# Patient Record
Sex: FEMALE | Race: WHITE | Employment: OTHER | URBAN - METROPOLITAN AREA
[De-identification: names, ages, dates, MRNs, and addresses within clinical notes are randomized per-mention and may not be internally consistent; named-entity substitution may affect disease eponyms.]

---

## 2017-01-20 ENCOUNTER — HOSPITAL ENCOUNTER (OUTPATIENT)
Dept: LAB | Age: 77
Discharge: HOME | End: 2017-01-20
Payer: MEDICARE

## 2017-01-20 PROCEDURE — 83550 IRON BINDING TEST: CPT

## 2017-01-20 PROCEDURE — 82728 ASSAY OF FERRITIN: CPT

## 2017-01-21 LAB
ALBUMIN SERPL-MCNC: 4.2 G/DL (ref 3.5–4.8)
ALBUMIN/GLOB SERPL: 1.4 {RATIO} (ref 1.1–2.5)
ALP SERPL-CCNC: 61 IU/L (ref 39–117)
ALT SERPL-CCNC: 23 IU/L (ref 0–32)
AST SERPL-CCNC: 17 IU/L (ref 0–40)
BILIRUB SERPL-MCNC: 0.2 MG/DL (ref 0–1.2)
BUN SERPL-MCNC: 21 MG/DL (ref 8–27)
BUN/CREAT SERPL: 24 (ref 11–26)
CALCIUM SERPL-MCNC: 9.6 MG/DL (ref 8.7–10.3)
CHLORIDE SERPL-SCNC: 96 MMOL/L (ref 96–106)
CHOLEST SERPL-MCNC: 142 MG/DL (ref 100–199)
CO2 SERPL-SCNC: 24 MMOL/L (ref 18–29)
CREAT SERPL-MCNC: 0.86 MG/DL (ref 0.57–1)
ERYTHROCYTE [DISTWIDTH] IN BLOOD BY AUTOMATED COUNT: 14.7 % (ref 12.3–15.4)
EST. AVERAGE GLUCOSE BLD GHB EST-MCNC: 143 MG/DL
GLOBULIN SER CALC-MCNC: 2.9 G/DL (ref 1.5–4.5)
GLUCOSE SERPL-MCNC: 138 MG/DL (ref 65–99)
HBA1C MFR BLD: 6.6 % (ref 4.8–5.6)
HCT VFR BLD AUTO: 33.8 % (ref 34–46.6)
HDLC SERPL-MCNC: 45 MG/DL
HGB BLD-MCNC: 10.9 G/DL (ref 11.1–15.9)
LDLC SERPL CALC-MCNC: 73 MG/DL (ref 0–99)
MCH RBC QN AUTO: 28 PG (ref 26.6–33)
MCHC RBC AUTO-ENTMCNC: 32.2 G/DL (ref 31.5–35.7)
MCV RBC AUTO: 87 FL (ref 79–97)
PLATELET # BLD AUTO: 340 X10E3/UL (ref 150–379)
POTASSIUM SERPL-SCNC: 5 MMOL/L (ref 3.5–5.2)
PROT SERPL-MCNC: 7.1 G/DL (ref 6–8.5)
RBC # BLD AUTO: 3.89 X10E6/UL (ref 3.77–5.28)
SODIUM SERPL-SCNC: 137 MMOL/L (ref 134–144)
TRIGL SERPL-MCNC: 121 MG/DL (ref 0–149)
VLDLC SERPL CALC-MCNC: 24 MG/DL (ref 5–40)
WBC # BLD AUTO: 7.1 X10E3/UL (ref 3.4–10.8)

## 2017-01-21 NOTE — PROGRESS NOTES
Tell pt bs avg is 135-140 good control of diabetes  Cholesterol levels wnl and at goal  Lytes kidney and liver wnl  Tell pt she is mildly anemic--she is on aspirin and plavix at risk for gi bleeding--I recommend that she sees GI Dr. Renea Paige again for the anemia.  Might need upper endoscopy--also please add on iron profile and ferritin

## 2017-01-23 LAB
FERRITIN SERPL-MCNC: 22 NG/ML (ref 15–150)
IRON SATN MFR SERPL: 11 % (ref 15–55)
IRON SERPL-MCNC: 50 UG/DL (ref 27–139)
SPECIMEN STATUS REPORT, ROLRST: NORMAL
TIBC SERPL-MCNC: 439 UG/DL (ref 250–450)
UIBC SERPL-MCNC: 389 UG/DL (ref 118–369)

## 2017-01-23 NOTE — PROGRESS NOTES
Pt called -bp 180/100 at [facility] this evening. Mild headache. Has had 3 bp readings elevated at gyn office recently  Advised tylenol for HA. Go to  if continues to have HA and get bp repeated. Try to get in to see PCP this week for BP check. Will forward to PCP.

## 2017-01-23 NOTE — PROGRESS NOTES
Discussed with pt--take iron bid otc and see Dr Yari Martinez for EGD-d/t anemia. pt verbalized understanding

## 2017-01-25 ENCOUNTER — TELEPHONE (OUTPATIENT)
Dept: INTERNAL MEDICINE CLINIC | Age: 77
End: 2017-01-25

## 2017-01-25 NOTE — TELEPHONE ENCOUNTER
Titi with Dr. Annmarie Freitas office called and states that they are needing the pt's recent office notes to be faxed to #[phone]. Please call Titi if needed.

## 2017-02-01 ENCOUNTER — TELEPHONE (OUTPATIENT)
Dept: INTERNAL MEDICINE CLINIC | Age: 77
End: 2017-02-01

## 2017-02-01 NOTE — TELEPHONE ENCOUNTER
Pt states the iron is not agreeing with her. She has diarrhea and not feeling well. Please call today she ask.

## 2017-02-01 NOTE — TELEPHONE ENCOUNTER
I called and spoke with patient. Pt was advised to try imodium AD and to hold the Iron. She was also advised to ask the pharmacist where to find Iron Gluconate as this is easier on the stomach. She verbalized understanding.

## 2017-02-22 RX ORDER — CELECOXIB 200 MG/1
CAPSULE ORAL
Qty: 30 CAP | Refills: 6 | Status: SHIPPED | OUTPATIENT
Start: 2017-02-22 | End: 2017-09-29 | Stop reason: SDUPTHER

## 2017-03-07 ENCOUNTER — OFFICE VISIT (OUTPATIENT)
Dept: INTERNAL MEDICINE CLINIC | Age: 77
End: 2017-03-07

## 2017-03-07 VITALS
HEIGHT: 63 IN | BODY MASS INDEX: 31.01 KG/M2 | WEIGHT: 175 LBS | TEMPERATURE: 98 F | HEART RATE: 95 BPM | OXYGEN SATURATION: 96 % | DIASTOLIC BLOOD PRESSURE: 78 MMHG | SYSTOLIC BLOOD PRESSURE: 149 MMHG

## 2017-03-07 DIAGNOSIS — Z13.39 SCREENING FOR ALCOHOLISM: ICD-10-CM

## 2017-03-07 DIAGNOSIS — Z00.00 ROUTINE GENERAL MEDICAL EXAMINATION AT A HEALTH CARE FACILITY: ICD-10-CM

## 2017-03-07 DIAGNOSIS — H26.9 CATARACT: Primary | ICD-10-CM

## 2017-03-07 DIAGNOSIS — Z78.0 ASYMPTOMATIC MENOPAUSAL STATE: ICD-10-CM

## 2017-03-07 NOTE — MR AVS SNAPSHOT
Visit Information Date & Time Provider Department Dept. Phone Encounter #  
 3/7/2017  3:15 PM Jean Marie Segundo, [address] [phone] [id number] Your Appointments 5/30/2017 11:30 AM  
ROUTINE CARE with Jean Marie Segundo, [address] [facility]) Appt Note: . [phone] [id number]  
[address] [facility] [address] Upcoming Health Maintenance Date Due DTaP/Tdap/Td series (1 - Tdap) 12/23/1962 OSTEOPOROSIS SCREENING (DEXA) 12/23/2006 EYE EXAM RETINAL OR DILATED Q1 10/29/2016 MEDICARE YEARLY EXAM 12/17/2016 MICROALBUMIN Q1 4/11/2017 HEMOGLOBIN A1C Q6M 7/20/2017 FOOT EXAM Q1 10/14/2017 GLAUCOMA SCREENING Q2Y 10/29/2017 LIPID PANEL Q1 1/20/2018 COLONOSCOPY 8/29/2019 Allergies as of 3/7/2017  Review Complete On: 3/7/2017 By: Jean Marie Segundo MD  
  
 Severity Noted Reaction Type Reactions Codeine  11/16/2009    Other (comments) Jerking sensation Livalo [Pitavastatin]  10/03/2013    Diarrhea Niaspan [Niacin]  10/08/2012    Myalgia Statins-hmg-coa Reductase Inhibitors  12/07/2010    Myalgia Zocor, pravachol, Lipitor, crestor Welchol [Colesevelam]  06/03/2013    Other (comments) Nausea, bloating and malaise Zetia [Ezetimibe]  04/01/2014    Myalgia Current Immunizations  Reviewed on 2/13/2016 Name Date Influenza Vaccine 11/30/2015, 10/18/2013 Influenza Vaccine Whole 9/29/2012 PPD  Incomplete, 5/19/2011 Pneumococcal Vaccine (Unspecified Type) 1/1/2008 Zoster Vaccine, Live 2/15/2017 Not reviewed this visit You Were Diagnosed With   
  
 Codes Comments Cataract    -  Primary ICD-10-CM: H26.9 ICD-9-CM: 366.9 Asymptomatic menopausal state     ICD-10-CM: Z78.0 ICD-9-CM: V49.81 Routine general medical examination at a health care facility     ICD-10-CM: Z00.00 ICD-9-CM: V70.0 Screening for alcoholism     ICD-10-CM: Z13.89 ICD-9-CM: V79.1 Vitals BP Pulse Temp Height(growth percentile) Weight(growth percentile) SpO2  
 149/78 (BP 1 Location: Left arm, BP Patient Position: Sitting) 95 98 °F (36.7 °C) (Oral) 5' 3\" (1.6 m) 175 lb (79.4 kg) 96% BMI OB Status Smoking Status 31 kg/m2 Postmenopausal Never Smoker BMI and BSA Data Body Mass Index Body Surface Area  
 31 kg/m 2 1.88 m 2 Preferred Pharmacy Pharmacy Name Phone [facility] [phone] [phone] Your Updated Medication List  
  
   
This list is accurate as of: 3/7/17  3:26 PM.  Always use your most recent med list.  
  
  
  
  
 aspirin 81 mg tablet Take 81 mg by mouth daily. atorvastatin 40 mg tablet Commonly known as:  LIPITOR Take  by mouth daily. celecoxib 200 mg capsule Commonly known as:  CELEBREX  
take 1 capsule by mouth once daily CENTRUM SILVER PO Take 1 Tab by mouth daily. clopidogrel 75 mg Tab Commonly known as:  PLAVIX Take 1 Tab by mouth daily. COREG 6.25 mg tablet Generic drug:  carvedilol Take  by mouth two (2) times daily (with meals). cyanocobalamin 500 mcg tablet Commonly known as:  VITAMIN B12 Take 500 mcg by mouth daily. fenofibrate 54 mg tablet Commonly known as:  LOFIBRA Take  by mouth daily. fluticasone 50 mcg/actuation nasal spray Commonly known as:  Alric Eaves INSTILL 2 SPRAYS IN EACH NOSTRIL ONCE DAILY  
  
 gabapentin 300 mg capsule Commonly known as:  NEURONTIN Take 2 Caps by mouth three (3) times daily. insulin glargine 100 unit/mL (3 mL) pen Commonly known as:  LANTUS SOLOSTAR Inject 16 units at bedtime--Dose change 10/14/16--updated med list--did not send prescription to the pharmacy Insulin Needles (Disposable) 31 gauge x 3/16\" Ndle Commonly known as:  PEN NEEDLE Use as directed once daily losartan 50 mg tablet Commonly known as:  COZAAR Take  by mouth daily. montelukast 10 mg tablet Commonly known as:  SINGULAIR  
take 1 tablet by mouth once daily NORVASC 2.5 mg tablet Generic drug:  amLODIPine Take  by mouth daily. omeprazole 20 mg capsule Commonly known as:  PRILOSEC Take 1 Cap by mouth two (2) times a day. PEPCID 20 mg tablet Generic drug:  famotidine Take 20 mg by mouth as needed. ranolazine  mg SR tablet Commonly known as:  RANEXA Take 1 Tab by mouth two (2) times a day. SITagliptin-metFORMIN 50-1,000 mg per tablet Commonly known as:  JANUMET  
take 1 tablet by mouth twice a day with food To-Do List   
 03/20/2017 Imaging:  DEXA BONE DENSITY STUDY AXIAL Patient Instructions Schedule of Personalized Health Plan - female (Provide Copy to Patient) The best way to stay healthy is to live a healthy lifestyle. A healthy lifestyle includes regular exercise, eating a well-balanced diet, keeping a healthy weight and not smoking. Regular physical exams and screening tests are another important way to take care of yourself. Preventive exams provided by health care providers can find health problems early when treatment works best and can keep you from getting certain diseases or illnesses. Preventive services include exams, lab tests, screenings, shots, monitoring and information to help you take care of your own health. All people over 65 should have a pneumonia shot. Pneumonia shots are usually only needed once in a lifetime unless your doctor decides differently. All people over 65 should have a yearly flu shot. People over 65 are at medium to high risk for Hepatitis B. Three shots are needed for complete protection. In addition to your physical exam, some screening tests are recommended: 
 
Bone mass measurement (dexa scan) is recommended every two years Diabetes Mellitus screening is recommended every year. Glaucoma is an eye disease caused by high pressure in the eye. An eye exam is recommended every year. Cardiovascular screening tests that check your cholesterol and other blood fat (lipid) levels are recommended every five years. Colorectal Cancer screening tests help to find pre-cancerous polyps (growths in the colon) so they can be removed before they turn into cancer. Tests ordered for screening depend on your personal and family history risk factors. Screening for Breast Cancer is recommended yearly with a mammogram. 
 
Screening for Cervical Cancer is recommended every two years (annually for certain risk factors, such as previous history of STD or abnormal PAP in past 7 years), with a Pelvic Exam with PAP Here is a list of your current Health Maintenance items with a due date: 
Health Maintenance Due Topic Date Due  
 DTaP/Tdap/Td series (1 - Tdap) 12/23/1962  
 OSTEOPOROSIS SCREENING (DEXA)  12/23/2006  
 EYE EXAM RETINAL OR DILATED Q1  10/29/2016  MEDICARE YEARLY EXAM  12/17/2016 Introducing [facility] SERVICES! Byron Torre introduces [employer] patient portal. Now you can access parts of your medical record, email your doctor's office, and request medication refills online. 1. In your internet browser, go to [email or web address] 2. Click on the First Time User? Click Here link in the Sign In box. You will see the New Member Sign Up page. 3. Enter your [employer] Access Code exactly as it appears below. You will not need to use this code after youve completed the sign-up process. If you do not sign up before the expiration date, you must request a new code. · [employer] Access Code: [email or web address] Expires: 6/5/2017  3:26 PM 
 
4. Enter the last four digits of your Social Security Number (xxxx) and Date of Birth (mm/dd/yyyy) as indicated and click Submit. You will be taken to the next sign-up page. 5. Create a [employer] ID. This will be your [employer] login ID and cannot be changed, so think of one that is secure and easy to remember. 6. Create a [employer] password. You can change your password at any time. 7. Enter your Password Reset Question and Answer. This can be used at a later time if you forget your password. 8. Enter your e-mail address. You will receive e-mail notification when new information is available in [address] 9. Click Sign Up. You can now view and download portions of your medical record. 10. Click the Download Summary menu link to download a portable copy of your medical information. If you have questions, please visit the Frequently Asked Questions section of the [employer] website. Remember, [employer] is NOT to be used for urgent needs. For medical emergencies, dial 911. Now available from your iPhone and Android! Please provide this summary of care documentation to your next provider. Your primary care clinician is listed as Nick OLIVEROS. If you have any questions after today's visit, please call [phone].

## 2017-03-07 NOTE — PROGRESS NOTES
HISTORY OF PRESENT ILLNESS  Emma Gramajo is a 77 y.o. female. HPI   preop cataract sx-Dr Tabares  Has not had any CP or angina sxs  Going to gym in [place] 3 days per week and feels ok  Had egd Dr Salvador for anemia--negative per pt  On oral iron but causes diarrhea    Patient Active Problem List    Diagnosis Date Noted    Unstable angina ([facility] 02/13/2016    NSTEMI (non-ST elevated myocardial infarction) ([facility] 01/19/2016    Advanced care planning/counseling discussion 12/17/2015    Migraine 02/19/2014    Hiatal hernia 05/15/2012    CAD (coronary artery disease) 05/15/2012    Type 2 diabetes, controlled, with neuropathy ([facility] 11/16/2009    Essential hypertension, benign 11/16/2009    Pure hypercholesterolemia 11/16/2009    Neuropathy in diabetes ([facility] 11/16/2009    DJD (degenerative joint disease) 11/16/2009    DOMINGO (obstructive sleep apnea) 11/16/2009    Breast cancer ([facility] 11/16/2009     Current Outpatient Prescriptions   Medication Sig Dispense Refill    celecoxib (CELEBREX) 200 mg capsule take 1 capsule by mouth once daily 30 Cap 6    gabapentin (NEURONTIN) 300 mg capsule Take 2 Caps by mouth three (3) times daily. 540 Cap 3    carvedilol (COREG) 6.25 mg tablet Take  by mouth two (2) times daily (with meals).  amLODIPine (NORVASC) 2.5 mg tablet Take  by mouth daily.  losartan (COZAAR) 50 mg tablet Take  by mouth daily.  insulin glargine (LANTUS SOLOSTAR) 100 unit/mL (3 mL) pen Inject 16 units at bedtime--Dose change 10/14/16--updated med list--did not send prescription to the pharmacy 15 mL 3    fluticasone (FLONASE) 50 mcg/actuation nasal spray INSTILL 2 SPRAYS IN EACH NOSTRIL ONCE DAILY 16 g 8    sitaGLIPtin-metFORMIN (JANUMET) 50-1,000 mg per tablet take 1 tablet by mouth twice a day with food 180 Tab 3    atorvastatin (LIPITOR) 40 mg tablet Take  by mouth daily.  fenofibrate (LOFIBRA) 54 mg tablet Take  by mouth daily.       omeprazole (PRILOSEC) 20 mg capsule Take 1 Cap by mouth two (2) times a day. 60 Cap 11    montelukast (SINGULAIR) 10 mg tablet take 1 tablet by mouth once daily 30 Tab 6    cyanocobalamin (VITAMIN B12) 500 mcg tablet Take 500 mcg by mouth daily.  ranolazine ER (RANEXA) 500 mg SR tablet Take 1 Tab by mouth two (2) times a day. 60 Tab 12    clopidogrel (PLAVIX) 75 mg tablet Take 1 Tab by mouth daily. 30 Tab 11    Insulin Needles, Disposable, (PEN NEEDLE) 31 x 3/16 \" Ndle Use as directed once daily 100 Each 11    aspirin 81 mg Tab Take 81 mg by mouth daily.  MULTIVITAMINS W-MINERALS/LUT (CENTRUM SILVER PO) Take 1 Tab by mouth daily.  famotidine (PEPCID) 20 mg tablet Take 20 mg by mouth as needed.        Allergies   Allergen Reactions    Codeine Other (comments)     Jerking sensation    Livalo [Pitavastatin] Diarrhea    Niaspan [Niacin] Myalgia    Statins-Hmg-Coa Reductase Inhibitors Myalgia     Zocor, pravachol, Lipitor, crestor    Welchol [Colesevelam] Other (comments)     Nausea, bloating and malaise    Zetia [Ezetimibe] Myalgia     Past Medical History:   Diagnosis Date    Arthritis     [facility] 2002    s/p lumpectomy and XRT    Diabetes ([facility]     GERD (gastroesophageal reflux disease)     with hiatal hernia    Hypercholesterolemia     Hypertension     Neuropathy in diabetes ([facility]     DOMINGO (obstructive sleep apnea)     on CPAP     Past Surgical History:   Procedure Laterality Date    BREAST SURGERY PROCEDURE UNLISTED  2001    right lumpectomy    CARDIAC SURG PROCEDURE UNLIST      cardiac cath; 3 stents placed    HX CARPAL TUNNEL RELEASE      b/l    HX HERNIA REPAIR      HX LUMBAR FUSION  July 2015    L4-L5    HX ORTHOPAEDIC      spinal fusion    VASCULAR SURGERY PROCEDURE UNLIST      right leg vein stripping     Family History   Problem Relation Age of Onset    Diabetes Mother     Heart Disease Mother     Stroke Mother     Breast Cancer Mother 79    Diabetes Brother     Heart Disease Brother     Emphysema Father     Diabetes Daughter      Social History   Substance Use Topics    Smoking status: Never Smoker    Smokeless tobacco: Never Used    Alcohol use No      Lab Results  Component Value Date/Time   WBC 7.1 01/20/2017 09:54 AM   WBC 8.0 05/15/2012 12:00 AM   HGB 10.9 01/20/2017 09:54 AM   HCT 33.8 01/20/2017 09:54 AM   PLATELET 404 37/02/7795 09:54 AM   MCV 87 01/20/2017 09:54 AM       Lab Results  Component Value Date/Time   GFR est AA 76 01/20/2017 09:54 AM   GFR est non-AA 66 01/20/2017 09:54 AM   Creatinine 0.86 01/20/2017 09:54 AM   BUN 21 01/20/2017 09:54 AM   Sodium 137 01/20/2017 09:54 AM   Potassium 5.0 01/20/2017 09:54 AM   Chloride 96 01/20/2017 09:54 AM   CO2 24 01/20/2017 09:54 AM         Review of Systems   Constitutional: Negative for chills, fever, malaise/fatigue and weight loss. Eyes: Negative for blurred vision and double vision. Respiratory: Negative for cough and shortness of breath. Cardiovascular: Negative for chest pain and palpitations. Gastrointestinal: Negative for abdominal pain, blood in stool, constipation, diarrhea, melena, nausea and vomiting. Genitourinary: Negative for dysuria, frequency, hematuria and urgency. Musculoskeletal: Negative for back pain, falls, joint pain and myalgias. Neurological: Negative for dizziness, tremors and headaches. Physical Exam   Constitutional: She appears well-developed and well-nourished. Appears stated age   Cardiovascular: Normal rate, regular rhythm and normal heart sounds. Exam reveals no gallop and no friction rub. No murmur heard. Pulmonary/Chest: Effort normal and breath sounds normal. No respiratory distress. She has no wheezes. Abdominal: Soft. Bowel sounds are normal.   Musculoskeletal: She exhibits no edema. Neurological: She is alert. Psychiatric: She has a normal mood and affect. Nursing note and vitals reviewed.       ASSESSMENT and PLAN  [address] [place] [address] was seen today for annual wellness visit and pre-op exam.    Diagnoses and all orders for this visit:    Cataract   Acceptable candidate and risk for sx-complete preop form  Asymptomatic menopausal state  -     DEXA BONE DENSITY STUDY AXIAL;  Future    Routine general medical examination at a health care facility    Screening for alcoholism    Rtc 3 months  Follow-up Disposition: Not on File

## 2017-03-07 NOTE — PROGRESS NOTES
This is a Subsequent Medicare Annual Wellness Visit providing Personalized Prevention Plan Services (PPPS) (Performed 12 months after initial AWV and PPPS )    I have reviewed the patient's medical history in detail and updated the computerized patient record. History     Past Medical History:   Diagnosis Date    Arthritis     Breast cancer ([facility] 2002    s/p lumpectomy and XRT    Diabetes ([facility]     GERD (gastroesophageal reflux disease)     with hiatal hernia    Hypercholesterolemia     Hypertension     Neuropathy in diabetes ([facility]     DOMINGO (obstructive sleep apnea)     on CPAP      Past Surgical History:   Procedure Laterality Date    BREAST SURGERY PROCEDURE UNLISTED  2001    right lumpectomy    CARDIAC SURG PROCEDURE UNLIST      cardiac cath; 3 stents placed    HX CARPAL TUNNEL RELEASE      b/l    HX HERNIA REPAIR      HX LUMBAR FUSION  July 2015    L4-L5    HX ORTHOPAEDIC      spinal fusion    VASCULAR SURGERY PROCEDURE UNLIST      right leg vein stripping     Current Outpatient Prescriptions   Medication Sig Dispense Refill    celecoxib (CELEBREX) 200 mg capsule take 1 capsule by mouth once daily 30 Cap 6    gabapentin (NEURONTIN) 300 mg capsule Take 2 Caps by mouth three (3) times daily. 540 Cap 3    carvedilol (COREG) 6.25 mg tablet Take  by mouth two (2) times daily (with meals).  amLODIPine (NORVASC) 2.5 mg tablet Take  by mouth daily.  losartan (COZAAR) 50 mg tablet Take  by mouth daily.  insulin glargine (LANTUS SOLOSTAR) 100 unit/mL (3 mL) pen Inject 16 units at bedtime--Dose change 10/14/16--updated med list--did not send prescription to the pharmacy 15 mL 3    fluticasone (FLONASE) 50 mcg/actuation nasal spray INSTILL 2 SPRAYS IN EACH NOSTRIL ONCE DAILY 16 g 8    sitaGLIPtin-metFORMIN (JANUMET) 50-1,000 mg per tablet take 1 tablet by mouth twice a day with food 180 Tab 3    atorvastatin (LIPITOR) 40 mg tablet Take  by mouth daily.       fenofibrate (LOFIBRA) 54 mg tablet Take  by mouth daily.  omeprazole (PRILOSEC) 20 mg capsule Take 1 Cap by mouth two (2) times a day. 60 Cap 11    montelukast (SINGULAIR) 10 mg tablet take 1 tablet by mouth once daily 30 Tab 6    cyanocobalamin (VITAMIN B12) 500 mcg tablet Take 500 mcg by mouth daily.  ranolazine ER (RANEXA) 500 mg SR tablet Take 1 Tab by mouth two (2) times a day. 60 Tab 12    clopidogrel (PLAVIX) 75 mg tablet Take 1 Tab by mouth daily. 30 Tab 11    Insulin Needles, Disposable, (PEN NEEDLE) 31 x 3/16 \" Ndle Use as directed once daily 100 Each 11    aspirin 81 mg Tab Take 81 mg by mouth daily.  MULTIVITAMINS W-MINERALS/LUT (CENTRUM SILVER PO) Take 1 Tab by mouth daily.  famotidine (PEPCID) 20 mg tablet Take 20 mg by mouth as needed.        Allergies   Allergen Reactions    Codeine Other (comments)     Jerking sensation    Livalo [Pitavastatin] Diarrhea    Niaspan [Niacin] Myalgia    Statins-Hmg-Coa Reductase Inhibitors Myalgia     Zocor, pravachol, Lipitor, crestor    Welchol [Colesevelam] Other (comments)     Nausea, bloating and malaise    Zetia [Ezetimibe] Myalgia     Family History   Problem Relation Age of Onset    Diabetes Mother     Heart Disease Mother    [facility] Stroke Mother     Breast Cancer Mother 79    Diabetes Brother     Heart Disease Brother     Emphysema Father     Diabetes Daughter      Social History   Substance Use Topics    Smoking status: Never Smoker    Smokeless tobacco: Never Used    Alcohol use No     Patient Active Problem List   Diagnosis Code    Type 2 diabetes, controlled, with neuropathy ([facility] E11.40    Essential hypertension, benign I10    Pure hypercholesterolemia E78.00    Neuropathy in diabetes ([facility] E11.40    DJD (degenerative joint disease) M19.90    DOMINGO (obstructive sleep apnea) G47.33    Breast cancer ([facility] C50.919    Hiatal hernia K44.9    CAD (coronary artery disease) I25.10    Migraine G43.909    Advanced care planning/counseling discussion Z70.80    NSTEMI (non-ST elevated myocardial infarction) ([facility] I21.4    Unstable angina (HCC) I20.0       Depression Risk Factor Screening:     PHQ 2 / 9, over the last two weeks 3/7/2017   Little interest or pleasure in doing things Not at all   Feeling down, depressed or hopeless Not at all   Total Score PHQ 2 0     Alcohol Risk Factor Screening: On any occasion during the past 3 months, have you had more than 3 drinks containing alcohol? No    Do you average more than 7 drinks per week? No      Functional Ability and Level of Safety:     Hearing Loss   normal-to-mild    Activities of Daily Living   Self-care. Requires assistance with: no ADLs    Fall Risk     Fall Risk Assessment, last 12 mths 3/7/2017   Able to walk? Yes   Fall in past 12 months? No     Abuse Screen   None  Patient is not abused    Review of Systems   A comprehensive review of systems was negative except for that written in the HPI. Physical Examination     Evaluation of Cognitive Function:  Mood/affect:  happy  Appearance: age appropriate, casually dressed and within normal Limits  Family member/caregiver input: none    Visit Vitals    /78 (BP 1 Location: Left arm, BP Patient Position: Sitting)    Pulse 95    Temp 98 °F (36.7 °C) (Oral)    Ht 5' 3\" (1.6 m)    Wt 175 lb (79.4 kg)    SpO2 96%    BMI 31 kg/m2     General:  Alert, cooperative, no distress, appears stated age. Head:  Normocephalic, without obvious abnormality, atraumatic. Eyes:  Conjunctivae/corneas clear. PERRL, EOMs intact. Fundi benign. Ears:  Normal TMs and external ear canals both ears. Nose: Nares normal. Septum midline. Mucosa normal. No drainage or sinus tenderness. Throat: Lips, mucosa, and tongue normal. Teeth and gums normal.   Neck: Supple, symmetrical, trachea midline, no adenopathy, thyroid: no enlargement/tenderness/nodules, no carotid bruit and no JVD. Back:   Symmetric, no curvature. ROM normal. No CVA tenderness. Lungs:   Clear to auscultation bilaterally. Chest wall:  No tenderness or deformity. Heart:  Regular rate and rhythm, S1, S2 normal, no murmur, click, rub or gallop. Breast Exam:  No tenderness, masses, or nipple abnormality. Abdomen:   Soft, non-tender. Bowel sounds normal. No masses,  No organomegaly. Genitalia:  Normal female without lesion, discharge or tenderness. Rectal:  Normal tone,  no masses or tenderness  Guaiac negative stool. Extremities: Extremities normal, atraumatic, no cyanosis or edema. Pulses: 2+ and symmetric all extremities. Skin: Skin color, texture, turgor normal. No rashes or lesions. Lymph nodes: Cervical, supraclavicular, and axillary nodes normal.   Neurologic: CNII-XII intact. Normal strength, sensation and reflexes throughout. Patient Care Team:  Lesia Collins MD as PCP - Lizy Garcia RN as Ambulatory Care Navigator  Frances Mars MD as Consulting Provider (Endocrinology)  Meg Batres MD (Neurology)  Jefferson Lesches., MD (Gastroenterology)  Jose E Chacko MD (Ophthalmology)  Edilberto Potter MD (Neurosurgery)  Donavon Reddy RN as One Braulio Richard MD as Physician (Sleep Medicine)  Frances Mars MD as Consulting Provider (Endocrinology)    Advice/Referrals/Counseling   Education and counseling provided:  End-of-Life planning (with patient's consent)-see ACP note  Bone mass measurement (DEXA)-ordered      Assessment/Plan   Viji Bonilla was seen today for annual wellness visit and pre-op exam.    Diagnoses and all orders for this visit:    Cataract   Cleared for sx    Asymptomatic menopausal state  -     DEXA BONE DENSITY STUDY AXIAL; Future    rtc 3 months  Follow-up Disposition: Not on File.

## 2017-03-07 NOTE — PATIENT INSTRUCTIONS
Schedule of Personalized Health Plan - female  (Provide Copy to Patient)  The best way to stay healthy is to live a healthy lifestyle. A healthy lifestyle includes regular exercise, eating a well-balanced diet, keeping a healthy weight and not smoking. Regular physical exams and screening tests are another important way to take care of yourself. Preventive exams provided by health care providers can find health problems early when treatment works best and can keep you from getting certain diseases or illnesses. Preventive services include exams, lab tests, screenings, shots, monitoring and information to help you take care of your own health. All people over 65 should have a pneumonia shot. Pneumonia shots are usually only needed once in a lifetime unless your doctor decides differently. All people over 65 should have a yearly flu shot. People over 65 are at medium to high risk for Hepatitis B. Three shots are needed for complete protection. In addition to your physical exam, some screening tests are recommended:    Bone mass measurement (dexa scan) is recommended every two years  Diabetes Mellitus screening is recommended every year. Glaucoma is an eye disease caused by high pressure in the eye. An eye exam is recommended every year. Cardiovascular screening tests that check your cholesterol and other blood fat (lipid) levels are recommended every five years. Colorectal Cancer screening tests help to find pre-cancerous polyps (growths in the colon) so they can be removed before they turn into cancer. Tests ordered for screening depend on your personal and family history risk factors.     Screening for Breast Cancer is recommended yearly with a mammogram.    Screening for Cervical Cancer is recommended every two years (annually for certain risk factors, such as previous history of STD or abnormal PAP in past 7 years), with a Pelvic Exam with PAP    Here is a list of your current Health Maintenance items with a due date:  Health Maintenance Due   Topic Date Due    DTaP/Tdap/Td series (1 - Tdap) 12/23/1962    OSTEOPOROSIS SCREENING (DEXA)  12/23/2006    EYE EXAM RETINAL OR DILATED Q1  10/29/2016    MEDICARE YEARLY EXAM  12/17/2016

## 2017-03-07 NOTE — ACP (ADVANCE CARE PLANNING)
Pt's daughter Regla Barrientos is SDM per pt. Pt has AMD at home but may want to update.  I gave pt AMD forms for completion

## 2017-04-02 ENCOUNTER — DOCUMENTATION ONLY (OUTPATIENT)
Dept: INTERNAL MEDICINE CLINIC | Age: 77
End: 2017-04-02

## 2017-04-02 PROBLEM — M85.852 OSTEOPENIA OF LEFT THIGH: Status: ACTIVE | Noted: 2017-04-02

## 2017-04-02 NOTE — PROGRESS NOTES
Telephone call. Discussed dexa. Normal spine. Normal total hip . Mild osteopenia of fem neck. Advised calicum plus D bid, walking and repeat scan 2-3 years.

## 2017-04-18 ENCOUNTER — OFFICE VISIT (OUTPATIENT)
Dept: INTERNAL MEDICINE CLINIC | Age: 77
End: 2017-04-18

## 2017-04-18 VITALS
HEIGHT: 63 IN | DIASTOLIC BLOOD PRESSURE: 76 MMHG | WEIGHT: 174 LBS | TEMPERATURE: 97.9 F | HEART RATE: 89 BPM | OXYGEN SATURATION: 97 % | BODY MASS INDEX: 30.83 KG/M2 | SYSTOLIC BLOOD PRESSURE: 128 MMHG

## 2017-04-18 DIAGNOSIS — H26.8 OTHER CATARACT: Primary | ICD-10-CM

## 2017-04-18 RX ORDER — NITROGLYCERIN 0.4 MG/1
0.4 TABLET SUBLINGUAL
Qty: 25 TAB | Refills: 3 | Status: SHIPPED | OUTPATIENT
Start: 2017-04-18

## 2017-04-18 RX ORDER — FUROSEMIDE 20 MG/1
20 TABLET ORAL
Qty: 30 TAB | Refills: 0 | Status: SHIPPED | OUTPATIENT
Start: 2017-04-18 | End: 2018-08-10

## 2017-04-18 NOTE — PROGRESS NOTES
HISTORY OF PRESENT ILLNESS  Angela Marr is a 77 y.o. female. HPI     Here for preop cataracts--sx scheduled left eye surgery 5-8-17  Had prior right eye cataract sx lst month which went well  Had egd and colonoscopy this year for mild anemia--Dr Rider  No cp or angina sxs    Patient Active Problem List    Diagnosis Date Noted    Osteopenia of left thigh 04/02/2017    Unstable angina ([facility] 02/13/2016    NSTEMI (non-ST elevated myocardial infarction) ([facility] 01/19/2016    Advanced care planning/counseling discussion 12/17/2015    Migraine 02/19/2014    Hiatal hernia 05/15/2012    CAD (coronary artery disease) 05/15/2012    Type 2 diabetes, controlled, with neuropathy ([facility] 11/16/2009    Essential hypertension, benign 11/16/2009    Pure hypercholesterolemia 11/16/2009    Neuropathy in diabetes ([facility] 11/16/2009    DJD (degenerative joint disease) 11/16/2009    DOMINGO (obstructive sleep apnea) 11/16/2009    Breast cancer ([facility] 11/16/2009     Current Outpatient Prescriptions   Medication Sig Dispense Refill    montelukast (SINGULAIR) 10 mg tablet take 1 tablet by mouth once daily 30 Tab 11    furosemide (LASIX) 20 mg tablet Take 1 Tab by mouth daily as needed. 30 Tab 0    nitroglycerin (NITROSTAT) 0.4 mg SL tablet 1 Tab by SubLINGual route every five (5) minutes as needed for Chest Pain. 25 Tab 3    celecoxib (CELEBREX) 200 mg capsule take 1 capsule by mouth once daily 30 Cap 6    gabapentin (NEURONTIN) 300 mg capsule Take 2 Caps by mouth three (3) times daily. 540 Cap 3    famotidine (PEPCID) 20 mg tablet Take 20 mg by mouth as needed.  carvedilol (COREG) 6.25 mg tablet Take  by mouth two (2) times daily (with meals).  amLODIPine (NORVASC) 2.5 mg tablet Take  by mouth daily.  losartan (COZAAR) 50 mg tablet Take  by mouth daily.       insulin glargine (LANTUS SOLOSTAR) 100 unit/mL (3 mL) pen Inject 16 units at bedtime--Dose change 10/14/16--updated med list--did not send prescription to the pharmacy (Patient taking differently: Inject 18 units at bedtime--Dose change 10/14/16--updated med list--did not send prescription to the pharmacy) 15 mL 3    fluticasone (FLONASE) 50 mcg/actuation nasal spray INSTILL 2 SPRAYS IN EACH NOSTRIL ONCE DAILY 16 g 8    sitaGLIPtin-metFORMIN (JANUMET) 50-1,000 mg per tablet take 1 tablet by mouth twice a day with food 180 Tab 3    atorvastatin (LIPITOR) 40 mg tablet Take  by mouth daily.  fenofibrate (LOFIBRA) 54 mg tablet Take  by mouth daily.  omeprazole (PRILOSEC) 20 mg capsule Take 1 Cap by mouth two (2) times a day. 60 Cap 11    cyanocobalamin (VITAMIN B12) 500 mcg tablet Take 500 mcg by mouth daily.  ranolazine ER (RANEXA) 500 mg SR tablet Take 1 Tab by mouth two (2) times a day. 60 Tab 12    clopidogrel (PLAVIX) 75 mg tablet Take 1 Tab by mouth daily. 30 Tab 11    Insulin Needles, Disposable, (PEN NEEDLE) 31 x 3/16 \" Ndle Use as directed once daily 100 Each 11    aspirin 81 mg Tab Take 81 mg by mouth daily.  MULTIVITAMINS W-MINERALS/LUT (CENTRUM SILVER PO) Take 1 Tab by mouth daily.        Allergies   Allergen Reactions    Codeine Other (comments)     Jerking sensation    Livalo [Pitavastatin] Diarrhea    Niaspan [Niacin] Myalgia    Statins-Hmg-Coa Reductase Inhibitors Myalgia     Zocor, pravachol, Lipitor, crestor    Welchol [Colesevelam] Other (comments)     Nausea, bloating and malaise    Zetia [Ezetimibe] Myalgia     Past Medical History:   Diagnosis Date    Arthritis     Breast cancer ([facility] 2002    s/p lumpectomy and XRT    Diabetes ([facility]     GERD (gastroesophageal reflux disease)     with hiatal hernia    Hypercholesterolemia     Hypertension     Neuropathy in diabetes ([facility]     DOMINGO (obstructive sleep apnea)     on CPAP     Past Surgical History:   Procedure Laterality Date    BREAST SURGERY PROCEDURE UNLISTED  2001    right lumpectomy    CARDIAC SURG PROCEDURE UNLIST      cardiac cath; 3 stents placed    HX CARPAL TUNNEL RELEASE      b/l    HX HERNIA REPAIR      HX LUMBAR FUSION  July 2015    L4-L5    HX ORTHOPAEDIC      spinal fusion    VASCULAR SURGERY PROCEDURE UNLIST      right leg vein stripping     Family History   Problem Relation Age of Onset    Diabetes Mother     Heart Disease Mother     Stroke Mother     Breast Cancer Mother 79    Diabetes Brother     Heart Disease Brother     Emphysema Father     Diabetes Daughter      Social History   Substance Use Topics    Smoking status: Never Smoker    Smokeless tobacco: Never Used    Alcohol use No      Lab Results  Component Value Date/Time   WBC 7.1 01/20/2017 09:54 AM   WBC 8.0 05/15/2012 12:00 AM   HGB 10.9 01/20/2017 09:54 AM   HCT 33.8 01/20/2017 09:54 AM   PLATELET 171 10/63/9967 09:54 AM   MCV 87 01/20/2017 09:54 AM       Lab Results  Component Value Date/Time   Hemoglobin A1c 6.6 01/20/2017 09:54 AM   Hemoglobin A1c 6.3 10/14/2016 12:41 PM   Hemoglobin A1c 6.3 04/11/2016 09:14 AM   Hemoglobin A1c, External 6.3 04/30/2016   Glucose 138 01/20/2017 09:54 AM   Glucose (POC) 85 02/15/2016 08:03 AM   Microalb/Creat ratio (ug/mg creat.) 84.1 04/11/2016 09:14 AM   LDL, calculated 73 01/20/2017 09:54 AM   Creatinine 0.86 01/20/2017 09:54 AM      Lab Results  Component Value Date/Time   GFR est AA 76 01/20/2017 09:54 AM   GFR est non-AA 66 01/20/2017 09:54 AM   Creatinine 0.86 01/20/2017 09:54 AM   BUN 21 01/20/2017 09:54 AM   Sodium 137 01/20/2017 09:54 AM   Potassium 5.0 01/20/2017 09:54 AM   Chloride 96 01/20/2017 09:54 AM   CO2 24 01/20/2017 09:54 AM         ROS    Physical Exam   Constitutional: She appears well-developed and well-nourished. Appears stated age   Cardiovascular: Normal rate, regular rhythm and normal heart sounds. Exam reveals no gallop and no friction rub. No murmur heard. Pulmonary/Chest: Effort normal and breath sounds normal. No respiratory distress. She has no wheezes. Abdominal: Soft.  Bowel sounds are normal. Musculoskeletal: She exhibits no edema. Neurological: She is alert. Psychiatric: She has a normal mood and affect. Nursing note and vitals reviewed. ASSESSMENT and PLAN  Topher Dick was seen today for pre-op exam.    Diagnoses and all orders for this visit:    Other cataract   Acceptable candidate and risk for surgery, surgery is low risk   Will complete preop form and fax    Edema   Pt request rx for minor leg edema and weight gain, no sob sxs   Lasix 20 mg every day prn  Other orders  -     furosemide (LASIX) 20 mg tablet; Take 1 Tab by mouth daily as needed. -     nitroglycerin (NITROSTAT) 0.4 mg SL tablet; 1 Tab by SubLINGual route every five (5) minutes as needed for Chest Pain. Follow-up Disposition:  Return in about 3 months (around 7/18/2017) for f/u dm-2 htn cad hld.

## 2017-04-18 NOTE — MR AVS SNAPSHOT
Visit Information Date & Time Provider Department Dept. Phone Encounter #  
 4/18/2017  2:45 PM Kaci Alberts, [facility] [phone] [id number] Follow-up Instructions Return in about 3 months (around 7/18/2017) for f/u dm-2 htn cad hld. Your Appointments 5/30/2017 11:30 AM  
ROUTINE CARE with Kaci Alberts, [address] [facility] Appt Note: . [facility] [phone] [id number]  
[address] [facility] [address] [facility] Upcoming Health Maintenance Date Due DTaP/Tdap/Td series (1 - Tdap) 12/23/1962 MICROALBUMIN Q1 4/11/2017 HEMOGLOBIN A1C Q6M 7/20/2017 FOOT EXAM Q1 10/14/2017 LIPID PANEL Q1 1/20/2018 EYE EXAM RETINAL OR DILATED Q1 2/28/2018 MEDICARE YEARLY EXAM 3/8/2018 GLAUCOMA SCREENING Q2Y 2/28/2019 COLONOSCOPY 3/1/2022 Allergies as of 4/18/2017  Review Complete On: 3/7/2017 By: Kaci Alberts MD  
  
 Severity Noted Reaction Type Reactions Codeine  11/16/2009    Other (comments) Jerking sensation Livalo [Pitavastatin]  10/03/2013    Diarrhea Niaspan [Niacin]  10/08/2012    Myalgia Statins-hmg-coa Reductase Inhibitors  12/07/2010    Myalgia Zocor, pravachol, Lipitor, crestor Welchol [Colesevelam]  06/03/2013    Other (comments) Nausea, bloating and malaise Zetia [Ezetimibe]  04/01/2014    Myalgia Current Immunizations  Reviewed on 2/13/2016 Name Date Influenza Vaccine 11/30/2015, 10/18/2013 Influenza Vaccine Whole 9/29/2012 PPD  Incomplete, 5/19/2011 Pneumococcal Vaccine (Unspecified Type) 1/1/2008 Zoster Vaccine, Live 2/15/2017 Not reviewed this visit You Were Diagnosed With   
  
 Codes Comments Other cataract    -  Primary ICD-10-CM: H26.8 ICD-9-CM: 366.8 Vitals BP Pulse Temp Height(growth percentile) Weight(growth percentile) SpO2 128/76 (BP 1 Location: Left arm, BP Patient Position: Sitting) 89 97.9 °F (36.6 °C) (Oral) 5' 3\" (1.6 m) 174 lb (78.9 kg) 97% BMI OB Status Smoking Status 30.82 kg/m2 Postmenopausal Never Smoker BMI and BSA Data Body Mass Index Body Surface Area  
 30.82 kg/m 2 1.87 m 2 Preferred Pharmacy Pharmacy Name Phone [facility] [phone] [phone] Your Updated Medication List  
  
   
This list is accurate as of: 4/18/17  3:33 PM.  Always use your most recent med list.  
  
  
  
  
 aspirin 81 mg tablet Take 81 mg by mouth daily. atorvastatin 40 mg tablet Commonly known as:  LIPITOR Take  by mouth daily. celecoxib 200 mg capsule Commonly known as:  CELEBREX  
take 1 capsule by mouth once daily CENTRUM SILVER PO Take 1 Tab by mouth daily. clopidogrel 75 mg Tab Commonly known as:  PLAVIX Take 1 Tab by mouth daily. COREG 6.25 mg tablet Generic drug:  carvedilol Take  by mouth two (2) times daily (with meals). cyanocobalamin 500 mcg tablet Commonly known as:  VITAMIN B12 Take 500 mcg by mouth daily. fenofibrate 54 mg tablet Commonly known as:  LOFIBRA Take  by mouth daily. fluticasone 50 mcg/actuation nasal spray Commonly known as:  Trinidad Morris INSTILL 2 SPRAYS IN EACH NOSTRIL ONCE DAILY  
  
 furosemide 20 mg tablet Commonly known as:  LASIX Take 1 Tab by mouth daily as needed. gabapentin 300 mg capsule Commonly known as:  NEURONTIN Take 2 Caps by mouth three (3) times daily. insulin glargine 100 unit/mL (3 mL) pen Commonly known as:  LANTUS SOLOSTAR Inject 16 units at bedtime--Dose change 10/14/16--updated med list--did not send prescription to the pharmacy Insulin Needles (Disposable) 31 gauge x 3/16\" Ndle Commonly known as:  PEN NEEDLE Use as directed once daily  
  
 losartan 50 mg tablet Commonly known as:  COZAAR  
 Take  by mouth daily. montelukast 10 mg tablet Commonly known as:  SINGULAIR  
take 1 tablet by mouth once daily  
  
 nitroglycerin 0.4 mg SL tablet Commonly known as:  NITROSTAT  
1 Tab by SubLINGual route every five (5) minutes as needed for Chest Pain. NORVASC 2.5 mg tablet Generic drug:  amLODIPine Take  by mouth daily. omeprazole 20 mg capsule Commonly known as:  PRILOSEC Take 1 Cap by mouth two (2) times a day. PEPCID 20 mg tablet Generic drug:  famotidine Take 20 mg by mouth as needed. ranolazine  mg SR tablet Commonly known as:  RANEXA Take 1 Tab by mouth two (2) times a day. SITagliptin-metFORMIN 50-1,000 mg per tablet Commonly known as:  JANUMET  
take 1 tablet by mouth twice a day with food Prescriptions Sent to Pharmacy Refills  
 furosemide (LASIX) 20 mg tablet 0 Sig: Take 1 Tab by mouth daily as needed. Class: Normal  
 Pharmacy: [facility] [id number] #: [phone] Route: Oral  
 nitroglycerin (NITROSTAT) 0.4 mg SL tablet 3 Si Tab by SubLINGual route every five (5) minutes as needed for Chest Pain. Class: Normal  
 Pharmacy: [facility] [id number] [place] [address] Ph #: [phone] Route: SubLINGual  
  
Follow-up Instructions Return in about 3 months (around 2017) for f/u dm-2 htn cad hld. Introducing [facility]! Kandis Dow introduces [employer] patient portal. Now you can access parts of your medical record, email your doctor's office, and request medication refills online. 1. In your internet browser, go to [email or web address] 2. Click on the First Time User? Click Here link in the Sign In box. You will see the New Member Sign Up page. 3. Enter your [employer] Access Code exactly as it appears below. You will not need to use this code after youve completed the sign-up process.  If you do not sign up before the expiration date, you must request a new code. · [employer] Access Code: [email or web address] Expires: 6/5/2017  4:26 PM 
 
4. Enter the last four digits of your Social Security Number (xxxx) and Date of Birth (mm/dd/yyyy) as indicated and click Submit. You will be taken to the next sign-up page. 5. Create a [employer] ID. This will be your [employer] login ID and cannot be changed, so think of one that is secure and easy to remember. 6. Create a [employer] password. You can change your password at any time. 7. Enter your Password Reset Question and Answer. This can be used at a later time if you forget your password. 8. Enter your e-mail address. You will receive e-mail notification when new information is available in [address] 9. Click Sign Up. You can now view and download portions of your medical record. 10. Click the Download Summary menu link to download a portable copy of your medical information. If you have questions, please visit the Frequently Asked Questions section of the [employer] website. Remember, [employer] is NOT to be used for urgent needs. For medical emergencies, dial 911. Now available from your iPhone and Android! Please provide this summary of care documentation to your next provider. Your primary care clinician is listed as Luis OLIVEROS. If you have any questions after today's visit, please call [phone].

## 2017-05-27 RX ORDER — SITAGLIPTIN AND METFORMIN HYDROCHLORIDE 50; 1000 MG/1; MG/1
TABLET, FILM COATED ORAL
Qty: 180 TAB | Refills: 3 | Status: SHIPPED | OUTPATIENT
Start: 2017-05-27 | End: 2018-05-29 | Stop reason: SDUPTHER

## 2017-06-05 RX ORDER — OMEPRAZOLE 20 MG/1
CAPSULE, DELAYED RELEASE ORAL
Qty: 60 CAP | Refills: 11 | Status: SHIPPED | OUTPATIENT
Start: 2017-06-05 | End: 2018-05-29 | Stop reason: SDUPTHER

## 2017-07-16 RX ORDER — FLUTICASONE PROPIONATE 50 MCG
SPRAY, SUSPENSION (ML) NASAL
Qty: 1 BOTTLE | Refills: 8 | Status: SHIPPED | OUTPATIENT
Start: 2017-07-16 | End: 2019-01-21 | Stop reason: SDUPTHER

## 2017-08-01 ENCOUNTER — OFFICE VISIT (OUTPATIENT)
Dept: INTERNAL MEDICINE CLINIC | Age: 77
End: 2017-08-01

## 2017-08-01 ENCOUNTER — HOSPITAL ENCOUNTER (OUTPATIENT)
Dept: LAB | Age: 77
Discharge: HOME | End: 2017-08-01
Payer: MEDICARE

## 2017-08-01 VITALS
HEART RATE: 87 BPM | HEIGHT: 63 IN | OXYGEN SATURATION: 96 % | DIASTOLIC BLOOD PRESSURE: 70 MMHG | SYSTOLIC BLOOD PRESSURE: 121 MMHG | TEMPERATURE: 98.3 F | BODY MASS INDEX: 30.79 KG/M2 | WEIGHT: 173.8 LBS

## 2017-08-01 DIAGNOSIS — I25.10 CORONARY ARTERY DISEASE INVOLVING NATIVE CORONARY ARTERY OF NATIVE HEART WITHOUT ANGINA PECTORIS: ICD-10-CM

## 2017-08-01 DIAGNOSIS — I10 ESSENTIAL HYPERTENSION, BENIGN: ICD-10-CM

## 2017-08-01 DIAGNOSIS — E78.00 PURE HYPERCHOLESTEROLEMIA: ICD-10-CM

## 2017-08-01 DIAGNOSIS — D64.9 ANEMIA, UNSPECIFIED TYPE: ICD-10-CM

## 2017-08-01 DIAGNOSIS — E11.40 TYPE 2 DIABETES, CONTROLLED, WITH NEUROPATHY (HCC): Primary | ICD-10-CM

## 2017-08-01 DIAGNOSIS — I25.5 ISCHEMIC CARDIOMYOPATHY: ICD-10-CM

## 2017-08-01 PROCEDURE — 36415 COLL VENOUS BLD VENIPUNCTURE: CPT

## 2017-08-01 PROCEDURE — 80053 COMPREHEN METABOLIC PANEL: CPT

## 2017-08-01 PROCEDURE — 85027 COMPLETE CBC AUTOMATED: CPT

## 2017-08-01 PROCEDURE — 82043 UR ALBUMIN QUANTITATIVE: CPT

## 2017-08-01 PROCEDURE — 80061 LIPID PANEL: CPT

## 2017-08-01 PROCEDURE — 83036 HEMOGLOBIN GLYCOSYLATED A1C: CPT

## 2017-08-01 NOTE — MR AVS SNAPSHOT
Visit Information Date & Time Provider Department Dept. Phone Encounter #  
 8/1/2017  1:30 PM Ilana Cali, [address] [phone] [id number] Follow-up Instructions Return in about 4 months (around 12/1/2017) for dm-2 htn hld. Upcoming Health Maintenance Date Due DTaP/Tdap/Td series (1 - Tdap) 12/23/1962 MICROALBUMIN Q1 4/11/2017 HEMOGLOBIN A1C Q6M 7/20/2017 INFLUENZA AGE 9 TO ADULT 8/1/2017 FOOT EXAM Q1 10/14/2017 LIPID PANEL Q1 1/20/2018 EYE EXAM RETINAL OR DILATED Q1 2/28/2018 MEDICARE YEARLY EXAM 3/8/2018 GLAUCOMA SCREENING Q2Y 2/28/2019 COLONOSCOPY 3/1/2022 Allergies as of 8/1/2017  Review Complete On: 3/7/2017 By: Ilana Cali MD  
  
 Severity Noted Reaction Type Reactions Codeine  11/16/2009    Other (comments) Jerking sensation Livalo [Pitavastatin]  10/03/2013    Diarrhea Niaspan [Niacin]  10/08/2012    Myalgia Statins-hmg-coa Reductase Inhibitors  12/07/2010    Myalgia Zocor, pravachol, Lipitor, crestor Welchol [Colesevelam]  06/03/2013    Other (comments) Nausea, bloating and malaise Zetia [Ezetimibe]  04/01/2014    Myalgia Current Immunizations  Reviewed on 2/13/2016 Name Date Influenza Vaccine 11/30/2015, 10/18/2013 Influenza Vaccine Whole 9/29/2012 PPD  Incomplete, 5/19/2011 ZZZ-RETIRED (DO NOT USE) Pneumococcal Vaccine (Unspecified Type) 1/1/2008 Zoster Vaccine, Live 2/15/2017 Not reviewed this visit You Were Diagnosed With   
  
 Codes Comments Type 2 diabetes, controlled, with neuropathy ([facility] [address]    -  Primary ICD-10-CM: E11.40 ICD-9-CM: 250.60, 357.2 Essential hypertension, benign     ICD-10-CM: I10 
ICD-9-CM: 401.1 Pure hypercholesterolemia     ICD-10-CM: E78.00 ICD-9-CM: 272.0 Coronary artery disease involving native coronary artery of native heart without angina pectoris     ICD-10-CM: I25.10 ICD-9-CM: 414.01   
 Anemia, unspecified type     ICD-10-CM: D64.9 ICD-9-CM: 285.9 Ischemic cardiomyopathy     ICD-10-CM: I25.5 ICD-9-CM: 414.8 Vitals BP Pulse Temp Height(growth percentile) Weight(growth percentile) SpO2  
 121/70 (BP 1 Location: Left arm, BP Patient Position: Sitting) 87 98.3 °F (36.8 °C) (Oral) 5' 3\" (1.6 m) 173 lb 12.8 oz (78.8 kg) 96% BMI OB Status Smoking Status 30.79 kg/m2 Postmenopausal Never Smoker BMI and BSA Data Body Mass Index Body Surface Area 30.79 kg/m 2 1.87 m 2 Preferred Pharmacy Pharmacy Name Phone [facility] [phone] [place] [address] [phone] Your Updated Medication List  
  
   
This list is accurate as of: 8/1/17  1:55 PM.  Always use your most recent med list.  
  
  
  
  
 aspirin 81 mg tablet Take 81 mg by mouth daily. atorvastatin 40 mg tablet Commonly known as:  LIPITOR Take  by mouth daily. celecoxib 200 mg capsule Commonly known as:  CELEBREX  
take 1 capsule by mouth once daily CENTRUM SILVER PO Take 1 Tab by mouth daily. clopidogrel 75 mg Tab Commonly known as:  PLAVIX Take 1 Tab by mouth daily. COREG 6.25 mg tablet Generic drug:  carvedilol Take  by mouth two (2) times daily (with meals). cyanocobalamin 500 mcg tablet Commonly known as:  VITAMIN B12 Take 500 mcg by mouth daily. fenofibrate 54 mg tablet Commonly known as:  LOFIBRA Take  by mouth daily. fluticasone 50 mcg/actuation nasal spray Commonly known as:  FLONASE  
instill 2 sprays into each nostril daily  
  
 furosemide 20 mg tablet Commonly known as:  LASIX Take 1 Tab by mouth daily as needed. gabapentin 300 mg capsule Commonly known as:  NEURONTIN Take 2 Caps by mouth three (3) times daily. insulin glargine 100 unit/mL (3 mL) Inpn Commonly known as:  LANTUS SOLOSTAR  
 Inject 16 units at bedtime--Dose change 10/14/16--updated med list--did not send prescription to the pharmacy Insulin Needles (Disposable) 31 gauge x 3/16\" Ndle Commonly known as:  PEN NEEDLE Use as directed once daily JANUMET 50-1,000 mg per tablet Generic drug:  SITagliptin-metFORMIN  
take 1 tablet by mouth twice a day with food  
  
 losartan 50 mg tablet Commonly known as:  COZAAR Take  by mouth daily. montelukast 10 mg tablet Commonly known as:  SINGULAIR  
take 1 tablet by mouth once daily  
  
 nitroglycerin 0.4 mg SL tablet Commonly known as:  NITROSTAT  
1 Tab by SubLINGual route every five (5) minutes as needed for Chest Pain. NORVASC 2.5 mg tablet Generic drug:  amLODIPine Take  by mouth daily. omeprazole 20 mg capsule Commonly known as:  PRILOSEC  
take 1 capsule by mouth twice a day PEPCID 20 mg tablet Generic drug:  famotidine Take 20 mg by mouth as needed. ranolazine  mg SR tablet Commonly known as:  RANEXA Take 1 Tab by mouth two (2) times a day. We Performed the Following CBC W/O DIFF [21163 CPT(R)] HEMOGLOBIN A1C WITH EAG [77732 CPT(R)] LIPID PANEL [24882 CPT(R)] METABOLIC PANEL, COMPREHENSIVE [91068 CPT(R)] MICROALBUMIN, UR, RAND W/ MICROALBUMIN/CREA RATIO [id number] CPT(R)] Follow-up Instructions Return in about 4 months (around 12/1/2017) for dm-2 htn hld. Introducing [facility] & HEALTH SERVICES! [employer] introduces ubigrate patient portal. Now you can access parts of your medical record, email your doctor's office, and request medication refills online. 1. In your internet browser, go to [email or web address] 2. Click on the First Time User? Click Here link in the Sign In box. You will see the New Member Sign Up page. 3. Enter your ubigrate Access Code exactly as it appears below.  You will not need to use this code after youve completed the sign-up process. If you do not sign up before the expiration date, you must request a new code. · GetSocial Access Code: [email or web address] Expires: 10/30/2017  1:55 PM 
 
4. Enter the last four digits of your Social Security Number (xxxx) and Date of Birth (mm/dd/yyyy) as indicated and click Submit. You will be taken to the next sign-up page. 5. Create a GetSocial ID. This will be your GetSocial login ID and cannot be changed, so think of one that is secure and easy to remember. 6. Create a GetSocial password. You can change your password at any time. 7. Enter your Password Reset Question and Answer. This can be used at a later time if you forget your password. 8. Enter your e-mail address. You will receive e-mail notification when new information is available in [address] 9. Click Sign Up. You can now view and download portions of your medical record. 10. Click the Download Summary menu link to download a portable copy of your medical information. If you have questions, please visit the Frequently Asked Questions section of the GetSocial website. Remember, GetSocial is NOT to be used for urgent needs. For medical emergencies, dial 911. Now available from your iPhone and Android! Please provide this summary of care documentation to your next provider. Your primary care clinician is listed as Sharon OLIVEROS. If you have any questions after today's visit, please call [phone].

## 2017-08-01 NOTE — PROGRESS NOTES
HISTORY OF PRESENT ILLNESS  Lilo Vasquez is a 77 y.o. female. HPI   F/u DM-2 htn hld cad  In ER in July for presycnope in [place]--ER evaluation negative  Saw cardiologist Dr Cole Bustos recently- no med changes. Ef 45%  Exercise at the local gym 2 days per week , walks 6- 8 minutes on treadmill  No PAD by testing in the past at vascular MD office  No cp or angina  No back pain since sx 2 yrs ago  Last a1c was 6.6    6 months ago  fsbs fluctuates, last 2 months now occasiionaly >200. Usual range 120-150 in am, up to 200 in PM, higher now  Has pain in legs walking and has constant burning in feet -takes neurontin 1 pm, 2 hs       Patient Active Problem List    Diagnosis Date Noted    Osteopenia of left thigh 04/02/2017    Unstable angina ([facility] [address] 02/13/2016    NSTEMI (non-ST elevated myocardial infarction) ([facility] [address] 01/19/2016    Advanced care planning/counseling discussion 12/17/2015    Migraine 02/19/2014    Hiatal hernia 05/15/2012    CAD (coronary artery disease) 05/15/2012    Type 2 diabetes, controlled, with neuropathy ([facility] [address] 11/16/2009    Essential hypertension, benign 11/16/2009    Pure hypercholesterolemia 11/16/2009    Neuropathy in diabetes ([facility] [address] 11/16/2009    DJD (degenerative joint disease) 11/16/2009    DOMINGO (obstructive sleep apnea) 11/16/2009    Breast cancer ([facility] [address] 11/16/2009     Current Outpatient Prescriptions   Medication Sig Dispense Refill    fluticasone (FLONASE) 50 mcg/actuation nasal spray instill 2 sprays into each nostril daily 1 Bottle 8    omeprazole (PRILOSEC) 20 mg capsule take 1 capsule by mouth twice a day 60 Cap 11    JANUMET 50-1,000 mg per tablet take 1 tablet by mouth twice a day with food 180 Tab 3    montelukast (SINGULAIR) 10 mg tablet take 1 tablet by mouth once daily 30 Tab 11    furosemide (LASIX) 20 mg tablet Take 1 Tab by mouth daily as needed.  30 Tab 0    nitroglycerin (NITROSTAT) 0.4 mg SL tablet 1 Tab by SubLINGual route every five (5) minutes as needed for Chest Pain. 25 Tab 3    celecoxib (CELEBREX) 200 mg capsule take 1 capsule by mouth once daily 30 Cap 6    gabapentin (NEURONTIN) 300 mg capsule Take 2 Caps by mouth three (3) times daily. 540 Cap 3    famotidine (PEPCID) 20 mg tablet Take 20 mg by mouth as needed.  carvedilol (COREG) 6.25 mg tablet Take  by mouth two (2) times daily (with meals).  amLODIPine (NORVASC) 2.5 mg tablet Take  by mouth daily.  losartan (COZAAR) 50 mg tablet Take  by mouth daily.  insulin glargine (LANTUS SOLOSTAR) 100 unit/mL (3 mL) pen Inject 16 units at bedtime--Dose change 10/14/16--updated med list--did not send prescription to the pharmacy (Patient taking differently: Inject 18 units at bedtime--Dose change 10/14/16--updated med list--did not send prescription to the pharmacy) 15 mL 3    atorvastatin (LIPITOR) 40 mg tablet Take  by mouth daily.  fenofibrate (LOFIBRA) 54 mg tablet Take  by mouth daily.  cyanocobalamin (VITAMIN B12) 500 mcg tablet Take 500 mcg by mouth daily.  ranolazine ER (RANEXA) 500 mg SR tablet Take 1 Tab by mouth two (2) times a day. 60 Tab 12    clopidogrel (PLAVIX) 75 mg tablet Take 1 Tab by mouth daily. 30 Tab 11    Insulin Needles, Disposable, (PEN NEEDLE) 31 x 3/16 \" Ndle Use as directed once daily 100 Each 11    aspirin 81 mg Tab Take 81 mg by mouth daily.  MULTIVITAMINS W-MINERALS/LUT (CENTRUM SILVER PO) Take 1 Tab by mouth daily.        Allergies   Allergen Reactions    Codeine Other (comments)     Jerking sensation    Livalo [Pitavastatin] Diarrhea    Niaspan [Niacin] Myalgia    Statins-Hmg-Coa Reductase Inhibitors Myalgia     Zocor, pravachol, Lipitor, crestor    Welchol [Colesevelam] Other (comments)     Nausea, bloating and malaise    Zetia [Ezetimibe] Myalgia      Lab Results  Component Value Date/Time   Hemoglobin A1c 6.6 01/20/2017 09:54 AM   Hemoglobin A1c 6.3 10/14/2016 12:41 PM   Hemoglobin A1c 6.3 04/11/2016 09:14 AM   Hemoglobin A1c, External 6.3 04/30/2016   Glucose 138 01/20/2017 09:54 AM   Glucose (POC) 85 02/15/2016 08:03 AM   Microalb/Creat ratio (ug/mg creat.) 84.1 04/11/2016 09:14 AM   LDL, calculated 73 01/20/2017 09:54 AM   Creatinine 0.86 01/20/2017 09:54 AM      Lab Results  Component Value Date/Time   Cholesterol, total 142 01/20/2017 09:54 AM   Cholesterol (POC) 195 08/11/2011 10:08 AM   HDL Cholesterol 45 01/20/2017 09:54 AM   LDL, calculated 73 01/20/2017 09:54 AM   LDL Cholesterol (POC) 113 08/11/2011 10:08 AM   LDL-C, External 104 05/02/2016   Triglyceride 121 01/20/2017 09:54 AM   Triglycerides (POC) 256 08/11/2011 10:08 AM   CHOL/HDL Ratio 5.5 01/19/2016 12:42 PM     Lab Results  Component Value Date/Time   GFR est non-AA 66 01/20/2017 09:54 AM   GFR est AA 76 01/20/2017 09:54 AM   Creatinine 0.86 01/20/2017 09:54 AM   BUN 21 01/20/2017 09:54 AM   Sodium 137 01/20/2017 09:54 AM   Potassium 5.0 01/20/2017 09:54 AM   Chloride 96 01/20/2017 09:54 AM   CO2 24 01/20/2017 09:54 AM   Magnesium 1.5 01/19/2016 12:42 PM          ROS    Physical Exam   Constitutional: She appears well-developed and well-nourished. Appears stated age   Cardiovascular: Normal rate, regular rhythm and normal heart sounds. Exam reveals no gallop and no friction rub. No murmur heard. Pulmonary/Chest: Effort normal and breath sounds normal. No respiratory distress. She has no wheezes. Abdominal: Soft. Bowel sounds are normal.   Musculoskeletal: She exhibits no edema. Neurological: She is alert. Psychiatric: She has a normal mood and affect. Nursing note and vitals reviewed. ASSESSMENT and PLAN  Diagnoses and all orders for this visit:    1. Type 2 diabetes, controlled, with neuropathy ([address]  -     HEMOGLOBIN A1C WITH EAG  -     MICROALBUMIN, UR, RAND W/ MICROALBUMIN/CREA RATIO  -     METABOLIC PANEL, COMPREHENSIVE   Borderline control by fsbs, may need to increase lantus   Work on diet   Check feet daily-discussed    2.  Essential hypertension, benign  -     METABOLIC PANEL, COMPREHENSIVE   controlled    3. Pure hypercholesterolemia  -     METABOLIC PANEL, COMPREHENSIVE  -     LIPID PANEL   On statin tx    4. Coronary artery disease involving native coronary artery of native heart without angina pectoris   EF 45% recnet echo was done   No angina     5.  Anemia   On iron 1 tab every day-slow fe   Colonoscopy this year 1 polyp and EGD and CT was done    rtc 4 months  Follow-up Disposition: Not on File

## 2017-08-02 LAB
ALBUMIN SERPL-MCNC: 4 G/DL (ref 3.5–4.8)
ALBUMIN/CREAT UR: 126.1 MG/G CREAT (ref 0–30)
ALBUMIN/GLOB SERPL: 1.4 {RATIO} (ref 1.2–2.2)
ALP SERPL-CCNC: 50 IU/L (ref 39–117)
ALT SERPL-CCNC: 21 IU/L (ref 0–32)
AST SERPL-CCNC: 15 IU/L (ref 0–40)
BILIRUB SERPL-MCNC: <0.2 MG/DL (ref 0–1.2)
BUN SERPL-MCNC: 21 MG/DL (ref 8–27)
BUN/CREAT SERPL: 20 (ref 12–28)
CALCIUM SERPL-MCNC: 9.5 MG/DL (ref 8.7–10.3)
CHLORIDE SERPL-SCNC: 99 MMOL/L (ref 96–106)
CHOLEST SERPL-MCNC: 145 MG/DL (ref 100–199)
CO2 SERPL-SCNC: 23 MMOL/L (ref 18–29)
CREAT SERPL-MCNC: 1.07 MG/DL (ref 0.57–1)
CREAT UR-MCNC: 130.8 MG/DL
ERYTHROCYTE [DISTWIDTH] IN BLOOD BY AUTOMATED COUNT: 14.2 % (ref 12.3–15.4)
EST. AVERAGE GLUCOSE BLD GHB EST-MCNC: 131 MG/DL
GLOBULIN SER CALC-MCNC: 2.9 G/DL (ref 1.5–4.5)
GLUCOSE SERPL-MCNC: 112 MG/DL (ref 65–99)
HBA1C MFR BLD: 6.2 % (ref 4.8–5.6)
HCT VFR BLD AUTO: 32 % (ref 34–46.6)
HDLC SERPL-MCNC: 40 MG/DL
HGB BLD-MCNC: 10.4 G/DL (ref 11.1–15.9)
LDLC SERPL CALC-MCNC: 65 MG/DL (ref 0–99)
MCH RBC QN AUTO: 28.3 PG (ref 26.6–33)
MCHC RBC AUTO-ENTMCNC: 32.5 G/DL (ref 31.5–35.7)
MCV RBC AUTO: 87 FL (ref 79–97)
MICROALBUMIN UR-MCNC: 164.9 UG/ML
PLATELET # BLD AUTO: 297 X10E3/UL (ref 150–379)
POTASSIUM SERPL-SCNC: 4.8 MMOL/L (ref 3.5–5.2)
PROT SERPL-MCNC: 6.9 G/DL (ref 6–8.5)
RBC # BLD AUTO: 3.68 X10E6/UL (ref 3.77–5.28)
SODIUM SERPL-SCNC: 139 MMOL/L (ref 134–144)
TRIGL SERPL-MCNC: 198 MG/DL (ref 0–149)
VLDLC SERPL CALC-MCNC: 40 MG/DL (ref 5–40)
WBC # BLD AUTO: 8 X10E3/UL (ref 3.4–10.8)

## 2017-08-02 RX ORDER — LOSARTAN POTASSIUM 50 MG/1
100 TABLET ORAL DAILY
Qty: 90 TAB | Refills: 3 | Status: SHIPPED | OUTPATIENT
Start: 2017-08-02 | End: 2017-11-03 | Stop reason: SDUPTHER

## 2017-09-29 RX ORDER — CELECOXIB 200 MG/1
CAPSULE ORAL
Qty: 30 CAP | Refills: 6 | Status: SHIPPED | OUTPATIENT
Start: 2017-09-29 | End: 2019-08-08 | Stop reason: SDUPTHER

## 2017-10-04 RX ORDER — CELECOXIB 200 MG/1
CAPSULE ORAL
Qty: 30 CAP | Refills: 6 | Status: SHIPPED | OUTPATIENT
Start: 2017-10-04 | End: 2017-12-01 | Stop reason: SDUPTHER

## 2017-11-03 ENCOUNTER — DOCUMENTATION ONLY (OUTPATIENT)
Dept: INTERNAL MEDICINE CLINIC | Age: 77
End: 2017-11-03

## 2017-11-03 RX ORDER — LOSARTAN POTASSIUM 100 MG/1
100 TABLET ORAL DAILY
Qty: 90 TAB | Refills: 3 | Status: SHIPPED | OUTPATIENT
Start: 2017-11-03 | End: 2018-08-10

## 2017-12-01 ENCOUNTER — OFFICE VISIT (OUTPATIENT)
Dept: INTERNAL MEDICINE CLINIC | Age: 77
End: 2017-12-01

## 2017-12-01 ENCOUNTER — HOSPITAL ENCOUNTER (OUTPATIENT)
Dept: MAMMOGRAPHY | Age: 77
Discharge: HOME | End: 2017-12-01
Payer: MEDICARE

## 2017-12-01 ENCOUNTER — HOSPITAL ENCOUNTER (OUTPATIENT)
Dept: LAB | Age: 77
Discharge: HOME | End: 2017-12-01
Payer: MEDICARE

## 2017-12-01 VITALS
HEART RATE: 90 BPM | OXYGEN SATURATION: 96 % | TEMPERATURE: 97.2 F | WEIGHT: 176 LBS | HEIGHT: 63 IN | BODY MASS INDEX: 31.18 KG/M2 | SYSTOLIC BLOOD PRESSURE: 125 MMHG | DIASTOLIC BLOOD PRESSURE: 71 MMHG

## 2017-12-01 DIAGNOSIS — Z12.31 VISIT FOR SCREENING MAMMOGRAM: ICD-10-CM

## 2017-12-01 DIAGNOSIS — R11.0 NAUSEA: ICD-10-CM

## 2017-12-01 DIAGNOSIS — E53.8 B12 DEFICIENCY: ICD-10-CM

## 2017-12-01 DIAGNOSIS — E11.40 TYPE 2 DIABETES, CONTROLLED, WITH NEUROPATHY (HCC): Primary | ICD-10-CM

## 2017-12-01 DIAGNOSIS — D50.9 IRON DEFICIENCY ANEMIA, UNSPECIFIED IRON DEFICIENCY ANEMIA TYPE: ICD-10-CM

## 2017-12-01 DIAGNOSIS — E78.00 PURE HYPERCHOLESTEROLEMIA: ICD-10-CM

## 2017-12-01 DIAGNOSIS — R80.9 MICROALBUMINURIA: ICD-10-CM

## 2017-12-01 DIAGNOSIS — I10 ESSENTIAL HYPERTENSION, BENIGN: ICD-10-CM

## 2017-12-01 PROCEDURE — 83550 IRON BINDING TEST: CPT

## 2017-12-01 PROCEDURE — 77063 BREAST TOMOSYNTHESIS BI: CPT

## 2017-12-01 PROCEDURE — 83036 HEMOGLOBIN GLYCOSYLATED A1C: CPT

## 2017-12-01 PROCEDURE — 85027 COMPLETE CBC AUTOMATED: CPT

## 2017-12-01 PROCEDURE — 82728 ASSAY OF FERRITIN: CPT

## 2017-12-01 PROCEDURE — 80053 COMPREHEN METABOLIC PANEL: CPT

## 2017-12-01 PROCEDURE — 36415 COLL VENOUS BLD VENIPUNCTURE: CPT

## 2017-12-01 PROCEDURE — 82607 VITAMIN B-12: CPT

## 2017-12-01 RX ORDER — INSULIN PUMP SYRINGE, 3 ML
EACH MISCELLANEOUS
Qty: 1 KIT | Refills: 0 | Status: SHIPPED | OUTPATIENT
Start: 2017-12-01

## 2017-12-01 RX ORDER — LANCETS
EACH MISCELLANEOUS
Qty: 200 EACH | Refills: 3 | Status: SHIPPED | OUTPATIENT
Start: 2017-12-01

## 2017-12-01 RX ORDER — PEN NEEDLE, DIABETIC 31 GX3/16"
NEEDLE, DISPOSABLE MISCELLANEOUS
Qty: 100 PEN NEEDLE | Refills: 3 | Status: SHIPPED | OUTPATIENT
Start: 2017-12-01

## 2017-12-01 RX ORDER — PREGABALIN 50 MG/1
50 CAPSULE ORAL 3 TIMES DAILY
Qty: 90 CAP | Refills: 6 | Status: SHIPPED | OUTPATIENT
Start: 2017-12-01 | End: 2018-07-04 | Stop reason: SDUPTHER

## 2017-12-01 NOTE — MR AVS SNAPSHOT
Visit Information Date & Time Provider Department Dept. Phone Encounter #  
 12/1/2017  1:30 PM Marti Lama, [address] [phone] [id number] Follow-up Instructions Return in about 4 months (around 4/1/2018) for dm-2 htn hld anemia. Upcoming Health Maintenance Date Due DTaP/Tdap/Td series (1 - Tdap) 12/23/1962 HEMOGLOBIN A1C Q6M 2/1/2018 EYE EXAM RETINAL OR DILATED Q1 2/28/2018 MEDICARE YEARLY EXAM 3/8/2018 MICROALBUMIN Q1 8/1/2018 LIPID PANEL Q1 8/1/2018 FOOT EXAM Q1 10/16/2018 GLAUCOMA SCREENING Q2Y 2/28/2019 COLONOSCOPY 3/1/2022 Allergies as of 12/1/2017  Review Complete On: 12/1/2017 By: Krish Ramos LPN Severity Noted Reaction Type Reactions Codeine  11/16/2009    Other (comments) Jerking sensation Livalo [Pitavastatin]  10/03/2013    Diarrhea Niaspan [Niacin]  10/08/2012    Myalgia Statins-hmg-coa Reductase Inhibitors  12/07/2010    Myalgia Zocor, pravachol, Lipitor, crestor Welchol [Colesevelam]  06/03/2013    Other (comments) Nausea, bloating and malaise Zetia [Ezetimibe]  04/01/2014    Myalgia Current Immunizations  Reviewed on 2/13/2016 Name Date Influenza Vaccine 9/15/2017, 11/30/2015, 10/18/2013 Influenza Vaccine Whole 9/29/2012 PPD  Incomplete, 5/19/2011 ZZZ-RETIRED (DO NOT USE) Pneumococcal Vaccine (Unspecified Type) 1/1/2008 Zoster Vaccine, Live 2/15/2017 Not reviewed this visit You Were Diagnosed With   
  
 Codes Comments Type 2 diabetes, controlled, with neuropathy ([facility] [address]    -  Primary ICD-10-CM: E11.40 ICD-9-CM: 250.60, 357.2 Iron deficiency anemia, unspecified iron deficiency anemia type     ICD-10-CM: D50.9 ICD-9-CM: 280.9 Essential hypertension, benign     ICD-10-CM: I10 
ICD-9-CM: 401.1 Pure hypercholesterolemia     ICD-10-CM: E78.00 ICD-9-CM: 272.0 Microalbuminuria     ICD-10-CM: R80.9 ICD-9-CM: 791.0 B12 deficiency     ICD-10-CM: E53.8 ICD-9-CM: 266.2 Vitals BP Pulse Temp Height(growth percentile) Weight(growth percentile) SpO2  
 125/71 (BP 1 Location: Left arm, BP Patient Position: Sitting) 90 97.2 °F (36.2 °C) (Oral) 5' 3\" (1.6 m) 176 lb (79.8 kg) 96% BMI OB Status Smoking Status 31.18 kg/m2 Postmenopausal Never Smoker BMI and BSA Data Body Mass Index Body Surface Area  
 31.18 kg/m 2 1.88 m 2 Preferred Pharmacy [phone] [facility] [facility] [phone] Your Updated Medication List  
  
   
This list is accurate as of: 12/1/17  2:18 PM.  Always use your most recent med list.  
  
  
  
  
 aspirin 81 mg tablet Take 81 mg by mouth daily. atorvastatin 40 mg tablet Commonly known as:  LIPITOR Take  by mouth daily. Blood-Glucose Meter monitoring kit One touch ultra mini glucometer--dx 250.00  
  
 celecoxib 200 mg capsule Commonly known as:  CELEBREX  
take 1 capsule by mouth once daily CENTRUM SILVER PO Take 1 Tab by mouth daily. clopidogrel 75 mg Tab Commonly known as:  PLAVIX Take 1 Tab by mouth daily. COREG 6.25 mg tablet Generic drug:  carvedilol Take  by mouth two (2) times daily (with meals). cyanocobalamin 500 mcg tablet Commonly known as:  VITAMIN B12 Take 500 mcg by mouth daily. fenofibrate 54 mg tablet Commonly known as:  LOFIBRA Take  by mouth daily. fluticasone 50 mcg/actuation nasal spray Commonly known as:  FLONASE  
instill 2 sprays into each nostril daily  
  
 furosemide 20 mg tablet Commonly known as:  LASIX Take 1 Tab by mouth daily as needed. gabapentin 300 mg capsule Commonly known as:  NEURONTIN Take 2 Caps by mouth three (3) times daily. glucose blood VI test strips strip Commonly known as:  ASCENSIA AUTODISC VI, ONE TOUCH ULTRA TEST VI  
 One touch ultra test strips min---check fsbs bid  
  
 insulin glargine 100 unit/mL (3 mL) Inpn Commonly known as:  LANTUS SOLOSTAR Inject 16 units at bedtime--Dose change 10/14/16--updated med list--did not send prescription to the pharmacy Insulin Needles (Disposable) 31 gauge x 3/16\" Ndle Commonly known as:  PEN NEEDLE Use as directed once daily JANUMET 50-1,000 mg per tablet Generic drug:  SITagliptin-metFORMIN  
take 1 tablet by mouth twice a day with food Lancets Misc Check fsbs bid -250.02  
  
 losartan 100 mg tablet Commonly known as:  COZAAR Take 1 Tab by mouth daily. montelukast 10 mg tablet Commonly known as:  SINGULAIR  
take 1 tablet by mouth once daily  
  
 nitroglycerin 0.4 mg SL tablet Commonly known as:  NITROSTAT  
1 Tab by SubLINGual route every five (5) minutes as needed for Chest Pain. NORVASC 2.5 mg tablet Generic drug:  amLODIPine Take  by mouth daily. omeprazole 20 mg capsule Commonly known as:  PRILOSEC  
take 1 capsule by mouth twice a day PEPCID 20 mg tablet Generic drug:  famotidine Take 20 mg by mouth as needed. ranolazine  mg SR tablet Commonly known as:  RANEXA Take 1 Tab by mouth two (2) times a day. Prescriptions Printed Refills Insulin Needles, Disposable, (PEN NEEDLE) 31 gauge x 3/16\" ndle 3 Sig: Use as directed once daily Class: Print  
 glucose blood VI test strips (ASCENSIA AUTODISC VI, ONE TOUCH ULTRA TEST VI) strip 3 Sig: One touch ultra test strips min---check fsbs bid Class: Print Blood-Glucose Meter monitoring kit 0 Sig: One touch ultra mini glucometer--dx 250.00 Class: Print Lancets misc 3 Sig: Check fsbs bid -250.02 Class: Print We Performed the Following CBC W/O DIFF [14108 CPT(R)] FERRITIN [72987 CPT(R)] HEMOGLOBIN A1C WITH EAG [06433 CPT(R)] IRON PROFILE [id number] CPT(R)] METABOLIC PANEL, COMPREHENSIVE [81914 CPT(R)] VITAMIN B12 [id number] CPT(R)] Follow-up Instructions Return in about 4 months (around 4/1/2018) for dm-2 htn hld anemia. Introducing [facility] & HEALTH SERVICES! [facility] introduces [employer] patient portal. Now you can access parts of your medical record, email your doctor's office, and request medication refills online. 1. In your internet browser, go to [email or web address] 2. Click on the First Time User? Click Here link in the Sign In box. You will see the New Member Sign Up page. 3. Enter your [employer] Mother List Access Code exactly as it appears below. You will not need to use this code after youve completed the sign-up process. If you do not sign up before the expiration date, you must request a new code. · The [employer] List Access Code: [email or web address] Expires: 3/1/2018  2:18 PM 
 
4. Enter the last four digits of your Social Security Number (xxxx) and Date of Birth (mm/dd/yyyy) as indicated and click Submit. You will be taken to the next sign-up page. 5. Create a The Mother List ID. This will be your The Mother List login ID and cannot be changed, so think of one that is secure and easy to remember. 6. Create a The [employer] List password. You can change your password at any time. 7. Enter your Password Reset Question and Answer. This can be used at a later time if you forget your password. 8. Enter your e-mail address. You will receive e-mail notification when new information is available in [address] 9. Click Sign Up. You can now view and download portions of your medical record. 10. Click the Download Summary menu link to download a portable copy of your medical information. If you have questions, please visit the Frequently Asked Questions section of the The Mother List website. Remember, The Mother List is NOT to be used for urgent needs. For medical emergencies, dial 911. Now available from your iPhone and Android! Please provide this summary of care documentation to your next provider. Your primary care clinician is listed as Shannon OLIVEROS. If you have any questions after today's visit, please call [phone].

## 2017-12-01 NOTE — PROGRESS NOTES
HISTORY OF PRESENT ILLNESS  Meaghan Bell is a 77 y.o. female. HPI   F/u DM-2 htn hld cad  Last visit hb was lower 10.4 from 10.9--oral iron inceased to 2 every day. Also elevated microlbumin--losartan dose increased to 100 mgqd  In ER in July for presycnope in [place]--ER evaluation negative  Saw cardiologist Dr Stan Coles recently- no med changes. Ef 45%  Exercise at the local gym 2 days per week , walks 6- 8 minutes on treadmill  No PAD by testing in the past at vascular MD office  No cp or angina  No back pain since sx 2 yrs ago  Last a1c was 6.6    6 months ago  fsbs fluctuates, last 2 months now occasiionaly >200.  Usual range 120-150 in am, up to 200 in PM, higher now  Has pain in legs walking and has constant burning in feet -takes neurontin 1 pm, 2 hs      {Choose one or more HPI Chronic Disease Notes, press DELETE if none desired:66136}  {Choose one or more SmartLinks; press DELETE if none desired:[id number]}   {Choose one or more Last Lab values; press DELETE if none desired:3333874}     ROS    Physical Exam    ASSESSMENT and PLAN  {ASSESSMENT/PLAN:46719}

## 2017-12-01 NOTE — PROGRESS NOTES
HISTORY OF PRESENT ILLNESS  Miriam Gooden is a 77 y.o. female. HPI   HPI   F/u DM-2 htn hld cad  Had recent UTI--treated at [facility] with bactrim x 7d  Last Hb was done to 10.4 --oral increased to 2 every day but still only taking one every day  Dr Yari Martinez-- EGD, M2a, colonoscopy--tics, colon polyps and hemorrhoids    In ER in July for presycnope in [place]--ER evaluation negative  Saw cardiologist Dr John Peña recently- no med changes.  Ef 45%  Exercise at the local gym 2 days per week , walks 6- 8 minutes on treadmill  No PAD by testing in the past at vascular MD office  No cp or angina  No back pain since sx 2 yrs ago  Last a1c was 6.6    6 months ago  fsbs fluctuates, but better after UTI treatments--avg am fsbs 110-160 but night time over 200 sometimes    Pt increased insulin to 20 units qd  Has pain in legs walking and has constant burning in feet and sharp stabbing pain in feet-takes neurontin 1 pm, 2 hs        Patient Active Problem List    Diagnosis Date Noted    Iron deficiency anemia 12/01/2017    Osteopenia of left thigh 04/02/2017    Unstable angina ([address] 02/13/2016    NSTEMI (non-ST elevated myocardial infarction) ([address] 01/19/2016    Advanced care planning/counseling discussion 12/17/2015    Migraine 02/19/2014    Hiatal hernia 05/15/2012    CAD (coronary artery disease) 05/15/2012    Type 2 diabetes, controlled, with neuropathy ([address] 11/16/2009    Essential hypertension, benign 11/16/2009    Pure hypercholesterolemia 11/16/2009    Neuropathy in diabetes ([address] 11/16/2009    DJD (degenerative joint disease) 11/16/2009    DOMINGO (obstructive sleep apnea) 11/16/2009    Breast cancer ([address] 11/16/2009     Current Outpatient Prescriptions   Medication Sig Dispense Refill    Insulin Needles, Disposable, (PEN NEEDLE) 31 gauge x 3/16\" ndle Use as directed once daily 100 Pen Needle 3    glucose blood VI test strips (ASCENSIA AUTODISC VI, ONE TOUCH ULTRA TEST VI) strip One touch ultra test strips min---check fsbs bid 200 Strip 3    Blood-Glucose Meter monitoring kit One touch ultra mini glucometer--dx 250.00 1 Kit 0    Lancets misc Check fsbs bid -250.02 200 Each 3    pregabalin (LYRICA) 50 mg capsule Take 1 Cap by mouth three (3) times daily. Max Daily Amount: 150 mg. 90 Cap 6    losartan (COZAAR) 100 mg tablet Take 1 Tab by mouth daily. 90 Tab 3    celecoxib (CELEBREX) 200 mg capsule take 1 capsule by mouth once daily 30 Cap 6    fluticasone (FLONASE) 50 mcg/actuation nasal spray instill 2 sprays into each nostril daily 1 Bottle 8    omeprazole (PRILOSEC) 20 mg capsule take 1 capsule by mouth twice a day 60 Cap 11    JANUMET 50-1,000 mg per tablet take 1 tablet by mouth twice a day with food 180 Tab 3    montelukast (SINGULAIR) 10 mg tablet take 1 tablet by mouth once daily 30 Tab 11    famotidine (PEPCID) 20 mg tablet Take 20 mg by mouth as needed.  carvedilol (COREG) 6.25 mg tablet Take  by mouth two (2) times daily (with meals).  amLODIPine (NORVASC) 2.5 mg tablet Take  by mouth daily.  insulin glargine (LANTUS SOLOSTAR) 100 unit/mL (3 mL) pen Inject 16 units at bedtime--Dose change 10/14/16--updated med list--did not send prescription to the pharmacy (Patient taking differently: Inject 20 units at bedtime--Dose change 10/14/16--updated med list--did not send prescription to the pharmacy) 15 mL 3    atorvastatin (LIPITOR) 40 mg tablet Take  by mouth daily.  fenofibrate (LOFIBRA) 54 mg tablet Take  by mouth daily.  cyanocobalamin (VITAMIN B12) 500 mcg tablet Take 500 mcg by mouth daily.  ranolazine ER (RANEXA) 500 mg SR tablet Take 1 Tab by mouth two (2) times a day. 60 Tab 12    clopidogrel (PLAVIX) 75 mg tablet Take 1 Tab by mouth daily. 30 Tab 11    aspirin 81 mg Tab Take 81 mg by mouth daily.  MULTIVITAMINS W-MINERALS/LUT (CENTRUM SILVER PO) Take 1 Tab by mouth daily.  furosemide (LASIX) 20 mg tablet Take 1 Tab by mouth daily as needed.  30 Tab 0  nitroglycerin (NITROSTAT) 0.4 mg SL tablet 1 Tab by SubLINGual route every five (5) minutes as needed for Chest Pain. 25 Tab 3     Allergies   Allergen Reactions    Codeine Other (comments)     Jerking sensation    Livalo [Pitavastatin] Diarrhea    Niaspan [Niacin] Myalgia    Statins-Hmg-Coa Reductase Inhibitors Myalgia     Zocor, pravachol, Lipitor, crestor    Welchol [Colesevelam] Other (comments)     Nausea, bloating and malaise    Zetia [Ezetimibe] Myalgia      Lab Results  Component Value Date/Time   Hemoglobin A1c 6.2 08/01/2017 02:01 PM   Hemoglobin A1c 6.6 01/20/2017 09:54 AM   Hemoglobin A1c 6.3 10/14/2016 12:41 PM   Hemoglobin A1c, External 6.3 04/30/2016   Glucose 112 08/01/2017 02:01 PM   Glucose (POC) 85 02/15/2016 08:03 AM   Microalb/Creat ratio (ug/mg creat.) 126.1 08/01/2017 02:01 PM   LDL, calculated 65 08/01/2017 02:01 PM   Creatinine 1.07 08/01/2017 02:01 PM      Lab Results  Component Value Date/Time   Cholesterol, total 145 08/01/2017 02:01 PM   Cholesterol (POC) 195 08/11/2011 10:08 AM   HDL Cholesterol 40 08/01/2017 02:01 PM   LDL, calculated 65 08/01/2017 02:01 PM   LDL Cholesterol (POC) 113 08/11/2011 10:08 AM   LDL-C, External 104 05/02/2016   Triglyceride 198 08/01/2017 02:01 PM   Triglycerides (POC) 256 08/11/2011 10:08 AM   CHOL/HDL Ratio 5.5 01/19/2016 12:42 PM     Lab Results  Component Value Date/Time   GFR est non-AA 51 08/01/2017 02:01 PM   GFR est AA 59 08/01/2017 02:01 PM   Creatinine 1.07 08/01/2017 02:01 PM   BUN 21 08/01/2017 02:01 PM   Sodium 139 08/01/2017 02:01 PM   Potassium 4.8 08/01/2017 02:01 PM   Chloride 99 08/01/2017 02:01 PM   CO2 23 08/01/2017 02:01 PM   Magnesium 1.5 01/19/2016 12:42 PM          ROS    Physical Exam   Constitutional: She appears well-developed and well-nourished. Appears stated age   Cardiovascular: Normal rate, regular rhythm and normal heart sounds. Exam reveals no gallop and no friction rub. No murmur heard.   Pulmonary/Chest: Effort normal and breath sounds normal. No respiratory distress. She has no wheezes. Abdominal: Soft. Bowel sounds are normal.   Musculoskeletal: She exhibits no edema. Neurological: She is alert. Psychiatric: She has a normal mood and affect. Nursing note and vitals reviewed. ASSESSMENT and PLAN  Diagnoses and all orders for this visit:    1. Type 2 diabetes, controlled, with neuropathy ([facility]  -     HEMOGLOBIN A1C WITH EAG  -     METABOLIC PANEL, COMPREHENSIVE   Fait control per home readings but up slightly   Can titfate up lantus if a1c is up    2. Iron deficiency anemia, unspecified iron deficiency anemia type  -     IRON PROFILE  -     CBC W/O DIFF  -     FERRITIN   May need to increase oral iron from 1 every day to one bid or tid    3. Essential hypertension, benign  -     METABOLIC PANEL, COMPREHENSIVE   Good control    4. Pure hypercholesterolemia  -     METABOLIC PANEL, COMPREHENSIVE    5. Microalbuminuria  -     METABOLIC PANEL, COMPREHENSIVE   Continue losartan 100mg qd  6. B12 deficiency  -     VITAMIN B12    7. Nausea  -     US ABD COMP; Future    8. Diabetic neuropathy   Start lyrica 50 mg tid, stop neurontin  Other orders  -     Insulin Needles, Disposable, (PEN NEEDLE) 31 gauge x 3/16\" ndle; Use as directed once daily  -     glucose blood VI test strips (ASCENSIA AUTODISC VI, ONE TOUCH ULTRA TEST VI) strip; One touch ultra test strips min---check fsbs bid  -     Blood-Glucose Meter monitoring kit; One touch ultra mini glucometer--dx 250.00  -     Lancets misc; Check fsbs bid -250.02  -     pregabalin (LYRICA) 50 mg capsule; Take 1 Cap by mouth three (3) times daily. Max Daily Amount: 150 mg. Follow-up Disposition:  Return in about 4 months (around 4/1/2018) for dm-2 htn hld anemia.

## 2017-12-02 LAB
ALBUMIN SERPL-MCNC: 4.1 G/DL (ref 3.5–4.8)
ALBUMIN/GLOB SERPL: 1.5 {RATIO} (ref 1.2–2.2)
ALP SERPL-CCNC: 47 IU/L (ref 39–117)
ALT SERPL-CCNC: 22 IU/L (ref 0–32)
AST SERPL-CCNC: 14 IU/L (ref 0–40)
BILIRUB SERPL-MCNC: 0.2 MG/DL (ref 0–1.2)
BUN SERPL-MCNC: 28 MG/DL (ref 8–27)
BUN/CREAT SERPL: 24 (ref 12–28)
CALCIUM SERPL-MCNC: 9.7 MG/DL (ref 8.7–10.3)
CHLORIDE SERPL-SCNC: 93 MMOL/L (ref 96–106)
CO2 SERPL-SCNC: 20 MMOL/L (ref 18–29)
CREAT SERPL-MCNC: 1.15 MG/DL (ref 0.57–1)
ERYTHROCYTE [DISTWIDTH] IN BLOOD BY AUTOMATED COUNT: 14.4 % (ref 12.3–15.4)
EST. AVERAGE GLUCOSE BLD GHB EST-MCNC: 137 MG/DL
FERRITIN SERPL-MCNC: 41 NG/ML (ref 15–150)
GFR SERPLBLD CREATININE-BSD FMLA CKD-EPI: 47 ML/MIN/1.73
GFR SERPLBLD CREATININE-BSD FMLA CKD-EPI: 54 ML/MIN/1.73
GLOBULIN SER CALC-MCNC: 2.7 G/DL (ref 1.5–4.5)
GLUCOSE SERPL-MCNC: 120 MG/DL (ref 65–99)
HBA1C MFR BLD: 6.4 % (ref 4.8–5.6)
HCT VFR BLD AUTO: 31.2 % (ref 34–46.6)
HGB BLD-MCNC: 10.4 G/DL (ref 11.1–15.9)
IRON SATN MFR SERPL: 15 % (ref 15–55)
IRON SERPL-MCNC: 60 UG/DL (ref 27–139)
MCH RBC QN AUTO: 29.1 PG (ref 26.6–33)
MCHC RBC AUTO-ENTMCNC: 33.3 G/DL (ref 31.5–35.7)
MCV RBC AUTO: 87 FL (ref 79–97)
PLATELET # BLD AUTO: 290 X10E3/UL (ref 150–379)
POTASSIUM SERPL-SCNC: 4.9 MMOL/L (ref 3.5–5.2)
PROT SERPL-MCNC: 6.8 G/DL (ref 6–8.5)
RBC # BLD AUTO: 3.58 X10E6/UL (ref 3.77–5.28)
SODIUM SERPL-SCNC: 131 MMOL/L (ref 134–144)
TIBC SERPL-MCNC: 411 UG/DL (ref 250–450)
UIBC SERPL-MCNC: 351 UG/DL (ref 118–369)
VIT B12 SERPL-MCNC: 786 PG/ML (ref 211–946)
WBC # BLD AUTO: 6.7 X10E3/UL (ref 3.4–10.8)

## 2017-12-06 ENCOUNTER — TELEPHONE (OUTPATIENT)
Dept: INTERNAL MEDICINE CLINIC | Age: 77
End: 2017-12-06

## 2017-12-06 NOTE — TELEPHONE ENCOUNTER
Patient needs needs a call back in reference to Urologist she was referred to can't see her until February 2018 & they have not received documentation from Dr. Doron Malloy. Please call to advise if waiting is ok or if Dr. Doron Malloy can get her in sooner. Please call to discuss.  Thank you

## 2017-12-07 NOTE — TELEPHONE ENCOUNTER
Spoke with patient after 2 patient identifiers being note and advised per Dr. Joselyn Renteria that it is ok to wait to February to be seen. Patient expressed understanding and has no further questions at this time.

## 2017-12-08 ENCOUNTER — HOSPITAL ENCOUNTER (OUTPATIENT)
Dept: ULTRASOUND IMAGING | Age: 77
Discharge: HOME | End: 2017-12-08
Attending: INTERNAL MEDICINE
Payer: MEDICARE

## 2017-12-08 DIAGNOSIS — R11.0 NAUSEA: ICD-10-CM

## 2017-12-08 PROCEDURE — 76700 US EXAM ABDOM COMPLETE: CPT

## 2017-12-08 NOTE — PROGRESS NOTES
US was done for CKD--just has small cysts in both kidneys of no significance--she will see renal MD in 1-2 mos  She dose have moderate to severe fatty liver and has DM-2---she should see a hepatologist to monitor this--refer to Dr Marielos Ibrahim please

## 2017-12-11 ENCOUNTER — DOCUMENTATION ONLY (OUTPATIENT)
Dept: INTERNAL MEDICINE CLINIC | Age: 77
End: 2017-12-11

## 2017-12-11 NOTE — PROGRESS NOTES
Discussed US results--has fatty liver and DM-2--refer to [facility].  Refer to GI MD for Dr Fabio Elias for ongoing abdominal pain and nausea.

## 2017-12-14 RX ORDER — GABAPENTIN 300 MG/1
CAPSULE ORAL
Qty: 540 CAP | Refills: 3 | Status: SHIPPED | OUTPATIENT
Start: 2017-12-14 | End: 2018-08-10

## 2018-02-23 ENCOUNTER — OFFICE VISIT (OUTPATIENT)
Dept: HEMATOLOGY | Age: 78
End: 2018-02-23

## 2018-02-23 VITALS
HEART RATE: 61 BPM | WEIGHT: 169.4 LBS | TEMPERATURE: 97.8 F | SYSTOLIC BLOOD PRESSURE: 134 MMHG | DIASTOLIC BLOOD PRESSURE: 74 MMHG | OXYGEN SATURATION: 96 % | HEIGHT: 63 IN | BODY MASS INDEX: 30.02 KG/M2

## 2018-02-23 DIAGNOSIS — K76.0 FATTY LIVER: Primary | ICD-10-CM

## 2018-02-23 NOTE — PROGRESS NOTES
Chief Complaint   Patient presents with   [facility] Patient     Hepatic Steatosis     Visit Vitals    /74 (BP 1 Location: Left arm, BP Patient Position: Sitting)    Pulse 61    Temp 97.8 °F (36.6 °C) (Tympanic)    Ht 5' 3\" (1.6 m)    Wt 169 lb 6.4 oz (76.8 kg)    SpO2 96%    BMI 30.01 kg/m2     PHQ over the last two weeks 2/23/2018   Little interest or pleasure in doing things Not at all   Feeling down, depressed or hopeless Not at all   Total Score PHQ 2 0

## 2018-02-23 NOTE — MR AVS SNAPSHOT
[address] [facility] [id number] Miah Chan 13 
[phone] Patient: Linda Montana MRN: [id number] :1940 Visit Information Date & Time Provider Department Dept. Phone Encounter #  
 2018  1:30 PM Robert Bazan 47 of [facility] [place] 2] [id number] Follow-up Instructions Return in about 4 weeks (around 3/23/2018) for NP with FS. Your Appointments 2018 10:30 AM  
ROUTINE CARE with Marie Boo, [address] [place] [address],[address] [facility] Appt Note: 4 month for dm-2 htn hld anemia [address] [id number]  
[address] [place] [address] [address] [place] [address] Upcoming Health Maintenance Date Due DTaP/Tdap/Td series (1 - Tdap) 1961 EYE EXAM RETINAL OR DILATED Q1 2018 MEDICARE YEARLY EXAM 3/8/2018 HEMOGLOBIN A1C Q6M 2018 MICROALBUMIN Q1 2018 LIPID PANEL Q1 2018 FOOT EXAM Q1 10/16/2018 GLAUCOMA SCREENING Q2Y 2019 COLONOSCOPY 3/1/2022 Allergies as of 2018  Review Complete On: 2018 By: Juliocesar Morris LPN Severity Noted Reaction Type Reactions Codeine  2009    Other (comments) Jerking sensation Livalo [Pitavastatin]  10/03/2013    Diarrhea Niaspan [Niacin]  10/08/2012    Myalgia Statins-hmg-coa Reductase Inhibitors  2010    Myalgia Zocor, pravachol, Lipitor, crestor Welchol [Colesevelam]  2013    Other (comments) Nausea, bloating and malaise Zetia [Ezetimibe]  2014    Myalgia Current Immunizations  Reviewed on 2016 Name Date Influenza Vaccine 9/15/2017, 2015, 10/18/2013 Influenza Vaccine Whole 2012 PPD  Incomplete, 2011 ZZZ-RETIRED (DO NOT USE) Pneumococcal Vaccine (Unspecified Type) 2008 Zoster Vaccine, Live 2/15/2017 Not reviewed this visit You Were Diagnosed With   
  
 Codes Comments Fatty liver    -  Primary ICD-10-CM: K76.0 ICD-9-CM: 571.8 Vitals BP Pulse Temp Height(growth percentile) Weight(growth percentile) 134/74 (BP 1 Location: Left arm, BP Patient Position: Sitting) 61 97.8 °F (36.6 °C) (Tympanic) 5' 3\" (1.6 m) 169 lb 6.4 oz (76.8 kg) SpO2 BMI OB Status Smoking Status 96% 30.01 kg/m2 Postmenopausal Never Smoker BMI and BSA Data Body Mass Index Body Surface Area 30.01 kg/m 2 1.85 m 2 Preferred Pharmacy Pharm[facility] Name Phone [facility] [phone] [facility] [address] [phone] Your Updated Medication List  
  
   
This list is accurate as of 2/23/18  3:13 PM.  Always use your most recent med list.  
  
  
  
  
 aspirin 81 mg tablet Take 81 mg by mouth daily. atorvastatin 40 mg tablet Commonly known as:  LIPITOR Take  by mouth daily. Blood-Glucose Meter monitoring kit One touch ultra mini glucometer--dx 250.00  
  
 celecoxib 200 mg capsule Commonly known as:  CELEBREX  
take 1 capsule by mouth once daily CENTRUM SILVER PO Take 1 Tab by mouth daily. clopidogrel 75 mg Tab Commonly known as:  PLAVIX Take 1 Tab by mouth daily. COREG 6.25 mg tablet Generic drug:  carvedilol Take  by mouth two (2) times daily (with meals). cyanocobalamin 500 mcg tablet Commonly known as:  VITAMIN B12 Take 500 mcg by mouth daily. fenofibrate 54 mg tablet Commonly known as:  LOFIBRA Take  by mouth daily. fluticasone 50 mcg/actuation nasal spray Commonly known as:  FLONASE  
instill 2 sprays into each nostril daily  
  
 furosemide 20 mg tablet Commonly known as:  LASIX Take 1 Tab by mouth daily as needed. gabapentin 300 mg capsule Commonly known as:  NEURONTIN  
take 2 capsules by mouth three times a day  
  
 glucose blood VI test strips strip Commonly known as:  ASCENSIA AUTODISC VI, ONE TOUCH ULTRA TEST VI One touch ultra test strips min---check fsbs bid  
  
 insulin glargine 100 unit/mL (3 mL) Inpn Commonly known as:  LANTUS SOLOSTAR U-100 INSULIN Inject 16 units at bedtime--Dose change 10/14/16--updated med list--did not send prescription to the pharmacy Insulin Needles (Disposable) 31 gauge x 3/16\" Ndle Commonly known as:  PEN NEEDLE Use as directed once daily JANUMET 50-1,000 mg per tablet Generic drug:  SITagliptin-metFORMIN  
take 1 tablet by mouth twice a day with food Lancets Misc Check fsbs bid -250.02  
  
 losartan 100 mg tablet Commonly known as:  COZAAR Take 1 Tab by mouth daily. montelukast 10 mg tablet Commonly known as:  SINGULAIR  
take 1 tablet by mouth once daily  
  
 nitroglycerin 0.4 mg SL tablet Commonly known as:  NITROSTAT  
1 Tab by SubLINGual route every five (5) minutes as needed for Chest Pain. NORVASC 2.5 mg tablet Generic drug:  amLODIPine Take  by mouth daily. omeprazole 20 mg capsule Commonly known as:  PRILOSEC  
take 1 capsule by mouth twice a day PEPCID 20 mg tablet Generic drug:  famotidine Take 20 mg by mouth as needed. pregabalin 50 mg capsule Commonly known as:  Murriel Aurora Take 1 Cap by mouth three (3) times daily. Max Daily Amount: 150 mg.  
  
 ranolazine  mg SR tablet Commonly known as:  RANEXA Take 1 Tab by mouth two (2) times a day. We Performed the Following ALPHA-1-ANTITRYPSIN, TOTAL [81259 CPT(R)] CBC WITH AUTOMATED DIFF [66572 CPT(R)] FERRITIN [07263 CPT(R)] HEMOGLOBIN A1C WITH EAG [24715 CPT(R)] HEP A AB, TOTAL [id number] CPT(R)] HEP B SURFACE AB T571444 CPT(R)] HEP B SURFACE AG [id number] CPT(R)] HEPATIC FUNCTION PANEL [51348 CPT(R)] HEPATITIS B CORE AB, TOTAL [id number] CPT(R)] IRON PROFILE [id number] CPT(R)] METABOLIC PANEL, BASIC [51402 CPT(R)] PROTHROMBIN TIME + INR [17267 CPT(R)] Follow-up Instructions Return in about 4 weeks (around 3/23/2018) for NP with FS. To-Do List   
 03/27/2018 10:30 AM  
  Appointment with Saleem Church at [facility] ([phone]) Introducing [facility]! [facility] introduces [employer] patient portal. Now you can access parts of your medical record, email your doctor's office, and request medication refills online. 1. In your internet browser, go to [email or web address] 2. Click on the First Time User? Click Here link in the Sign In box. You will see the New Member Sign Up page. 3. Enter your [employer] Access Code exactly as it appears below. You will not need to use this code after youve completed the sign-up process. If you do not sign up before the expiration date, you must request a new code. · [employer] Access Code: [email or web address] Expires: 3/1/2018  2:18 PM 
 
4. Enter the last four digits of your Social Security Number (xxxx) and Date of Birth (mm/dd/yyyy) as indicated and click Submit. You will be taken to the next sign-up page. 5. Create a [employer] ID. This will be your [employer] login ID and cannot be changed, so think of one that is secure and easy to remember. 6. Create a [employer] password. You can change your password at any time. 7. Enter your Password Reset Question and Answer. This can be used at a later time if you forget your password. 8. Enter your e-mail address. You will receive e-mail notification when new information is available in [address] 9. Click Sign Up. You can now view and download portions of your medical record. 10. Click the Download Summary menu link to download a portable copy of your medical information. If you have questions, please visit the Frequently Asked Questions section of the [employer] website. Remember, [employer] is NOT to be used for urgent needs. For medical emergencies, dial 911. Now available from your iPhone and Android! Please provide this summary of care documentation to your next provider. Your primary care clinician is listed as Margarito OLIVEROS. If you have any questions after today's visit, please call [phone].

## 2018-02-23 NOTE — PROGRESS NOTES
[address] Tsering Conde MD, [place], [place], [place]       Edward Valladares, SIMBA Larios, ADDISON Diaz, ROBERTO CARLOS-BC   Vega Jama, SIMBA [facility]eda [facility]    at [address], [address] [place]  22.    [phone]    FAX: [address] [place] [address]    at [facility], [address], [address]    [phone]    FAX: [phone]         Patient Care Team:  Rafat Herrera MD as PCP - Shelby Souza RN as Ambulatory Care Navigator  Jamzin Doll MD as Consulting Provider (Endocrinology)  Shivam Park MD (Neurology)  Vipul Graf MD (Gastroenterology)  Eduar Trent MD (Ophthalmology)  Cornel Bailey MD (Neurosurgery)  Darrian Lund RN as [facility] MD Jose Manuel as Physician (Sleep Medicine)  Jazmin Doll MD as Consulting Provider (Endocrinology)      Problem List  Date Reviewed: 12/1/2017          Codes Class Noted    Fatty liver ICD-10-CM: K76.0  ICD-9-CM: 571.8  2/23/2018        Iron deficiency anemia ICD-10-CM: D50.9  ICD-9-CM: 280.9  12/1/2017    Overview Signed 12/1/2017  1:46 PM by Rafat Herrera MD     colonoscopy 3-1-17--tics,polyp, int hemorrhoids--Dr Manley Smoker  EGD             Osteopenia of left thigh ICD-10-CM: M85.852  ICD-9-CM: 733.90  4/2/2017    Overview Signed 4/2/2017  3:02 PM by Rafat Herrera MD     T -1.1 at fem neck, normal total hip and spine 2017.              NSTEMI (non-ST elevated myocardial infarction) [facility]) ICD-10-CM: I21.4  ICD-9-CM: 410.70  1/19/2016        Advanced care planning/counseling discussion ICD-10-CM: Z71.89  ICD-9-CM: V65.49  12/17/2015    Overview Signed 12/17/2015  5:54 PM by Rafat Herrera MD     Pt has a living will and was advised to bring in a copy              Hiatal hernia ICD-10-CM: K44.9  ICD-9-CM: 553.3  5/15/2012        CAD (coronary artery disease) ICD-10-CM: I25.10  ICD-9-CM: 414.00  5/15/2012    Overview Addendum 3/7/2017  7:19 PM by Aranza Sierra MD     Diffuse nonobstructive dz-Dr. Julisa Bailey  2016--s/p non st elevation IWMI---3 stents to RCA-Dr Noble             Type 2 diabetes, controlled, with neuropathy ([address] ICD-10-CM: E11.40  ICD-9-CM: 250.60, 357.2  11/16/2009        Essential hypertension, benign ICD-10-CM: I10  ICD-9-CM: 401.1  11/16/2009        Pure hypercholesterolemia ICD-10-CM: E78.00  ICD-9-CM: 272.0  11/16/2009        Neuropathy in diabetes [facility]) ICD-10-CM: E11.40  ICD-9-CM: 250.60, 357.2  11/16/2009        DJD (degenerative joint disease) ICD-10-CM: M19.90  ICD-9-CM: 715.90  11/16/2009    Overview Signed 11/16/2009  5:14 PM by Aranza Sierra MD     Hand arthritis             DOMINGO (obstructive sleep apnea) ICD-10-CM: Z37.98  ICD-9-CM: 327.23  11/16/2009    Overview Signed 11/16/2009  5:14 PM by Aranza Sierra MD     Cpap. Pulmonary associated             Breast cancer [facility]) ICD-10-CM: C50.919  ICD-9-CM: 174.9  11/16/2009    Overview Signed 11/16/2009  5:16 PM by Aranza Sierra MD     S/p right lumpectomy and XRT  2001. Dr Liya Benitez and Dr. Gabriela Begum                     The physicians listed above have asked me to see Tayla Murdock in consultation regarding suspected fatty liver disease and its management. All medical records sent by the referring physicians were reviewed including imaging studies     The patient is a 69 y.o.  female who is suspected to have fatty liver disease based upon ultrasound. Serologic evaluation was either not perfromed or available to me. Ultrasound of the liver was performed in 12/2017. The results of the imaging suggested fatty liver disease. An assessment of liver fibrosis with biopsy or elastography has not been performed.       The most recent laboratory studies indicate that the liver transaminases are normal, ALP is normal, tests of hepatic synthetic and metabolic function are normal, and the platelet count is normal.    The patient notes fatigue,     The patient completes all daily activities without any functional limitations. The patient has not experienced pain in the right side over the liver,       ALLERGIES  Allergies   Allergen Reactions    Codeine Other (comments)     Jerking sensation    Livalo [Pitavastatin] Diarrhea    Niaspan [Niacin] Myalgia    Statins-Hmg-Coa Reductase Inhibitors Myalgia     Zocor, pravachol, Lipitor, crestor    Welchol [Colesevelam] Other (comments)     Nausea, bloating and malaise    Zetia [Ezetimibe] Myalgia       MEDICATIONS  Current Outpatient Prescriptions   Medication Sig    Insulin Needles, Disposable, (PEN NEEDLE) 31 gauge x 3/16\" ndle Use as directed once daily    glucose blood VI test strips (ASCENSIA AUTODISC VI, ONE TOUCH ULTRA TEST VI) strip One touch ultra test strips min---check fsbs bid    Lancets misc Check fsbs bid -250.02    pregabalin (LYRICA) 50 mg capsule Take 1 Cap by mouth three (3) times daily. Max Daily Amount: 150 mg.    losartan (COZAAR) 100 mg tablet Take 1 Tab by mouth daily.  fluticasone (FLONASE) 50 mcg/actuation nasal spray instill 2 sprays into each nostril daily    omeprazole (PRILOSEC) 20 mg capsule take 1 capsule by mouth twice a day    JANUMET 50-1,000 mg per tablet take 1 tablet by mouth twice a day with food    montelukast (SINGULAIR) 10 mg tablet take 1 tablet by mouth once daily    furosemide (LASIX) 20 mg tablet Take 1 Tab by mouth daily as needed.  nitroglycerin (NITROSTAT) 0.4 mg SL tablet 1 Tab by SubLINGual route every five (5) minutes as needed for Chest Pain.  famotidine (PEPCID) 20 mg tablet Take 20 mg by mouth as needed.  carvedilol (COREG) 6.25 mg tablet Take  by mouth two (2) times daily (with meals).     amLODIPine (NORVASC) 2.5 mg tablet Take  by mouth daily.    insulin glargine (LANTUS SOLOSTAR) 100 unit/mL (3 mL) pen Inject 16 units at bedtime--Dose change 10/14/16--updated med list--did not send prescription to the pharmacy (Patient taking differently: Inject 20 units at bedtime--Dose change 10/14/16--updated med list--did not send prescription to the pharmacy)    atorvastatin (LIPITOR) 40 mg tablet Take  by mouth daily.  fenofibrate (LOFIBRA) 54 mg tablet Take  by mouth daily.  cyanocobalamin (VITAMIN B12) 500 mcg tablet Take 500 mcg by mouth daily.  ranolazine ER (RANEXA) 500 mg SR tablet Take 1 Tab by mouth two (2) times a day.  clopidogrel (PLAVIX) 75 mg tablet Take 1 Tab by mouth daily.  aspirin 81 mg Tab Take 81 mg by mouth daily.  MULTIVITAMINS W-MINERALS/LUT (CENTRUM SILVER PO) Take 1 Tab by mouth daily.  gabapentin (NEURONTIN) 300 mg capsule take 2 capsules by mouth three times a day    Blood-Glucose Meter monitoring kit One touch ultra mini glucometer--dx 250.00    celecoxib (CELEBREX) 200 mg capsule take 1 capsule by mouth once daily     No current facility-administered medications for this visit. SYSTEM REVIEW NOT RELATED TO LIVER DISEASE OR REVIEWED ABOVE:  Constitution systems: Negative for fever, chills, weight gain, weight loss. Eyes: Negative for visual changes. ENT: Negative for sore throat, painful swallowing. Respiratory: Negative for cough, hemoptysis, SOB. Cardiology: Negative for chest pain, palpitations. GI:  Negative for constipation or diarrhea. : Negative for urinary frequency, dysuria, hematuria, nocturia. Skin: Negative for rash. Hematology: Negative for easy bruising, blood clots. Musculo-skelatal: Negative for back pain, muscle pain, weakness. Neurologic: Negative for headaches, dizziness, vertigo, memory problems not related to HE. Psychology: Negative for anxiety, depression. FAMILY HISTORY:  The father  of COPD. The mother  of MI.     There is no family history of liver disease. SOCIAL HISTORY:  The patient is . The patient has 3 children, and 4 grandchildren. The patient has never used tobacco products. The patient has never consumed significant amounts of alcohol. The patient used to work in accounting. PHYSICAL EXAMINATION:  Visit Vitals    /74 (BP 1 Location: Left arm, BP Patient Position: Sitting)    Pulse 61    Temp 97.8 °F (36.6 °C) (Tympanic)    Ht 5' 3\" (1.6 m)    Wt 169 lb 6.4 oz (76.8 kg)    SpO2 96%    BMI 30.01 kg/m2     General: No acute distress. Eyes: Sclera anicteric. ENT: No oral lesions. Thyroid normal.  Nodes: No adenopathy. Skin: No spider angiomata. No jaundice. No palmar erythema. Respiratory: Lungs clear to auscultation. Cardiovascular: Regular heart rate. No murmurs. No JVD. Abdomen: Soft non-tender. Liver size normal to percussion/palpation. Spleen not palpable. No obvious ascites. Extremities: No edema. No muscle wasting. No gross arthritic changes. Neurologic: Alert and oriented. Cranial nerves grossly intact. No asterixis. LABORATORY STUDIES:  [facility] [place] of [address] Units 2/23/2018 12/1/2017   WBC 3.4 - 10.8 x10E3/uL 6.6 6.7   ANC 1.4 - 7.0 x10E3/uL 4.7    HGB 11.1 - 15.9 g/dL 11.5 10.4 (L)    - 379 x10E3/uL 280 290   INR 0.8 - 1.2 0.9    AST 0 - 40 IU/L 15 14   ALT 0 - 32 IU/L 21 22   Alk Phos 39 - 117 IU/L 47 47   Bili, Total 0.0 - 1.2 mg/dL 0.2 0.2   Bili, Direct 0.00 - 0.40 mg/dL 0.12    Albumin 3.5 - 4.8 g/dL 4.3 4.1   BUN 8 - 27 mg/dL 22 28 (H)   Creat 0.57 - 1.00 mg/dL 0.95 1.15 (H)   Na 134 - 144 mmol/L 139 131 (L)   K 3.5 - 5.2 mmol/L 4.6 4.9   Cl 96 - 106 mmol/L 97 93 (L)   CO2 18 - 29 mmol/L 22 20   Glucose 65 - 99 mg/dL 94 120 (H)   Magnesium 1.6 - 2.4 mg/dL       SEROLOGIES:  Not available or performed. Testing was performed today.     LIVER HISTOLOGY:  Not available or performed    ENDOSCOPIC PROCEDURES:  Not available or performed    RADIOLOGY:  12/2017. Ultrasound of liver. Echogenic consistent with fatty liver. No liver mass lesions. No dilated bile ducts. No ascites. OTHER TESTING:  Not available or performed    ASSESSMENT AND PLAN:  Suspected fatty liver disease of unclear histologic severity. Liver transaminases are normal.  Alkaline phosphate is normal.  Liver function is normal.  The platelet count is normal.      Based upon laboratory studies and imaging the patient does not appear to have significant liver injury. Will perform laboratory testing to monitor liver function and degree of liver injury. This included BMP, hepatic panel, CBC with platelet count,     Will perform serologic and virologic studies to assess for other causes of chronic liver disease. There is no reason to perform liver biopsy at this time. Liver chemistries will be monitored. The need to perform an assessment of liver fibrosis was discussed with the patient. The Fibroscan can assess liver fibrosis and determine if a patient has advanced fibrosis or cirrhosis without the need for liver biopsy. The Fibroscan is currently available at liver [place]. This will be performed at the next office visit. The patient was counseled regarding diet and exercise to achieve weight loss. The best diet for patients with fatty liver is one very low in carbohydrates and enriched with protein such as an Atkins program.      We will continue to monitor the patient at periodic intervals. If weight loss is successful the fat will resolve from the liver and liver enzymes should return to the normal range. We would then repeat an ultrasound to see if this also returns to normal.  If liver enzymes do not return to normal with weight reduction additional evaluation may be necessary over time. The patient was directed to continue all current medications at the current dosages.   There are no contraindications for the patient to take any medications that are necessary for treatment of other medical issues including medications for diabetes mellitus and hypercholesterolemia. The patient was counseled regarding alcohol consumption and that this could contribute to fatty liver disease. The need for vaccination against viral hepatitis A and B will be assessed with serologic and instituted as appropriate. All of the above issues were discussed with the patient. All questions were answered. The patient expressed a clear understanding of the above. [address] [place] [address] in 4 weeks for Fibroscan, to review all data and determine the treatment plan.     Anaya Collins MD  [facility] [place] [address] [place] [address] 22.  [phone]

## 2018-02-24 LAB
A1AT SERPL-MCNC: 127 MG/DL (ref 90–200)
ALBUMIN SERPL-MCNC: 4.3 G/DL (ref 3.5–4.8)
ALP SERPL-CCNC: 47 IU/L (ref 39–117)
ALT SERPL-CCNC: 21 IU/L (ref 0–32)
AST SERPL-CCNC: 15 IU/L (ref 0–40)
BASOPHILS # BLD AUTO: 0 X10E3/UL (ref 0–0.2)
BASOPHILS NFR BLD AUTO: 1 %
BILIRUB DIRECT SERPL-MCNC: 0.12 MG/DL (ref 0–0.4)
BILIRUB SERPL-MCNC: 0.2 MG/DL (ref 0–1.2)
BUN SERPL-MCNC: 22 MG/DL (ref 8–27)
BUN/CREAT SERPL: 23 (ref 12–28)
CALCIUM SERPL-MCNC: 9.7 MG/DL (ref 8.7–10.3)
CHLORIDE SERPL-SCNC: 97 MMOL/L (ref 96–106)
CO2 SERPL-SCNC: 22 MMOL/L (ref 18–29)
CREAT SERPL-MCNC: 0.95 MG/DL (ref 0.57–1)
EOSINOPHIL # BLD AUTO: 0.1 X10E3/UL (ref 0–0.4)
EOSINOPHIL NFR BLD AUTO: 2 %
ERYTHROCYTE [DISTWIDTH] IN BLOOD BY AUTOMATED COUNT: 14.1 % (ref 12.3–15.4)
EST. AVERAGE GLUCOSE BLD GHB EST-MCNC: 123 MG/DL
FERRITIN SERPL-MCNC: 27 NG/ML (ref 15–150)
GFR SERPLBLD CREATININE-BSD FMLA CKD-EPI: 58 ML/MIN/1.73
GFR SERPLBLD CREATININE-BSD FMLA CKD-EPI: 67 ML/MIN/1.73
GLUCOSE SERPL-MCNC: 94 MG/DL (ref 65–99)
HAV AB SER QL IA: NEGATIVE
HBA1C MFR BLD: 5.9 % (ref 4.8–5.6)
HBV CORE AB SERPL QL IA: NEGATIVE
HBV SURFACE AB SER QL: NON REACTIVE
HBV SURFACE AG SERPL QL IA: NEGATIVE
HCT VFR BLD AUTO: 36.4 % (ref 34–46.6)
HGB BLD-MCNC: 11.5 G/DL (ref 11.1–15.9)
IMM GRANULOCYTES # BLD: 0 X10E3/UL (ref 0–0.1)
IMM GRANULOCYTES NFR BLD: 0 %
INR PPP: 0.9 (ref 0.8–1.2)
IRON SATN MFR SERPL: 15 % (ref 15–55)
IRON SERPL-MCNC: 62 UG/DL (ref 27–139)
LYMPHOCYTES # BLD AUTO: 1.2 X10E3/UL (ref 0.7–3.1)
LYMPHOCYTES NFR BLD AUTO: 18 %
MCH RBC QN AUTO: 28 PG (ref 26.6–33)
MCHC RBC AUTO-ENTMCNC: 31.6 G/DL (ref 31.5–35.7)
MCV RBC AUTO: 89 FL (ref 79–97)
MONOCYTES # BLD AUTO: 0.6 X10E3/UL (ref 0.1–0.9)
MONOCYTES NFR BLD AUTO: 9 %
NEUTROPHILS # BLD AUTO: 4.7 X10E3/UL (ref 1.4–7)
NEUTROPHILS NFR BLD AUTO: 70 %
PLATELET # BLD AUTO: 280 X10E3/UL (ref 150–379)
POTASSIUM SERPL-SCNC: 4.6 MMOL/L (ref 3.5–5.2)
PROT SERPL-MCNC: 7.1 G/DL (ref 6–8.5)
PROTHROMBIN TIME: 10.1 SEC (ref 9.1–12)
RBC # BLD AUTO: 4.1 X10E6/UL (ref 3.77–5.28)
SODIUM SERPL-SCNC: 139 MMOL/L (ref 134–144)
TIBC SERPL-MCNC: 425 UG/DL (ref 250–450)
UIBC SERPL-MCNC: 363 UG/DL (ref 118–369)
WBC # BLD AUTO: 6.6 X10E3/UL (ref 3.4–10.8)

## 2018-03-27 ENCOUNTER — OFFICE VISIT (OUTPATIENT)
Dept: HEMATOLOGY | Age: 78
End: 2018-03-27

## 2018-03-27 VITALS
DIASTOLIC BLOOD PRESSURE: 69 MMHG | BODY MASS INDEX: 29.73 KG/M2 | HEART RATE: 83 BPM | TEMPERATURE: 97.3 F | WEIGHT: 167.8 LBS | OXYGEN SATURATION: 99 % | HEIGHT: 63 IN | RESPIRATION RATE: 16 BRPM | SYSTOLIC BLOOD PRESSURE: 124 MMHG

## 2018-03-27 DIAGNOSIS — K76.0 FATTY LIVER: Primary | ICD-10-CM

## 2018-03-27 NOTE — MR AVS SNAPSHOT
[address] [facility] [phone] Miah Chan 13 
[phone] Patient: Jaz Calderon MRN: [id number] :1940 Visit Information Date & Time Provider Department Dept. Phone Encounter #  
 3/27/2018 10:30 AM Viral Hickey, [address] of [place] [address] [id number] Follow-up Instructions Return in about 1 year (around 3/27/2019) for Александр Craig and [address] . Your Appointments 2018 10:30 AM  
ROUTINE CARE with Kaushik Fabian, [address]) Appt Note: 4 month for dm-2 htn hld anemia [address] [address]  
[address] 235 [facility] [address] [place] [address] Upcoming Health Maintenance Date Due DTaP/Tdap/Td series (1 - Tdap) 1961 EYE EXAM RETINAL OR DILATED Q1 2018 MEDICARE YEARLY EXAM 3/14/2018 MICROALBUMIN Q1 2018 LIPID PANEL Q1 2018 HEMOGLOBIN A1C Q6M 2018 FOOT EXAM Q1 10/16/2018 GLAUCOMA SCREENING Q2Y 2019 COLONOSCOPY 3/1/2022 Allergies as of 3/27/2018  Review Complete On: 3/27/2018 By: Rose Love, Certified Medical Assistant Severity Noted Reaction Type Reactions Codeine  2009    Other (comments) Jerking sensation Livalo [Pitavastatin]  10/03/2013    Diarrhea Niaspan [Niacin]  10/08/2012    Myalgia Statins-hmg-coa Reductase Inhibitors  2010    Myalgia Zocor, pravachol, Lipitor, crestor Welchol [Colesevelam]  2013    Other (comments) Nausea, bloating and malaise Zetia [Ezetimibe]  2014    Myalgia Current Immunizations  Reviewed on 2016 Name Date Influenza Vaccine 9/15/2017, 2015, 10/18/2013 Influenza Vaccine Whole 2012 PPD  Incomplete, 2011 ZZZ-RETIRED (DO NOT USE) Pneumococcal Vaccine (Unspecified Type) 2008 Zoster Vaccine, Live 2/15/2017 Not reviewed this visit Vitals BP Pulse Temp Resp Height(growth percentile) Weight(growth percentile) 124/69 (BP 1 Location: Left arm, BP Patient Position: Sitting) 83 97.3 °F (36.3 °C) 16 5' 3\" (1.6 m) 167 lb 12.8 oz (76.1 kg) SpO2 BMI OB Status Smoking Status 99% 29.72 kg/m2 Postmenopausal Never Smoker BMI and BSA Data Body Mass Index Body Surface Area  
 29.72 kg/m 2 1.84 m 2 Preferred Pharmacy Pharmacy Name Phone [facility] [phone] [phone] Your Updated Medication List  
  
   
This list is accurate as of 3/27/18 11:07 AM.  Always use your most recent med list.  
  
  
  
  
 aspirin 81 mg tablet Take 81 mg by mouth daily. atorvastatin 40 mg tablet Commonly known as:  LIPITOR Take  by mouth daily. Blood-Glucose Meter monitoring kit One touch ultra mini glucometer--dx 250.00  
  
 celecoxib 200 mg capsule Commonly known as:  CELEBREX  
take 1 capsule by mouth once daily CENTRUM SILVER PO Take 1 Tab by mouth daily. clopidogrel 75 mg Tab Commonly known as:  PLAVIX Take 1 Tab by mouth daily. COREG 6.25 mg tablet Generic drug:  carvedilol Take  by mouth two (2) times daily (with meals). cyanocobalamin 500 mcg tablet Commonly known as:  VITAMIN B12 Take 500 mcg by mouth daily. fenofibrate 54 mg tablet Commonly known as:  LOFIBRA Take  by mouth daily. fluticasone 50 mcg/actuation nasal spray Commonly known as:  FLONASE  
instill 2 sprays into each nostril daily  
  
 furosemide 20 mg tablet Commonly known as:  LASIX Take 1 Tab by mouth daily as needed. gabapentin 300 mg capsule Commonly known as:  NEURONTIN  
take 2 capsules by mouth three times a day  
  
 glucose blood VI test strips strip Commonly known as:  ASCENSIA AUTODISC VI, ONE TOUCH ULTRA TEST VI One touch ultra test strips min---check fsbs bid insulin glargine 100 unit/mL (3 mL) Inpn Commonly known as:  LANTUS SOLOSTAR U-100 INSULIN Inject 16 units at bedtime--Dose change 10/14/16--updated med list--did not send prescription to the pharmacy Insulin Needles (Disposable) 31 gauge x 3/16\" Ndle Commonly known as:  PEN NEEDLE Use as directed once daily JANUMET 50-1,000 mg per tablet Generic drug:  SITagliptin-metFORMIN  
take 1 tablet by mouth twice a day with food Lancets Misc Check fsbs bid -250.02  
  
 losartan 100 mg tablet Commonly known as:  COZAAR Take 1 Tab by mouth daily. montelukast 10 mg tablet Commonly known as:  SINGULAIR  
take 1 tablet by mouth once daily  
  
 nitroglycerin 0.4 mg SL tablet Commonly known as:  NITROSTAT  
1 Tab by SubLINGual route every five (5) minutes as needed for Chest Pain. NORVASC 2.5 mg tablet Generic drug:  amLODIPine Take  by mouth daily. omeprazole 20 mg capsule Commonly known as:  PRILOSEC  
take 1 capsule by mouth twice a day PEPCID 20 mg tablet Generic drug:  famotidine Take 20 mg by mouth as needed. pregabalin 50 mg capsule Commonly known as:  Gloriann Bacca Take 1 Cap by mouth three (3) times daily. Max Daily Amount: 150 mg.  
  
 ranolazine  mg SR tablet Commonly known as:  RANEXA Take 1 Tab by mouth two (2) times a day. Follow-up Instructions Return in about 1 year (around 3/27/2019) for [facility] and [address] . Introducing [facility]! Nemesio Mejia introduces [employer] patient portal. Now you can access parts of your medical record, email your doctor's office, and request medication refills online. 1. In your internet browser, go to [email or web address] 2. Click on the First Time User? Click Here link in the Sign In box. You will see the New Member Sign Up page. 3. Enter your [employer] Access Code exactly as it appears below.  You will not need to use this code after youve completed the sign-up process. If you do not sign up before the expiration date, you must request a new code. · [employer] [facility] [employer] Access Code: [email or web address] Expires: 6/25/2018 11:07 AM 
 
4. Enter the last four digits of your Social Security Number (xxxx) and Date of Birth (mm/dd/yyyy) as indicated and click Submit. You will be taken to the next sign-up page. 5. Create a [employer] [facility] [employer] ID. This will be your [employer] [facility] [employer] login ID and cannot be changed, so think of one that is secure and easy to remember. 6. Create a [employer] [facility] [employer] password. You can change your password at any time. 7. Enter your Password Reset Question and Answer. This can be used at a later time if you forget your password. 8. Enter your e-mail address. You will receive e-mail notification when new information is available in [address]. 9. Click Sign Up. You can now view and download portions of your medical record. 10. Click the Download Summary menu link to download a portable copy of your medical information. If you have questions, please visit the Frequently Asked Questions section of the [employer] [facility] [employer] website. Remember, [employer] [facility] [employer] is NOT to be used for urgent needs. For medical emergencies, dial 911. Now available from your iPhone and Android! Please provide this summary of care documentation to your next provider. Your primary care clinician is listed as Erika OLIVEROS. If you have any questions after today's visit, please call [phone].

## 2018-03-27 NOTE — PROGRESS NOTES
70 Tsering Conde MD, [address], [facility], [place]       Jaison Abraham, NP    Yaneth Evans, ADDISON Chaves, ACNP-BC   Nicolás Moscoso, SIMBA Tyler Count includes the [facility] 136    at [address]    [address] [place] [address] Ave, [address] [facility], [address].    [phone]    FAX: [address] [place] [address] Avenue    [place] [address] [address], [address], [address]    [phone]    FAX: [phone]         Patient Care Team:  Shira Alcala MD as PCP - Smita Amaya RN as Ambulatory Care Navigator  Marlon Wallis MD as Consulting Provider (Endocrinology)  Bird Crooks MD (Neurology)  Kristin Carter MD (Gastroenterology)  Amparo Pelletier MD (Ophthalmology)  Laquita Iyer MD (Neurosurgery)  Yue Berrios RN as One Braulio Richard MD as Physician (Sleep Medicine)  Marlon Wallis MD as Consulting Provider (Endocrinology)      Problem List  Date Reviewed: 2/25/2018          Codes Class Noted    Fatty liver ICD-10-CM: K76.0  ICD-9-CM: 571.8  2/23/2018        Iron deficiency anemia ICD-10-CM: D50.9  ICD-9-CM: 280.9  12/1/2017    Overview Signed 12/1/2017  1:46 PM by Shira Alcala MD     colonoscopy 3-1-17--tics,polyp, int hemorrhoids--Dr Apoorva Londono  EGD             Osteopenia of left thigh ICD-10-CM: M85.852  ICD-9-CM: 733.90  4/2/2017    Overview Signed 4/2/2017  3:02 PM by Shira Alcala MD     T -1.1 at fem neck, normal total hip and spine 2017.              NSTEMI (non-ST elevated myocardial infarction) [facility]) ICD-10-CM: I21.4  ICD-9-CM: 410.70  1/19/2016        Advanced care planning/counseling discussion ICD-10-CM: Z71.89  ICD-9-CM: V65.49  12/17/2015    Overview Signed 12/17/2015  5:54 PM by Shira Alcala MD     Pt has a living will and was advised to bring in a copy              Hiatal hernia ICD-10-CM: K44.9  ICD-9-CM: 553.3  5/15/2012        CAD (coronary artery disease) ICD-10-CM: I25.10  ICD-9-CM: 414.00  5/15/2012    Overview Addendum 3/7/2017  7:19 PM by Anaya Hoffmann MD     Diffuse nonobstructive dz-Dr. Yosi Amaral  2016--s/p non st elevation IWMI---3 stents to RCA-Dr Noble             Type 2 diabetes, controlled, with neuropathy ([facility] [address] ICD-10-CM: E11.40  ICD-9-CM: 250.60, 357.2  11/16/2009        Essential hypertension, benign ICD-10-CM: I10  ICD-9-CM: 401.1  11/16/2009        Pure hypercholesterolemia ICD-10-CM: E78.00  ICD-9-CM: 272.0  11/16/2009        Neuropathy in diabetes [facility]) ICD-10-CM: E11.40  ICD-9-CM: 250.60, 357.2  11/16/2009        DJD (degenerative joint disease) ICD-10-CM: M19.90  ICD-9-CM: 715.90  11/16/2009    Overview Signed 11/16/2009  5:14 PM by Anaya Hoffmann MD     Hand arthritis             DOMINGO (obstructive sleep apnea) ICD-10-CM: B34.22  ICD-9-CM: 327.23  11/16/2009    Overview Signed 11/16/2009  5:14 PM by Anaya Hoffmann MD     Cpap. Pulmonary associated             Breast cancer [facility]) ICD-10-CM: C50.919  ICD-9-CM: 174.9  11/16/2009    Overview Signed 11/16/2009  5:16 PM by Anaya Hoffmann MD     S/p right lumpectomy and XRT  2001. Dr Tiffany Engel and Dr. Dario Montana returns to the [place] [address] for management of non-alcoholic fatty liver (NAFL) and to perform in-office fibroscan evaluation. The active problem list, all pertinent past medical history, medications, radiologic findings and laboratory findings related to the liver disorder were reviewed with the patient. The patient is a 69 y.o.  female who is suspected to have fatty liver disease based upon ultrasound. She has always had normal liver enzymes and function. Serologic evaluation was performed at her initial office visit and is negative. Ultrasound of the liver was performed in 12/2017.   The results of the imaging suggested fatty liver disease. An assessment of liver fibrosis with biopsy or elastography has not been performed. This is planned for today's office visit. The most recent laboratory studies indicate that the liver transaminases are normal, ALP is normal, tests of hepatic synthetic and metabolic function are normal, and the platelet count is normal.    The patient notes fatigue. The patient completes all daily activities without any functional limitations. The patient has not experienced pain in the right side over the liver. Of note, patient was treated with tamoxifen therapy for several years following breast cancer treatment. She has had no exposure to this medication for the past 3-4 years. ALLERGIES  Allergies   Allergen Reactions    Codeine Other (comments)     Jerking sensation    Livalo [Pitavastatin] Diarrhea    Niaspan [Niacin] Myalgia    Statins-Hmg-Coa Reductase Inhibitors Myalgia     Zocor, pravachol, Lipitor, crestor    Welchol [Colesevelam] Other (comments)     Nausea, bloating and malaise    Zetia [Ezetimibe] Myalgia       MEDICATIONS  Current Outpatient Prescriptions   Medication Sig    gabapentin (NEURONTIN) 300 mg capsule take 2 capsules by mouth three times a day    Insulin Needles, Disposable, (PEN NEEDLE) 31 gauge x 3/16\" ndle Use as directed once daily    glucose blood VI test strips (ASCENSIA AUTODISC VI, ONE TOUCH ULTRA TEST VI) strip One touch ultra test strips min---check fsbs bid    Blood-Glucose Meter monitoring kit One touch ultra mini glucometer--dx 250.00    Lancets misc Check fsbs bid -250.02    pregabalin (LYRICA) 50 mg capsule Take 1 Cap by mouth three (3) times daily. Max Daily Amount: 150 mg.    losartan (COZAAR) 100 mg tablet Take 1 Tab by mouth daily.     celecoxib (CELEBREX) 200 mg capsule take 1 capsule by mouth once daily    fluticasone (FLONASE) 50 mcg/actuation nasal spray instill 2 sprays into each nostril daily    omeprazole (PRILOSEC) 20 mg capsule take 1 capsule by mouth twice a day    JANUMET 50-1,000 mg per tablet take 1 tablet by mouth twice a day with food    montelukast (SINGULAIR) 10 mg tablet take 1 tablet by mouth once daily    nitroglycerin (NITROSTAT) 0.4 mg SL tablet 1 Tab by SubLINGual route every five (5) minutes as needed for Chest Pain.  famotidine (PEPCID) 20 mg tablet Take 20 mg by mouth as needed.  carvedilol (COREG) 6.25 mg tablet Take  by mouth two (2) times daily (with meals).  amLODIPine (NORVASC) 2.5 mg tablet Take  by mouth daily.  insulin glargine (LANTUS SOLOSTAR) 100 unit/mL (3 mL) pen Inject 16 units at bedtime--Dose change 10/14/16--updated med list--did not send prescription to the pharmacy (Patient taking differently: Inject 20 units at bedtime--Dose change 10/14/16--updated med list--did not send prescription to the pharmacy)    atorvastatin (LIPITOR) 40 mg tablet Take  by mouth daily.  fenofibrate (LOFIBRA) 54 mg tablet Take  by mouth daily.  cyanocobalamin (VITAMIN B12) 500 mcg tablet Take 500 mcg by mouth daily.  ranolazine ER (RANEXA) 500 mg SR tablet Take 1 Tab by mouth two (2) times a day.  clopidogrel (PLAVIX) 75 mg tablet Take 1 Tab by mouth daily.  aspirin 81 mg Tab Take 81 mg by mouth daily.  MULTIVITAMINS W-MINERALS/LUT (CENTRUM SILVER PO) Take 1 Tab by mouth daily.  furosemide (LASIX) 20 mg tablet Take 1 Tab by mouth daily as needed. No current facility-administered medications for this visit. SYSTEM REVIEW NOT RELATED TO LIVER DISEASE OR REVIEWED ABOVE:  Constitution systems: Negative for fever, chills, weight gain, weight loss. Eyes: Negative for visual changes. ENT: Negative for sore throat, painful swallowing. Respiratory: Negative for cough, hemoptysis, SOB. Cardiology: Negative for chest pain, palpitations. GI:  Negative for constipation or diarrhea. : Negative for urinary frequency, dysuria, hematuria, nocturia.    Skin: Negative for rash. Hematology: Negative for easy bruising, blood clots. Musculo-skeletal: Negative for back pain, muscle pain, weakness. Neurologic: Negative for headaches, dizziness, vertigo, memory problems not related to HE. Psychology: Negative for anxiety, depression. FAMILY HISTORY:  The father  of COPD. The mother  of MI. There is no family history of liver disease. SOCIAL HISTORY:  The patient is . The patient has 3 children, and 4 grandchildren. The patient has never used tobacco products. The patient has never consumed significant amounts of alcohol. The patient used to work in [employer]. PHYSICAL EXAMINATION:  Visit Vitals    /69 (BP 1 Location: Left arm, BP Patient Position: Sitting)    Pulse 83    Temp 97.3 °F (36.3 °C)    Resp 16    Ht 5' 3\" (1.6 m)    Wt 167 lb 12.8 oz (76.1 kg)    SpO2 99%    BMI 29.72 kg/m2     General: No acute distress. Eyes: Sclera anicteric. ENT: No oral lesions. Thyroid normal.  Nodes: No adenopathy. Skin: No spider angiomata. No jaundice. No palmar erythema. Respiratory: Lungs clear to auscultation. Cardiovascular: Regular heart rate. No murmurs. No JVD. Abdomen: Soft non-tender. Liver size normal to percussion/palpation. Spleen not palpable. No obvious ascites. Extremities: No edema. No muscle wasting. No gross arthritic changes. Neurologic: Alert and oriented. Cranial nerves grossly intact. No asterixis.     LABORATORY STUDIES:  Liver [place] of [address] Units 2018   WBC 3.4 - 10.8 x10E3/uL 6.6 6.7   ANC 1.4 - 7.0 x10E3/uL 4.7    HGB 11.1 - 15.9 g/dL 11.5 10.4 (L)    - 379 x10E3/uL 280 290   INR 0.8 - 1.2 0.9    AST 0 - 40 IU/L 15 14   ALT 0 - 32 IU/L 21 22   Alk Phos 39 - 117 IU/L 47 47   Bili, Total 0.0 - 1.2 mg/dL 0.2 0.2   Bili, Direct 0.00 - 0.40 mg/dL 0.12    Albumin 3.5 - 4.8 g/dL 4.3 4.1   BUN 8 - 27 mg/dL 22 28 (H)   Creat 0.57 - 1.00 mg/dL 0.95 1.15 (H)   Na 134 - 144 mmol/L 139 131 (L)   K 3.5 - 5.2 mmol/L 4.6 4.9   Cl 96 - 106 mmol/L 97 93 (L)   CO2 18 - 29 mmol/L 22 20   Glucose 65 - 99 mg/dL 94 120 (H)   Magnesium 1.6 - 2.4 mg/dL       SEROLOGIES:  Serologies Latest Ref Rng & Units 2/23/2018   Hep A Ab, Total Negative Negative   Hep B Surface Ag Negative Negative   Hep B Core Ab, Total Negative Negative   Hep B Surface AB QL  Non Reactive   Ferritin 15 - 150 ng/mL 27   Iron % Saturation 15 - 55 % 15   Alpha-1 antitrypsin level 90 - 200 mg/dL 127     LIVER HISTOLOGY:  3/2018. FibroScan performed at [address]. EkPa was 3.6. Suggested fibrosis level is F0-1. CAP score is 348. ENDOSCOPIC PROCEDURES:  Not available or performed    RADIOLOGY:  12/2017. Ultrasound of liver. Echogenic consistent with fatty liver. No liver mass lesions. No dilated bile ducts. No ascites. OTHER TESTING:  Not available or performed    ASSESSMENT AND PLAN:  Suspected fatty liver disease in the setting of no significant fibrosis. Liver transaminases are normal.  Alkaline phosphate is normal.  Liver function is normal.  The platelet count is normal.      Based upon laboratory studies and imaging the patient does not appear to have significant liver injury. Serologic and virologic studies to assess for other causes of chronic liver disease were negative. I have reviewed with the patient and her daughter the results of the fibroscan which support significant steatosis of the liver. There does not appear to be associated inflammation as enzymes are normal and no fibrosis as fibroscan score was low. She has multiple features of metabolic syndrome that could contribute to the development of steatosis. Additionally, she has a history of tamoxifen use which is associated with steatosis. I have counseled her on the importance of tight control of these contributors like DM, cholesterol, hypertension, and weight.     There is no reason to perform liver biopsy at this time. Liver chemistries will be monitored. We have recommended monitoring with enzymes and repeat fibroscan in one year. The need to perform an assessment of liver fibrosis was discussed with the patient. The Fibroscan can assess liver fibrosis and determine if a patient has advanced fibrosis or cirrhosis without the need for liver biopsy. The Fibroscan is currently available at liver [place]. This will be performed at the next office visit. The patient was directed to continue all current medications at the current dosages. There are no contraindications for the patient to take any medications that are necessary for treatment of other medical issues including medications for diabetes mellitus and hypercholesterolemia. The patient was counseled regarding alcohol consumption and that this could contribute to fatty liver disease. Vaccination for viral hepatitis A and B is recommended since the patient has no serologic evidence of previous exposure or vaccination with immunity. All of the above issues were discussed with the patient. All questions were answered. The patient expressed a clear understanding of the above. [address] [place] [address] in 12 months for Fibroscan and repeat labs.      Khushi Peacock PA-C  Liver [address], [address] [place]  22.  [phone]\

## 2018-03-27 NOTE — PROGRESS NOTES
Tayla Murdock is a 69 y.o. female    Chief Complaint   Patient presents with    Follow-up       1. Have you been to the ER, urgent care clinic since your last visit? Hospitalized since your last visit? No     2. Have you seen or consulted any other health care providers outside of the [address] [place] [address] since your last visit? Include any pap smears or colon screening.   No     Visit Vitals    /69 (BP 1 Location: Left arm, BP Patient Position: Sitting)    Pulse 83    Temp 97.3 °F (36.3 °C)    Resp 16    Ht 5' 3\" (1.6 m)    Wt 167 lb 12.8 oz (76.1 kg)    SpO2 99%    BMI 29.72 kg/m2

## 2018-04-04 RX ORDER — MONTELUKAST SODIUM 10 MG/1
TABLET ORAL
Qty: 30 TAB | Refills: 11 | Status: SHIPPED | OUTPATIENT
Start: 2018-04-04 | End: 2019-04-04 | Stop reason: SDUPTHER

## 2018-04-06 ENCOUNTER — TELEPHONE (OUTPATIENT)
Dept: INTERNAL MEDICINE CLINIC | Age: 78
End: 2018-04-06

## 2018-04-06 ENCOUNTER — HOSPITAL ENCOUNTER (OUTPATIENT)
Dept: LAB | Age: 78
Discharge: HOME | End: 2018-04-06
Payer: MEDICARE

## 2018-04-06 ENCOUNTER — OFFICE VISIT (OUTPATIENT)
Dept: INTERNAL MEDICINE CLINIC | Age: 78
End: 2018-04-06

## 2018-04-06 VITALS
WEIGHT: 168 LBS | SYSTOLIC BLOOD PRESSURE: 96 MMHG | BODY MASS INDEX: 29.77 KG/M2 | HEART RATE: 82 BPM | HEIGHT: 63 IN | RESPIRATION RATE: 16 BRPM | OXYGEN SATURATION: 98 % | DIASTOLIC BLOOD PRESSURE: 63 MMHG | TEMPERATURE: 97.6 F

## 2018-04-06 DIAGNOSIS — E11.40 TYPE 2 DIABETES, CONTROLLED, WITH NEUROPATHY (HCC): Primary | ICD-10-CM

## 2018-04-06 DIAGNOSIS — E78.00 PURE HYPERCHOLESTEROLEMIA: ICD-10-CM

## 2018-04-06 DIAGNOSIS — G44.229 CHRONIC TENSION-TYPE HEADACHE, NOT INTRACTABLE: ICD-10-CM

## 2018-04-06 DIAGNOSIS — G89.29 CHRONIC NONINTRACTABLE HEADACHE, UNSPECIFIED HEADACHE TYPE: ICD-10-CM

## 2018-04-06 DIAGNOSIS — Z00.00 MEDICARE ANNUAL WELLNESS VISIT, SUBSEQUENT: ICD-10-CM

## 2018-04-06 DIAGNOSIS — R51.9 CHRONIC NONINTRACTABLE HEADACHE, UNSPECIFIED HEADACHE TYPE: ICD-10-CM

## 2018-04-06 DIAGNOSIS — K76.0 FATTY LIVER: ICD-10-CM

## 2018-04-06 DIAGNOSIS — I10 ESSENTIAL HYPERTENSION, BENIGN: ICD-10-CM

## 2018-04-06 DIAGNOSIS — I25.10 CORONARY ARTERY DISEASE INVOLVING NATIVE CORONARY ARTERY OF NATIVE HEART WITHOUT ANGINA PECTORIS: ICD-10-CM

## 2018-04-06 LAB — ERYTHROCYTE [SEDIMENTATION RATE] IN BLOOD BY WESTERGREN METHOD: 3 MM/HR (ref 0–40)

## 2018-04-06 PROCEDURE — 85651 RBC SED RATE NONAUTOMATED: CPT

## 2018-04-06 PROCEDURE — 36415 COLL VENOUS BLD VENIPUNCTURE: CPT

## 2018-04-06 RX ORDER — AMOXICILLIN 500 MG/1
500 CAPSULE ORAL 3 TIMES DAILY
Qty: 21 CAP | Refills: 0 | Status: SHIPPED | OUTPATIENT
Start: 2018-04-06 | End: 2018-08-10

## 2018-04-06 NOTE — MR AVS SNAPSHOT
[facility] [address] [place] [phone] Patient: Rachael De La Paz MRN: [id number] :1940 Visit Information Date & Time Provider Department Dept. Phone Encounter #  
 2018 10:30 AM Rigo Cancino, [address] [place] [address],[address] [phone] [id number] Follow-up Instructions Return in about 6 months (around 10/6/2018) for dm-2 htn chol cad. Your Appointments 3/29/2019 11:30 AM  
Follow Up with [address] ([facility]) Appt Note: Follow up [address] [facility] [phone] [facility] [address]  
59 [facility] [address] Upcoming Health Maintenance Date Due DTaP/Tdap/Td series (1 - Tdap) 1961 EYE EXAM RETINAL OR DILATED Q1 2018 MEDICARE YEARLY EXAM 3/14/2018 MICROALBUMIN Q1 2018 LIPID PANEL Q1 2018 HEMOGLOBIN A1C Q6M 2018 FOOT EXAM Q1 10/16/2018 GLAUCOMA SCREENING Q2Y 2019 COLONOSCOPY 3/1/2022 Allergies as of 2018  Review Complete On: 3/27/2018 By: BRIANA Henderson Severity Noted Reaction Type Reactions Codeine  2009    Other (comments) Jerking sensation Livalo [Pitavastatin]  10/03/2013    Diarrhea Niaspan [Niacin]  10/08/2012    Myalgia Statins-hmg-coa Reductase Inhibitors  2010    Myalgia Zocor, pravachol, Lipitor, crestor Welchol [Colesevelam]  2013    Other (comments) Nausea, bloating and malaise Zetia [Ezetimibe]  2014    Myalgia Current Immunizations  Reviewed on 2016 Name Date Influenza Vaccine 9/15/2017, 2015, 10/18/2013 Influenza Vaccine Whole 2012 PPD  Incomplete, 2011 ZZZ-RETIRED (DO NOT USE) Pneumococcal Vaccine (Unspecified Type) 2008 Zoster Vaccine, Live 2/15/2017 Not reviewed this visit You Were Diagnosed With   
  
 Codes Comments Type 2 diabetes, controlled, with neuropathy ([facility] [address]    -  Primary ICD-10-CM: E11.40 ICD-9-CM: 250.60, 357.2 Coronary artery disease involving native coronary artery of native heart without angina pectoris     ICD-10-CM: I25.10 ICD-9-CM: 414.01 Essential hypertension, benign     ICD-10-CM: I10 
ICD-9-CM: 401.1 Pure hypercholesterolemia     ICD-10-CM: E78.00 ICD-9-CM: 272.0 Fatty liver     ICD-10-CM: K76.0 ICD-9-CM: 571.8 Chronic nonintractable headache, unspecified headache type     ICD-10-CM: R51 ICD-9-CM: 234. 0 Chronic tension-type headache, not intractable     ICD-10-CM: V88.003 ICD-9-CM: 339.12 Vitals BP Pulse Temp Resp Height(growth percentile) Weight(growth percentile) 96/63 (BP 1 Location: Left arm, BP Patient Position: Sitting) 82 97.6 °F (36.4 °C) (Oral) 16 5' 3\" (1.6 m) 168 lb (76.2 kg) SpO2 BMI OB Status Smoking Status 98% 29.76 kg/m2 Postmenopausal Never Smoker BMI and BSA Data Body Mass Index Body Surface Area  
 29.76 kg/m 2 1.84 m 2 [facility] [address] [place] [address] [phone] Your Updated Medication List  
  
   
This list is accurate as of 4/6/18 11:16 AM.  Always use your most recent med list.  
  
  
  
  
 amoxicillin 500 mg capsule Commonly known as:  AMOXIL Take 1 Cap by mouth three (3) times daily. aspirin 81 mg tablet Take 81 mg by mouth daily. atorvastatin 40 mg tablet Commonly known as:  LIPITOR Take  by mouth daily. Blood-Glucose Meter monitoring kit One touch ultra mini glucometer--dx 250.00  
  
 celecoxib 200 mg capsule Commonly known as:  CELEBREX  
take 1 capsule by mouth once daily CENTRUM SILVER PO Take 1 Tab by mouth daily. clopidogrel 75 mg Tab Commonly known as:  PLAVIX Take 1 Tab by mouth daily. COREG 6.25 mg tablet Generic drug:  carvedilol Take  by mouth two (2) times daily (with meals). cyanocobalamin 500 mcg tablet Commonly known as:  VITAMIN B12 Take 500 mcg by mouth daily. fenofibrate 54 mg tablet Commonly known as:  LOFIBRA Take  by mouth daily. fluticasone 50 mcg/actuation nasal spray Commonly known as:  FLONASE  
instill 2 sprays into each nostril daily  
  
 furosemide 20 mg tablet Commonly known as:  LASIX Take 1 Tab by mouth daily as needed. gabapentin 300 mg capsule Commonly known as:  NEURONTIN  
take 2 capsules by mouth three times a day  
  
 glucose blood VI test strips strip Commonly known as:  ASCENSIA AUTODISC VI, ONE TOUCH ULTRA TEST VI One touch ultra test strips min---check fsbs bid  
  
 insulin glargine 100 unit/mL (3 mL) Inpn Commonly known as:  LANTUS SOLOSTAR U-100 INSULIN Inject 16 units at bedtime--Dose change 10/14/16--updated med list--did not send prescription to the pharmacy Insulin Needles (Disposable) 31 gauge x 3/16\" Ndle Commonly known as:  PEN NEEDLE Use as directed once daily JANUMET 50-1,000 mg per tablet Generic drug:  SITagliptin-metFORMIN  
take 1 tablet by mouth twice a day with food Lancets Misc Check fsbs bid -250.02  
  
 losartan 100 mg tablet Commonly known as:  COZAAR Take 1 Tab by mouth daily. montelukast 10 mg tablet Commonly known as:  SINGULAIR  
take 1 tablet by mouth once daily  
  
 nitroglycerin 0.4 mg SL tablet Commonly known as:  NITROSTAT  
1 Tab by SubLINGual route every five (5) minutes as needed for Chest Pain. NORVASC 2.5 mg tablet Generic drug:  amLODIPine Take  by mouth daily. omeprazole 20 mg capsule Commonly known as:  PRILOSEC  
take 1 capsule by mouth twice a day PEPCID 20 mg tablet Generic drug:  famotidine Take 20 mg by mouth as needed. pregabalin 50 mg capsule Commonly known as:  [facility] Take 1 Cap by mouth three (3) times daily. Max Daily Amount: 150 mg. ranolazine  mg SR tablet Commonly known as:  RANEXA Take 1 Tab by mouth two (2) times a day. Prescriptions Sent to Pharmacy Refills  
 amoxicillin (AMOXIL) 500 mg capsule 0 Sig: Take 1 Cap by mouth three (3) times daily. Class: Normal  
 Pharmacy: [facility] [address] #: [phone] Route: Oral  
  
We Performed the Following SED RATE (ESR) [id number] CPT(R)] Follow-up Instructions Return in about 6 months (around 10/6/2018) for dm-2 htn chol cad. Patient Instructions Medicare Wellness Visit, Female The best way to live healthy is to have a healthy lifestyle by eating a well-balanced diet, exercising regularly, limiting alcohol and stopping smoking. Regular physical exams and screening tests are another way to keep healthy. Preventive exams provided by your health care provider can find health problems before they become diseases or illnesses. Preventive services including immunizations, screening tests, monitoring and exams can help you take care of your own health. All people over age 72 should have a pneumovax  and and a prevnar shot to prevent pneumonia. These are once in a lifetime unless you and your provider decide differently. All people over 65 should have a yearly flu shot and a tetanus vaccine every 10 years. A bone mass density to screen for osteoporosis or thinning of the bones should be done every 2 years after 65. Screening for diabetes mellitus with a blood sugar test should be done every year. Glaucoma is a disease of the eye due to increased ocular pressure that can lead to blindness and it should be done every year by an eye professional. 
 
Cardiovascular screening tests that check for elevated lipids (fatty part of blood) which can lead to heart disease and strokes should be done every 5 years. Colorectal screening that evaluates for blood or polyps in your colon should be done yearly as a stool test or every five years as a flexible sigmoidoscope or every 10 years as a colonoscopy up to age 76. Breast cancer screening with a mammogram is recommended biennially  for women age 54-69. Screening for cervical cancer with a pap smear and pelvic exam is recommended for women after age 72 years every 2 years up to age 79 or when the provider and patient decide to stop. If there is a history of cervical abnormalities or other increased risk for cancer then the test is recommended yearly. Hepatitis C screening is also recommended for anyone born between [birth year] through [address]. A shingles vaccine is also recommended once in a lifetime after age 61. Your Medicare Wellness Exam is recommended annually. Here is a list of your current Health Maintenance items with a due date: 
Health Maintenance Due Topic Date Due  
 DTaP/Tdap/Td  (1 - Tdap) 12/23/1961 [facility] Eye Exam  02/28/2018 [facility] Annual Well Visit  03/14/2018 Introducing [facility]! Atiya Ramsay introduces [employer] patient portal. Now you can access parts of your medical record, email your doctor's office, and request medication refills online. 1. In your internet browser, go to [email or web address] 2. Click on the First Time User? Click Here link in the Sign In box. You will see the New Member Sign Up page. 3. Enter your [employer] Access Code exactly as it appears below. You will not need to use this code after youve completed the sign-up process. If you do not sign up before the expiration date, you must request a new code. · [employer] Access Code: [email or web address] Expires: 6/25/2018 11:07 AM 
 
4. Enter the last four digits of your Social Security Number (xxxx) and Date of Birth (mm/dd/yyyy) as indicated and click Submit. You will be taken to the next sign-up page. 5. Create a [employer] ID. This will be your [employer] login ID and cannot be changed, so think of one that is secure and easy to remember. 6. Create a [employer] password. You can change your password at any time. 7. Enter your Password Reset Question and Answer. This can be used at a later time if you forget your password. 8. Enter your e-mail address. You will receive e-mail notification when new information is available in [address] 9. Click Sign Up. You can now view and download portions of your medical record. 10. Click the Download Summary menu link to download a portable copy of your medical information. If you have questions, please visit the Frequently Asked Questions section of the [employer] website. Remember, [employer] is NOT to be used for urgent needs. For medical emergencies, dial 911. Now available from your iPhone and Android! Please provide this summary of care documentation to your next provider. Your primary care clinician is listed as Pat OLIVEROS. If you have any questions after today's visit, please call [phone].

## 2018-04-06 NOTE — PROGRESS NOTES
HISTORY OF PRESENT ILLNESS  Srinivasa Dowell is a 69 y.o. female. HPI     F/u DM-2 htn hld cad  Last a1c 5.9 ldl 65  Sees renal MD and will get labs next week-Dr Keenan  fsbs bid < 200 at home    C/o sneezes and sinus congestion and bitemporal headaches for 4 months intermittently  Headache pain is soreness, dull pain occurs in the evenings, worse after cough or when bending over    Saw liver MD for fatty liver, had fibroscan--repeat in 1 year    Last visit:  Had recent UTI--treated at [facility] with bactrim x 7d  Last Hb was done to 10.4 --oral increased to 2 every day but still only taking one every day  Dr Cheng Schmitz-- EGD, M2a, colonoscopy--tics, colon polyps and hemorrhoids     In ER in July for presycnope in [place]--ER evaluation negative  Saw cardiologist Dr Esmer Leija recently- no med changes.  Ef 45%  Exercise at the local gym 2 days per week , walks 6- 8 minutes on treadmill  No PAD by testing in the past at vascular MD office  No cp or angina  No back pain since sx 2 yrs ago  Last a1c was 6.6    6 months ago  fsbs fluctuates, but better after UTI treatments--avg am fsbs 110-160 but night time over 200 sometimes     Pt increased insulin to 20 units qd  Has pain in legs walking and has constant burning in feet and sharp stabbing pain in feet-takes neurontin 1 pm, 2 hs         Patient Active Problem List    Diagnosis Date Noted    Fatty liver 02/23/2018    Iron deficiency anemia 12/01/2017    Osteopenia of left thigh 04/02/2017    NSTEMI (non-ST elevated myocardial infarction) ([address] 01/19/2016    Advanced care planning/counseling discussion 12/17/2015    Hiatal hernia 05/15/2012    CAD (coronary artery disease) 05/15/2012    Type 2 diabetes, controlled, with neuropathy ([address] 11/16/2009    Essential hypertension, benign 11/16/2009    Pure hypercholesterolemia 11/16/2009    Neuropathy in diabetes ([address] 11/16/2009    DJD (degenerative joint disease) 11/16/2009    DOMINGO (obstructive sleep apnea) 11/16/2009  Breast cancer ([facility] 11/16/2009     Current Outpatient Prescriptions   Medication Sig Dispense Refill    montelukast (SINGULAIR) 10 mg tablet take 1 tablet by mouth once daily 30 Tab 11    gabapentin (NEURONTIN) 300 mg capsule take 2 capsules by mouth three times a day 540 Cap 3    Insulin Needles, Disposable, (PEN NEEDLE) 31 gauge x 3/16\" ndle Use as directed once daily 100 Pen Needle 3    glucose blood VI test strips (ASCENSIA AUTODISC VI, ONE TOUCH ULTRA TEST VI) strip One touch ultra test strips min---check fsbs bid 200 Strip 3    Blood-Glucose Meter monitoring kit One touch ultra mini glucometer--dx 250.00 1 Kit 0    Lancets misc Check fsbs bid -250.02 200 Each 3    pregabalin (LYRICA) 50 mg capsule Take 1 Cap by mouth three (3) times daily. Max Daily Amount: 150 mg. 90 Cap 6    losartan (COZAAR) 100 mg tablet Take 1 Tab by mouth daily. 90 Tab 3    celecoxib (CELEBREX) 200 mg capsule take 1 capsule by mouth once daily 30 Cap 6    fluticasone (FLONASE) 50 mcg/actuation nasal spray instill 2 sprays into each nostril daily 1 Bottle 8    omeprazole (PRILOSEC) 20 mg capsule take 1 capsule by mouth twice a day 60 Cap 11    JANUMET 50-1,000 mg per tablet take 1 tablet by mouth twice a day with food 180 Tab 3    furosemide (LASIX) 20 mg tablet Take 1 Tab by mouth daily as needed. 30 Tab 0    nitroglycerin (NITROSTAT) 0.4 mg SL tablet 1 Tab by SubLINGual route every five (5) minutes as needed for Chest Pain. 25 Tab 3    famotidine (PEPCID) 20 mg tablet Take 20 mg by mouth as needed.  carvedilol (COREG) 6.25 mg tablet Take  by mouth two (2) times daily (with meals).  amLODIPine (NORVASC) 2.5 mg tablet Take  by mouth daily.       insulin glargine (LANTUS SOLOSTAR) 100 unit/mL (3 mL) pen Inject 16 units at bedtime--Dose change 10/14/16--updated med list--did not send prescription to the pharmacy (Patient taking differently: Inject 20 units at bedtime--Dose change 10/14/16--updated med list--did not send prescription to the pharmacy) 15 mL 3    atorvastatin (LIPITOR) 40 mg tablet Take  by mouth daily.  fenofibrate (LOFIBRA) 54 mg tablet Take  by mouth daily.  cyanocobalamin (VITAMIN B12) 500 mcg tablet Take 500 mcg by mouth daily.  ranolazine ER (RANEXA) 500 mg SR tablet Take 1 Tab by mouth two (2) times a day. 60 Tab 12    clopidogrel (PLAVIX) 75 mg tablet Take 1 Tab by mouth daily. 30 Tab 11    aspirin 81 mg Tab Take 81 mg by mouth daily.  MULTIVITAMINS W-MINERALS/LUT (CENTRUM SILVER PO) Take 1 Tab by mouth daily.        Allergies   Allergen Reactions    Codeine Other (comments)     Jerking sensation    Livalo [Pitavastatin] Diarrhea    Niaspan [Niacin] Myalgia    Statins-Hmg-Coa Reductase Inhibitors Myalgia     Zocor, pravachol, Lipitor, crestor    Welchol [Colesevelam] Other (comments)     Nausea, bloating and malaise    Zetia [Ezetimibe] Myalgia     Social History   Substance Use Topics    Smoking status: Never Smoker    Smokeless tobacco: Never Used    Alcohol use No      Lab Results  Component Value Date/Time   WBC 6.6 02/23/2018 03:15 PM   HGB 11.5 02/23/2018 03:15 PM   HCT 36.4 02/23/2018 03:15 PM   PLATELET 704 06/39/8881 03:15 PM   MCV 89 02/23/2018 03:15 PM     Lab Results  Component Value Date/Time   Hemoglobin A1c 5.9 (H) 02/23/2018 03:15 PM   Hemoglobin A1c 6.4 (H) 12/01/2017 02:40 PM   Hemoglobin A1c 6.2 (H) 08/01/2017 02:01 PM   Hemoglobin A1c, External 6.3 04/30/2016   Glucose 94 02/23/2018 03:15 PM   Glucose (POC) 85 02/15/2016 08:03 AM   Microalb/Creat ratio (ug/mg creat.) 126.1 (H) 08/01/2017 02:01 PM   LDL, calculated 65 08/01/2017 02:01 PM   Creatinine 0.95 02/23/2018 03:15 PM      Lab Results  Component Value Date/Time   Cholesterol, total 145 08/01/2017 02:01 PM   Cholesterol (POC) 195 08/11/2011 10:08 AM   HDL Cholesterol 40 08/01/2017 02:01 PM   LDL, calculated 65 08/01/2017 02:01 PM   LDL Cholesterol (POC) 113 08/11/2011 10:08 AM   LDL-C, External 104 05/02/2016   Triglyceride 198 (H) 08/01/2017 02:01 PM   Triglycerides (POC) 256 08/11/2011 10:08 AM   CHOL/HDL Ratio 5.5 (H) 01/19/2016 12:42 PM     Lab Results  Component Value Date/Time   GFR est non-AA 58 (L) 02/23/2018 03:15 PM   GFR est AA 67 02/23/2018 03:15 PM   Creatinine 0.95 02/23/2018 03:15 PM   BUN 22 02/23/2018 03:15 PM   Sodium 139 02/23/2018 03:15 PM   Potassium 4.6 02/23/2018 03:15 PM   Chloride 97 02/23/2018 03:15 PM   CO2 22 02/23/2018 03:15 PM   Magnesium 1.5 (L) 01/19/2016 12:42 PM        ROS    Physical Exam   Constitutional: She appears well-developed and well-nourished. Appears stated age   Cardiovascular: Normal rate, regular rhythm and normal heart sounds. Exam reveals no gallop and no friction rub. No murmur heard. Pulmonary/Chest: Effort normal and breath sounds normal. No respiratory distress. She has no wheezes. Abdominal: Soft. Bowel sounds are normal.   Musculoskeletal: She exhibits no edema. Neurological: She is alert. Psychiatric: She has a normal mood and affect. Nursing note and vitals reviewed. ASSESSMENT and PLAN  Diagnoses and all orders for this visit:    1. Type 2 diabetes, controlled, with neuropathy ([address]    2. Coronary artery disease involving native coronary artery of native heart without angina pectoris    3. Essential hypertension, benign    4. Pure hypercholesterolemia    5. Fatty liver    6. Chronic nonintractable headache, unspecified headache type    7. Chronic tension-type headache, not intractable  -     SED RATE (ESR)    Other orders  -     amoxicillin (AMOXIL) 500 mg capsule; Take 1 Cap by mouth three (3) times daily. Follow-up Disposition:  Return in about 6 months (around 10/6/2018) for dm-2 htn chol cad.     TAre appropriate based on today's review and evaluation  End-of-Life planning (with patient's consent)  This is the Subsequent Medicare Annual Wellness Exam, performed 12 months or more after the Initial AWV or the last Subsequent AWV    I have reviewed the patient's medical history in detail and updated the computerized patient record. History     Past Medical History:   Diagnosis Date    Arthritis     [facility] 2002    s/p lumpectomy and XRT    Diabetes [facility]     GERD (gastroesophageal reflux disease)     with hiatal hernia    Hypercholesterolemia     Hypertension     Neuropathy in diabetes ([facility]     DOMINGO (obstructive sleep apnea)     on CPAP      Past Surgical History:   Procedure Laterality Date    BREAST SURGERY PROCEDURE UNLISTED  2001    right lumpectomy    CARDIAC SURG PROCEDURE UNLIST      cardiac cath; 3 stents placed    HX BREAST BIOPSY Left     years ago no scar seen    HX CARPAL TUNNEL RELEASE      b/l    HX HERNIA REPAIR      HX LUMBAR FUSION  July 2015    L4-L5    HX ORTHOPAEDIC      spinal fusion    VASCULAR SURGERY PROCEDURE UNLIST      right leg vein stripping     Current Outpatient Prescriptions   Medication Sig Dispense Refill    montelukast (SINGULAIR) 10 mg tablet take 1 tablet by mouth once daily 30 Tab 11    gabapentin (NEURONTIN) 300 mg capsule take 2 capsules by mouth three times a day 540 Cap 3    Insulin Needles, Disposable, (PEN NEEDLE) 31 gauge x 3/16\" ndle Use as directed once daily 100 Pen Needle 3    glucose blood VI test strips (ASCENSIA AUTODISC VI, ONE TOUCH ULTRA TEST VI) strip One touch ultra test strips min---check fsbs bid 200 Strip 3    Blood-Glucose Meter monitoring kit One touch ultra mini glucometer--dx 250.00 1 Kit 0    Lancets misc Check fsbs bid -250.02 200 Each 3    pregabalin (LYRICA) 50 mg capsule Take 1 Cap by mouth three (3) times daily. Max Daily Amount: 150 mg. 90 Cap 6    losartan (COZAAR) 100 mg tablet Take 1 Tab by mouth daily.  90 Tab 3    celecoxib (CELEBREX) 200 mg capsule take 1 capsule by mouth once daily 30 Cap 6    fluticasone (FLONASE) 50 mcg/actuation nasal spray instill 2 sprays into each nostril daily 1 Bottle 8    omeprazole (PRILOSEC) 20 mg capsule take 1 capsule by mouth twice a day 60 Cap 11    JANUMET 50-1,000 mg per tablet take 1 tablet by mouth twice a day with food 180 Tab 3    furosemide (LASIX) 20 mg tablet Take 1 Tab by mouth daily as needed. 30 Tab 0    nitroglycerin (NITROSTAT) 0.4 mg SL tablet 1 Tab by SubLINGual route every five (5) minutes as needed for Chest Pain. 25 Tab 3    famotidine (PEPCID) 20 mg tablet Take 20 mg by mouth as needed.  carvedilol (COREG) 6.25 mg tablet Take  by mouth two (2) times daily (with meals).  amLODIPine (NORVASC) 2.5 mg tablet Take  by mouth daily.  insulin glargine (LANTUS SOLOSTAR) 100 unit/mL (3 mL) pen Inject 16 units at bedtime--Dose change 10/14/16--updated med list--did not send prescription to the pharmacy (Patient taking differently: Inject 20 units at bedtime--Dose change 10/14/16--updated med list--did not send prescription to the pharmacy) 15 mL 3    atorvastatin (LIPITOR) 40 mg tablet Take  by mouth daily.  fenofibrate (LOFIBRA) 54 mg tablet Take  by mouth daily.  cyanocobalamin (VITAMIN B12) 500 mcg tablet Take 500 mcg by mouth daily.  ranolazine ER (RANEXA) 500 mg SR tablet Take 1 Tab by mouth two (2) times a day. 60 Tab 12    clopidogrel (PLAVIX) 75 mg tablet Take 1 Tab by mouth daily. 30 Tab 11    aspirin 81 mg Tab Take 81 mg by mouth daily.  MULTIVITAMINS W-MINERALS/LUT (CENTRUM SILVER PO) Take 1 Tab by mouth daily.        Allergies   Allergen Reactions    Codeine Other (comments)     Jerking sensation    Livalo [Pitavastatin] Diarrhea    Niaspan [Niacin] Myalgia    Statins-Hmg-Coa Reductase Inhibitors Myalgia     Zocor, pravachol, Lipitor, crestor    Welchol [Colesevelam] Other (comments)     Nausea, bloating and malaise    Zetia [Ezetimibe] Myalgia     Family History   Problem Relation Age of Onset    Diabetes Mother     Heart Disease Mother     Stroke Mother     Breast Cancer Mother 79    Diabetes Brother     Heart Disease Brother     Emphysema Father     Diabetes Daughter      Social History   Substance Use Topics    Smoking status: Never Smoker    Smokeless tobacco: Never Used    Alcohol use No     Patient Active Problem List   Diagnosis Code    Type 2 diabetes, controlled, with neuropathy ([facility] E11.40    Essential hypertension, benign I10    Pure hypercholesterolemia E78.00    Neuropathy in diabetes ([facility] E11.40    DJD (degenerative joint disease) M19.90    DOMINGO (obstructive sleep apnea) G47.33    Breast cancer ([facility] C50.919    Hiatal hernia K44.9    CAD (coronary artery disease) I25.10    Advanced care planning/counseling discussion Z71.89    NSTEMI (non-ST elevated myocardial infarction) ([facility] I21.4    Osteopenia of left thigh M85.852    Iron deficiency anemia D50.9    Fatty liver K76.0       Depression Risk Factor Screening:     PHQ over the last two weeks 4/6/2018   Little interest or pleasure in doing things Not at all   Feeling down, depressed or hopeless Not at all   Total Score PHQ 2 0     Alcohol Risk Factor Screening: You do not drink alcohol or very rarely. Functional Ability and Level of Safety:   Hearing Loss  Hearing is good. Activities of Daily Living  The home contains: no safety equipment. Patient needs help with:  transportation    Fall Risk  Fall Risk Assessment, last 12 mths 4/6/2018   Able to walk? Yes   Fall in past 12 months? No   Fall with injury?  -       Abuse Screen  Patient is not abused    Cognitive Screening   Evaluation of Cognitive Function:  Has your family/caregiver stated any concerns about your memory: no  Normal    Patient Care Team   Patient Care Team:  Eduin Romeo MD as PCP - Norah Browne RN as Ambulatory Care Navigator  Garry Chahal MD as Consulting Provider (Endocrinology)  Tr Best MD (Neurology)  Abel Judd MD (Gastroenterology)  Eddie Tidwell MD (Ophthalmology)  Casimiro Looney MD (Neurosurgery)  iLsa Mckeon RN as One Braulio Richard MD as Physician (Sleep Medicine)  Eber Motley MD as Consulting Provider (Endocrinology)    Assessment/Plan   Education and counseling providetdap recommendedAre appropriate based on today's review and evaluation  End-of-Life planning (with patient's consent)-see acp note  tdap recommended    Diagnoses and all orders for this visit:    1. Type 2 diabetes, controlled, with neuropathy ([address]   Controlled by recent a1c  2. Coronary artery disease involving native coronary artery of native heart without angina pectoris   Stable, no cp /angina sxs  3. Essential hypertension, benign   controlled  4. Pure hypercholesterolemia   LDl at goal  5. Fatty liver   Continue diet and exercise   F/u hepatologist  6.  Headache    C/w tension  Or sinusitis   Sed rate   Amoxil   otc nsaids or tylenol prn    Health Maintenance Due   Topic Date Due    DTaP/Tdap/Td series (1 - Tdap) 12/[birth year]/1962    EYE EXAM RETINAL OR DILATED Q1  02/28/2018    MEDICARE YEARLY EXAM  03/14/2018

## 2018-04-06 NOTE — PATIENT INSTRUCTIONS

## 2018-04-17 ENCOUNTER — HOSPITAL ENCOUNTER (OUTPATIENT)
Dept: LAB | Age: 78
Discharge: HOME | End: 2018-04-17
Payer: MEDICARE

## 2018-04-17 LAB — ERYTHROCYTE [SEDIMENTATION RATE] IN BLOOD BY WESTERGREN METHOD: 5 MM/HR (ref 0–40)

## 2018-04-17 PROCEDURE — 36415 COLL VENOUS BLD VENIPUNCTURE: CPT

## 2018-04-17 PROCEDURE — 85651 RBC SED RATE NONAUTOMATED: CPT

## 2018-05-29 RX ORDER — CELECOXIB 200 MG/1
CAPSULE ORAL
Qty: 30 CAP | Refills: 6 | Status: SHIPPED | OUTPATIENT
Start: 2018-05-29 | End: 2018-08-10

## 2018-05-29 RX ORDER — SITAGLIPTIN AND METFORMIN HYDROCHLORIDE 50; 1000 MG/1; MG/1
TABLET, FILM COATED ORAL
Qty: 180 TAB | Refills: 3 | Status: SHIPPED | OUTPATIENT
Start: 2018-05-29 | End: 2019-06-09 | Stop reason: SDUPTHER

## 2018-05-29 RX ORDER — OMEPRAZOLE 20 MG/1
CAPSULE, DELAYED RELEASE ORAL
Qty: 60 CAP | Refills: 11 | Status: SHIPPED | OUTPATIENT
Start: 2018-05-29 | End: 2019-08-26 | Stop reason: SDUPTHER

## 2018-07-05 ENCOUNTER — TELEPHONE (OUTPATIENT)
Dept: INTERNAL MEDICINE CLINIC | Age: 78
End: 2018-07-05

## 2018-08-10 ENCOUNTER — OFFICE VISIT (OUTPATIENT)
Dept: INTERNAL MEDICINE CLINIC | Age: 78
End: 2018-08-10

## 2018-08-10 VITALS
DIASTOLIC BLOOD PRESSURE: 74 MMHG | SYSTOLIC BLOOD PRESSURE: 123 MMHG | RESPIRATION RATE: 18 BRPM | HEART RATE: 86 BPM | TEMPERATURE: 98.5 F | OXYGEN SATURATION: 98 % | WEIGHT: 166 LBS | BODY MASS INDEX: 29.41 KG/M2 | HEIGHT: 63 IN

## 2018-08-10 DIAGNOSIS — J01.10 ACUTE FRONTAL SINUSITIS, RECURRENCE NOT SPECIFIED: ICD-10-CM

## 2018-08-10 DIAGNOSIS — R53.81 MALAISE: ICD-10-CM

## 2018-08-10 DIAGNOSIS — G89.29 CHRONIC INTRACTABLE HEADACHE, UNSPECIFIED HEADACHE TYPE: Primary | ICD-10-CM

## 2018-08-10 DIAGNOSIS — R73.9 HYPERGLYCEMIA: ICD-10-CM

## 2018-08-10 DIAGNOSIS — R51.9 CHRONIC INTRACTABLE HEADACHE, UNSPECIFIED HEADACHE TYPE: Primary | ICD-10-CM

## 2018-08-10 PROBLEM — E11.21 TYPE 2 DIABETES WITH NEPHROPATHY (HCC): Status: ACTIVE | Noted: 2018-08-10

## 2018-08-10 RX ORDER — AZITHROMYCIN 250 MG/1
TABLET, FILM COATED ORAL
Qty: 6 TAB | Refills: 0 | Status: SHIPPED | OUTPATIENT
Start: 2018-08-10 | End: 2018-10-09 | Stop reason: ALTCHOICE

## 2018-08-10 RX ORDER — AMOXICILLIN AND CLAVULANATE POTASSIUM 875; 125 MG/1; MG/1
1 TABLET, FILM COATED ORAL EVERY 12 HOURS
Qty: 20 TAB | Refills: 0 | Status: SHIPPED | OUTPATIENT
Start: 2018-08-10 | End: 2018-08-10

## 2018-08-10 NOTE — PROGRESS NOTES
1. Have you been to the ER,No urgent care clinic since your last visit? No Hospitalized since your last visit? NO    2. Have you seen or consulted any other health care providers outside of the [address] [place] [address] since your last visit? Include any pap smears or colon screening. Patient complaining of head pain rating 8 most of the time. Pain radiates dowm the neck and behind ears, especially around temples. Taking Tylenol Strength. B/S 160 this morning fasting. Seen by Podiatry Thursday.

## 2018-08-10 NOTE — PROGRESS NOTES
SUBJECTIVE  Ms. Emma Gramajo is a patient of Ellis Franklin MD and presents today acutely for     Chief Complaint   Patient presents with    Head Pain     with blurred vision       She is worried that her sugar is running high. 166 this morning. For several weeks she has been having headaches. She was volunteering for the county fair, and \"it was so bad I felt swimmy headed\", and it was affecting her vision--\"blurry\". Bitemporal, pounding, L side of face.      In Jan, similar pain, and thought it was a tooth problem; has had root canal.    Past Medical History:   Diagnosis Date    Arthritis     [facility] ([facility] 2002    s/p lumpectomy and XRT    Diabetes ([facility]     GERD (gastroesophageal reflux disease)     with hiatal hernia    Hypercholesterolemia     Hypertension     Neuropathy in diabetes ([facility]     DOMINGO (obstructive sleep apnea)     on CPAP       Current Outpatient Prescriptions on File Prior to Visit   Medication Sig Dispense Refill    LYRICA 50 mg capsule take 1 capsule by mouth three times a day 90 Cap 5    JANUMET 50-1,000 mg per tablet take 1 tablet by mouth twice a day with food 180 Tab 3    omeprazole (PRILOSEC) 20 mg capsule take 1 capsule by mouth twice a day 60 Cap 11    montelukast (SINGULAIR) 10 mg tablet take 1 tablet by mouth once daily 30 Tab 11    Insulin Needles, Disposable, (PEN NEEDLE) 31 gauge x 3/16\" ndle Use as directed once daily 100 Pen Needle 3    glucose blood VI test strips (ASCENSIA AUTODISC VI, ONE TOUCH ULTRA TEST VI) strip One touch ultra test strips min---check fsbs bid 200 Strip 3    Blood-Glucose Meter monitoring kit One touch ultra mini glucometer--dx 250.00 1 Kit 0    Lancets misc Check fsbs bid -250.02 200 Each 3    celecoxib (CELEBREX) 200 mg capsule take 1 capsule by mouth once daily 30 Cap 6    fluticasone (FLONASE) 50 mcg/actuation nasal spray instill 2 sprays into each nostril daily 1 Bottle 8    nitroglycerin (NITROSTAT) 0.4 mg SL tablet 1 Tab by SubLINGual route every five (5) minutes as needed for Chest Pain. 25 Tab 3    carvedilol (COREG) 6.25 mg tablet Take  by mouth two (2) times daily (with meals).  amLODIPine (NORVASC) 2.5 mg tablet Take  by mouth daily.  insulin glargine (LANTUS SOLOSTAR) 100 unit/mL (3 mL) pen Inject 16 units at bedtime--Dose change 10/14/16--updated med list--did not send prescription to the pharmacy (Patient taking differently: Inject 20 units at bedtime--Dose change 10/14/16--updated med list--did not send prescription to the pharmacy) 15 mL 3    atorvastatin (LIPITOR) 40 mg tablet Take  by mouth daily.  fenofibrate (LOFIBRA) 54 mg tablet Take  by mouth daily.  cyanocobalamin (VITAMIN B12) 500 mcg tablet Take 500 mcg by mouth daily.  ranolazine ER (RANEXA) 500 mg SR tablet Take 1 Tab by mouth two (2) times a day. 60 Tab 12    clopidogrel (PLAVIX) 75 mg tablet Take 1 Tab by mouth daily. 30 Tab 11    aspirin 81 mg Tab Take 81 mg by mouth daily.  MULTIVITAMINS W-MINERALS/LUT (CENTRUM SILVER PO) Take 1 Tab by mouth daily.  celecoxib (CELEBREX) 200 mg capsule take 1 capsule by mouth once daily 30 Cap 6    amoxicillin (AMOXIL) 500 mg capsule Take 1 Cap by mouth three (3) times daily. 21 Cap 0    gabapentin (NEURONTIN) 300 mg capsule take 2 capsules by mouth three times a day 540 Cap 3    losartan (COZAAR) 100 mg tablet Take 1 Tab by mouth daily. 90 Tab 3    furosemide (LASIX) 20 mg tablet Take 1 Tab by mouth daily as needed. 30 Tab 0    famotidine (PEPCID) 20 mg tablet Take 20 mg by mouth as needed. No current facility-administered medications on file prior to visit. Complete review of systems was performed and is otherwise unremarkable except as noted elsewhere.       OBJECTIVE  Visit Vitals    /74 (BP 1 Location: Left arm, BP Patient Position: Sitting)    Pulse 86    Temp 98.5 °F (36.9 °C)    Resp 18    Ht 5' 3\" (1.6 m)    Wt 166 lb (75.3 kg)    SpO2 98%    BMI 29.41 kg/m2     Gen: Pleasant, anxious 69 y.o.  female in NAD.   HEENT: +Tender over R frontal area and R maxillary. PERRLA. EOMI. OP moist and pink.  Neck: Supple.  No LAD.  HEART: RRR, No M/G/R.   LUNGS: CTAB No W/R.   ABDOMEN: S, NT, ND, BS+.   EXTREMITIES: Warm. No C/C/E.  NEURO: Alert and oriented x 3.  Cranial nerves grossly intact.  No focal sensory or motor deficits noted. SKIN: Warm. Dry. No rashes or other lesions noted. UA: Normal.     ASSESSMENT / PLAN    ICD-10-CM ICD-9-CM    1. Chronic intractable headache, unspecified headache type--?sinusitis, tooth abscess/nerve injury, atypical migraine. R51 784.0 REFERRAL TO NEUROLOGY--Dr. Sam Ceron; if doesn't improve with antibiotics. Might also run it by her dentist.    2. Malaise R53.81 780.79 AMB POC URINALYSIS DIP STICK AUTO W/O MICRO--negative   3. Hyperglycemia R73.9 790.29 AMB POC URINALYSIS DIP STICK AUTO W/O MICRO   4. Acute frontal sinusitis, recurrence not specified J01.10 461.1 Z-Pack       I have reviewed with the patient details of the assessment and plan and all questions were answered. Relevant patient education was performed. Follow-up Disposition:  Return if symptoms worsen or fail to improve.

## 2018-08-10 NOTE — MR AVS SNAPSHOT
[address] 
[phone] Patient: Meaghan Bell MRN: [id number] :1940 Visit Information Date & Time Provider Department Dept. Phone Encounter #  
 8/10/2018  1:45 PM Reynaldo Tracy, [address] [phone] [id number] Follow-up Instructions Return if symptoms worsen or fail to improve. Follow-up and Disposition History Your Appointments 10/9/2018 11:30 AM  
ROUTINE CARE with Alfonso Varner, [address] [facility] Appt Note: 6 MONTH F/U FOR DM-2 HTN CHOL CAD  
 [phone] [id number]  
[address] 235 [facility] [address]  
  
    
 3/29/2019 11:30 AM  
Follow Up with BRIANA Flores 75 ([facility]) Appt Note: Follow up 200 [facility] [place] [phone] [facility] [address]  
59 [facility] [place] [address] Upcoming Health Maintenance Date Due DTaP/Tdap/Td series (1 - Tdap) 1961 EYE EXAM RETINAL OR DILATED Q1 2018 MICROALBUMIN Q1 2018 LIPID PANEL Q1 2018 Influenza Age 5 to Adult 2018 HEMOGLOBIN A1C Q6M 2018 FOOT EXAM Q1 10/16/2018 GLAUCOMA SCREENING Q2Y 2019 MEDICARE YEARLY EXAM 2019 COLONOSCOPY 3/1/2022 Allergies as of 8/10/2018  Review Complete On: 8/10/2018 By: Reynaldo Tracy MD  
  
 Severity Noted Reaction Type Reactions Codeine  2009    Other (comments) Jerking sensation Livalo [Pitavastatin]  10/03/2013    Diarrhea Niaspan [Niacin]  10/08/2012    Myalgia Statins-hmg-coa Reductase Inhibitors  2010    Myalgia Zocor, pravachol, Lipitor, crestor Welchol [Colesevelam]  2013    Other (comments) Nausea, bloating and malaise Zetia [Ezetimibe]  2014    Myalgia Current Immunizations  Reviewed on 2/13/2016 Name Date Influenza Vaccine 9/15/2017, 11/30/2015, 10/18/2013 Influenza Vaccine Whole 9/29/2012 PPD  Incomplete, 5/19/2011 ZZZ-RETIRED (DO NOT USE) Pneumococcal Vaccine (Unspecified Type) 1/1/2008 Zoster Vaccine, Live 2/15/2017 Not reviewed this visit You Were Diagnosed With   
  
 Codes Comments Chronic intractable headache, unspecified headache type    -  Primary ICD-10-CM: R51 ICD-9-CM: 784.0 Malaise     ICD-10-CM: R53.81 ICD-9-CM: 780.79 Hyperglycemia     ICD-10-CM: R73.9 ICD-9-CM: 790.29 Acute frontal sinusitis, recurrence not specified     ICD-10-CM: J01.10 ICD-9-CM: 062.5 Vitals BP Pulse Temp Resp Height(growth percentile) Weight(growth percentile) 123/74 (BP 1 Location: Left arm, BP Patient Position: Sitting) 86 98.5 °F (36.9 °C) 18 5' 3\" (1.6 m) 166 lb (75.3 kg) SpO2 BMI OB Status Smoking Status 98% 29.41 kg/m2 Postmenopausal Never Smoker BMI and BSA Data Body Mass Index Body Surface Area  
 29.41 kg/m 2 1.83 m 2 Preferred Pharmacy Pharmacy Name Phone [facility] [phone] [address] [phone] Your Updated Medication List  
  
   
This list is accurate as of 8/10/18  2:57 PM.  Always use your most recent med list.  
  
  
  
  
 aspirin 81 mg tablet Take 81 mg by mouth daily. atorvastatin 40 mg tablet Commonly known as:  LIPITOR Take  by mouth daily. azithromycin 250 mg tablet Commonly known as:  Rudolph Montes Use as directed Blood-Glucose Meter monitoring kit One touch ultra mini glucometer--dx 250.00  
  
 celecoxib 200 mg capsule Commonly known as:  CELEBREX  
take 1 capsule by mouth once daily CENTRUM SILVER PO Take 1 Tab by mouth daily. clopidogrel 75 mg Tab Commonly known as:  PLAVIX Take 1 Tab by mouth daily. COREG 6.25 mg tablet Generic drug:  carvedilol Take  by mouth two (2) times daily (with meals). cyanocobalamin 500 mcg tablet Commonly known as:  VITAMIN B12 Take 500 mcg by mouth daily. fenofibrate 54 mg tablet Commonly known as:  LOFIBRA Take  by mouth daily. fluticasone 50 mcg/actuation nasal spray Commonly known as:  FLONASE  
instill 2 sprays into each nostril daily  
  
 glucose blood VI test strips strip Commonly known as:  ASCENSIA AUTODISC VI, ONE TOUCH ULTRA TEST VI One touch ultra test strips min---check fsbs bid  
  
 insulin glargine 100 unit/mL (3 mL) Inpn Commonly known as:  LANTUS SOLOSTAR U-100 INSULIN Inject 16 units at bedtime--Dose change 10/14/16--updated med list--did not send prescription to the pharmacy Insulin Needles (Disposable) 31 gauge x 3/16\" Ndle Commonly known as:  PEN NEEDLE Use as directed once daily JANUMET 50-1,000 mg per tablet Generic drug:  SITagliptin-metFORMIN  
take 1 tablet by mouth twice a day with food Lancets Misc Check fsbs bid -250.02  
  
 LYRICA 50 mg capsule Generic drug:  pregabalin  
take 1 capsule by mouth three times a day  
  
 montelukast 10 mg tablet Commonly known as:  SINGULAIR  
take 1 tablet by mouth once daily  
  
 nitroglycerin 0.4 mg SL tablet Commonly known as:  NITROSTAT  
1 Tab by SubLINGual route every five (5) minutes as needed for Chest Pain. NORVASC 2.5 mg tablet Generic drug:  amLODIPine Take  by mouth daily. omeprazole 20 mg capsule Commonly known as:  PRILOSEC  
take 1 capsule by mouth twice a day  
  
 ranolazine  mg SR tablet Commonly known as:  RANEXA Take 1 Tab by mouth two (2) times a day. Prescriptions Sent to Pharmacy Refills  
 azithromycin (ZITHROMAX) 250 mg tablet 0 Sig: Use as directed Class: Normal  
 Pharmacy: [facility] [address] #: [phone] We Performed the Following AMB POC URINALYSIS DIP STICK AUTO W/O MICRO [99761 CPT(R)] REFERRAL TO NEUROLOGY [DXH23 Custom] Follow-up Instructions Return if symptoms worsen or fail to improve. Referral Information Referral ID Referred By Referred To  
  
 [id number] Bess Alcaraz MD   
   [address] Phone: [phone] Fax: [phone] Visits Status Start Date End Date 1 New Request 8/10/18 8/10/19 If your referral has a status of pending review or denied, additional information will be sent to support the outcome of this decision. Introducing [facility]! [place] introduces [employer] patient portal. Now you can access parts of your medical record, email your doctor's office, and request medication refills online. 1. In your internet browser, go to [email or web address] 2. Click on the First Time User? Click Here link in the Sign In box. You will see the New Member Sign Up page. 3. Enter your [employer] Access Code exactly as it appears below. You will not need to use this code after youve completed the sign-up process. If you do not sign up before the expiration date, you must request a new code. · [employer] Access Code: [email or web address] Expires: 11/8/2018  2:05 PM 
 
4. Enter the last four digits of your Social Security Number (xxxx) and Date of Birth (mm/dd/yyyy) as indicated and click Submit. You will be taken to the next sign-up page. 5. Create a idea[employer] ID. This will be your [employer] login ID and cannot be changed, so think of one that is secure and easy to remember. 6. Create a [employer] password. You can change your password at any time. 7. Enter your Password Reset Question and Answer. This can be used at a later time if you forget your password. 8. Enter your e-mail address. You will receive e-mail notification when new information is available in [address] 9. Click Sign Up. You can now view and download portions of your medical record. 10. Click the Download Summary menu link to download a portable copy of your medical information. If you have questions, please visit the Frequently Asked Questions section of the [employer] website. Remember, [employer] is NOT to be used for urgent needs. For medical emergencies, dial 911. Now available from your iPhone and Android! Please provide this summary of care documentation to your next provider. Your primary care clinician is listed as Zenia OLIVEROS. If you have any questions after today's visit, please call [phone].

## 2018-08-14 ENCOUNTER — OFFICE VISIT (OUTPATIENT)
Dept: NEUROLOGY | Age: 78
End: 2018-08-14

## 2018-08-14 VITALS
HEART RATE: 84 BPM | OXYGEN SATURATION: 98 % | RESPIRATION RATE: 18 BRPM | BODY MASS INDEX: 29.95 KG/M2 | SYSTOLIC BLOOD PRESSURE: 126 MMHG | DIASTOLIC BLOOD PRESSURE: 68 MMHG | HEIGHT: 63 IN | WEIGHT: 169 LBS

## 2018-08-14 DIAGNOSIS — R51.9 NEW ONSET OF HEADACHES AFTER AGE 50: Primary | ICD-10-CM

## 2018-08-14 RX ORDER — LORAZEPAM 1 MG/1
1 TABLET ORAL ONCE
Qty: 2 TAB | Refills: 0 | Status: SHIPPED | OUTPATIENT
Start: 2018-08-14 | End: 2018-08-14

## 2018-08-14 RX ORDER — AMITRIPTYLINE HYDROCHLORIDE 10 MG/1
10 TABLET, FILM COATED ORAL
Qty: 30 TAB | Refills: 5 | Status: SHIPPED | OUTPATIENT
Start: 2018-08-14 | End: 2019-11-22 | Stop reason: SDUPTHER

## 2018-08-14 NOTE — PATIENT INSTRUCTIONS
10 [facility]   Statement to Patients  April 1, 2014      In an effort to ensure the large volume of patient prescription refills is processed in the most efficient and expeditious manner, we are asking our patients to assist us by calling your Pharmacy for all prescription refills, this will include also your  Mail Order Pharmacy. The pharmacy will contact our office electronically to continue the refill process. Please do not wait until the last minute to call your pharmacy. We need at least 48 hours (2days) to fill prescriptions. We also encourage you to call your pharmacy before going to  your prescription to make sure it is ready. With regard to controlled substance prescription refill requests (narcotic refills) that need to be picked up at our office, we ask your cooperation by providing us with at least 72 hours (3days) notice that you will need a refill. We will not refill narcotic prescription refill requests after 4:00pm on any weekday, Monday through Thursday, or after 2:00pm on Fridays, or on the weekends. We encourage everyone to explore another way of getting your prescription refill request processed using [employer], our patient web portal through our electronic medical record system. [employer] is an efficient and effective way to communicate your medication request directly to the office and  downloadable as an miles on your smart phone . [employer] also features a review functionality that allows you to view your medication list as well as leave messages for your physician. Are you ready to get connected? If so please review the attatched instructions or speak to any of our staff to get you set up right away! Thank you so much for your cooperation. Should you have any questions please contact our Practice Administrator.     The Physicians and Staff,  [facility]   Patient Instruction Plan/ Result Policy    If we have ordered testing for you, know that; \"NO NEWS IS GOOD NEWS! \" It is our policy that we know longer call patients with results, nor do we  give test results over the phone. We schedule follow up appointments so that your results can be discussed in person. This allows you to address any questions you have regarding the results. If something of concern is revealed on your test, we will contact you to discuss the matter and if needed schedule a sooner follow up appointment. Additionally, results may be found by using the My Chart feature and one of our patient service representatives at the  can give you instructions on how to access this feature to utilize our electronic medical record system. Thank you for your understanding. Amitriptyline (By mouth)   Amitriptyline (am-i-TRIP-ti-bucky)  Treats depression. This medicine is a TCA. Brand Name(s): Elavil   There may be other brand names for this medicine. When This Medicine Should Not Be Used: This medicine is not right for everyone. Do not use if you had an allergic reaction to amitriptyline. How to Use This Medicine:   Tablet  · Take your medicine as directed. Your dose may need to be changed several times to find what works best for you. · This medicine should come with a Medication Guide. Ask your pharmacist for a copy if you do not have one. · Store the medicine in a closed container at room temperature, away from heat, moisture, and direct light. · Missed dose: Take a dose as soon as you remember. If it is almost time for your next dose, wait until then and take a regular dose. Do not take extra medicine to make up for a missed dose. Drugs and Foods to Avoid:   Ask your doctor or pharmacist before using any other medicine, including over-the-counter medicines, vitamins, and herbal products. · Do not use this medicine with cisapride. Do not use this medicine and an MAO inhibitor (MAOI) within 14 days of each other.   · Some medicines and foods can affect how amitriptyline works. Tell your doctor if you are using cimetidine, disulfiram, or guanethidine. Tell your doctor if you are using other medicine for depression (such as fluoxetine, paroxetine, sertraline), a phenothiazine medicine (such as promethazine, chlorpromazine), medicine for heart rhythm problems (such as flecainide, propafenone, quinidine), or thyroid medicine. · Tell your doctor if you use anything else that makes you sleepy. Some examples are allergy medicine, narcotic pain medicine, and alcohol. Warnings While Using This Medicine:   · Tell your doctor if you are pregnant or breastfeeding, or if you have liver disease, heart disease, diabetes, narrow-angle glaucoma, a seizure disorder, a thyroid problem, or trouble urinating. · For some children, teenagers, and young adults, this medicine may increase mental or emotional problems. This may lead to thoughts of suicide and violence. Talk with your doctor right away if you have any thoughts or behavior changes that concern you. Tell your doctor if you or anyone in your family has a history of bipolar disorder or suicide attempts. · This medicine may cause the following problems:   ¨ Heart rhythm problems  ¨ High or low blood sugar levels  · This medicine may make you dizzy or drowsy. Do not drive or do anything else that could be dangerous until you know how this medicine affects you. · Do not stop using this medicine suddenly. Your doctor will need to slowly decrease your dose before you stop it completely. · Keep all medicine out of the reach of children. Never share your medicine with anyone.   Possible Side Effects While Using This Medicine:   Call your doctor right away if you notice any of these side effects:  · Allergic reaction: Itching or hives, swelling in your face or hands, swelling or tingling in your mouth or throat, chest tightness, trouble breathing  · Anxiety, restlessness, seeing or hearing things that are not there  · Chest pain, trouble breathing  · Fast, pounding, or uneven heartbeat  · Feeling more excited or energetic than usual, racing thoughts, trouble sleeping  · Lightheadedness, dizziness, or fainting  · Seizures  · Thoughts of hurting yourself or others, unusual behavior  · Unusual bleeding or bruising  If you notice these less serious side effects, talk with your doctor:   · Blurred vision, dry mouth, fever  · Change in how much or how often you urinate  · Constipation, diarrhea, nausea, vomiting  · Drowsiness, sleepiness  · Sexual problems  If you notice other side effects that you think are caused by this medicine, tell your doctor. Call your doctor for medical advice about side effects. You may report side effects to FDA at 8-457-FDA-5836  © 2017 [address] [employer] Information is for End User's use only and may not be sold, redistributed or otherwise used for commercial purposes. The above information is an  only. It is not intended as medical advice for individual conditions or treatments. Talk to your doctor, nurse or pharmacist before following any medical regimen to see if it is safe and effective for you.

## 2018-08-14 NOTE — MR AVS SNAPSHOT
[address] [place] [address] [address] Ashlie Dominique [address] [phone] Patient: Soledad Tafoya MRN: [id number] :1940 Visit Information Date & Time Provider Department Dept. Phone Encounter #  
 2018  3:00 PM MD Manjeet Chirinos Neurology [facility] [phone] [id number] Follow-up Instructions Return in about 3 months (around 2018). Your Appointments 10/9/2018 11:30 AM  
ROUTINE CARE with Katherin Mac, [address] [place] [address] [facility] Appt [facility]: 6 MONTH F/U FOR DM-2 HTN CHOL CAD  
 [address] [facility] [address] [id number]  
[address] 235 [facility] [address] [facility]  
  
    
 3/29/2019 11:30 AM  
Follow Up with BRIANA Waltersteliazar [address] ([facility]) Appt Note: Follow up 200 [facility] D[place] [phone] [facility] [id number]  
59 [facility] [place] [address] Upcoming Health Maintenance Date Due DTaP/Tdap/Td series (1 - Tdap) 1961 EYE EXAM RETINAL OR DILATED Q1 2018 MICROALBUMIN Q1 2018 LIPID PANEL Q1 2018 Influenza Age 5 to Adult 2018 HEMOGLOBIN A1C Q6M 2018 FOOT EXAM Q1 10/16/2018 GLAUCOMA SCREENING Q2Y 2019 MEDICARE YEARLY EXAM 2019 COLONOSCOPY 3/1/2022 Allergies as of 2018  Review Complete On: 8/10/2018 By: Nic Tian MD  
  
 Severity Noted Reaction Type Reactions Codeine  2009    Other (comments) Jerking sensation Livalo [Pitavastatin]  10/03/2013    Diarrhea Niaspan [Niacin]  10/08/2012    Myalgia Statins-hmg-coa Reductase Inhibitors  2010    Myalgia Zocor, pravachol, Lipitor, crestor Welchol [Colesevelam]  2013    Other (comments) Nausea, bloating and malaise Zetia [Ezetimibe]  2014    Myalgia Current Immunizations  Reviewed on 2/13/2016 Name Date Influenza Vaccine 9/15/2017, 11/30/2015, 10/18/2013 Influenza Vaccine Whole 9/29/2012 PPD  Incomplete, 5/19/2011 ZZZ-RETIRED (DO NOT USE) Pneumococcal Vaccine (Unspecified Type) 1/1/2008 Zoster Vaccine, Live 2/15/2017 Not reviewed this visit You Were Diagnosed With   
  
 Codes Comments New onset of headaches after age 48    -  Primary ICD-10-CM: R46 ICD-9-CM: 802. 0 Vitals BP Pulse Resp Height(growth percentile) Weight(growth percentile) SpO2  
 126/68 84 18 5' 3\" (1.6 m) 169 lb (76.7 kg) 98% BMI OB Status Smoking Status 29.94 kg/m2 Postmenopausal Never Smoker BMI and BSA Data Body Mass Index Body Surface Area  
 29.94 kg/m 2 1.85 m 2 Preferred Pharmacy Pharmacy Name Phone [facility] [phone] [place] [address] [phone] Your Updated Medication List  
  
   
This list is accurate as of 8/14/18  3:51 PM.  Always use your most recent med list.  
  
  
  
  
 amitriptyline 10 mg tablet Commonly known as:  ELAVIL Take 1 Tab by mouth nightly. aspirin 81 mg tablet Take 81 mg by mouth daily. atorvastatin 40 mg tablet Commonly known as:  LIPITOR Take  by mouth daily. azithromycin 250 mg tablet Commonly known as:  Sherren Mohair Use as directed Blood-Glucose Meter monitoring kit One touch ultra mini glucometer--dx 250.00  
  
 celecoxib 200 mg capsule Commonly known as:  CELEBREX  
take 1 capsule by mouth once daily CENTRUM SILVER PO Take 1 Tab by mouth daily. clopidogrel 75 mg Tab Commonly known as:  PLAVIX Take 1 Tab by mouth daily. COREG 6.25 mg tablet Generic drug:  carvedilol Take  by mouth two (2) times daily (with meals). cyanocobalamin 500 mcg tablet Commonly known as:  VITAMIN B12 Take 500 mcg by mouth daily. fenofibrate 54 mg tablet Commonly known as:  LOFIBRA Take  by mouth daily. fluticasone 50 mcg/actuation nasal spray Commonly known as:  FLONASE  
instill 2 sprays into each nostril daily  
  
 glucose blood VI test strips strip Commonly known as:  ASCENSIA AUTODISC VI, ONE TOUCH ULTRA TEST VI One touch ultra test strips min---check fsbs bid  
  
 insulin glargine 100 unit/mL (3 mL) Inpn Commonly known as:  LANTUS SOLOSTAR U-100 INSULIN Inject 16 units at bedtime--Dose change 10/14/16--updated med list--did not send prescription to the pharmacy Insulin Needles (Disposable) 31 gauge x 3/16\" Ndle Commonly known as:  PEN NEEDLE Use as directed once daily JANUMET 50-1,000 mg per tablet Generic drug:  SITagliptin-metFORMIN  
take 1 tablet by mouth twice a day with food Lancets Misc Check fsbs bid -250.02 LORazepam 1 mg tablet Commonly known as:  ATIVAN Take 1 Tab by mouth once for 1 dose. Max Daily Amount: 2 mg. LYRICA 50 mg capsule Generic drug:  pregabalin  
take 1 capsule by mouth three times a day  
  
 montelukast 10 mg tablet Commonly known as:  SINGULAIR  
take 1 tablet by mouth once daily  
  
 nitroglycerin 0.4 mg SL tablet Commonly known as:  NITROSTAT  
1 Tab by SubLINGual route every five (5) minutes as needed for Chest Pain. NORVASC 2.5 mg tablet Generic drug:  amLODIPine Take  by mouth daily. omeprazole 20 mg capsule Commonly known as:  PRILOSEC  
take 1 capsule by mouth twice a day  
  
 ranolazine  mg SR tablet Commonly known as:  RANEXA Take 1 Tab by mouth two (2) times a day. Prescriptions Printed Refills LORazepam (ATIVAN) 1 mg tablet 0 Sig: Take 1 Tab by mouth once for 1 dose. Max Daily Amount: 2 mg. Class: Print Route: Oral  
  
Prescriptions Sent to Pharmacy Refills  
 amitriptyline (ELAVIL) 10 mg tablet 5 Sig: Take 1 Tab by mouth nightly.   
 Class: Normal  
 Pharmacy: [facility] [id number]  #: [phone] Route: Oral  
  
Follow-up Instructions Return in about 3 months (around 11/14/2018). To-Do List   
 08/15/2018 Imaging:  MRI BRAIN WO CONT Patient Instructions PRESCRIPTION REFILL POLICY [facility] Statement to Patients April 1, 2014 In an effort to ensure the large volume of patient prescription refills is processed in the most efficient and expeditious manner, we are asking our patients to assist us by calling your Pharmacy for all prescription refills, this will include also your  Mail Order Pharmacy. The pharmacy will contact our office electronically to continue the refill process. Please do not wait until the last minute to call your pharmacy. We need at least 48 hours (2days) to fill prescriptions. We also encourage you to call your pharmacy before going to  your prescription to make sure it is ready. With regard to controlled substance prescription refill requests (narcotic refills) that need to be picked up at our office, we ask your cooperation by providing us with at least 72 hours (3days) notice that you will need a refill. We will not refill narcotic prescription refill requests after 4:00pm on any weekday, Monday through Thursday, or after 2:00pm on Fridays, or on the weekends. We encourage everyone to explore another way of getting your prescription refill request processed using [employer], our patient web portal through our electronic medical record system. [employer]i is an efficient and effective way to communicate your medication request directly to the office and  downloadable as an miles on your smart phone . [employer]i also features a review functionality that allows you to view your medication list as well as leave messages for your physician. Are you ready to get connected?  If so please review the attatched instructions or speak to any of our staff to get you set up right away! Thank you so much for your cooperation. Should you have any questions please contact our Practice Administrator. The Physicians and Staff,  [facility] Patient Instruction Plan/ Result Policy If we have ordered testing for you, know that; \"NO NEWS IS GOOD NEWS! \" It is our policy that we know longer call patients with results, nor do we  give test results over the phone. We schedule follow up appointments so that your results can be discussed in person. This allows you to address any questions you have regarding the results. If something of concern is revealed on your test, we will contact you to discuss the matter and if needed schedule a sooner follow up appointment. Additionally, results may be found by using the My Chart feature and one of our patient service representatives at the  can give you instructions on how to access this feature to utilize our electronic medical record system. Thank you for your understanding. Amitriptyline (By mouth) Amitriptyline (am-i-TRIP-ti-bucky) Treats depression. This medicine is a TCA. Brand Name(s): Elavil There may be other brand names for this medicine. When This Medicine Should Not Be Used: This medicine is not right for everyone. Do not use if you had an allergic reaction to amitriptyline. How to Use This Medicine:  
Tablet · Take your medicine as directed. Your dose may need to be changed several times to find what works best for you. · This medicine should come with a Medication Guide. Ask your pharmacist for a copy if you do not have one. · Store the medicine in a closed container at room temperature, away from heat, moisture, and direct light. · Missed dose: Take a dose as soon as you remember. If it is almost time for your next dose, wait until then and take a regular dose.  Do not take extra medicine to make up for a missed dose. Drugs and Foods to Avoid: Ask your doctor or pharmacist before using any other medicine, including over-the-counter medicines, vitamins, and herbal products. · Do not use this medicine with cisapride. Do not use this medicine and an MAO inhibitor (MAOI) within 14 days of each other. · Some medicines and foods can affect how amitriptyline works. Tell your doctor if you are using cimetidine, disulfiram, or guanethidine. Tell your doctor if you are using other medicine for depression (such as fluoxetine, paroxetine, sertraline), a phenothiazine medicine (such as promethazine, chlorpromazine), medicine for heart rhythm problems (such as flecainide, propafenone, quinidine), or thyroid medicine. · Tell your doctor if you use anything else that makes you sleepy. Some examples are allergy medicine, narcotic pain medicine, and alcohol. Warnings While Using This Medicine: · Tell your doctor if you are pregnant or breastfeeding, or if you have liver disease, heart disease, diabetes, narrow-angle glaucoma, a seizure disorder, a thyroid problem, or trouble urinating. · For some children, teenagers, and young adults, this medicine may increase mental or emotional problems. This may lead to thoughts of suicide and violence. Talk with your doctor right away if you have any thoughts or behavior changes that concern you. Tell your doctor if you or anyone in your family has a history of bipolar disorder or suicide attempts. · This medicine may cause the following problems:  
¨ Heart rhythm problems ¨ High or low blood sugar levels · This medicine may make you dizzy or drowsy. Do not drive or do anything else that could be dangerous until you know how this medicine affects you. · Do not stop using this medicine suddenly. Your doctor will need to slowly decrease your dose before you stop it completely. · Keep all medicine out of the reach of children.  Never share your medicine with anyone. Possible Side Effects While Using This Medicine:  
Call your doctor right away if you notice any of these side effects: · Allergic reaction: Itching or hives, swelling in your face or hands, swelling or tingling in your mouth or throat, chest tightness, trouble breathing · Anxiety, restlessness, seeing or hearing things that are not there · Chest pain, trouble breathing · Fast, pounding, or uneven heartbeat · Feeling more excited or energetic than usual, racing thoughts, trouble sleeping · Lightheadedness, dizziness, or fainting · Seizures · Thoughts of hurting yourself or others, unusual behavior · Unusual bleeding or bruising If you notice these less serious side effects, talk with your doctor: · Blurred vision, dry mouth, fever · Change in how much or how often you urinate · Constipation, diarrhea, nausea, vomiting · Drowsiness, sleepiness · Sexual problems If you notice other side effects that you think are caused by this medicine, tell your doctor. Call your doctor for medical advice about side effects. You may report side effects to FDA at 5-701-FDA-7949 © 2017 [facility] Information is for End User's use only and may not be sold, redistributed or otherwise used for commercial purposes. The above information is an  only. It is not intended as medical advice for individual conditions or treatments. Talk to your doctor, nurse or pharmacist before following any medical regimen to see if it is safe and effective for you. Introducing [facility]! Merna Liu introduces [employer] patient portal. Now you can access parts of your medical record, email your doctor's office, and request medication refills online. 1. In your internet browser, go to [email or web address] 2. Click on the First Time User? Click Here link in the Sign In box. You will see the New Member Sign Up page. 3. Enter your [employer] Access Code exactly as it appears below. You will not need to use this code after youve completed the sign-up process. If you do not sign up before the expiration date, you must request a new code. · [employer] Access Code: [email or web address] Expires: 11/8/2018  2:05 PM 
 
4. Enter the last four digits of your Social Security Number (xxxx) and Date of Birth (mm/dd/yyyy) as indicated and click Submit. You will be taken to the next sign-up page. 5. Create a [employer] ID. This will be your [employer] login ID and cannot be changed, so think of one that is secure and easy to remember. 6. Create a [employer] password. You can change your password at any time. 7. Enter your Password Reset Question and Answer. This can be used at a later time if you forget your password. 8. Enter your e-mail address. You will receive e-mail notification when new information is available in [address] 9. Click Sign Up. You can now view and download portions of your medical record. 10. Click the Download Summary menu link to download a portable copy of your medical information. If you have questions, please visit the Frequently Asked Questions section of the [employer] website. Remember, [employer] is NOT to be used for urgent needs. For medical emergencies, dial 911. Now available from your iPhone and Android! Please provide this summary of care documentation to your next provider. Your primary care clinician is listed as Juan A OLIVEROS. If you have any questions after today's visit, please call [phone].

## 2018-08-14 NOTE — PROGRESS NOTES
Neurology Progress Note    Patient ID:  Oly Wilkerson  [id number]  69 y.o.  1940    HISTORY PROVIDED BY:  Patient    Chief Complaint: dizziness/stroke eval  Subjective:    Ms. Linda Aj is here for follow up today of headaches. She has not been seen in almost 3 years. Previously she was followed for dizziness and stroke. Today she has a new complaint of headache. She as had pain in her head and around her eyes and down her neck x 6 months. She had it really severe in December. She had an abscess tooth and had a root canal but still has had pain. She has continued with issues. She will have throbbing and it feels like it is going to burst open. This is on her left side of her face. She also feels both temples hurt. Coughing makes it worse. She denies soreness when eating. Last Thursday she had some eye pain associated and she had some blurred vision. She wakes up and feels fine and then it begins to ache as the day goes on. She feels like it is worse when leaning over at night too. The pain is throbbing. She tried a claritin once thinking it was sinus but this made it worse. She takes tylenol to help but she was told to limit this if possible. She has not had any recent imaging. Recap:  dizziness and stroke eval. MRA of the neck was negative. Since I saw her last she did have had several carotid stents placed. She is now on Plavix and aspirin 81 mg daily. Today, she came home after her stents, she did have a severe episode of vertigo followed by headache. Patient reports her memory is stable. She does have some episodes of transient aphasia. Nothing consistent. Her daughter is with her today and does not feel like her memory is a big issue. Labs from last visit showed elevated B6. Patient has stopped her B6 supplements. Patient reports that her dizziness otherwise, better.    Patient has had no further syncope      Objective:   ROS:  Per HPI-  Otherwise 12 point ROS was negative    Meds:  Current Outpatient Prescriptions on File Prior to Visit   Medication Sig Dispense Refill    azithromycin (ZITHROMAX) 250 mg tablet Use as directed 6 Tab 0    LYRICA 50 mg capsule take 1 capsule by mouth three times a day 90 Cap 5    JANUMET 50-1,000 mg per tablet take 1 tablet by mouth twice a day with food 180 Tab 3    omeprazole (PRILOSEC) 20 mg capsule take 1 capsule by mouth twice a day 60 Cap 11    montelukast (SINGULAIR) 10 mg tablet take 1 tablet by mouth once daily 30 Tab 11    Insulin Needles, Disposable, (PEN NEEDLE) 31 gauge x 3/16\" ndle Use as directed once daily 100 Pen Needle 3    glucose blood VI test strips (ASCENSIA AUTODISC VI, ONE TOUCH ULTRA TEST VI) strip One touch ultra test strips min---check fsbs bid 200 Strip 3    Blood-Glucose Meter monitoring kit One touch ultra mini glucometer--dx 250.00 1 Kit 0    Lancets misc Check fsbs bid -250.02 200 Each 3    celecoxib (CELEBREX) 200 mg capsule take 1 capsule by mouth once daily 30 Cap 6    fluticasone (FLONASE) 50 mcg/actuation nasal spray instill 2 sprays into each nostril daily 1 Bottle 8    nitroglycerin (NITROSTAT) 0.4 mg SL tablet 1 Tab by SubLINGual route every five (5) minutes as needed for Chest Pain. 25 Tab 3    carvedilol (COREG) 6.25 mg tablet Take  by mouth two (2) times daily (with meals).  amLODIPine (NORVASC) 2.5 mg tablet Take  by mouth daily.  insulin glargine (LANTUS SOLOSTAR) 100 unit/mL (3 mL) pen Inject 16 units at bedtime--Dose change 10/14/16--updated med list--did not send prescription to the pharmacy (Patient taking differently: Inject 20 units at bedtime--Dose change 10/14/16--updated med list--did not send prescription to the pharmacy) 15 mL 3    atorvastatin (LIPITOR) 40 mg tablet Take  by mouth daily.  fenofibrate (LOFIBRA) 54 mg tablet Take  by mouth daily.  cyanocobalamin (VITAMIN B12) 500 mcg tablet Take 500 mcg by mouth daily.       ranolazine ER (RANEXA) 500 mg SR tablet Take 1 Tab by mouth two (2) times a day. 60 Tab 12    clopidogrel (PLAVIX) 75 mg tablet Take 1 Tab by mouth daily. 30 Tab 11    aspirin 81 mg Tab Take 81 mg by mouth daily.  MULTIVITAMINS W-MINERALS/LUT (CENTRUM SILVER PO) Take 1 Tab by mouth daily. No current facility-administered medications on file prior to visit. Imaging:  MRI brain: chronic small vessel disease, MRA with mild right carotid stenosis  MRA neck: Negative  TTE: wnl  Reviewed records in [facility] and [facility] tab today    Lab Review   Results for orders placed or performed in visit on 04/06/18   SED RATE (ESR)   Result Value Ref Range    Sed rate (ESR) 3 0 - 40 mm/hr   SED RATE (ESR)   Result Value Ref Range    Sed rate (ESR) 5 0 - 40 mm/hr       Exam:  Visit Vitals    /68    Pulse 84    Resp 18    Ht 5' 3\" (1.6 m)    Wt 76.7 kg (169 lb)    SpO2 98%    BMI 29.94 kg/m2     Gen: Well developed  CV: RRR  Lungs: non labored breathing  Abd: non distending  Neuro: A&O x 3, 2/3 word recall, 3/3 with category cue, spells WORLD backwards, no dysarthria or aphasia  CN II-XII: PERRL, EOMI, face symmetric, tongue/palate midline, no papilledema  Motor: strength 5/5 all four ext  Sensory: intact to LT  Gait: normal    Assessment:     Kisha Torres is a 69 y.o. female who presents for evaluation of headache. Headache is in the temples and the back of the head. She also feels like her sinuses are affected. Will do an MRI of the brain to evaluate for this new onset headache. There is also a component that sounds like could be trigeminal neuralgia. I think patient would benefit from a low-dose of amitriptyline as a preventative. Patient is in agreement and will initiate this today. Temporal arteritis has been ruled out with a normal ESR. Plan:     1. Cont Plavix and aspirin 81 mg daily  2. Cont good BP and DM  3. MRI of the brain with special attention to orbits  4. Start amitriptyline 10 mg nightly.   Side effects discussed. Patient is to call if we need to adjust this further  5. Will consider weaning off of medication at follow-up if she is doing better     Follow-up in 3 months or sooner if needed        Signed:  Meg Batres MD  8/14/2018    Medications and side effects discussed with patient in detail. With any new medications prescribed, patient was given instructions on administration and side effects. Written medication information was provided to the patient as well. This note was created using voice recognition software. Despite editing, there may be syntax errors. This note will not be viewable in [address]

## 2018-08-14 NOTE — LETTER
Neurology Progress Note Patient ID: Shawn Prophet [id number] 
69 y.o. 
1940 HISTORY PROVIDED BY: 
Patient Chief Complaint: dizziness/stroke eval 
Subjective:  
 Ms. Dema Angelucci is here for follow up today of headaches. She has not been seen in almost 3 years. Previously she was followed for dizziness and stroke. Today she has a new complaint of headache. She as had pain in her head and around her eyes and down her neck x 6 months. She had it really severe in December. She had an abscess tooth and had a root canal but still has had pain. She has continued with issues. She will have throbbing and it feels like it is going to burst open. This is on her left side of her face. She also feels both temples hurt. Coughing makes it worse. She denies soreness when eating. Last Thursday she had some eye pain associated and she had some blurred vision. She wakes up and feels fine and then it begins to ache as the day goes on. She feels like it is worse when leaning over at night too. The pain is throbbing. She tried a claritin once thinking it was sinus but this made it worse. She takes tylenol to help but she was told to limit this if possible. She has not had any recent imaging. Recap: 
dizziness and stroke eval. MRA of the neck was negative. Since I saw her last she did have had several carotid stents placed. She is now on Plavix and aspirin 81 mg daily. Today, she came home after her stents, she did have a severe episode of vertigo followed by headache. Patient reports her memory is stable. She does have some episodes of transient aphasia. Nothing consistent. Her daughter is with her today and does not feel like her memory is a big issue. Labs from last visit showed elevated B6. Patient has stopped her B6 supplements. Patient reports that her dizziness otherwise, better. Patient has had no further syncope Objective:  
ROS: 
Per HPI- 
Otherwise 12 point ROS was negative Meds: 
Current Outpatient Prescriptions on File Prior to Visit Medication Sig Dispense Refill  azithromycin (ZITHROMAX) 250 mg tablet Use as directed 6 Tab 0  
 LYRICA 50 mg capsule take 1 capsule by mouth three times a day 90 Cap 5  JANUMET 50-1,000 mg per tablet take 1 tablet by mouth twice a day with food 180 Tab 3  
 omeprazole (PRILOSEC) 20 mg capsule take 1 capsule by mouth twice a day 60 Cap 11  
 montelukast (SINGULAIR) 10 mg tablet take 1 tablet by mouth once daily 30 Tab 11  Insulin Needles, Disposable, (PEN NEEDLE) 31 gauge x 3/16\" ndle Use as directed once daily 100 Pen Needle 3  
 glucose blood VI test strips (ASCENSIA AUTODISC VI, ONE TOUCH ULTRA TEST VI) strip One touch ultra test strips min---check fsbs bid 200 Strip 3  Blood-Glucose Meter monitoring kit One touch ultra mini glucometer--dx 250.00 1 Kit 0  
 Lancets misc Check fsbs bid -250.02 200 Each 3  
 celecoxib (CELEBREX) 200 mg capsule take 1 capsule by mouth once daily 30 Cap 6  fluticasone (FLONASE) 50 mcg/actuation nasal spray instill 2 sprays into each nostril daily 1 Bottle 8  
 nitroglycerin (NITROSTAT) 0.4 mg SL tablet 1 Tab by SubLINGual route every five (5) minutes as needed for Chest Pain. 25 Tab 3  carvedilol (COREG) 6.25 mg tablet Take  by mouth two (2) times daily (with meals).  amLODIPine (NORVASC) 2.5 mg tablet Take  by mouth daily.  insulin glargine (LANTUS SOLOSTAR) 100 unit/mL (3 mL) pen Inject 16 units at bedtime--Dose change 10/14/16--updated med list--did not send prescription to the pharmacy (Patient taking differently: Inject 20 units at bedtime--Dose change 10/14/16--updated med list--did not send prescription to the pharmacy) 15 mL 3  
 atorvastatin (LIPITOR) 40 mg tablet Take  by mouth daily.  fenofibrate (LOFIBRA) 54 mg tablet Take  by mouth daily.  cyanocobalamin (VITAMIN B12) 500 mcg tablet Take 500 mcg by mouth daily.  ranolazine ER (RANEXA) 500 mg SR tablet Take 1 Tab by mouth two (2) times a day. 60 Tab 12  
 clopidogrel (PLAVIX) 75 mg tablet Take 1 Tab by mouth daily. 30 Tab 11  
 aspirin 81 mg Tab Take 81 mg by mouth daily.  MULTIVITAMINS W-MINERALS/LUT (CENTRUM SILVER PO) Take 1 Tab by mouth daily. No current facility-administered medications on file prior to visit. Imaging: MRI brain: chronic small vessel disease, MRA with mild right carotid stenosis MRA neck: Negative TTE: wnl Reviewed records in [facility] and SR Labs tab today Lab Review Results for orders placed or performed in visit on 04/06/18 SED RATE (ESR) Result Value Ref Range Sed rate (ESR) 3 0 - 40 mm/hr SED RATE (ESR) Result Value Ref Range Sed rate (ESR) 5 0 - 40 mm/hr Exam: 
Visit Vitals  /68  Pulse 84  Resp 18  Ht 5' 3\" (1.6 m)  Wt 76.7 kg (169 lb)  SpO2 98%  BMI 29.94 kg/m2 Gen: Well developed CV: RRR Lungs: non labored breathing Abd: non distending Neuro: A&O x 3, 2/3 word recall, 3/3 with category cue, spells WORLD backwards, no dysarthria or aphasia CN II-XII: PERRL, EOMI, face symmetric, tongue/palate midline, no papilledema Motor: strength 5/5 all four ext Sensory: intact to LT Gait: normal 
 
Assessment:  
 
Angi Jimenes is a 69 y.o. female who presents for evaluation of headache. Headache is in the temples and the back of the head. She also feels like her sinuses are affected. Will do an MRI of the brain to evaluate for this new onset headache. There is also a component that sounds like could be trigeminal neuralgia. I think patient would benefit from a low-dose of amitriptyline as a preventative. Patient is in agreement and will initiate this today. Temporal arteritis has been ruled out with a normal ESR. Plan: 1. Cont Plavix and aspirin 81 mg daily 2. Cont good BP and DM 3. MRI of the brain with special attention to orbits 4.  Start amitriptyline 10 mg nightly. Side effects discussed. Patient is to call if we need to adjust this further 5. Will consider weaning off of medication at follow-up if she is doing better Follow-up in 3 months or sooner if needed Signed: 
Collette Gaucher, MD 
8/14/2018 Medications and side effects discussed with patient in detail. With any new medications prescribed, patient was given instructions on administration and side effects. Written medication information was provided to the patient as well. This note was created using voice recognition software. Despite editing, there may be syntax errors. This note will not be viewable in [address]

## 2018-09-02 ENCOUNTER — HOSPITAL ENCOUNTER (OUTPATIENT)
Dept: MRI IMAGING | Age: 78
Discharge: HOME | End: 2018-09-02
Attending: PSYCHIATRY & NEUROLOGY
Payer: MEDICARE

## 2018-09-02 DIAGNOSIS — R51.9 NEW ONSET OF HEADACHES AFTER AGE 50: ICD-10-CM

## 2018-09-02 PROCEDURE — 70551 MRI BRAIN STEM W/O DYE: CPT

## 2018-10-08 NOTE — PROGRESS NOTES
HISTORY OF PRESENT ILLNESS 
Silvino Begum is a 69 y.o. female. HPI  
 
F/u DM-2 htn hld cad fatty liver Last a1c 5.9 ldl 65 Saw neurologist recently for new headaches--brain MRI-negative. Started on elavil 10 mg hs-now 1/2 tab hs helped prevent migraines Sees Dr Fabi Johnson for hx severe CAD s/p NSTEMI, diffuse dz and stents to RCA 
 
fsbs bid 100-140 Lowest 89 Takes lyrica for neuorpathy which helps Last OV: 
Last a1c 5.9 ldl 65 Sees renal MD and will get labs next week-Dr Keenan 
fsbs bid < 200 at home 
  
C/o sneezes and sinus congestion and bitemporal headaches for 4 months intermittently Headache pain is soreness, dull pain occurs in the evenings, worse after cough or when bending over 
  
Saw liver MD for fatty liver, had fibroscan--repeat in 1 year 
  
Last visit: 
Had recent UTI--treated at [facility] with bactrim x 7d Last Hb was done to 10.4 --oral increased to 2 every day but still only taking one every day Dr Mendosa Like-- EGD, M2a, colonoscopy--tics, colon polyps and hemorrhoids 
   
In ER in July for presycnope in [place]--ER evaluation negative Saw cardiologist Dr Fabi Johnson recently- no med changes. Ef 45% Exercise at the local gym 2 days per week , walks 6- 8 minutes on treadmill No PAD by testing in the past at vascular MD office No cp or angina No back pain since sx 2 yrs ago Last a1c was 6.6    6 months ago 
fsbs fluctuates, but better after UTI treatments--avg am fsbs 110-160 but night time over 200 sometimes 
   
Pt increased insulin to 20 units qd Has pain in legs walking and has constant burning in feet and sharp stabbing pain in feet-takes neurontin 1 pm, 2 hs Patient Active Problem List  
 Diagnosis Date Noted  Type 2 diabetes with nephropathy ([facility] 08/10/2018  Fatty liver 02/23/2018  Iron deficiency anemia 12/01/2017  Osteopenia of left thigh 04/02/2017  
 NSTEMI (non-ST elevated myocardial infarction) ([address] 01/19/2016  Advanced care planning/counseling discussion 12/17/2015  
 Hiatal hernia 05/15/2012  CAD (coronary artery disease) 05/15/2012  Type 2 diabetes, controlled, with neuropathy ([facility] 11/16/2009  Essential hypertension, benign 11/16/2009  Pure hypercholesterolemia 11/16/2009  Neuropathy in diabetes ([facility] 11/16/2009  DJD (degenerative joint disease) 11/16/2009  DOMINGO (obstructive sleep apnea) 11/16/2009  Breast cancer ([facility] 11/16/2009 Current Outpatient Prescriptions Medication Sig Dispense Refill  amitriptyline (ELAVIL) 10 mg tablet Take 1 Tab by mouth nightly. 30 Tab 5  
 azithromycin (ZITHROMAX) 250 mg tablet Use as directed 6 Tab 0  
 LYRICA 50 mg capsule take 1 capsule by mouth three times a day 90 Cap 5  JANUMET 50-1,000 mg per tablet take 1 tablet by mouth twice a day with food 180 Tab 3  
 omeprazole (PRILOSEC) 20 mg capsule take 1 capsule by mouth twice a day 60 Cap 11  
 montelukast (SINGULAIR) 10 mg tablet take 1 tablet by mouth once daily 30 Tab 11  Insulin Needles, Disposable, (PEN NEEDLE) 31 gauge x 3/16\" ndle Use as directed once daily 100 Pen Needle 3  
 glucose blood VI test strips (ASCENSIA AUTODISC VI, ONE TOUCH ULTRA TEST VI) strip One touch ultra test strips min---check fsbs bid 200 Strip 3  Blood-Glucose Meter monitoring kit One touch ultra mini glucometer--dx 250.00 1 Kit 0  
 Lancets misc Check fsbs bid -250.02 200 Each 3  
 celecoxib (CELEBREX) 200 mg capsule take 1 capsule by mouth once daily 30 Cap 6  fluticasone (FLONASE) 50 mcg/actuation nasal spray instill 2 sprays into each nostril daily 1 Bottle 8  
 nitroglycerin (NITROSTAT) 0.4 mg SL tablet 1 Tab by SubLINGual route every five (5) minutes as needed for Chest Pain. 25 Tab 3  carvedilol (COREG) 6.25 mg tablet Take  by mouth two (2) times daily (with meals).  amLODIPine (NORVASC) 2.5 mg tablet Take  by mouth daily.  insulin glargine (LANTUS SOLOSTAR) 100 unit/mL (3 mL) pen Inject 16 units at bedtime--Dose change 10/14/16--updated med list--did not send prescription to the pharmacy (Patient taking differently: Inject 20 units at bedtime--Dose change 10/14/16--updated med list--did not send prescription to the pharmacy) 15 mL 3  
 atorvastatin (LIPITOR) 40 mg tablet Take  by mouth daily.  fenofibrate (LOFIBRA) 54 mg tablet Take  by mouth daily.  cyanocobalamin (VITAMIN B12) 500 mcg tablet Take 500 mcg by mouth daily.  ranolazine ER (RANEXA) 500 mg SR tablet Take 1 Tab by mouth two (2) times a day. 60 Tab 12  
 clopidogrel (PLAVIX) 75 mg tablet Take 1 Tab by mouth daily. 30 Tab 11  
 aspirin 81 mg Tab Take 81 mg by mouth daily.  MULTIVITAMINS W-MINERALS/LUT (CENTRUM SILVER PO) Take 1 Tab by mouth daily. Allergies Allergen Reactions  Codeine Other (comments) Jerking sensation  Livalo [Pitavastatin] Diarrhea  Niaspan [Niacin] Myalgia  Statins-Hmg-Coa Reductase Inhibitors Myalgia Zocor, pravachol, Lipitor, crestor  Welchol [Colesevelam] Other (comments) Nausea, bloating and malaise  Zetia [Ezetimibe] Myalgia Social History Substance Use Topics  Smoking status: Never Smoker  Smokeless tobacco: Never Used  Alcohol use No  
  
Lab Results Component Value Date/Time Hemoglobin A1c 5.9 (H) 02/23/2018 03:15 PM  
Hemoglobin A1c 6.4 (H) 12/01/2017 02:40 PM  
Hemoglobin A1c 6.2 (H) 08/01/2017 02:01 PM  
Hemoglobin A1c, External 6.3 04/30/2016 Glucose 94 02/23/2018 03:15 PM  
Glucose (POC) 85 02/15/2016 08:03 AM  
Microalb/Creat ratio (ug/mg creat.) 126.1 (H) 08/01/2017 02:01 PM  
LDL, calculated 65 08/01/2017 02:01 PM  
Creatinine 0.95 02/23/2018 03:15 PM  
  
Lab Results Component Value Date/Time Cholesterol, total 145 08/01/2017 02:01 PM  
Cholesterol (POC) 195 08/11/2011 10:08 AM  
HDL Cholesterol 40 08/01/2017 02:01 PM  
 LDL, calculated 65 08/01/2017 02:01 PM  
LDL Cholesterol (POC) 113 08/11/2011 10:08 AM  
LDL-C, External 104 05/02/2016 Triglyceride 198 (H) 08/01/2017 02:01 PM  
Triglycerides (POC) 256 08/11/2011 10:08 AM  
CHOL/HDL Ratio 5.5 (H) 01/19/2016 12:42 PM  
 
Lab Results Component Value Date/Time GFR est non-AA 58 (L) 02/23/2018 03:15 PM  
GFR est AA 67 02/23/2018 03:15 PM  
Creatinine 0.95 02/23/2018 03:15 PM  
BUN 22 02/23/2018 03:15 PM  
Sodium 139 02/23/2018 03:15 PM  
Potassium 4.6 02/23/2018 03:15 PM  
Chloride 97 02/23/2018 03:15 PM  
CO2 22 02/23/2018 03:15 PM  
Magnesium 1.5 (L) 01/19/2016 12:42 PM  
  
 
ROS Physical Exam  
Constitutional: She appears well-developed and well-nourished. Appears stated age Cardiovascular: Normal rate, regular rhythm and normal heart sounds. Exam reveals no gallop and no friction rub. No murmur heard. Pulmonary/Chest: Effort normal and breath sounds normal. No respiratory distress. She has no wheezes. Abdominal: Soft. Bowel sounds are normal.  
Musculoskeletal: She exhibits no edema. Neurological: She is alert. Psychiatric: She has a normal mood and affect. Nursing note and vitals reviewed. ASSESSMENT and PLAN Diagnoses and all orders for this visit: 
 
1. Type 2 diabetes, controlled, with neuropathy ([address] 
-     HEMOGLOBIN A1C WITH EAG 
-     METABOLIC PANEL, COMPREHENSIVE 
-     MICROALBUMIN, UR, RAND W/ MICROALB/CREAT RATIO Controlled but monitor for hypoglycemia and decrease lantus by 2-4 units if needed 2. Essential hypertension, benign -     METABOLIC PANEL, COMPREHENSIVE 
 controlled 3. Pure hypercholesterolemia -     METABOLIC PANEL, COMPREHENSIVE 
-     LIPID PANEL Continue lipitor 4. Coronary artery disease involving native coronary artery of native heart without angina pectoris Stable. No cp or angina, continue medicines 5. Headaches Improved on low dose elavil 6. Preventive Pt will get flu shot at the pharmacy Follow-up Disposition: 
Return in about 6 months (around 4/9/2019) for dm-2 htn hld cad.

## 2018-10-09 ENCOUNTER — HOSPITAL ENCOUNTER (OUTPATIENT)
Dept: LAB | Age: 78
Discharge: HOME | End: 2018-10-09
Payer: MEDICARE

## 2018-10-09 ENCOUNTER — OFFICE VISIT (OUTPATIENT)
Dept: INTERNAL MEDICINE CLINIC | Age: 78
End: 2018-10-09

## 2018-10-09 VITALS
TEMPERATURE: 98.2 F | BODY MASS INDEX: 30.3 KG/M2 | DIASTOLIC BLOOD PRESSURE: 74 MMHG | OXYGEN SATURATION: 99 % | HEART RATE: 83 BPM | SYSTOLIC BLOOD PRESSURE: 138 MMHG | WEIGHT: 171 LBS | HEIGHT: 63 IN

## 2018-10-09 DIAGNOSIS — I10 ESSENTIAL HYPERTENSION, BENIGN: ICD-10-CM

## 2018-10-09 DIAGNOSIS — E78.00 PURE HYPERCHOLESTEROLEMIA: ICD-10-CM

## 2018-10-09 DIAGNOSIS — E11.40 TYPE 2 DIABETES, CONTROLLED, WITH NEUROPATHY (HCC): Primary | ICD-10-CM

## 2018-10-09 DIAGNOSIS — I25.10 CORONARY ARTERY DISEASE INVOLVING NATIVE CORONARY ARTERY OF NATIVE HEART WITHOUT ANGINA PECTORIS: ICD-10-CM

## 2018-10-09 PROCEDURE — 80053 COMPREHEN METABOLIC PANEL: CPT

## 2018-10-09 PROCEDURE — 80061 LIPID PANEL: CPT

## 2018-10-09 PROCEDURE — 82043 UR ALBUMIN QUANTITATIVE: CPT

## 2018-10-09 PROCEDURE — 36415 COLL VENOUS BLD VENIPUNCTURE: CPT

## 2018-10-09 PROCEDURE — 83036 HEMOGLOBIN GLYCOSYLATED A1C: CPT

## 2018-10-09 NOTE — MR AVS SNAPSHOT
[address] [facility] 
[phone] Patient: Ned Jackson MRN: [id number] :1940 Visit Information Date & Time Provider Department Dept. Phone Encounter #  
 10/9/2018 11:30 AM Chelita Nelson, [address] [place] [address],[address] [phone] [id number] Follow-up Instructions Return in about 6 months (around 2019) for dm-2 htn hld cad. Your Appointments 2018 10:40 AM  
Follow Up with Jolynn Mcelroy MD  
[facility]) Appt Note: 3 month f/u seb [place]  [address] [facility] [id number] [phone]  
  
   
 [facility]  
  
    
 3/29/2019 11:30 AM  
Follow Up with BRIANA [address] ([facility]) Appt Note: Follow up [address] [facility] [place] [phone] [facility] [id number]  
[address] [facility] [place] [address] Upcoming Health Maintenance Date Due DTaP/Tdap/Td series (1 - Tdap) 1962 Shingrix Vaccine Age 50> (1 of 2) 1991 EYE EXAM RETINAL OR DILATED Q1 2018 MICROALBUMIN Q1 2018 LIPID PANEL Q1 2018 Influenza Age 5 to Adult 2018 HEMOGLOBIN A1C Q6M 2018 FOOT EXAM Q1 10/16/2018 GLAUCOMA SCREENING Q2Y 2019 MEDICARE YEARLY EXAM 2019 COLONOSCOPY 3/1/2022 Allergies as of 10/9/2018  Review Complete On: 10/9/2018 By: Efe Fernandez LPN Severity Noted Reaction Type Reactions Codeine  2009    Other (comments) Jerking sensation Livalo [Pitavastatin]  10/03/2013    Diarrhea Niaspan [Niacin]  10/08/2012    Myalgia Statins-hmg-coa Reductase Inhibitors  2010    Myalgia Zocor, pravachol, Lipitor, crestor Welchol [Colesevelam]  2013    Other (comments) Nausea, bloating and malaise Zetia [Ezetimibe]  04/01/2014    Myalgia Current Immunizations  Reviewed on 2/13/2016 Name Date Influenza Vaccine 9/15/2017, 11/30/2015, 10/18/2013 Influenza Vaccine Whole 9/29/2012 PPD  Incomplete, 5/19/2011 ZZZ-RETIRED (DO NOT USE) Pneumococcal Vaccine (Unspecified Type) 1/1/2008 Zoster Vaccine, Live 2/15/2017 Not reviewed this visit You Were Diagnosed With   
  
 Codes Comments Type 2 diabetes, controlled, with neuropathy ([facility]    -  Primary ICD-10-CM: E11.40 ICD-9-CM: 250.60, 357.2 Essential hypertension, benign     ICD-10-CM: I10 
ICD-9-CM: 401.1 Pure hypercholesterolemia     ICD-10-CM: E78.00 ICD-9-CM: 272.0 Coronary artery disease involving native coronary artery of native heart without angina pectoris     ICD-10-CM: I25.10 ICD-9-CM: 414.01 Vitals BP Pulse Temp Height(growth percentile) Weight(growth percentile) SpO2  
 138/74 83 98.2 °F (36.8 °C) (Oral) 5' 3\" (1.6 m) 171 lb (77.6 kg) 99% BMI OB Status Smoking Status 30.29 kg/m2 Postmenopausal Never Smoker Vitals History BMI and BSA Data Body Mass Index Body Surface Area  
 30.29 kg/m 2 1.86 m 2 Preferred Pharmacy Pharmacy Name [facility] [phone] [address] [phone] Your Updated Medication List  
  
   
This list is accurate as of 10/9/18 12:16 PM.  Always use your most recent med list.  
  
  
  
  
 amitriptyline 10 mg tablet Commonly known as:  ELAVIL Take 1 Tab by mouth nightly. aspirin 81 mg tablet Take 81 mg by mouth daily. atorvastatin 40 mg tablet Commonly known as:  LIPITOR Take  by mouth daily. Blood-Glucose Meter monitoring kit One touch ultra mini glucometer--dx 250.00  
  
 celecoxib 200 mg capsule Commonly known as:  CELEBREX  
take 1 capsule by mouth once daily CENTRUM SILVER PO Take 1 Tab by mouth daily. clopidogrel 75 mg Tab Commonly known as:  PLAVIX Take 1 Tab by mouth daily. COREG 6.25 mg tablet Generic drug:  carvedilol Take  by mouth two (2) times daily (with meals). cyanocobalamin 500 mcg tablet Commonly known as:  VITAMIN B12 Take 500 mcg by mouth daily. fenofibrate 54 mg tablet Commonly known as:  LOFIBRA Take  by mouth daily. fluticasone 50 mcg/actuation nasal spray Commonly known as:  FLONASE  
instill 2 sprays into each nostril daily  
  
 glucose blood VI test strips strip Commonly known as:  ASCENSIA AUTODISC VI, ONE TOUCH ULTRA TEST VI One touch ultra test strips min---check fsbs bid  
  
 insulin glargine 100 unit/mL (3 mL) Inpn Commonly known as:  LANTUS SOLOSTAR U-100 INSULIN Inject 16 units at bedtime--Dose change 10/14/16--updated med list--did not send prescription to the pharmacy Insulin Needles (Disposable) 31 gauge x 3/16\" Ndle Commonly known as:  PEN NEEDLE Use as directed once daily JANUMET 50-1,000 mg per tablet Generic drug:  SITagliptin-metFORMIN  
take 1 tablet by mouth twice a day with food Lancets Misc Check fsbs bid -250.02  
  
 LYRICA 50 mg capsule Generic drug:  pregabalin  
take 1 capsule by mouth three times a day  
  
 montelukast 10 mg tablet Commonly known as:  SINGULAIR  
take 1 tablet by mouth once daily  
  
 nitroglycerin 0.4 mg SL tablet Commonly known as:  NITROSTAT  
1 Tab by SubLINGual route every five (5) minutes as needed for Chest Pain. NORVASC 2.5 mg tablet Generic drug:  amLODIPine Take  by mouth daily. omeprazole 20 mg capsule Commonly known as:  PRILOSEC  
take 1 capsule by mouth twice a day  
  
 ranolazine  mg SR tablet Commonly known as:  RANEXA Take 1 Tab by mouth two (2) times a day. We Performed the Following HEMOGLOBIN A1C WITH EAG [11155 CPT(R)] LIPID PANEL [00783 CPT(R)] METABOLIC PANEL, COMPREHENSIVE [22237 CPT(R)] MICROALBUMIN, UR, RAND W/ MICROALB/CREAT RATIO [id number] CPT(R)] Follow-up Instructions Return in about 6 months (around 4/9/2019) for dm-2 htn hld cad. Introducing [facility]! [employer] introduces [employer] patient portal. Now you can access parts of your medical record, email your doctor's office, and request medication refills online. 1. In your internet browser, go to [email or web address] [email or web address] 2. Click on the First Time User? Click Here link in the Sign In box. You will see the New Member Sign Up page. 3. Enter your [employer] Access Code exactly as it appears below. You will not need to use this code after youve completed the sign-up process. If you do not sign up before the expiration date, you must request a new code. · [employer] Access Code: [email or web address] Expires: 11/8/2018  2:05 PM 
 
4. Enter the last four digits of your Social Security Number (xxxx) and Date of Birth (mm/dd/yyyy) as indicated and click Submit. You will be taken to the next sign-up page. 5. Create a [employer] ID. This will be your [employer] login ID and cannot be changed, so think of one that is secure and easy to remember. 6. Create a [employer] password. You can change your password at any time. 7. Enter your Password Reset Question and Answer. This can be used at a later time if you forget your password. 8. Enter your e-mail address. You will receive e-mail notification when new information is available in [address] 9. Click Sign Up. You can now view and download portions of your medical record. 10. Click the Download Summary menu link to download a portable copy of your medical information. If you have questions, please visit the Frequently Asked Questions section of the [employer] website. Remember, [employer] is NOT to be used for urgent needs. For medical emergencies, dial 911. Now available from your iPhone and Android! Please provide this summary of care documentation to your next provider. Your primary care clinician is listed as Jerson OLIVEROS. If you have any questions after today's visit, please call [phone].

## 2018-10-09 NOTE — PROGRESS NOTES
Chief Complaint Patient presents with  Diabetes 6 month follow up  Cholesterol Problem 6 month follow up  Coronary Artery Disease 6 month follow up  Labs Non fasting  Medication Evaluation  
  losartan vs amlodipine

## 2018-10-10 LAB
ALBUMIN SERPL-MCNC: 4.3 G/DL (ref 3.5–4.8)
ALBUMIN/CREAT UR: 305 MG/G CREAT (ref 0–30)
ALBUMIN/GLOB SERPL: 1.7 {RATIO} (ref 1.2–2.2)
ALP SERPL-CCNC: 47 IU/L (ref 39–117)
ALT SERPL-CCNC: 20 IU/L (ref 0–32)
AST SERPL-CCNC: 15 IU/L (ref 0–40)
BILIRUB SERPL-MCNC: 0.3 MG/DL (ref 0–1.2)
BUN SERPL-MCNC: 23 MG/DL (ref 8–27)
BUN/CREAT SERPL: 26 (ref 12–28)
CALCIUM SERPL-MCNC: 9.6 MG/DL (ref 8.7–10.3)
CHLORIDE SERPL-SCNC: 99 MMOL/L (ref 96–106)
CHOLEST SERPL-MCNC: 129 MG/DL (ref 100–199)
CO2 SERPL-SCNC: 22 MMOL/L (ref 20–29)
CREAT SERPL-MCNC: 0.89 MG/DL (ref 0.57–1)
CREAT UR-MCNC: 71.8 MG/DL
EST. AVERAGE GLUCOSE BLD GHB EST-MCNC: 126 MG/DL
GLOBULIN SER CALC-MCNC: 2.5 G/DL (ref 1.5–4.5)
GLUCOSE SERPL-MCNC: 157 MG/DL (ref 65–99)
HBA1C MFR BLD: 6 % (ref 4.8–5.6)
HDLC SERPL-MCNC: 41 MG/DL
LDLC SERPL CALC-MCNC: 58 MG/DL (ref 0–99)
MICROALBUMIN UR-MCNC: 219 UG/ML
POTASSIUM SERPL-SCNC: 4.5 MMOL/L (ref 3.5–5.2)
PROT SERPL-MCNC: 6.8 G/DL (ref 6–8.5)
SODIUM SERPL-SCNC: 134 MMOL/L (ref 134–144)
TRIGL SERPL-MCNC: 152 MG/DL (ref 0–149)
VLDLC SERPL CALC-MCNC: 30 MG/DL (ref 5–40)

## 2018-11-09 RX ORDER — LOSARTAN POTASSIUM 100 MG/1
TABLET ORAL
Qty: 90 TAB | Refills: 3 | Status: SHIPPED | OUTPATIENT
Start: 2018-11-09 | End: 2019-10-25 | Stop reason: SDUPTHER

## 2018-11-14 ENCOUNTER — TELEPHONE (OUTPATIENT)
Dept: NEUROLOGY | Age: 78
End: 2018-11-14

## 2018-11-14 RX ORDER — AMITRIPTYLINE HYDROCHLORIDE 10 MG/1
10 TABLET, FILM COATED ORAL
Qty: 30 TAB | Refills: 5 | Status: CANCELLED | OUTPATIENT
Start: 2018-11-14

## 2018-11-14 NOTE — TELEPHONE ENCOUNTER
----- Message from Ajay Aviles sent at 11/13/2018  4:24 PM EST -----  Regarding: Dr Houston/Telephone  Pt is reporting that she had problems taking the Amitriptyline 10 mg she cut them in half. She is now running out of medication, she has two pills left. Needs a new Rx if she needs to continue taking this medication.     Best contact # [phone]

## 2018-11-14 NOTE — TELEPHONE ENCOUNTER
Called and spoke with patient and let her know that per her pharmacy she still has refills on her medication.

## 2018-11-20 ENCOUNTER — OFFICE VISIT (OUTPATIENT)
Dept: NEUROLOGY | Age: 78
End: 2018-11-20

## 2018-11-20 VITALS
RESPIRATION RATE: 18 BRPM | HEART RATE: 82 BPM | SYSTOLIC BLOOD PRESSURE: 130 MMHG | HEIGHT: 63 IN | WEIGHT: 171 LBS | BODY MASS INDEX: 30.3 KG/M2 | OXYGEN SATURATION: 100 % | DIASTOLIC BLOOD PRESSURE: 72 MMHG

## 2018-11-20 DIAGNOSIS — R51.9 NEW ONSET OF HEADACHES AFTER AGE 50: Primary | ICD-10-CM

## 2018-11-20 RX ORDER — AZITHROMYCIN 250 MG/1
TABLET, FILM COATED ORAL
Qty: 6 TAB | Refills: 0 | Status: SHIPPED | OUTPATIENT
Start: 2018-11-20 | End: 2019-03-29

## 2018-11-20 NOTE — PROGRESS NOTES
Visit Vitals  /72   Pulse 82   Resp 18   Ht 5' 3\" (1.6 m)   Wt 77.6 kg (171 lb)   SpO2 100%   BMI 30.29 kg/m²     Chief Complaint   Patient presents with    Migraine     has decreased, but in the past 1.5 weeks 3 times/ cold maybe a trigger     1. Have you been to the ER, urgent care clinic since your last visit? Hospitalized since your last visit? No    2. Have you seen or consulted any other health care providers outside of the [address] [place] [address] since your last visit? Include any pap smears or colon screening.  No

## 2018-11-20 NOTE — PROGRESS NOTES
Neurology Progress Note    Patient ID:  Cyndy Hoffman  [id number]  69 y.o.  1940    HISTORY PROVIDED BY:  Patient  Daughter    Chief Complaint: Headache/nerve pain  Subjective:    Ms. Nash is here for follow up today of headaches. She was previously followed for stroke. Due to complaints of headache and pain we did an MRI of the brain. This was negative for any acute abnormality. She continues with pain on left side of her face and her temples. She did have some dental work which she was worried may have triggered this. She had a normal sed rate checked several months ago. Headaches and pain were improving until about a week ago. She feels with the weather changing she has started having them again. She feels really sleepy and woke up with headache on the left side of her head. She has days where she does fine. She has pain in the left cheek and side of the nose. She ususally does get sinus issues. She would occasionally have to take a Z-Milton for sinus issues. Her balance is better. No falls. No vision changes or jaw claudication. Recap:  She has not been seen in almost 3 years. Previously she was followed for dizziness and stroke. Today she has a new complaint of headache. She as had pain in her head and around her eyes and down her neck x 6 months. She had it really severe in December. She had an abscess tooth and had a root canal but still has had pain. She has continued with issues. She will have throbbing and it feels like it is going to burst open. This is on her left side of her face. She also feels both temples hurt. Coughing makes it worse. She denies soreness when eating. Last Thursday she had some eye pain associated and she had some blurred vision. She wakes up and feels fine and then it begins to ache as the day goes on. She feels like it is worse when leaning over at night too. The pain is throbbing.    She tried a claritin once thinking it was sinus but this made it worse. She takes tylenol to help but she was told to limit this if possible. She has not had any recent imaging. Objective:   ROS:  Per HPI-  Otherwise 12 point ROS was negative    Meds:  Current Outpatient Medications on File Prior to Visit   Medication Sig Dispense Refill    losartan (COZAAR) 100 mg tablet take 1 tablet by mouth once daily 90 Tab 3    amitriptyline (ELAVIL) 10 mg tablet Take 1 Tab by mouth nightly. (Patient taking differently: Take 10 mg by mouth nightly. 1/2 tab daily) 30 Tab 5    LYRICA 50 mg capsule take 1 capsule by mouth three times a day (Patient taking differently: take 1 capsule by mouth twice a day) 90 Cap 5    JANUMET 50-1,000 mg per tablet take 1 tablet by mouth twice a day with food 180 Tab 3    omeprazole (PRILOSEC) 20 mg capsule take 1 capsule by mouth twice a day 60 Cap 11    montelukast (SINGULAIR) 10 mg tablet take 1 tablet by mouth once daily 30 Tab 11    Insulin Needles, Disposable, (PEN NEEDLE) 31 gauge x 3/16\" ndle Use as directed once daily 100 Pen Needle 3    glucose blood VI test strips (ASCENSIA AUTODISC VI, ONE TOUCH ULTRA TEST VI) strip One touch ultra test strips min---check fsbs bid 200 Strip 3    Blood-Glucose Meter monitoring kit One touch ultra mini glucometer--dx 250.00 1 Kit 0    Lancets misc Check fsbs bid -250.02 200 Each 3    celecoxib (CELEBREX) 200 mg capsule take 1 capsule by mouth once daily 30 Cap 6    fluticasone (FLONASE) 50 mcg/actuation nasal spray instill 2 sprays into each nostril daily 1 Bottle 8    nitroglycerin (NITROSTAT) 0.4 mg SL tablet 1 Tab by SubLINGual route every five (5) minutes as needed for Chest Pain. 25 Tab 3    carvedilol (COREG) 6.25 mg tablet Take  by mouth two (2) times daily (with meals).  amLODIPine (NORVASC) 2.5 mg tablet Take  by mouth daily.       insulin glargine (LANTUS SOLOSTAR) 100 unit/mL (3 mL) pen Inject 16 units at bedtime--Dose change 10/14/16--updated med list--did not send prescription to the pharmacy (Patient taking differently: Inject 20 units at bedtime--Dose change 10/14/16--updated med list--did not send prescription to the pharmacy) 15 mL 3    atorvastatin (LIPITOR) 40 mg tablet Take  by mouth daily.  fenofibrate (LOFIBRA) 54 mg tablet Take  by mouth daily.  cyanocobalamin (VITAMIN B12) 500 mcg tablet Take 500 mcg by mouth daily.  ranolazine ER (RANEXA) 500 mg SR tablet Take 1 Tab by mouth two (2) times a day. 60 Tab 12    clopidogrel (PLAVIX) 75 mg tablet Take 1 Tab by mouth daily. 30 Tab 11    aspirin 81 mg Tab Take 81 mg by mouth daily.  MULTIVITAMINS W-MINERALS/LUT (CENTRUM SILVER PO) Take 1 Tab by mouth daily. No current facility-administered medications on file prior to visit. Imaging:  MRI brain (August/18): Chronic small vessel disease (I personally reviewed these images in PACS and this is my impression)    MRA neck: Negative  TTE: wnl  Reviewed records in [facility] and media tab today    Lab Review   Results for orders placed or performed in visit on 10/09/18   HEMOGLOBIN A1C WITH EAG   Result Value Ref Range    Hemoglobin A1c 6.0 (H) 4.8 - 5.6 %    Estimated average glucose 846 mg/dL   METABOLIC PANEL, COMPREHENSIVE   Result Value Ref Range    Glucose 157 (H) 65 - 99 mg/dL    BUN 23 8 - 27 mg/dL    Creatinine 0.89 0.57 - 1.00 mg/dL    GFR est non-AA 63 >59 mL/min/1.73    GFR est AA 73 >59 mL/min/1.73    BUN/Creatinine ratio 26 12 - 28    Sodium 134 134 - 144 mmol/L    Potassium 4.5 3.5 - 5.2 mmol/L    Chloride 99 96 - 106 mmol/L    CO2 22 20 - 29 mmol/L    Calcium 9.6 8.7 - 10.3 mg/dL    Protein, total 6.8 6.0 - 8.5 g/dL    Albumin 4.3 3.5 - 4.8 g/dL    GLOBULIN, TOTAL 2.5 1.5 - 4.5 g/dL    A-G Ratio 1.7 1.2 - 2.2    Bilirubin, total 0.3 0.0 - 1.2 mg/dL    Alk.  phosphatase 47 39 - 117 IU/L    AST (SGOT) 15 0 - 40 IU/L    ALT (SGPT) 20 0 - 32 IU/L   LIPID PANEL   Result Value Ref Range    Cholesterol, total 129 100 - 199 mg/dL Triglyceride 152 (H) 0 - 149 mg/dL    HDL Cholesterol 41 >39 mg/dL    VLDL, calculated 30 5 - 40 mg/dL    LDL, calculated 58 0 - 99 mg/dL   MICROALBUMIN, UR, RAND W/ MICROALB/CREAT RATIO   Result Value Ref Range    Creatinine, urine 71.8 Not Estab. mg/dL    Microalbumin, urine 219.0 Not Estab. ug/mL    Microalb/Creat ratio (ug/mg creat.) 305.0 (H) 0.0 - 30.0 mg/g creat       Exam:  Visit Vitals  /72   Pulse 82   Resp 18   Ht 5' 3\" (1.6 m)   Wt 77.6 kg (171 lb)   SpO2 100%   BMI 30.29 kg/m²     Gen: Well developed  CV: RRR  Lungs: non labored breathing  Abd: non distending  Neuro: A&O x 3, 2/3 word recall, 3/3 with category cue, spells WORLD backwards, no dysarthria or aphasia  CN II-XII: PERRL, EOMI, face symmetric, tongue/palate midline, no papilledema  Motor: strength 5/5 all four ext  Sensory: intact to LT  Gait: normal    Assessment:     Ned Jackson is a 69 y.o. female who presents for follow-up of headache. Headache is in the temples and the back of the head. She also feels like her sinuses are affected. MRI brain was negative. There is also a component that sounds like could be trigeminal neuralgia. Currently she is taking a low-dose of amitriptyline as a headache and nerve preventative. She would like to try to wean off to see how she does. Given that she had an uptake in the last week who gave her an antibiotic in case she is developing a sinus infection. Temporal arteritis has been ruled out with a normal ESR. Plan:     1. Cont Plavix and aspirin 81 mg daily  2. Cont good BP and DM  3. MRI of the brain with special attention to orbits negative. Reviewed this with patient and daughter today  4. Continue amitriptyline 5 mg nightly. If desired patient will decrease this to 1 tablet every other day for a week and then stop.   She is instructed to restart if she develops headache or nerve pain at 5 mg and if needed can go up to 10 mg.  5.  We will discuss other neuropathic pain medications and follow-up as needed    Follow-up in 6 months or sooner if needed    Signed:  Karyn Reddy MD  11/20/2018    Medications and side effects discussed with patient in detail. With any new medications prescribed, patient was given instructions on administration and side effects. Written medication information was provided to the patient as well. This note was created using voice recognition software. Despite editing, there may be syntax errors. This note will not be viewable in [address]

## 2018-11-20 NOTE — LETTER
Visit Vitals /72 Pulse 82 Resp 18 Ht 5' 3\" (1.6 m) Wt 77.6 kg (171 lb) SpO2 100% BMI 30.29 kg/m² Chief Complaint Patient presents with  Migraine  
  has decreased, but in the past 1.5 weeks 3 times/ cold maybe a trigger 1. Have you been to the ER, urgent care clinic since your last visit? Hospitalized since your last visit? No 
 
2. Have you seen or consulted any other health care providers outside of the [address] [place] [address] since your last visit? Include any pap smears or colon screening. No  
 
  
 
 
Neurology Progress Note Patient ID: Destiny Payan [id number] 
69 y.o. 
1940 HISTORY PROVIDED BY: 
Patient Daughter Chief Complaint: Headache/nerve pain Subjective:  
 Ms. Federico Larios is here for follow up today of headaches. She was previously followed for stroke. Due to complaints of headache and pain we did an MRI of the brain. This was negative for any acute abnormality. She continues with pain on left side of her face and her temples. She did have some dental work which she was worried may have triggered this. She had a normal sed rate checked several months ago. Headaches and pain were improving until about a week ago. She feels with the weather changing she has started having them again. She feels really sleepy and woke up with headache on the left side of her head. She has days where she does fine. She has pain in the left cheek and side of the nose. She ususally does get sinus issues. She would occasionally have to take a Z-Milton for sinus issues. Her balance is better. No falls. No vision changes or jaw claudication. Recap: 
She has not been seen in almost 3 years. Previously she was followed for dizziness and stroke. Today she has a new complaint of headache. She as had pain in her head and around her eyes and down her neck x 6 months. She had it really severe in December.  She had an abscess tooth and had a root canal but still has had pain. She has continued with issues. She will have throbbing and it feels like it is going to burst open. This is on her left side of her face. She also feels both temples hurt. Coughing makes it worse. She denies soreness when eating. Last Thursday she had some eye pain associated and she had some blurred vision. She wakes up and feels fine and then it begins to ache as the day goes on. She feels like it is worse when leaning over at night too. The pain is throbbing. She tried a claritin once thinking it was sinus but this made it worse. She takes tylenol to help but she was told to limit this if possible. She has not had any recent imaging. Objective:  
ROS: 
Per HPI- 
Otherwise 12 point ROS was negative Meds: 
Current Outpatient Medications on File Prior to Visit Medication Sig Dispense Refill  losartan (COZAAR) 100 mg tablet take 1 tablet by mouth once daily 90 Tab 3  
 amitriptyline (ELAVIL) 10 mg tablet Take 1 Tab by mouth nightly. (Patient taking differently: Take 10 mg by mouth nightly. 1/2 tab daily) 30 Tab 5  
 LYRICA 50 mg capsule take 1 capsule by mouth three times a day (Patient taking differently: take 1 capsule by mouth twice a day) 90 Cap 5  JANUMET 50-1,000 mg per tablet take 1 tablet by mouth twice a day with food 180 Tab 3  
 omeprazole (PRILOSEC) 20 mg capsule take 1 capsule by mouth twice a day 60 Cap 11  
 montelukast (SINGULAIR) 10 mg tablet take 1 tablet by mouth once daily 30 Tab 11  Insulin Needles, Disposable, (PEN NEEDLE) 31 gauge x 3/16\" ndle Use as directed once daily 100 Pen Needle 3  
 glucose blood VI test strips (ASCENSIA AUTODISC VI, ONE TOUCH ULTRA TEST VI) strip One touch ultra test strips min---check fsbs bid 200 Strip 3  Blood-Glucose Meter monitoring kit One touch ultra mini glucometer--dx 250.00 1 Kit 0  
 Lancets misc Check fsbs bid -250.02 200 Each 3  
  celecoxib (CELEBREX) 200 mg capsule take 1 capsule by mouth once daily 30 Cap 6  fluticasone (FLONASE) 50 mcg/actuation nasal spray instill 2 sprays into each nostril daily 1 Bottle 8  
 nitroglycerin (NITROSTAT) 0.4 mg SL tablet 1 Tab by SubLINGual route every five (5) minutes as needed for Chest Pain. 25 Tab 3  carvedilol (COREG) 6.25 mg tablet Take  by mouth two (2) times daily (with meals).  amLODIPine (NORVASC) 2.5 mg tablet Take  by mouth daily.  insulin glargine (LANTUS SOLOSTAR) 100 unit/mL (3 mL) pen Inject 16 units at bedtime--Dose change 10/14/16--updated med list--did not send prescription to the pharmacy (Patient taking differently: Inject 20 units at bedtime--Dose change 10/14/16--updated med list--did not send prescription to the pharmacy) 15 mL 3  
 atorvastatin (LIPITOR) 40 mg tablet Take  by mouth daily.  fenofibrate (LOFIBRA) 54 mg tablet Take  by mouth daily.  cyanocobalamin (VITAMIN B12) 500 mcg tablet Take 500 mcg by mouth daily.  ranolazine ER (RANEXA) 500 mg SR tablet Take 1 Tab by mouth two (2) times a day. 60 Tab 12  
 clopidogrel (PLAVIX) 75 mg tablet Take 1 Tab by mouth daily. 30 Tab 11  
 aspirin 81 mg Tab Take 81 mg by mouth daily.  MULTIVITAMINS W-MINERALS/LUT (CENTRUM SILVER PO) Take 1 Tab by mouth daily. No current facility-administered medications on file prior to visit. Imaging: MRI brain (August/18): Chronic small vessel disease (I personally reviewed these images in PACS and this is my impression) MRA neck: Negative TTE: wnl Reviewed records in [facility] and media tab today Lab Review Results for orders placed or performed in visit on 10/09/18 HEMOGLOBIN A1C WITH EAG Result Value Ref Range Hemoglobin A1c 6.0 (H) 4.8 - 5.6 % Estimated average glucose 126 mg/dL METABOLIC PANEL, COMPREHENSIVE Result Value Ref Range Glucose 157 (H) 65 - 99 mg/dL BUN 23 8 - 27 mg/dL Creatinine 0.89 0.57 - 1.00 mg/dL GFR est non-AA 63 >59 mL/min/1.73 GFR est AA 73 >59 mL/min/1.73  
 BUN/Creatinine ratio 26 12 - 28 Sodium 134 134 - 144 mmol/L Potassium 4.5 3.5 - 5.2 mmol/L Chloride 99 96 - 106 mmol/L  
 CO2 22 20 - 29 mmol/L Calcium 9.6 8.7 - 10.3 mg/dL Protein, total 6.8 6.0 - 8.5 g/dL Albumin 4.3 3.5 - 4.8 g/dL GLOBULIN, TOTAL 2.5 1.5 - 4.5 g/dL A-G Ratio 1.7 1.2 - 2.2 Bilirubin, total 0.3 0.0 - 1.2 mg/dL Alk. phosphatase 47 39 - 117 IU/L  
 AST (SGOT) 15 0 - 40 IU/L  
 ALT (SGPT) 20 0 - 32 IU/L  
LIPID PANEL Result Value Ref Range Cholesterol, total 129 100 - 199 mg/dL Triglyceride 152 (H) 0 - 149 mg/dL HDL Cholesterol 41 >39 mg/dL VLDL, calculated 30 5 - 40 mg/dL LDL, calculated 58 0 - 99 mg/dL MICROALBUMIN, UR, RAND W/ MICROALB/CREAT RATIO Result Value Ref Range Creatinine, urine 71.8 Not Estab. mg/dL Microalbumin, urine 219.0 Not Estab. ug/mL Microalb/Creat ratio (ug/mg creat.) 305.0 (H) 0.0 - 30.0 mg/g creat Exam: 
Visit Vitals /72 Pulse 82 Resp 18 Ht 5' 3\" (1.6 m) Wt 77.6 kg (171 lb) SpO2 100% BMI 30.29 kg/m² Gen: Well developed CV: RRR Lungs: non labored breathing Abd: non distending Neuro: A&O x 3, 2/3 word recall, 3/3 with category cue, spells WORLD backwards, no dysarthria or aphasia CN II-XII: PERRL, EOMI, face symmetric, tongue/palate midline, no papilledema Motor: strength 5/5 all four ext Sensory: intact to LT Gait: normal 
 
Assessment:  
 
Edith Martin is a 69 y.o. female who presents for follow-up of headache. Headache is in the temples and the back of the head. She also feels like her sinuses are affected. MRI brain was negative. There is also a component that sounds like could be trigeminal neuralgia. Currently she is taking a low-dose of amitriptyline as a headache and nerve preventative. She would like to try to wean off to see how she does. Given that she had an uptake in the last week who gave her an antibiotic in case she is developing a sinus infection. Temporal arteritis has been ruled out with a normal ESR. Plan: 1. Cont Plavix and aspirin 81 mg daily 2. Cont good BP and DM 3. MRI of the brain with special attention to orbits negative. Reviewed this with patient and daughter today 4. Continue amitriptyline 5 mg nightly. If desired patient will decrease this to 1 tablet every other day for a week and then stop. She is instructed to restart if she develops headache or nerve pain at 5 mg and if needed can go up to 10 mg. 
5.  We will discuss other neuropathic pain medications and follow-up as needed Follow-up in 6 months or sooner if needed Signed: 
Oscar Hernandez MD 
11/20/2018 Medications and side effects discussed with patient in detail. With any new medications prescribed, patient was given instructions on administration and side effects. Written medication information was provided to the patient as well. This note was created using voice recognition software. Despite editing, there may be syntax errors. This note will not be viewable in [address]

## 2018-11-20 NOTE — PATIENT INSTRUCTIONS
[facility]   Statement to Patients  April 1, 2014      In an effort to ensure the large volume of patient prescription refills is processed in the most efficient and expeditious manner, we are asking our patients to assist us by calling your Pharmacy for all prescription refills, this will include also your  Mail Order Pharmacy. The pharmacy will contact our office electronically to continue the refill process. Please do not wait until the last minute to call your pharmacy. We need at least 48 hours (2days) to fill prescriptions. We also encourage you to call your pharmacy before going to  your prescription to make sure it is ready. With regard to controlled substance prescription refill requests (narcotic refills) that need to be picked up at our office, we ask your cooperation by providing us with at least 72 hours (3days) notice that you will need a refill. We will not refill narcotic prescription refill requests after 4:00pm on any weekday, Monday through Thursday, or after 2:00pm on Fridays, or on the weekends. We encourage everyone to explore another way of getting your prescription refill request processed using [employer], our patient web portal through our electronic medical record system. [employer] is an efficient and effective way to communicate your medication request directly to the office and  downloadable as an miles on your smart phone . [employer] also features a review functionality that allows you to view your medication list as well as leave messages for your physician. Are you ready to get connected? If so please review the attatched instructions or speak to any of our staff to get you set up right away! Thank you so much for your cooperation. Should you have any questions please contact our Practice Administrator.     The Physicians and Staff,  [facility]   Patient Instruction Plan/ Result Policy    If we have ordered testing for you, know that; \"NO NEWS IS GOOD NEWS! \" It is our policy that we know longer call patients with results, nor do we  give test results over the phone. We schedule follow up appointments so that your results can be discussed in person. This allows you to address any questions you have regarding the results. If something of concern is revealed on your test, we will contact you to discuss the matter and if needed schedule a sooner follow up appointment. Additionally, results may be found by using the My Chart feature and one of our patient service representatives at the  can give you instructions on how to access this feature to utilize our electronic medical record system. Thank you for your understanding. Patient Instructions/Plans:  · For amitriptyline 10mg tabs:    Take 1/2 tab every other day for one week then STOP

## 2019-01-03 RX ORDER — CELECOXIB 200 MG/1
CAPSULE ORAL
Qty: 30 CAP | Refills: 6 | Status: SHIPPED | OUTPATIENT
Start: 2019-01-03 | End: 2019-04-12 | Stop reason: SDUPTHER

## 2019-01-21 RX ORDER — FLUTICASONE PROPIONATE 50 MCG
SPRAY, SUSPENSION (ML) NASAL
Qty: 1 BOTTLE | Refills: 8 | Status: SHIPPED | OUTPATIENT
Start: 2019-01-21 | End: 2020-09-28

## 2019-02-18 DIAGNOSIS — G62.9 NEUROPATHY: ICD-10-CM

## 2019-02-18 RX ORDER — PREGABALIN 50 MG/1
CAPSULE ORAL
Qty: 90 CAP | Refills: 5 | Status: SHIPPED | OUTPATIENT
Start: 2019-02-18 | End: 2019-08-25 | Stop reason: SDUPTHER

## 2019-03-13 NOTE — TELEPHONE ENCOUNTER
Pt would need a refill for her test strips sent to [facility] (information is on file) Pts contact [phone]       Message received & copied from [facility]

## 2019-03-29 ENCOUNTER — OFFICE VISIT (OUTPATIENT)
Dept: HEMATOLOGY | Age: 79
End: 2019-03-29

## 2019-03-29 VITALS
HEIGHT: 63 IN | HEART RATE: 85 BPM | SYSTOLIC BLOOD PRESSURE: 145 MMHG | DIASTOLIC BLOOD PRESSURE: 78 MMHG | WEIGHT: 172.8 LBS | TEMPERATURE: 96.9 F | OXYGEN SATURATION: 97 % | BODY MASS INDEX: 30.62 KG/M2

## 2019-03-29 DIAGNOSIS — K76.0 FATTY LIVER: Primary | ICD-10-CM

## 2019-03-29 DIAGNOSIS — E08.3213 DIABETES MELLITUS DUE TO UNDERLYING CONDITION WITH BOTH EYES AFFECTED BY MILD NONPROLIFERATIVE RETINOPATHY AND MACULAR EDEMA, WITHOUT LONG-TERM CURRENT USE OF INSULIN (HCC): ICD-10-CM

## 2019-03-29 NOTE — LETTER
3/29/19 Patient: Srinivasa Birchster YOB: 1940 Date of Visit: 3/29/2019 Mena Sandhu MD 
[address] [address] VIA In Basket Dear Mena Sandhu MD, Thank you for referring Ms. Maria Victoria Vu to [address] for evaluation. My notes for this consultation are attached. If you have questions, please do not hesitate to call me. I look forward to following your patient along with you. Sincerely, BRIANA Paulson

## 2019-03-29 NOTE — PROGRESS NOTES
[address] Conde MD, Dahlia Laura, [place], [place]       Leander Herman, SIMBA Campos, ADDISON Pederson, [facility]   Lacinda Boast, NP Dickson Dan, NP [address] [facility] [address]    at [facility]    [address] [facility], [address] [place] [address], [facility] 22.    [phone]    FAX: [address] [place] [address]    at [address], [address], [address]    [phone]    FAX: [phone]         Patient Care Team:  Gavin Chau MD as PCP - Maribell Herbert, RN as Ambulatory Care Navigator  Nakul Garcia MD as Consulting Provider (Endocrinology)  Mateo Jiang MD (Neurology)  Peri Shipley MD (Gastroenterology)  Sherly Munoz MD (Ophthalmology)  Vincent Julio MD (Neurosurgery)  Mekhi Shaikh RN as Ambulatory Care Navigator  Norma Frias MD as Physician (Sleep Medicine)  Nakul Garcia MD as Consulting Provider (Endocrinology)      Problem List  Date Reviewed: 11/20/2018          Codes Class Noted    NSTEMI (non-ST elevated myocardial infarction) [facility]) ICD-10-CM: I21.4  ICD-9-CM: 410.70  1/19/2016        Type 2 diabetes with nephropathy ([facility] ICD-10-CM: E11.21  ICD-9-CM: 250.40, 583.81  8/10/2018        Fatty liver ICD-10-CM: K76.0  ICD-9-CM: 571.8  2/23/2018        Iron deficiency anemia ICD-10-CM: D50.9  ICD-9-CM: 280.9  12/1/2017    Overview Signed 12/1/2017  1:46 PM by Gavin Chau MD     colonoscopy 3-1-17--tics,polyp, int hemorrhoids--Dr Jessi Coello  EGD             Osteopenia of left thigh ICD-10-CM: M85.852  ICD-9-CM: 733.90  4/2/2017    Overview Signed 4/2/2017  3:02 PM by Gavin Chau MD     T -1.1 at fem neck, normal total hip and spine 2017.              Advanced care planning/counseling discussion ICD-10-CM: Z71.89  ICD-9-CM: V65.49  12/17/2015    Overview Signed 12/17/2015  5:54 PM by Michael Adam MD     Pt has a living will and was advised to bring in a copy              Hiatal hernia ICD-10-CM: K44.9  ICD-9-CM: 553.3  5/15/2012        CAD (coronary artery disease) ICD-10-CM: I25.10  ICD-9-CM: 414.00  5/15/2012    Overview Addendum 3/7/2017  7:19 PM by Michael Adam MD     Diffuse nonobstructive dz-Dr. Aki Betancur  2016--s/p non st elevation IWMI---3 stents to RCA-Dr Noble             Type 2 diabetes, controlled, with neuropathy ([facility] ICD-10-CM: E11.40  ICD-9-CM: 250.60, 357.2  11/16/2009        Essential hypertension, benign ICD-10-CM: I10  ICD-9-CM: 401.1  11/16/2009        Pure hypercholesterolemia ICD-10-CM: E78.00  ICD-9-CM: 272.0  11/16/2009        Neuropathy in diabetes [facility]) ICD-10-CM: E11.40  ICD-9-CM: 250.60, 357.2  11/16/2009        DJD (degenerative joint disease) ICD-10-CM: M19.90  ICD-9-CM: 715.90  11/16/2009    Overview Signed 11/16/2009  5:14 PM by Michael Adam MD     Hand arthritis             DOMINGO (obstructive sleep apnea) ICD-10-CM: W86.55  ICD-9-CM: 327.23  11/16/2009    Overview Signed 11/16/2009  5:14 PM by Michael Adam MD     Cpap. Pulmonary associated             Breast cancer [facility]) ICD-10-CM: C50.919  ICD-9-CM: 174.9  11/16/2009    Overview Signed 11/16/2009  5:16 PM by Michael Adam MD     S/p right lumpectomy and XRT  2001. Dr Michel Vigil and Dr. Ila Baxter returns to the [place] [address] for management of non-alcoholic fatty liver (NAFL) and to perform in-office fibroscan evaluation. The active problem list, all pertinent past medical history, medications, radiologic findings and laboratory findings related to the liver disorder were reviewed with the patient. The patient is a 69 y.o.  female who is suspected to have fatty liver disease based upon ultrasound. She has always had normal liver enzymes and function.      Serologic evaluation was performed at her initial office visit and is negative. Ultrasound of the liver was performed in 12/2017. The results of the imaging suggested fatty liver disease. An assessment of liver fibrosis with elastography has been performed, 3/2018. This showed no fibrosis and significant steatosis. Repeat evaluation is planned for today's office visit. The most recent laboratory studies indicate that the liver transaminases are normal, ALP is normal, tests of hepatic synthetic and metabolic function are normal, and the platelet count is normal.    The patient notes fatigue. The patient completes all daily activities without any functional limitations. The patient has not experienced pain in the right side over the liver. Of note, patient was treated with tamoxifen therapy for several years following breast cancer treatment. She has had no exposure to this medication for the past 4+ years. ALLERGIES  Allergies   Allergen Reactions    Codeine Other (comments)     Jerking sensation    Livalo [Pitavastatin] Diarrhea    Niaspan [Niacin] Myalgia    Statins-Hmg-Coa Reductase Inhibitors Myalgia     Zocor, pravachol, Lipitor, crestor    Welchol [Colesevelam] Other (comments)     Nausea, bloating and malaise    Zetia [Ezetimibe] Myalgia       MEDICATIONS  Current Outpatient Medications   Medication Sig    amitriptyline (ELAVIL) 10 mg tablet Take 1 Tab by mouth nightly. (Patient taking differently: Take 10 mg by mouth nightly. 1/2 tab daily)    clopidogrel (PLAVIX) 75 mg tablet Take 1 Tab by mouth daily.  MULTIVITAMINS W-MINERALS/LUT (CENTRUM SILVER PO) Take 1 Tab by mouth daily.     glucose blood VI test strips (ASCENSIA AUTODISC VI, ONE TOUCH ULTRA TEST VI) strip One touch ultra test strips min---check fsbs bid    LYRICA 50 mg capsule take 1 capsule by mouth three times a day    fluticasone (FLONASE) 50 mcg/actuation nasal spray instill 2 sprays into each nostril daily    celecoxib (CELEBREX) 200 mg capsule take 1 capsule by mouth once daily    azithromycin (ZITHROMAX) 250 mg tablet Take 2 tabs the first day and then 1 tab daily    losartan (COZAAR) 100 mg tablet take 1 tablet by mouth once daily    JANUMET 50-1,000 mg per tablet take 1 tablet by mouth twice a day with food    omeprazole (PRILOSEC) 20 mg capsule take 1 capsule by mouth twice a day    montelukast (SINGULAIR) 10 mg tablet take 1 tablet by mouth once daily    Insulin Needles, Disposable, (PEN NEEDLE) 31 gauge x 3/16\" ndle Use as directed once daily    Blood-Glucose Meter monitoring kit One touch ultra mini glucometer--dx 250.00    Lancets misc Check fsbs bid -250.02    celecoxib (CELEBREX) 200 mg capsule take 1 capsule by mouth once daily    nitroglycerin (NITROSTAT) 0.4 mg SL tablet 1 Tab by SubLINGual route every five (5) minutes as needed for Chest Pain.  carvedilol (COREG) 6.25 mg tablet Take  by mouth two (2) times daily (with meals).  amLODIPine (NORVASC) 2.5 mg tablet Take  by mouth daily.  insulin glargine (LANTUS SOLOSTAR) 100 unit/mL (3 mL) pen Inject 16 units at bedtime--Dose change 10/14/16--updated med list--did not send prescription to the pharmacy (Patient taking differently: Inject 20 units at bedtime--Dose change 10/14/16--updated med list--did not send prescription to the pharmacy)    atorvastatin (LIPITOR) 40 mg tablet Take  by mouth daily.  fenofibrate (LOFIBRA) 54 mg tablet Take  by mouth daily.  cyanocobalamin (VITAMIN B12) 500 mcg tablet Take 500 mcg by mouth daily.  ranolazine ER (RANEXA) 500 mg SR tablet Take 1 Tab by mouth two (2) times a day.  aspirin 81 mg Tab Take 81 mg by mouth daily. No current facility-administered medications for this visit. SYSTEM REVIEW NOT RELATED TO LIVER DISEASE OR REVIEWED ABOVE:  Constitution systems: Negative for fever, chills. Weight stable since last office visit. Eyes: Negative for visual changes. ENT: Negative for sore throat, painful swallowing. Respiratory: Negative for cough, hemoptysis, SOB. Cardiology: Negative for chest pain, palpitations. GI:  Negative for constipation or diarrhea. : Negative for urinary frequency, dysuria, hematuria, nocturia. Skin: Negative for rash. Hematology: Negative for easy bruising, blood clots. Musculo-skeletal: Negative for back pain, muscle pain, weakness. Neurologic: Negative for headaches, dizziness, vertigo, memory problems not related to HE. Psychology: Negative for anxiety, depression. FAMILY HISTORY:  The father  of COPD. The mother  of MI. There is no family history of liver disease. SOCIAL HISTORY:  The patient is . The patient has 3 children, and 4 grandchildren. The patient has never used tobacco products. The patient has never consumed significant amounts of alcohol. The patient used to work in accounting. PHYSICAL EXAMINATION:  Visit Vitals  /78 (BP 1 Location: Left arm, BP Patient Position: Sitting)   Pulse 85   Temp 96.9 °F (36.1 °C) (Tympanic)   Ht 5' 3\" (1.6 m)   Wt 172 lb 12.8 oz (78.4 kg)   SpO2 97%   BMI 30.61 kg/m²     General: No acute distress. Eyes: Sclera anicteric. ENT: No oral lesions. Thyroid normal.  Nodes: No adenopathy. Skin: No spider angiomata. No jaundice. No palmar erythema. Respiratory: Lungs clear to auscultation. Cardiovascular: Regular heart rate. No murmurs. No JVD. Abdomen: Soft non-tender. Liver size normal to percussion/palpation. Spleen not palpable. No obvious ascites. Extremities: No edema. No muscle wasting. No gross arthritic changes. Neurologic: Alert and oriented. Cranial nerves grossly intact. No asterixis.     LABORATORY STUDIES:  [facility] [address] [place] [address] & Units 10/9/2018 2018   WBC 3.4 - 10.8 x10E3/uL  6.6   ANC 1.4 - 7.0 x10E3/uL  4.7   HGB 11.1 - 15.9 g/dL  11.5    - 379 x10E3/uL  280   INR 0.8 - 1.2  0.9   AST 0 - 40 IU/L 15 15   ALT 0 - 32 IU/L 20 21   Alk Phos 39 - 117 IU/L 47 47   Bili, Total 0.0 - 1.2 mg/dL 0.3 0.2   Bili, Direct 0.00 - 0.40 mg/dL  0.12   Albumin 3.5 - 4.8 g/dL 4.3 4.3   BUN 8 - 27 mg/dL 23 22   Creat 0.57 - 1.00 mg/dL 0.89 0.95   Na 134 - 144 mmol/L 134 139   K 3.5 - 5.2 mmol/L 4.5 4.6   Cl 96 - 106 mmol/L 99 97   CO2 20 - 29 mmol/L 22 22   Glucose 65 - 99 mg/dL 157 (H) 94   Magnesium 1.6 - 2.4 mg/dL       Liver [place] [address] Ref Rng & Units 12/1/2017   WBC 3.4 - 10.8 x10E3/uL 6.7   ANC 1.4 - 7.0 x10E3/uL    HGB 11.1 - 15.9 g/dL 10.4 (L)    - 379 x10E3/uL 290   INR 0.8 - 1.2    AST 0 - 40 IU/L 14   ALT 0 - 32 IU/L 22   Alk Phos 39 - 117 IU/L 47   Bili, Total 0.0 - 1.2 mg/dL 0.2   Bili, Direct 0.00 - 0.40 mg/dL    Albumin 3.5 - 4.8 g/dL 4.1   BUN 8 - 27 mg/dL 28 (H)   Creat 0.57 - 1.00 mg/dL 1.15 (H)   Na 134 - 144 mmol/L 131 (L)   K 3.5 - 5.2 mmol/L 4.9   Cl 96 - 106 mmol/L 93 (L)   CO2 20 - 29 mmol/L 20   Glucose 65 - 99 mg/dL 120 (H)   Magnesium 1.6 - 2.4 mg/dL    Additional lab values drawn at today's office visit are pending at the time of documentation. SEROLOGIES:  Serologies Latest Ref Rng & Units 2/23/2018   Hep A Ab, Total Negative Negative   Hep B Surface Ag Negative Negative   Hep B Core Ab, Total Negative Negative   Hep B Surface AB QL  Non Reactive   Ferritin 15 - 150 ng/mL 27   Iron % Saturation 15 - 55 % 15   Alpha-1 antitrypsin level 90 - 200 mg/dL 127     LIVER HISTOLOGY:  3/2018. FibroScan performed at [facility]. EkPa was 3.6. Suggested fibrosis level is F0-1. CAP score is 348.  3/2019. FibroScan performed at [facility]. EkPa was 3.8. Suggested fibrosis level is F0-1. CAP score is 384, this is consistent with steatosis. ENDOSCOPIC PROCEDURES:  Not available or performed    RADIOLOGY:  12/2017. Ultrasound of liver. Echogenic consistent with fatty liver. No liver mass lesions. No dilated bile ducts. No ascites.     OTHER TESTING:  Not available or performed    ASSESSMENT AND PLAN:  Suspected fatty liver disease in the setting of no significant fibrosis. Liver transaminases are normal.  Alkaline phosphate is normal.  Liver function is normal.  The platelet count is normal.      Based upon laboratory studies and imaging the patient does not appear to have significant liver injury. Serologic and virologic studies to assess for other causes of chronic liver disease were negative. I have reviewed with the patient and her daughter the results of the fibroscan which support significant steatosis of the liver. There does not appear to be associated inflammation as enzymes are normal and no fibrosis as fibroscan score was low. This has been repeated on two occasions. She has multiple features of metabolic syndrome that could contribute to the development of steatosis. Additionally, she has a history of tamoxifen use which is associated with steatosis. I have counseled her on the importance of tight control of these contributors like DM, cholesterol, hypertension, and weight. It appears that while she has the condition of fatty liver, it is not associated with component of steatohepatitis. Given that there has been no progression over the past year and no history of elevation of enzymes or signs of dysfunction, we will release her from ongoing follow-up in this office. I have suggested that she should maintain regular follow-up with her PCP and we would be happy to see her back as he determines for any new concern. There is no reason to perform liver biopsy at this time. Liver chemistries should continue to be monitored intermittently. This could continue to be done through her PCP's office. I have drawn lab studies in the office today and will share with her PCP as she has follow-up with him in the next several weeks. The patient was directed to continue all current medications at the current dosages.   There are no contraindications for the patient to take any medications that are necessary for treatment of other medical issues including medications for diabetes mellitus and hypercholesterolemia. The patient was counseled regarding alcohol consumption and that this could contribute to fatty liver disease. Vaccination for viral hepatitis A and B is recommended since the patient has no serologic evidence of previous exposure or vaccination with immunity. All of the above issues were discussed with the patient. All questions were answered. The patient expressed a clear understanding of the above. Follow-up [facility] [address] prn only.      Eileen Gann PA-C  [facility] [place] [address], [address] [address] [place]  [address].  [phone]\

## 2019-03-30 LAB
ALBUMIN SERPL-MCNC: 4.3 G/DL (ref 3.5–4.8)
ALBUMIN/CREAT UR: 336.4 MG/G CREAT (ref 0–30)
ALP SERPL-CCNC: 50 IU/L (ref 39–117)
ALT SERPL-CCNC: 29 IU/L (ref 0–32)
AST SERPL-CCNC: 15 IU/L (ref 0–40)
BILIRUB DIRECT SERPL-MCNC: 0.12 MG/DL (ref 0–0.4)
BILIRUB SERPL-MCNC: 0.3 MG/DL (ref 0–1.2)
BUN SERPL-MCNC: 24 MG/DL (ref 8–27)
BUN/CREAT SERPL: 23 (ref 12–28)
CALCIUM SERPL-MCNC: 9.5 MG/DL (ref 8.7–10.3)
CHLORIDE SERPL-SCNC: 102 MMOL/L (ref 96–106)
CO2 SERPL-SCNC: 24 MMOL/L (ref 20–29)
CREAT SERPL-MCNC: 1.03 MG/DL (ref 0.57–1)
CREAT UR-MCNC: 69.5 MG/DL
ERYTHROCYTE [DISTWIDTH] IN BLOOD BY AUTOMATED COUNT: 14.3 % (ref 12.3–15.4)
EST. AVERAGE GLUCOSE BLD GHB EST-MCNC: 131 MG/DL
GLUCOSE SERPL-MCNC: 78 MG/DL (ref 65–99)
HBA1C MFR BLD: 6.2 % (ref 4.8–5.6)
HCT VFR BLD AUTO: 34.3 % (ref 34–46.6)
HGB BLD-MCNC: 10.9 G/DL (ref 11.1–15.9)
MCH RBC QN AUTO: 28 PG (ref 26.6–33)
MCHC RBC AUTO-ENTMCNC: 31.8 G/DL (ref 31.5–35.7)
MCV RBC AUTO: 88 FL (ref 79–97)
MICROALBUMIN UR-MCNC: 233.8 UG/ML
PLATELET # BLD AUTO: 298 X10E3/UL (ref 150–379)
POTASSIUM SERPL-SCNC: 4.5 MMOL/L (ref 3.5–5.2)
RBC # BLD AUTO: 3.89 X10E6/UL (ref 3.77–5.28)
SODIUM SERPL-SCNC: 140 MMOL/L (ref 134–144)
WBC # BLD AUTO: 6.9 X10E3/UL (ref 3.4–10.8)

## 2019-04-04 RX ORDER — MONTELUKAST SODIUM 10 MG/1
TABLET ORAL
Qty: 30 TAB | Refills: 11 | Status: SHIPPED | OUTPATIENT
Start: 2019-04-04 | End: 2020-03-24

## 2019-04-09 NOTE — PROGRESS NOTES
Pt notified of stable liver function values. Follow-up with Dr Sharon Eldridge this week for review of DM values. Follow-up [facility] [place] prn only.

## 2019-04-12 ENCOUNTER — OFFICE VISIT (OUTPATIENT)
Dept: INTERNAL MEDICINE CLINIC | Age: 79
End: 2019-04-12

## 2019-04-12 VITALS
HEART RATE: 94 BPM | RESPIRATION RATE: 16 BRPM | BODY MASS INDEX: 30.48 KG/M2 | HEIGHT: 63 IN | OXYGEN SATURATION: 96 % | TEMPERATURE: 98.2 F | WEIGHT: 172 LBS | DIASTOLIC BLOOD PRESSURE: 78 MMHG | SYSTOLIC BLOOD PRESSURE: 137 MMHG

## 2019-04-12 DIAGNOSIS — E78.00 PURE HYPERCHOLESTEROLEMIA: ICD-10-CM

## 2019-04-12 DIAGNOSIS — E11.29 CONTROLLED TYPE 2 DIABETES MELLITUS WITH MICROALBUMINURIA, WITHOUT LONG-TERM CURRENT USE OF INSULIN (HCC): Primary | ICD-10-CM

## 2019-04-12 DIAGNOSIS — M19.049 HAND ARTHRITIS: ICD-10-CM

## 2019-04-12 DIAGNOSIS — K76.0 FATTY LIVER: ICD-10-CM

## 2019-04-12 DIAGNOSIS — I25.10 CORONARY ARTERY DISEASE INVOLVING NATIVE HEART WITHOUT ANGINA PECTORIS, UNSPECIFIED VESSEL OR LESION TYPE: ICD-10-CM

## 2019-04-12 DIAGNOSIS — I10 ESSENTIAL HYPERTENSION: ICD-10-CM

## 2019-04-12 DIAGNOSIS — Z23 ENCOUNTER FOR IMMUNIZATION: ICD-10-CM

## 2019-04-12 DIAGNOSIS — Z00.00 MEDICARE ANNUAL WELLNESS VISIT, SUBSEQUENT: ICD-10-CM

## 2019-04-12 DIAGNOSIS — R80.9 CONTROLLED TYPE 2 DIABETES MELLITUS WITH MICROALBUMINURIA, WITHOUT LONG-TERM CURRENT USE OF INSULIN (HCC): Primary | ICD-10-CM

## 2019-04-12 RX ORDER — DICLOFENAC SODIUM 10 MG/G
GEL TOPICAL 4 TIMES DAILY
Qty: 100 G | Refills: 1 | Status: SHIPPED | OUTPATIENT
Start: 2019-04-12 | End: 2020-09-28

## 2019-04-12 RX ORDER — ATORVASTATIN CALCIUM 40 MG/1
TABLET, FILM COATED ORAL DAILY
COMMUNITY
End: 2022-01-15 | Stop reason: DRUGHIGH

## 2019-04-12 NOTE — PROGRESS NOTES
Chief Complaint Patient presents with  Cholesterol Problem 6 month follow up  Labs  
  non fasting

## 2019-04-12 NOTE — PROGRESS NOTES
HISTORY OF PRESENT ILLNESS 
Angi Jimenes is a 69 y.o. female. HPI  
  
F/u DM-2 htn hld cad fatty liver and medicare wellness-------- 
a1c last month 6.2 ,   Last LDL 58 Taking lantus 24 units now Has fatty liver but not steatohepatitis  Per liver MD--will not need scheduled f/u at liver inst, Has proteinuria-Dr Fay-appt in July Has not had angina pain recently-Dr Noble Due for PCV 13, Tdap and shingrix C/o left shoudler pain and puffy hands Last OV Last a1c 5.9 ldl 65 Saw neurologist recently for new headaches--brain MRI-negative. Started on elavil 10 mg hs-now 1/2 tab hs helped prevent migraines Sees Dr Aranza Somers for hx severe CAD s/p NSTEMI, diffuse dz and stents to RCA 
  
fsbs bid 100-140 Lowest 89 Takes lyrica for neuorpathy which helps 
  
Last OV: 
Last a1c 5.9 ldl 65 Sees renal MD and will get labs next week-Dr Keenan 
fsbs bid < 200 at home 
  
C/o sneezes and sinus congestion and bitemporal headaches for 4 months intermittently Headache pain is soreness, dull pain occurs in the evenings, worse after cough or when bending over 
  
Saw liver MD for fatty liver, had fibroscan--repeat in 1 year Patient Active Problem List  
 Diagnosis Date Noted  NSTEMI (non-ST elevated myocardial infarction) ([address] 01/19/2016 Priority: 1 - One  Type 2 diabetes with nephropathy ([address] 08/10/2018  Fatty liver 02/23/2018  Iron deficiency anemia 12/01/2017  Osteopenia of left thigh 04/02/2017  Advanced care planning/counseling discussion 12/17/2015  
 Hiatal hernia 05/15/2012  CAD (coronary artery disease) 05/15/2012  Type 2 diabetes, controlled, with neuropathy ([address] 11/16/2009  Essential hypertension, benign 11/16/2009  Pure hypercholesterolemia 11/16/2009  Neuropathy in diabetes ([address] 11/16/2009  DJD (degenerative joint disease) 11/16/2009  DOMINGO (obstructive sleep apnea) 11/16/2009  Breast cancer ([address] 11/16/2009 Current Outpatient Medications Medication Sig Dispense Refill  montelukast (SINGULAIR) 10 mg tablet take 1 tablet by mouth once daily 30 Tab 11  
 glucose blood VI test strips (ASCENSIA AUTODISC VI, ONE TOUCH ULTRA TEST VI) strip One touch ultra test strips min---check fsbs bid 200 Strip 3  
 LYRICA 50 mg capsule take 1 capsule by mouth three times a day 90 Cap 5  
 fluticasone (FLONASE) 50 mcg/actuation nasal spray instill 2 sprays into each nostril daily 1 Bottle 8  celecoxib (CELEBREX) 200 mg capsule take 1 capsule by mouth once daily 30 Cap 6  
 losartan (COZAAR) 100 mg tablet take 1 tablet by mouth once daily 90 Tab 3  
 amitriptyline (ELAVIL) 10 mg tablet Take 1 Tab by mouth nightly. (Patient taking differently: Take 10 mg by mouth nightly. 1/2 tab daily) 30 Tab 5  JANUMET 50-1,000 mg per tablet take 1 tablet by mouth twice a day with food 180 Tab 3  
 omeprazole (PRILOSEC) 20 mg capsule take 1 capsule by mouth twice a day 60 Cap 11  Insulin Needles, Disposable, (PEN NEEDLE) 31 gauge x 3/16\" ndle Use as directed once daily 100 Pen Needle 3  Blood-Glucose Meter monitoring kit One touch ultra mini glucometer--dx 250.00 1 Kit 0  
 Lancets misc Check fsbs bid -250.02 200 Each 3  
 celecoxib (CELEBREX) 200 mg capsule take 1 capsule by mouth once daily 30 Cap 6  
 nitroglycerin (NITROSTAT) 0.4 mg SL tablet 1 Tab by SubLINGual route every five (5) minutes as needed for Chest Pain. 25 Tab 3  carvedilol (COREG) 6.25 mg tablet Take  by mouth two (2) times daily (with meals).  amLODIPine (NORVASC) 2.5 mg tablet Take  by mouth daily.     
 insulin glargine (LANTUS SOLOSTAR) 100 unit/mL (3 mL) pen Inject 16 units at bedtime--Dose change 10/14/16--updated med list--did not send prescription to the pharmacy (Patient taking differently: Inject 20 units at bedtime--Dose change 10/14/16--updated med list--did not send prescription to the pharmacy) 15 mL 3  
  atorvastatin (LIPITOR) 40 mg tablet Take  by mouth daily.  fenofibrate (LOFIBRA) 54 mg tablet Take  by mouth daily.  cyanocobalamin (VITAMIN B12) 500 mcg tablet Take 500 mcg by mouth daily.  ranolazine ER (RANEXA) 500 mg SR tablet Take 1 Tab by mouth two (2) times a day. 60 Tab 12  
 clopidogrel (PLAVIX) 75 mg tablet Take 1 Tab by mouth daily. 30 Tab 11  
 aspirin 81 mg Tab Take 81 mg by mouth daily.  MULTIVITAMINS W-MINERALS/LUT (CENTRUM SILVER PO) Take 1 Tab by mouth daily. Allergies Allergen Reactions  Codeine Other (comments) Jerking sensation  Livalo [Pitavastatin] Diarrhea  Niaspan [Niacin] Myalgia  Statins-Hmg-Coa Reductase Inhibitors Myalgia Zocor, pravachol, Lipitor, crestor  Welchol [Colesevelam] Other (comments) Nausea, bloating and malaise  Zetia [Ezetimibe] Myalgia Lab Results Component Value Date/Time Hemoglobin A1c 6.2 (H) 03/29/2019 12:40 PM  
 Hemoglobin A1c 6.0 (H) 10/09/2018 12:38 PM  
 Hemoglobin A1c 5.9 (H) 02/23/2018 03:15 PM  
 Hemoglobin A1c, External 6.3 04/30/2016 Glucose 78 03/29/2019 12:40 PM  
 Glucose (POC) 85 02/15/2016 08:03 AM  
 Microalb/Creat ratio (ug/mg creat.) 336.4 (H) 03/29/2019 12:40 PM  
 LDL, calculated 58 10/09/2018 12:38 PM  
 Creatinine 1.03 (H) 03/29/2019 12:40 PM  
  
Lab Results Component Value Date/Time Cholesterol, total 129 10/09/2018 12:38 PM  
 Cholesterol (POC) 195 08/11/2011 10:08 AM  
 HDL Cholesterol 41 10/09/2018 12:38 PM  
 LDL, calculated 58 10/09/2018 12:38 PM  
 LDL Cholesterol (POC) 113 08/11/2011 10:08 AM  
 LDL-C, External 104 05/02/2016 Triglyceride 152 (H) 10/09/2018 12:38 PM  
 Triglycerides (POC) 256 08/11/2011 10:08 AM  
 CHOL/HDL Ratio 5.5 (H) 01/19/2016 12:42 PM  
 
Lab Results Component Value Date/Time  GFR est non-AA 53 (L) 03/29/2019 12:40 PM  
 GFR est AA 61 03/29/2019 12:40 PM  
 Creatinine 1.03 (H) 03/29/2019 12:40 PM  
 BUN 24 03/29/2019 12:40 PM  
 Sodium 140 03/29/2019 12:40 PM  
 Potassium 4.5 03/29/2019 12:40 PM  
 Chloride 102 03/29/2019 12:40 PM  
 CO2 24 03/29/2019 12:40 PM  
 Magnesium 1.5 (L) 01/19/2016 12:42 PM  
  
ROS Physical Exam  
Constitutional: She appears well-developed and well-nourished. Appears stated age Cardiovascular: Normal rate, regular rhythm and normal heart sounds. Exam reveals no gallop and no friction rub. No murmur heard. Pulmonary/Chest: Effort normal and breath sounds normal. No respiratory distress. She has no wheezes. Abdominal: Soft. Bowel sounds are normal. She exhibits no distension and no mass. There is no tenderness. There is no rebound and no guarding. Musculoskeletal: She exhibits no edema. Neurological: She is alert. Psychiatric: She has a normal mood and affect. Nursing note and vitals reviewed. ASSESSMENT and PLAN Diagnoses and all orders for this visit: 1. Controlled type 2 diabetes mellitus with microalbuminuria, without long-term current use of insulin ([facility] [facility] 2. Essential hypertension 3. Pure hypercholesterolemia 4. Coronary artery disease involving native heart without angina pectoris, unspecified vessel or lesion type 5. Fatty liver Other orders 
-     diclofenac (VOLTAREN) 1 % gel; Apply  to affected area four (4) times daily. This is the Subsequent Medicare Annual Wellness Exam, performed 12 months or more after the Initial AWV or the last Subsequent AWV I have reviewed the patient's medical history in detail and updated the computerized patient record. History Past Medical History:  
Diagnosis Date  Arthritis  Breast cancer ([address] 2002  
 s/p lumpectomy and XRT  Diabetes ([address]  GERD (gastroesophageal reflux disease)   
 with hiatal hernia  Hypercholesterolemia  Hypertension  Neuropathy in diabetes ([address]  DOMINGO (obstructive sleep apnea)   
 on CPAP Past Surgical History:  
Procedure Laterality Date  BREAST SURGERY PROCEDURE UNLISTED  2001  
 right lumpectomy  CARDIAC SURG PROCEDURE UNLIST    
 cardiac cath; 3 stents placed  HX BREAST BIOPSY Left   
 years ago no scar seen  HX CARPAL TUNNEL RELEASE    
 b/l  HX HERNIA REPAIR    
 HX LUMBAR FUSION  July 2015 L4-L5  HX ORTHOPAEDIC    
 spinal fusion  VASCULAR SURGERY PROCEDURE UNLIST    
 right leg vein stripping Current Outpatient Medications Medication Sig Dispense Refill  atorvastatin (LIPITOR) 40 mg tablet Take  by mouth daily.  diclofenac (VOLTAREN) 1 % gel Apply  to affected area four (4) times daily. 100 g 1  
 montelukast (SINGULAIR) 10 mg tablet take 1 tablet by mouth once daily 30 Tab 11  
 glucose blood VI test strips (ASCENSIA AUTODISC VI, ONE TOUCH ULTRA TEST VI) strip One touch ultra test strips min---check fsbs bid 200 Strip 3  
 LYRICA 50 mg capsule take 1 capsule by mouth three times a day 90 Cap 5  
 fluticasone (FLONASE) 50 mcg/actuation nasal spray instill 2 sprays into each nostril daily 1 Bottle 8  
 losartan (COZAAR) 100 mg tablet take 1 tablet by mouth once daily 90 Tab 3  
 amitriptyline (ELAVIL) 10 mg tablet Take 1 Tab by mouth nightly. (Patient taking differently: Take 10 mg by mouth nightly. 1/2 tab daily) 30 Tab 5  JANUMET 50-1,000 mg per tablet take 1 tablet by mouth twice a day with food 180 Tab 3  
 omeprazole (PRILOSEC) 20 mg capsule take 1 capsule by mouth twice a day 60 Cap 11  Insulin Needles, Disposable, (PEN NEEDLE) 31 gauge x 3/16\" ndle Use as directed once daily 100 Pen Needle 3  Blood-Glucose Meter monitoring kit One touch ultra mini glucometer--dx 250.00 1 Kit 0  
 Lancets misc Check fsbs bid -250.02 200 Each 3  
 celecoxib (CELEBREX) 200 mg capsule take 1 capsule by mouth once daily 30 Cap 6  
 nitroglycerin (NITROSTAT) 0.4 mg SL tablet 1 Tab by SubLINGual route every five (5) minutes as needed for Chest Pain.  25 Tab 3  
  carvedilol (COREG) 6.25 mg tablet Take  by mouth two (2) times daily (with meals).  amLODIPine (NORVASC) 2.5 mg tablet Take  by mouth daily.  insulin glargine (LANTUS SOLOSTAR) 100 unit/mL (3 mL) pen Inject 16 units at bedtime--Dose change 10/14/16--updated med list--did not send prescription to the pharmacy (Patient taking differently: Inject 24 units at bedtime--Dose change 10/14/16--updated med list--did not send prescription to the pharmacy) 15 mL 3  
 fenofibrate (LOFIBRA) 54 mg tablet Take  by mouth daily.  cyanocobalamin (VITAMIN B12) 500 mcg tablet Take 500 mcg by mouth daily.  ranolazine ER (RANEXA) 500 mg SR tablet Take 1 Tab by mouth two (2) times a day. 60 Tab 12  
 clopidogrel (PLAVIX) 75 mg tablet Take 1 Tab by mouth daily. 30 Tab 11  
 aspirin 81 mg Tab Take 81 mg by mouth daily.  MULTIVITAMINS W-MINERALS/LUT (CENTRUM SILVER PO) Take 1 Tab by mouth daily. Allergies Allergen Reactions  Codeine Other (comments) Jerking sensation  Livalo [Pitavastatin] Diarrhea  Niaspan [Niacin] Myalgia  Statins-Hmg-Coa Reductase Inhibitors Myalgia Zocor, pravachol, Lipitor, crestor  Welchol [Colesevelam] Other (comments) Nausea, bloating and malaise  Zetia [Ezetimibe] Myalgia Family History Problem Relation Age of Onset  Diabetes Mother  Heart Disease Mother  Stroke Mother  Breast Cancer Mother 79  
 Diabetes Brother  Heart Disease Brother  Emphysema Father  Diabetes Daughter Social History Tobacco Use  Smoking status: Never Smoker  Smokeless tobacco: Never Used Substance Use Topics  Alcohol use: No  
  Alcohol/week: 0.0 oz Patient Active Problem List  
Diagnosis Code  Type 2 diabetes, controlled, with neuropathy ([address] E11.40  Essential hypertension, benign I10  Pure hypercholesterolemia E78.00  Neuropathy in diabetes ([facility] [address] E11.40  DJD (degenerative joint disease) M19.90  
  DOMINGO (obstructive sleep apnea) G47.33  
 Breast cancer (HCC) C50.919  
 Hiatal hernia K44.9  CAD (coronary artery disease) I25.10  Advanced care planning/counseling discussion Z70.80  
 NSTEMI (non-ST elevated myocardial infarction) ([facility] [address] [address] I21.4  Osteopenia of left thigh M85.852  Iron deficiency anemia D50.9  Fatty liver K76.0  Type 2 diabetes with nephropathy (HCC) E11.21 Depression Risk Factor Screening:  
 
3 most recent PHQ Screens 4/12/2019 Little interest or pleasure in doing things Not at all Feeling down, depressed, irritable, or hopeless Not at all Total Score PHQ 2 0 Alcohol Risk Factor Screening: You do not drink alcohol or very rarely. Functional Ability and Level of Safety:  
Hearing Loss Hearing is good. Activities of Daily Living The home contains: grab bars Patient needs help with:  transportation Fall Risk Fall Risk Assessment, last 12 mths 4/12/2019 Able to walk? Yes Fall in past 12 months? No  
Fall with injury? -  
 
 
Abuse Screen Patient is not abused Cognitive Screening Evaluation of Cognitive Function: 
Has your family/caregiver stated any concerns about your memory: no 
Abnormal---25/30--MMS/SLUMS Patient Care Team  
Patient Care Team: 
Les Mckeon MD as PCP - Jacinto Barton, RN as Ambulatory Care Navigator Bong Wing MD as Consulting Provider (Endocrinology) Sammi Cardona MD (Neurology) Hero Elliott MD (Gastroenterology) Sandip Crockett MD (Ophthalmology) Gwen Caro MD (Neurosurgery) Cosimo Favre, RN as Ambulatory Care Navigator Braulio Shen MD as Physician (Sleep Medicine) Bong Wing MD as Consulting Provider (Endocrinology) Assessment/Plan Education and counseling provided: 
Are appropriate based on today's review and evaluation End-of-Life planning (with patient's consent)-advised completion of AMD forms Pneumococcal Vaccine-PCV 13 today tdap and shiongrix recommended Diagnoses and all orders for this visit: 1. Controlled type 2 diabetes mellitus with microalbuminuria, without long-term current use of insulin ([address] recnet a1c 6.2 Refilled test strips 2. Essential hypertension 
 controlled 3. Pure hypercholesterolemia LDl at goal 
4. Coronary artery disease involving native heart without angina pectoris, unspecified vessel or lesion type 
 stable 5. Fatty liver Satble, LFT next OV 6. Hand arthritis Trial of voltaren crm 7. MCI? Pt declines referral to neurologist  
 Can repeta MMS in 6-12 months Other orders 
-     diclofenac (VOLTAREN) 1 % gel; Apply  to affected area four (4) times daily. Health Maintenance Due Topic Date Due  
 DTaP/Tdap/Td series (1 - Tdap) 12/23/1962  Shingrix Vaccine Age 50> (1 of 2) 12/23/1991  Pneumococcal 65+ years (2 of 2 - PCV13) 02/02/2019  
 EYE EXAM RETINAL OR DILATED  02/28/2019  MEDICARE YEARLY EXAM  04/07/2019

## 2019-04-15 ENCOUNTER — TELEPHONE (OUTPATIENT)
Dept: INTERNAL MEDICINE CLINIC | Age: 79
End: 2019-04-15

## 2019-04-15 NOTE — TELEPHONE ENCOUNTER
Called, spoke to pt. Two identifiers confirmed. Appointment scheduled for 4/18 @ 9. Pt verbalized understanding of information discussed w/ no further questions at this time.
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
## 2019-04-16 ENCOUNTER — APPOINTMENT (OUTPATIENT)
Dept: CT IMAGING | Age: 79
End: 2019-04-16
Attending: EMERGENCY MEDICINE
Payer: MEDICARE

## 2019-04-16 ENCOUNTER — HOSPITAL ENCOUNTER (EMERGENCY)
Age: 79
Discharge: HOME | End: 2019-04-16
Attending: EMERGENCY MEDICINE
Payer: MEDICARE

## 2019-04-16 VITALS
HEART RATE: 87 BPM | BODY MASS INDEX: 30.47 KG/M2 | HEIGHT: 63 IN | WEIGHT: 171.96 LBS | TEMPERATURE: 97.4 F | RESPIRATION RATE: 16 BRPM | DIASTOLIC BLOOD PRESSURE: 95 MMHG | SYSTOLIC BLOOD PRESSURE: 144 MMHG | OXYGEN SATURATION: 99 %

## 2019-04-16 DIAGNOSIS — S30.1XXA CONTUSION OF ABDOMINAL WALL, INITIAL ENCOUNTER: ICD-10-CM
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
## 2019-04-16 LAB
ABO + RH BLD: NORMAL
ALBUMIN SERPL-MCNC: 3.5 G/DL (ref 3.5–5)
ALBUMIN/GLOB SERPL: 0.8 {RATIO} (ref 1.1–2.2)
ALP SERPL-CCNC: 54 U/L (ref 45–117)
ALT SERPL-CCNC: 36 U/L (ref 12–78)
ANION GAP BLD CALC-SCNC: 17 MMOL/L (ref 10–20)
ANION GAP SERPL CALC-SCNC: 6 MMOL/L (ref 5–15)
AST SERPL-CCNC: 17 U/L (ref 15–37)
BASOPHILS # BLD: 0 K/UL (ref 0–0.1)
BASOPHILS NFR BLD: 1 % (ref 0–1)
BILIRUB SERPL-MCNC: 0.4 MG/DL (ref 0.2–1)
BLOOD GROUP ANTIBODIES SERPL: NORMAL
BUN BLD-MCNC: 18 MG/DL (ref 9–20)
BUN SERPL-MCNC: 18 MG/DL (ref 6–20)
BUN/CREAT SERPL: 20 (ref 12–20)
CA-I BLD-MCNC: 1.2 MMOL/L (ref 1.12–1.32)
CALCIUM SERPL-MCNC: 9 MG/DL (ref 8.5–10.1)
CHLORIDE BLD-SCNC: 102 MMOL/L (ref 98–107)
CHLORIDE SERPL-SCNC: 104 MMOL/L (ref 97–108)
CO2 BLD-SCNC: 25 MMOL/L (ref 21–32)
CO2 SERPL-SCNC: 26 MMOL/L (ref 21–32)
CREAT BLD-MCNC: 1 MG/DL (ref 0.6–1.3)
CREAT SERPL-MCNC: 0.89 MG/DL (ref 0.55–1.02)
DIFFERENTIAL METHOD BLD: ABNORMAL
EOSINOPHIL # BLD: 0.2 K/UL (ref 0–0.4)
EOSINOPHIL NFR BLD: 3 % (ref 0–7)
ERYTHROCYTE [DISTWIDTH] IN BLOOD BY AUTOMATED COUNT: 14.1 % (ref 11.5–14.5)
GLOBULIN SER CALC-MCNC: 4.2 G/DL (ref 2–4)
GLUCOSE BLD-MCNC: 93 MG/DL (ref 65–100)
GLUCOSE SERPL-MCNC: 90 MG/DL (ref 65–100)
HCT VFR BLD AUTO: 32.4 % (ref 35–47)
HCT VFR BLD CALC: 33 % (ref 35–47)
HGB BLD-MCNC: 10.7 G/DL (ref 11.5–16)
IMM GRANULOCYTES # BLD AUTO: 0 K/UL (ref 0–0.04)
IMM GRANULOCYTES NFR BLD AUTO: 0 % (ref 0–0.5)
INR PPP: 1 (ref 0.9–1.1)
LACTATE SERPL-SCNC: 1.6 MMOL/L (ref 0.4–2)
LIPASE SERPL-CCNC: 198 U/L (ref 73–393)
LYMPHOCYTES # BLD: 0.9 K/UL (ref 0.8–3.5)
LYMPHOCYTES NFR BLD: 13 % (ref 12–49)
MCH RBC QN AUTO: 28.7 PG (ref 26–34)
MCHC RBC AUTO-ENTMCNC: 33 G/DL (ref 30–36.5)
MCV RBC AUTO: 86.9 FL (ref 80–99)
MONOCYTES # BLD: 0.6 K/UL (ref 0–1)
MONOCYTES NFR BLD: 9 % (ref 5–13)
NEUTS SEG # BLD: 5.3 K/UL (ref 1.8–8)
NEUTS SEG NFR BLD: 74 % (ref 32–75)
NRBC # BLD: 0 K/UL (ref 0–0.01)
NRBC BLD-RTO: 0 PER 100 WBC
PLATELET # BLD AUTO: 246 K/UL (ref 150–400)
PMV BLD AUTO: 10.9 FL (ref 8.9–12.9)
POTASSIUM BLD-SCNC: 4.2 MMOL/L (ref 3.5–5.1)
POTASSIUM SERPL-SCNC: 4.2 MMOL/L (ref 3.5–5.1)
PROT SERPL-MCNC: 7.7 G/DL (ref 6.4–8.2)
PROTHROMBIN TIME: 10.5 SEC (ref 9–11.1)
RBC # BLD AUTO: 3.73 M/UL (ref 3.8–5.2)
SERVICE CMNT-IMP: ABNORMAL
SODIUM BLD-SCNC: 138 MMOL/L (ref 136–145)
SODIUM SERPL-SCNC: 136 MMOL/L (ref 136–145)
SPECIMEN EXP DATE BLD: NORMAL
WBC # BLD AUTO: 7.1 K/UL (ref 3.6–11)

## 2019-04-16 PROCEDURE — 74011636320 HC RX REV CODE- 636/320: Performed by: EMERGENCY MEDICINE

## 2019-04-16 PROCEDURE — 80047 BASIC METABLC PNL IONIZED CA: CPT

## 2019-04-16 PROCEDURE — 80053 COMPREHEN METABOLIC PANEL: CPT

## 2019-04-16 PROCEDURE — 99283 EMERGENCY DEPT VISIT LOW MDM: CPT

## 2019-04-16 PROCEDURE — 85025 COMPLETE CBC W/AUTO DIFF WBC: CPT

## 2019-04-16 PROCEDURE — 85610 PROTHROMBIN TIME: CPT

## 2019-04-16 PROCEDURE — 83605 ASSAY OF LACTIC ACID: CPT

## 2019-04-16 PROCEDURE — 71260 CT THORAX DX C+: CPT

## 2019-04-16 PROCEDURE — 74177 CT ABD & PELVIS W/CONTRAST: CPT

## 2019-04-16 PROCEDURE — 83690 ASSAY OF LIPASE: CPT

## 2019-04-16 PROCEDURE — 36415 COLL VENOUS BLD VENIPUNCTURE: CPT

## 2019-04-16 PROCEDURE — 86900 BLOOD TYPING SEROLOGIC ABO: CPT

## 2019-04-16 PROCEDURE — 74011250637 HC RX REV CODE- 250/637: Performed by: EMERGENCY MEDICINE

## 2019-04-16 RX ORDER — SODIUM CHLORIDE 0.9 % (FLUSH) 0.9 %
10 SYRINGE (ML) INJECTION
Status: COMPLETED | OUTPATIENT
Start: 2019-04-16 | End: 2019-04-16

## 2019-04-16 RX ORDER — TRAMADOL HYDROCHLORIDE 50 MG/1
50 TABLET ORAL
Status: COMPLETED | OUTPATIENT
Start: 2019-04-16 | End: 2019-04-16

## 2019-04-16 RX ORDER — SODIUM CHLORIDE 9 MG/ML
150 INJECTION, SOLUTION INTRAVENOUS ONCE
Status: DISCONTINUED | OUTPATIENT
Start: 2019-04-16 | End: 2019-04-16 | Stop reason: HOSPADM

## 2019-04-16 RX ADMIN — IOPAMIDOL 100 ML: 755 INJECTION, SOLUTION INTRAVENOUS at 17:30

## 2019-04-16 RX ADMIN — Medication 10 ML: at 18:04

## 2019-04-16 RX ADMIN — TRAMADOL HYDROCHLORIDE 50 MG: 50 TABLET, FILM COATED ORAL at 19:49

## 2019-04-16 NOTE — DISCHARGE INSTRUCTIONS
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
## 2019-04-22 ENCOUNTER — TELEPHONE (OUTPATIENT)
Dept: INTERNAL MEDICINE CLINIC | Age: 79
End: 2019-04-22

## 2019-04-22 NOTE — TELEPHONE ENCOUNTER
Called, spoke to pt. Two identifiers confirmed. Appointment scheduled for 4/24 @ 710. Pt verbalized understanding of information discussed w/ no further questions at this time.
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
## 2019-04-24 ENCOUNTER — OFFICE VISIT (OUTPATIENT)
Dept: INTERNAL MEDICINE CLINIC | Age: 79
End: 2019-04-24

## 2019-04-24 VITALS
HEART RATE: 65 BPM | SYSTOLIC BLOOD PRESSURE: 156 MMHG | OXYGEN SATURATION: 99 % | TEMPERATURE: 97.9 F | BODY MASS INDEX: 30.33 KG/M2 | RESPIRATION RATE: 18 BRPM | WEIGHT: 171.2 LBS | HEIGHT: 63 IN | DIASTOLIC BLOOD PRESSURE: 81 MMHG

## 2019-04-24 DIAGNOSIS — S30.1XXA ABDOMINAL WALL HEMATOMA, INITIAL ENCOUNTER: ICD-10-CM
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
## 2019-04-24 DIAGNOSIS — R10.30 LOWER ABDOMINAL PAIN: ICD-10-CM

## 2019-04-24 DIAGNOSIS — S20.212A CONTUSION OF RIB ON LEFT SIDE, INITIAL ENCOUNTER: Primary | ICD-10-CM
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
## 2019-04-24 NOTE — PROGRESS NOTES
Reviewed record in preparation for visit and have obtained necessary documentation. Identified pt with two pt identifiers(name and ). Chief Complaint Patient presents with  ED Follow-up Health Maintenance Due Topic Date Due  
 DTaP/Tdap/Td  (1 - Tdap) 1962  Shingles Vaccine (1 of 2) 1991 [facility]  2019 Ms. Mendoza has a reminder for a \"due or due soon\" health maintenance. I have asked that she discuss this further with her primary care provider for follow-up on this health maintenance. Coordination of Care Questionnaire: 
:  
 
1) Have you been to an emergency room, urgent care clinic since your last visit? yes Hospitalized since your last visit? no          
 
2) Have you seen or consulted any other health care providers outside of [address] [place] [address] since your last visit? no  (Include any pap smears or colon screenings in this section.) 3) In the event something were to happen to you and you were unable to speak on your behalf, do you have an Advance Directive/ Living Will in place stating your wishes? NO Do you have an Advance Directive on file? no 
 
4) Are you interested in receiving information on Advance Directives? NO Patient is accompanied by daughter I have received verbal consent from Mama to discuss any/all medical information while they are present in the room.

## 2019-06-09 RX ORDER — SITAGLIPTIN AND METFORMIN HYDROCHLORIDE 50; 1000 MG/1; MG/1
TABLET, FILM COATED ORAL
Qty: 180 TAB | Refills: 3 | Status: SHIPPED | OUTPATIENT
Start: 2019-06-09 | End: 2020-04-07

## 2019-08-25 DIAGNOSIS — G62.9 NEUROPATHY: ICD-10-CM

## 2019-08-25 RX ORDER — PREGABALIN 50 MG/1
CAPSULE ORAL
Qty: 90 CAP | Refills: 5 | Status: SHIPPED | OUTPATIENT
Start: 2019-08-25 | End: 2020-04-07

## 2019-08-26 RX ORDER — OMEPRAZOLE 20 MG/1
CAPSULE, DELAYED RELEASE ORAL
Qty: 180 CAP | Refills: 0 | Status: SHIPPED | OUTPATIENT
Start: 2019-08-26 | End: 2019-11-26 | Stop reason: SDUPTHER

## 2019-10-25 ENCOUNTER — OFFICE VISIT (OUTPATIENT)
Dept: INTERNAL MEDICINE CLINIC | Age: 79
End: 2019-10-25

## 2019-10-25 VITALS
WEIGHT: 175 LBS | SYSTOLIC BLOOD PRESSURE: 127 MMHG | DIASTOLIC BLOOD PRESSURE: 73 MMHG | OXYGEN SATURATION: 96 % | HEART RATE: 90 BPM | TEMPERATURE: 97.6 F | BODY MASS INDEX: 31.01 KG/M2 | HEIGHT: 63 IN | RESPIRATION RATE: 16 BRPM

## 2019-10-25 DIAGNOSIS — I10 ESSENTIAL HYPERTENSION: ICD-10-CM

## 2019-10-25 DIAGNOSIS — Z79.4 CONTROLLED TYPE 2 DIABETES MELLITUS WITH COMPLICATION, WITH LONG-TERM CURRENT USE OF INSULIN (HCC): Primary | ICD-10-CM

## 2019-10-25 DIAGNOSIS — E11.8 CONTROLLED TYPE 2 DIABETES MELLITUS WITH COMPLICATION, WITH LONG-TERM CURRENT USE OF INSULIN (HCC): Primary | ICD-10-CM

## 2019-10-25 DIAGNOSIS — E78.00 PURE HYPERCHOLESTEROLEMIA: ICD-10-CM

## 2019-10-25 RX ORDER — AZELASTINE 1 MG/ML
SPRAY, METERED NASAL
Refills: 0 | COMMUNITY
Start: 2019-08-07 | End: 2020-12-15 | Stop reason: SDUPTHER

## 2019-10-25 RX ORDER — LOSARTAN POTASSIUM 100 MG/1
TABLET ORAL
Qty: 90 TAB | Refills: 3 | Status: SHIPPED | OUTPATIENT
Start: 2019-10-25 | End: 2020-11-27 | Stop reason: SDUPTHER

## 2019-10-25 NOTE — PROGRESS NOTES
Chief Complaint   Patient presents with    Diabetes     6 month follow up    Cholesterol Problem     6 month follow up    Hypertension     6 month follow up    Labs     Non fasting
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
## 2019-11-01 ENCOUNTER — HOSPITAL ENCOUNTER (OUTPATIENT)
Dept: LAB | Age: 79
Discharge: HOME | End: 2019-11-01
Payer: MEDICARE

## 2019-11-01 PROCEDURE — 36415 COLL VENOUS BLD VENIPUNCTURE: CPT

## 2019-11-01 PROCEDURE — 80061 LIPID PANEL: CPT

## 2019-11-01 PROCEDURE — 80053 COMPREHEN METABOLIC PANEL: CPT

## 2019-11-01 PROCEDURE — 83036 HEMOGLOBIN GLYCOSYLATED A1C: CPT

## 2019-11-02 LAB
ALBUMIN SERPL-MCNC: 4.2 G/DL (ref 3.5–4.8)
ALBUMIN/GLOB SERPL: 1.7 {RATIO} (ref 1.2–2.2)
ALP SERPL-CCNC: 50 IU/L (ref 39–117)
ALT SERPL-CCNC: 27 IU/L (ref 0–32)
AST SERPL-CCNC: 17 IU/L (ref 0–40)
BILIRUB SERPL-MCNC: 0.3 MG/DL (ref 0–1.2)
BUN SERPL-MCNC: 24 MG/DL (ref 8–27)
BUN/CREAT SERPL: 23 (ref 12–28)
CALCIUM SERPL-MCNC: 9.5 MG/DL (ref 8.7–10.3)
CHLORIDE SERPL-SCNC: 103 MMOL/L (ref 96–106)
CHOLEST SERPL-MCNC: 149 MG/DL (ref 100–199)
CO2 SERPL-SCNC: 23 MMOL/L (ref 20–29)
CREAT SERPL-MCNC: 1.05 MG/DL (ref 0.57–1)
EST. AVERAGE GLUCOSE BLD GHB EST-MCNC: 137 MG/DL
GLOBULIN SER CALC-MCNC: 2.5 G/DL (ref 1.5–4.5)
GLUCOSE SERPL-MCNC: 141 MG/DL (ref 65–99)
HBA1C MFR BLD: 6.4 % (ref 4.8–5.6)
HDLC SERPL-MCNC: 43 MG/DL
LDLC SERPL CALC-MCNC: 86 MG/DL (ref 0–99)
POTASSIUM SERPL-SCNC: 4.8 MMOL/L (ref 3.5–5.2)
PROT SERPL-MCNC: 6.7 G/DL (ref 6–8.5)
SODIUM SERPL-SCNC: 141 MMOL/L (ref 134–144)
TRIGL SERPL-MCNC: 98 MG/DL (ref 0–149)
VLDLC SERPL CALC-MCNC: 20 MG/DL (ref 5–40)

## 2019-11-04 ENCOUNTER — PATIENT OUTREACH (OUTPATIENT)
Dept: INTERNAL MEDICINE CLINIC | Age: 79
End: 2019-11-04

## 2019-11-04 ENCOUNTER — TELEPHONE (OUTPATIENT)
Dept: INTERNAL MEDICINE CLINIC | Age: 79
End: 2019-11-04

## 2019-11-04 NOTE — TELEPHONE ENCOUNTER
Vicente Bustamante             Patient return call     Caller's first and last name and relationship (if not the patient):       Best contact number(s):   [phone]     Whose call is being returned:   Jocelin Manning     Details to clarify the request:   Not sure what it was about.      "[employer]     Message received & copied from [facility]

## 2019-11-14 ENCOUNTER — PATIENT OUTREACH (OUTPATIENT)
Dept: INTERNAL MEDICINE CLINIC | Age: 79
End: 2019-11-14

## 2019-11-14 NOTE — PROGRESS NOTES
Ambulatory Care Manager outreached to patient today to offer care management services. Introduction to self and role of care manager provided. Patient has now decided to declined care management services at this time; states, \"Everything is going fine and I don't see the need in going any further, furthermore I don't have the time this morning. \"   No follow up call scheduled at this time. Patient has Ambulatory Care Manager's contact number for for any questions or concerns.      Component      Latest Ref Rng & Units 11/1/2019          11:16 AM   Hemoglobin A1c, (calculated)      4.8 - 5.6 % 6.4 (H)   Estimated average glucose      mg/dL 137       Future Appointments:  Future Appointments   Date Time Provider Santiago Perez   4/27/2020  1:30 PM MD Odell Elias 88        Last Appointment With Me:  Visit date not found     Last Appointment My Department:  10/25/2019

## 2019-11-22 RX ORDER — AMITRIPTYLINE HYDROCHLORIDE 10 MG/1
10 TABLET, FILM COATED ORAL
Qty: 90 TAB | Refills: 3 | Status: SHIPPED | OUTPATIENT
Start: 2019-11-22 | End: 2020-10-19 | Stop reason: CLARIF

## 2019-11-22 NOTE — TELEPHONE ENCOUNTER
Luis Enrique Looney [facility]   Phone Number: [phone]             Pt is requesting a call back in regards to  medication \"amitriptyline\" states that she does need a prescription however not sure of dosage because it was prescribed by another doctor. Would like to know what to do.  Best contact [phone]     Copy/Paste ENVERA

## 2019-12-03 ENCOUNTER — TELEPHONE (OUTPATIENT)
Dept: INTERNAL MEDICINE CLINIC | Age: 79
End: 2019-12-03

## 2019-12-03 RX ORDER — AZITHROMYCIN 250 MG/1
250 TABLET, FILM COATED ORAL SEE ADMIN INSTRUCTIONS
Qty: 6 TAB | Refills: 0 | Status: SHIPPED | OUTPATIENT
Start: 2019-12-03 | End: 2019-12-08

## 2019-12-03 NOTE — TELEPHONE ENCOUNTER
Called, spoke to pt. Two pt identifiers confirmed. Pt informed per Dr. Jean-Claude Ramon that zpak sent to pt's pharmacy. Pt verbalized understanding of information discussed w/ no further questions at this time.

## 2019-12-03 NOTE — TELEPHONE ENCOUNTER
MD Edis Fuchs, LPN   Caller: Unspecified (Today,  8:40 AM)             Tell pt I escribed zpak please

## 2019-12-03 NOTE — TELEPHONE ENCOUNTER
Paul/Telephone   Received: Yesterday   Message Contents   Genna Swain[facility] Pool   Phone Number: [phone]             Caller's first and last name: Danny Valera   Reason for call: Pt stated possible sinus infection   Callback required yes/no and why: Yes   Best contact number(s):  [phone]   Details to clarify the request: Pt would like medication called into the [facility] pharmacy [phone].

## 2020-03-24 RX ORDER — MONTELUKAST SODIUM 10 MG/1
TABLET ORAL
Qty: 90 TAB | Refills: 3 | Status: SHIPPED | OUTPATIENT
Start: 2020-03-24 | End: 2021-05-10 | Stop reason: SDUPTHER

## 2020-04-07 DIAGNOSIS — G62.9 NEUROPATHY: ICD-10-CM

## 2020-04-07 RX ORDER — PREGABALIN 50 MG/1
CAPSULE ORAL
Qty: 90 CAP | Refills: 5 | Status: SHIPPED | OUTPATIENT
Start: 2020-04-07 | End: 2020-10-26 | Stop reason: SDUPTHER

## 2020-04-07 RX ORDER — SITAGLIPTIN AND METFORMIN HYDROCHLORIDE 50; 1000 MG/1; MG/1
TABLET, FILM COATED ORAL
Qty: 180 TAB | Refills: 3 | Status: SHIPPED | OUTPATIENT
Start: 2020-04-07 | End: 2021-05-24 | Stop reason: SDUPTHER

## 2020-04-17 ENCOUNTER — TELEPHONE (OUTPATIENT)
Dept: OTHER | Age: 80
End: 2020-04-17

## 2020-04-17 NOTE — TELEPHONE ENCOUNTER
Bartolome Lopez was contacted to activate their [employer] account. Name and date of birth verified. This writer spoke with patient and offered to assist with account setup. Patien tdoes not  wish to sign up at this time. Patient does not have an [address]. I discussed the basics of Advanced Care Planning with patient, including what the document does, purpose of a health care proxy and how to execute ACP. Patient advised to speak with PCP related to [address] at next visit.      Cecilio Peguero LPN

## 2020-04-20 PROBLEM — Z85.3 HISTORY OF BREAST CANCER: Status: ACTIVE | Noted: 2020-04-20

## 2020-04-25 ENCOUNTER — TELEPHONE (OUTPATIENT)
Dept: INTERNAL MEDICINE CLINIC | Age: 80
End: 2020-04-25

## 2020-04-25 DIAGNOSIS — K21.9 GASTROESOPHAGEAL REFLUX DISEASE WITHOUT ESOPHAGITIS: Primary | ICD-10-CM

## 2020-04-25 RX ORDER — PANTOPRAZOLE SODIUM 40 MG/1
40 TABLET, DELAYED RELEASE ORAL DAILY
Qty: 30 TAB | Refills: 2 | Status: SHIPPED | OUTPATIENT
Start: 2020-04-25 | End: 2020-07-21

## 2020-04-25 RX ORDER — FAMOTIDINE 20 MG/1
20 TABLET, FILM COATED ORAL
Qty: 30 TAB | Refills: 2 | Status: SHIPPED | OUTPATIENT
Start: 2020-04-25 | End: 2020-07-21

## 2020-04-25 NOTE — TELEPHONE ENCOUNTER
Patient called at 10:11am on 4/25. Patient reports upper abdominal pain described as \"burning\". Has a history of GERD and hiatal hernia. She is taking prilosec 20mg BID and pepcid 20mg daily for 3-4 days. Reports following diet. Some nausea, no vomiting. Reports soft stool for 3 days. No fever. Stop omeprazole and start pantoprazole 40mg daily and continue pepcid 20mg at bedtime. To call if symptoms not improved.

## 2020-05-14 RX ORDER — CELECOXIB 200 MG/1
CAPSULE ORAL
Qty: 90 CAP | Refills: 1 | Status: SHIPPED | OUTPATIENT
Start: 2020-05-14 | End: 2020-10-28
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
## 2020-06-15 ENCOUNTER — VIRTUAL VISIT (OUTPATIENT)
Dept: INTERNAL MEDICINE CLINIC | Age: 80
End: 2020-06-15

## 2020-06-15 DIAGNOSIS — E78.00 PURE HYPERCHOLESTEROLEMIA: ICD-10-CM

## 2020-06-15 DIAGNOSIS — Z79.4 CONTROLLED TYPE 2 DIABETES MELLITUS WITH COMPLICATION, WITH LONG-TERM CURRENT USE OF INSULIN (HCC): Primary | ICD-10-CM

## 2020-06-15 DIAGNOSIS — I10 ESSENTIAL HYPERTENSION: ICD-10-CM

## 2020-06-15 DIAGNOSIS — I25.10 CORONARY ARTERY DISEASE INVOLVING NATIVE CORONARY ARTERY OF NATIVE HEART WITHOUT ANGINA PECTORIS: ICD-10-CM

## 2020-06-15 DIAGNOSIS — N30.00 ACUTE CYSTITIS WITHOUT HEMATURIA: ICD-10-CM

## 2020-06-15 DIAGNOSIS — E11.8 CONTROLLED TYPE 2 DIABETES MELLITUS WITH COMPLICATION, WITH LONG-TERM CURRENT USE OF INSULIN (HCC): Primary | ICD-10-CM

## 2020-06-15 DIAGNOSIS — N18.30 CKD (CHRONIC KIDNEY DISEASE) STAGE 3, GFR 30-59 ML/MIN (HCC): ICD-10-CM

## 2020-06-15 NOTE — PATIENT INSTRUCTIONS
This is an established visit conducted via telemedicine. The patient has been instructed that this meets HIPAA criteria and acknowledges and agrees to this method of visitation. Ronak Love LPN 
[birth year] 
10:43 AM' Chief Complaint Patient presents with  Medication Evaluation  
  review  Follow-up Michael Salazar Annual Wellness Visit Medicare Wellness Visit, Female The best way to live healthy is to have a lifestyle where you eat a well-balanced diet, exercise regularly, limit alcohol use, and quit all forms of tobacco/nicotine, if applicable. Regular preventive services are another way to keep healthy. Preventive services (vaccines, screening tests, monitoring & exams) can help personalize your care plan, which helps you manage your own care. Screening tests can find health problems at the earliest stages, when they are easiest to treat. [facility] follows the current, evidence-based guidelines published by the [facility] Mckeon ([facility]) when recommending preventive services for our patients. Because we follow these guidelines, sometimes recommendations change over time as research supports it. (For example, mammograms used to be recommended annually. Even though Medicare will still pay for an annual mammogram, the newer guidelines recommend a mammogram every two years for women of average risk). Of course, you and your doctor may decide to screen more often for some diseases, based on your risk and your co-morbidities (chronic disease you are already diagnosed with). Preventive services for you include: - Medicare offers their members a free annual wellness visit, which is time for you and your primary care provider to discuss and plan for your preventive service needs.  Take advantage of this benefit every year! 
-All adults over the age of 72 should receive the recommended pneumonia vaccines. Current USPSTF guidelines recommend a series of two vaccines for the best pneumonia protection.  
-All adults should have a flu vaccine yearly and a tetanus vaccine every 10 years.  
-All adults age 48 and older should receive the shingles vaccines (series of two vaccines). -All adults age 38-68 who are overweight should have a diabetes screening test once every three years.  
-All adults born between [birth year] and 1965 should be screened once for Hepatitis C. 
-Other screening tests and preventive services for persons with diabetes include: an eye exam to screen for diabetic retinopathy, a kidney function test, a foot exam, and stricter control over your cholesterol.  
-Cardiovascular screening for adults with routine risk involves an electrocardiogram (ECG) at intervals determined by your doctor.  
-Colorectal cancer screenings should be done for adults age 54-65 with no increased risk factors for colorectal cancer. There are a number of acceptable methods of screening for this type of cancer. Each test has its own benefits and drawbacks. Discuss with your doctor what is most appropriate for you during your annual wellness visit. The different tests include: colonoscopy (considered the best screening method), a fecal occult blood test, a fecal DNA test, and sigmoidoscopy. 
 
-A bone mass density test is recommended when a woman turns 65 to screen for osteoporosis. This test is only recommended one time, as a screening. Some providers will use this same test as a disease monitoring tool if you already have osteoporosis. -Breast cancer screenings are recommended every other year for women of normal risk, age 54-69. 
-Cervical cancer screenings for women over age 72 are only recommended with certain risk factors. Here is a list of your current Health Maintenance items (your personalized list of preventive services) with a due date: 
Health Maintenance Due Topic Date Due  
  DTaP/Tdap/Td  (1 - Tdap) 12/23/1962  Shingles Vaccine (1 of 2) 12/23/1991  Albumin Urine Test  03/29/2020 [facility] Diabetic Foot Care  04/11/2020 [facility] Annual Well Visit  04/12/2020  Hemoglobin A1C    05/01/2020

## 2020-06-18 ENCOUNTER — HOSPITAL ENCOUNTER (OUTPATIENT)
Dept: LAB | Age: 80
Discharge: HOME | End: 2020-06-18
Payer: MEDICARE

## 2020-06-18 PROCEDURE — 85027 COMPLETE CBC AUTOMATED: CPT

## 2020-06-18 PROCEDURE — 83036 HEMOGLOBIN GLYCOSYLATED A1C: CPT

## 2020-06-18 PROCEDURE — 82043 UR ALBUMIN QUANTITATIVE: CPT

## 2020-06-18 PROCEDURE — 80061 LIPID PANEL: CPT

## 2020-06-18 PROCEDURE — 87086 URINE CULTURE/COLONY COUNT: CPT

## 2020-06-18 PROCEDURE — 81001 URINALYSIS AUTO W/SCOPE: CPT

## 2020-06-18 PROCEDURE — 80053 COMPREHEN METABOLIC PANEL: CPT

## 2020-06-18 PROCEDURE — 36415 COLL VENOUS BLD VENIPUNCTURE: CPT

## 2020-06-21 LAB
ALBUMIN SERPL-MCNC: 4.4 G/DL (ref 3.7–4.7)
ALBUMIN/CREAT UR: 666 MG/G CREAT (ref 0–29)
ALBUMIN/GLOB SERPL: 1.7 {RATIO} (ref 1.2–2.2)
ALP SERPL-CCNC: 46 IU/L (ref 39–117)
ALT SERPL-CCNC: 32 IU/L (ref 0–32)
APPEARANCE UR: CLEAR
AST SERPL-CCNC: 20 IU/L (ref 0–40)
BACTERIA #/AREA URNS HPF: ABNORMAL /[HPF]
BACTERIA UR CULT: ABNORMAL
BACTERIA UR CULT: ABNORMAL
BILIRUB SERPL-MCNC: 0.3 MG/DL (ref 0–1.2)
BILIRUB UR QL STRIP: NEGATIVE
BUN SERPL-MCNC: 23 MG/DL (ref 8–27)
BUN/CREAT SERPL: 20 (ref 12–28)
CALCIUM SERPL-MCNC: 9.7 MG/DL (ref 8.7–10.3)
CASTS URNS MICRO: ABNORMAL
CASTS URNS QL MICRO: PRESENT /LPF
CHLORIDE SERPL-SCNC: 102 MMOL/L (ref 96–106)
CHOLEST SERPL-MCNC: 162 MG/DL (ref 100–199)
CO2 SERPL-SCNC: 21 MMOL/L (ref 20–29)
COLOR UR: YELLOW
CREAT SERPL-MCNC: 1.16 MG/DL (ref 0.57–1)
CREAT UR-MCNC: 336.3 MG/DL
EPI CELLS #/AREA URNS HPF: ABNORMAL /HPF (ref 0–10)
ERYTHROCYTE [DISTWIDTH] IN BLOOD BY AUTOMATED COUNT: 13.3 % (ref 11.7–15.4)
EST. AVERAGE GLUCOSE BLD GHB EST-MCNC: 148 MG/DL
GLOBULIN SER CALC-MCNC: 2.6 G/DL (ref 1.5–4.5)
GLUCOSE SERPL-MCNC: 145 MG/DL (ref 65–99)
GLUCOSE UR QL: ABNORMAL
HBA1C MFR BLD: 6.8 % (ref 4.8–5.6)
HCT VFR BLD AUTO: 34.4 % (ref 34–46.6)
HDLC SERPL-MCNC: 41 MG/DL
HGB BLD-MCNC: 11.4 G/DL (ref 11.1–15.9)
HGB UR QL STRIP: NEGATIVE
KETONES UR QL STRIP: ABNORMAL
LDLC SERPL CALC-MCNC: 72 MG/DL (ref 0–99)
LEUKOCYTE ESTERASE UR QL STRIP: ABNORMAL
MCH RBC QN AUTO: 28.8 PG (ref 26.6–33)
MCHC RBC AUTO-ENTMCNC: 33.1 G/DL (ref 31.5–35.7)
MCV RBC AUTO: 87 FL (ref 79–97)
MICRO URNS: ABNORMAL
MICROALBUMIN UR-MCNC: 2239.8 UG/ML
MUCOUS THREADS URNS QL MICRO: PRESENT
NITRITE UR QL STRIP: NEGATIVE
PH UR STRIP: 5 [PH] (ref 5–7.5)
PLATELET # BLD AUTO: 277 X10E3/UL (ref 150–450)
POTASSIUM SERPL-SCNC: 4.5 MMOL/L (ref 3.5–5.2)
PROT SERPL-MCNC: 7 G/DL (ref 6–8.5)
PROT UR QL STRIP: ABNORMAL
RBC # BLD AUTO: 3.96 X10E6/UL (ref 3.77–5.28)
RBC #/AREA URNS HPF: ABNORMAL /HPF (ref 0–2)
SODIUM SERPL-SCNC: 139 MMOL/L (ref 134–144)
SP GR UR: >=1.03 (ref 1–1.03)
TRIGL SERPL-MCNC: 243 MG/DL (ref 0–149)
URINALYSIS REFLEX , 377201: ABNORMAL
UROBILINOGEN UR STRIP-MCNC: 1 MG/DL (ref 0.2–1)
VLDLC SERPL CALC-MCNC: 49 MG/DL (ref 5–40)
WBC # BLD AUTO: 7.1 X10E3/UL (ref 3.4–10.8)
WBC #/AREA URNS HPF: ABNORMAL /HPF (ref 0–5)

## 2020-06-21 NOTE — PROGRESS NOTES
Tell pt normal cbc lytes liver   Her kidney function is mild-moderately impaired with increased protein in urine--refer back to Dr Jp Soliz for renal disease and proteinuria pleaes.   3 mos bs avg is 148--still contrlolled--continue medicines and diet   Lipids are reasonably well controlled on lipitor

## 2020-06-22 DIAGNOSIS — N18.30 CKD (CHRONIC KIDNEY DISEASE) STAGE 3, GFR 30-59 ML/MIN (HCC): Primary | ICD-10-CM

## 2020-06-22 NOTE — PROGRESS NOTES
Called, spoke to pt. Two identifiers confirmed. Notified pt of lab results/recommendations per Dr. Froylan Pearson. Pt verbalized understanding of information discussed w/ no further questions at this time.

## 2020-07-21 DIAGNOSIS — K21.9 GASTROESOPHAGEAL REFLUX DISEASE WITHOUT ESOPHAGITIS: ICD-10-CM

## 2020-07-21 RX ORDER — FAMOTIDINE 20 MG/1
TABLET, FILM COATED ORAL
Qty: 30 TAB | Refills: 2 | Status: SHIPPED | OUTPATIENT
Start: 2020-07-21 | End: 2020-10-17

## 2020-07-21 RX ORDER — PANTOPRAZOLE SODIUM 40 MG/1
TABLET, DELAYED RELEASE ORAL
Qty: 30 TAB | Refills: 2 | Status: SHIPPED | OUTPATIENT
Start: 2020-07-21 | End: 2020-10-17

## 2020-09-27 ENCOUNTER — HOSPITAL ENCOUNTER (EMERGENCY)
Age: 80
Discharge: HOME | End: 2020-09-27
Attending: EMERGENCY MEDICINE | Admitting: EMERGENCY MEDICINE
Payer: MEDICARE

## 2020-09-27 VITALS
TEMPERATURE: 98.6 F | OXYGEN SATURATION: 98 % | HEART RATE: 84 BPM | WEIGHT: 167.77 LBS | RESPIRATION RATE: 18 BRPM | BODY MASS INDEX: 29.72 KG/M2 | SYSTOLIC BLOOD PRESSURE: 125 MMHG | DIASTOLIC BLOOD PRESSURE: 77 MMHG

## 2020-09-27 DIAGNOSIS — R10.9 RIGHT FLANK PAIN: Primary | ICD-10-CM

## 2020-09-27 LAB
ALBUMIN SERPL-MCNC: 3.5 G/DL (ref 3.5–5)
ALBUMIN/GLOB SERPL: 0.9 {RATIO} (ref 1.1–2.2)
ALP SERPL-CCNC: 54 U/L (ref 45–117)
ALT SERPL-CCNC: 29 U/L (ref 12–78)
ANION GAP SERPL CALC-SCNC: 5 MMOL/L (ref 5–15)
APPEARANCE UR: CLEAR
AST SERPL-CCNC: 12 U/L (ref 15–37)
BACTERIA URNS QL MICRO: NEGATIVE /HPF
BASOPHILS # BLD: 0 K/UL (ref 0–0.1)
BASOPHILS NFR BLD: 0 % (ref 0–1)
BILIRUB SERPL-MCNC: 0.3 MG/DL (ref 0.2–1)
BILIRUB UR QL CFM: NEGATIVE
BUN SERPL-MCNC: 40 MG/DL (ref 6–20)
BUN/CREAT SERPL: 25 (ref 12–20)
CALCIUM SERPL-MCNC: 9.2 MG/DL (ref 8.5–10.1)
CHLORIDE SERPL-SCNC: 105 MMOL/L (ref 97–108)
CO2 SERPL-SCNC: 27 MMOL/L (ref 21–32)
COLOR UR: ABNORMAL
CREAT SERPL-MCNC: 1.6 MG/DL (ref 0.55–1.02)
DIFFERENTIAL METHOD BLD: NORMAL
EOSINOPHIL # BLD: 0.1 K/UL (ref 0–0.4)
EOSINOPHIL NFR BLD: 2 % (ref 0–7)
EPITH CASTS URNS QL MICRO: ABNORMAL /LPF
ERYTHROCYTE [DISTWIDTH] IN BLOOD BY AUTOMATED COUNT: 13.9 % (ref 11.5–14.5)
GLOBULIN SER CALC-MCNC: 4.1 G/DL (ref 2–4)
GLUCOSE SERPL-MCNC: 161 MG/DL (ref 65–100)
GLUCOSE UR STRIP.AUTO-MCNC: NEGATIVE MG/DL
HCT VFR BLD AUTO: 35.5 % (ref 35–47)
HGB BLD-MCNC: 11.5 G/DL (ref 11.5–16)
HGB UR QL STRIP: NEGATIVE
HYALINE CASTS URNS QL MICRO: ABNORMAL /LPF (ref 0–5)
IMM GRANULOCYTES # BLD AUTO: 0 K/UL (ref 0–0.04)
IMM GRANULOCYTES NFR BLD AUTO: 0 % (ref 0–0.5)
KETONES UR QL STRIP.AUTO: NEGATIVE MG/DL
LEUKOCYTE ESTERASE UR QL STRIP.AUTO: ABNORMAL
LYMPHOCYTES # BLD: 1.1 K/UL (ref 0.8–3.5)
LYMPHOCYTES NFR BLD: 16 % (ref 12–49)
MCH RBC QN AUTO: 28.8 PG (ref 26–34)
MCHC RBC AUTO-ENTMCNC: 32.4 G/DL (ref 30–36.5)
MCV RBC AUTO: 89 FL (ref 80–99)
MONOCYTES # BLD: 0.7 K/UL (ref 0–1)
MONOCYTES NFR BLD: 10 % (ref 5–13)
NEUTS SEG # BLD: 4.8 K/UL (ref 1.8–8)
NEUTS SEG NFR BLD: 72 % (ref 32–75)
NITRITE UR QL STRIP.AUTO: NEGATIVE
NRBC # BLD: 0 K/UL (ref 0–0.01)
NRBC BLD-RTO: 0 PER 100 WBC
PH UR STRIP: 5.5 [PH] (ref 5–8)
PLATELET # BLD AUTO: 277 K/UL (ref 150–400)
PMV BLD AUTO: 11.1 FL (ref 8.9–12.9)
POTASSIUM SERPL-SCNC: 4.2 MMOL/L (ref 3.5–5.1)
PROT SERPL-MCNC: 7.6 G/DL (ref 6.4–8.2)
PROT UR STRIP-MCNC: 100 MG/DL
RBC # BLD AUTO: 3.99 M/UL (ref 3.8–5.2)
RBC #/AREA URNS HPF: ABNORMAL /HPF (ref 0–5)
SODIUM SERPL-SCNC: 137 MMOL/L (ref 136–145)
SP GR UR REFRACTOMETRY: 1.02 (ref 1–1.03)
UA: UC IF INDICATED,UAUC: ABNORMAL
UROBILINOGEN UR QL STRIP.AUTO: 1 EU/DL (ref 0.2–1)
WBC # BLD AUTO: 6.8 K/UL (ref 3.6–11)
WBC URNS QL MICRO: ABNORMAL /HPF (ref 0–4)

## 2020-09-27 PROCEDURE — 81001 URINALYSIS AUTO W/SCOPE: CPT

## 2020-09-27 PROCEDURE — 96374 THER/PROPH/DIAG INJ IV PUSH: CPT

## 2020-09-27 PROCEDURE — 99284 EMERGENCY DEPT VISIT MOD MDM: CPT

## 2020-09-27 PROCEDURE — 74011250637 HC RX REV CODE- 250/637: Performed by: EMERGENCY MEDICINE

## 2020-09-27 PROCEDURE — 80053 COMPREHEN METABOLIC PANEL: CPT

## 2020-09-27 PROCEDURE — 74011250636 HC RX REV CODE- 250/636: Performed by: EMERGENCY MEDICINE

## 2020-09-27 PROCEDURE — 36415 COLL VENOUS BLD VENIPUNCTURE: CPT

## 2020-09-27 PROCEDURE — 85025 COMPLETE CBC W/AUTO DIFF WBC: CPT

## 2020-09-27 PROCEDURE — 74011000250 HC RX REV CODE- 250: Performed by: EMERGENCY MEDICINE

## 2020-09-27 RX ORDER — KETOROLAC TROMETHAMINE 30 MG/ML
15 INJECTION, SOLUTION INTRAMUSCULAR; INTRAVENOUS
Status: COMPLETED | OUTPATIENT
Start: 2020-09-27 | End: 2020-09-27

## 2020-09-27 RX ORDER — LIDOCAINE 4 G/100G
1 PATCH TOPICAL
Status: DISCONTINUED | OUTPATIENT
Start: 2020-09-27 | End: 2020-09-28 | Stop reason: HOSPADM

## 2020-09-27 RX ORDER — METHOCARBAMOL 500 MG/1
500 TABLET, FILM COATED ORAL
Qty: 20 TAB | Refills: 0 | Status: SHIPPED | OUTPATIENT
Start: 2020-09-27 | End: 2020-10-19 | Stop reason: CLARIF

## 2020-09-27 RX ORDER — METHOCARBAMOL 500 MG/1
500 TABLET, FILM COATED ORAL ONCE
Status: COMPLETED | OUTPATIENT
Start: 2020-09-27 | End: 2020-09-27

## 2020-09-27 RX ORDER — ACETAMINOPHEN 500 MG
1000 TABLET ORAL
Status: COMPLETED | OUTPATIENT
Start: 2020-09-27 | End: 2020-09-27

## 2020-09-27 RX ADMIN — ACETAMINOPHEN 1000 MG: 500 TABLET ORAL at 19:49

## 2020-09-27 RX ADMIN — METHOCARBAMOL TABLETS 500 MG: 500 TABLET, COATED ORAL at 19:49

## 2020-09-27 RX ADMIN — KETOROLAC TROMETHAMINE 15 MG: 30 INJECTION, SOLUTION INTRAMUSCULAR at 19:53

## 2020-09-27 NOTE — ED NOTES
Care assumed of patient @ this time & intro with rounding done, with report from [facility], RN_________________. Patient resting quietly on stretcher without verbal complaints.

## 2020-09-27 NOTE — ED NOTES
Bedside and Verbal shift change report given to Eugenie Turcios RN (oncoming nurse) by Royce Mahoney RN (offgoing nurse). Report included the following information SBAR, ED Summary, MAR and Recent Results.

## 2020-09-27 NOTE — ED NOTES
Pt arrives reporting persistent pain in her right flank since Wed. Pt says it started in her right flank then wrapped around across her abd before subsiding and settling in her right flank. Pt presented to [facility] and was dc'd after being treated for pain. Pt reports dizziness when taking the prescribed hydrocodone. Pt endorses stage 3 renal failure.

## 2020-09-27 NOTE — ED PROVIDER NOTES
EMERGENCY DEPARTMENT HISTORY AND PHYSICAL EXAM      Date: 9/27/2020  Patient Name: Kristie Prado    History of Presenting Illness     Chief Complaint   Patient presents with    Flank Pain     pt with c/o right side flank pain, radiates to front. was seen at [facility] Wednesday for same without clear diagnosis. History Provided By: Patient and Patient's Daughter    HPI: Kristie Prado, 67 y.o. female with history of hypertension and diabetes presents to the ED with cc of right flank pain. Pain has been present for the past 5 days. Pain starts in the right flank area and radiates around to the right upper and lower abdomen. She denies any nausea, vomiting, urinary symptoms, diarrhea, or constipation. Denies any chest pain, shortness of breath, fevers, chills, or recent illness. She denies any injury or trauma. She was evaluated at [facility] emergency department 5 days ago when symptoms initially onset. She was evaluated with CT imaging which did not show any abnormality and she was discharged home on hydrocodone. She states that she cannot take it because it makes her feel sick. She has not taken anything else to help her pain. She does endorse playing with her grandchildren recently and carrying a 3year-old around multiple times over the past few days. There are no other complaints, changes, or physical findings at this time. PCP: Jose Armando Grijalva MD    No current facility-administered medications on file prior to encounter. Current Outpatient Medications on File Prior to Encounter   Medication Sig Dispense Refill    pantoprazole (PROTONIX) 40 mg tablet TAKE 1 TABLET BY MOUTH DAILY.  IN PLACE OF PRILOSEC 30 Tab 2    famotidine (PEPCID) 20 mg tablet TAKE 1 TABLET BY MOUTH EVERY NIGHT 30 Tab 2    celecoxib (CELEBREX) 200 mg capsule TAKE 1 CAPSULE BY MOUTH ONCE DAILY 90 Cap 1    Janumet 50-1,000 mg per tablet TAKE 1 TABLET BY MOUTH TWICE A DAY WITH FOOD 180 Tab 3    pregabalin (LYRICA) 50 mg capsule TAKE 1 CAPSULE BY MOUTH THREE TIMES A DAY 90 Cap 5    montelukast (SINGULAIR) 10 mg tablet TAKE 1 TABLET BY MOUTH ONCE DAILY 90 Tab 3    amitriptyline (ELAVIL) 10 mg tablet Take 1 Tab by mouth nightly. 90 Tab 3    losartan (COZAAR) 100 mg tablet take 1 tablet by mouth once daily 90 Tab 3    azelastine (ASTELIN) 137 mcg (0.1 %) nasal spray instill 2 sprays into each nostril twice a day  0    atorvastatin (LIPITOR) 40 mg tablet Take  by mouth daily.  [DISCONTINUED] diclofenac (VOLTAREN) 1 % gel Apply  to affected area four (4) times daily. 100 g 1    glucose blood VI test strips (ASCENSIA AUTODISC VI, ONE TOUCH ULTRA TEST VI) strip One touch ultra test strips min---check fsbs bid 200 Strip 3    [DISCONTINUED] fluticasone (FLONASE) 50 mcg/actuation nasal spray instill 2 sprays into each nostril daily 1 Bottle 8    Insulin Needles, Disposable, (PEN NEEDLE) 31 gauge x 3/16\" ndle Use as directed once daily 100 Pen Needle 3    Blood-Glucose Meter monitoring kit One touch ultra mini glucometer--dx 250.00 1 Kit 0    Lancets misc Check fsbs bid -250.02 200 Each 3    nitroglycerin (NITROSTAT) 0.4 mg SL tablet 1 Tab by SubLINGual route every five (5) minutes as needed for Chest Pain. 25 Tab 3    carvedilol (COREG) 6.25 mg tablet Take  by mouth two (2) times daily (with meals).  amLODIPine (NORVASC) 2.5 mg tablet Take  by mouth daily.  insulin glargine (LANTUS SOLOSTAR) 100 unit/mL (3 mL) pen Inject 16 units at bedtime--Dose change 10/14/16--updated med list--did not send prescription to the pharmacy (Patient taking differently: Inject 26 units at bedtime--Dose change reported by pt during med rec on 9/28/20) 15 mL 3    fenofibrate (LOFIBRA) 54 mg tablet Take  by mouth daily.  cyanocobalamin (VITAMIN B12) 500 mcg tablet Take 500 mcg by mouth daily.  ranolazine ER (RANEXA) 500 mg SR tablet Take 1 Tab by mouth two (2) times a day.  60 Tab 12    clopidogrel (PLAVIX) 75 mg tablet Take 1 Tab by mouth daily. 30 Tab 11    aspirin 81 mg Tab Take 81 mg by mouth daily.  MULTIVITAMINS W-MINERALS/LUT (CENTRUM SILVER PO) Take 1 Tab by mouth daily. Past History     Past Medical History:  Past Medical History:   Diagnosis Date    Arthritis     Breast cancer ([facility] 2002    s/p lumpectomy and XRT    Diabetes ([facility]     GERD (gastroesophageal reflux disease)     with hiatal hernia    Hypercholesterolemia     Hypertension     Neuropathy in diabetes ([facility]     DOMINGO (obstructive sleep apnea)     on CPAP       Past Surgical History:  Past Surgical History:   Procedure Laterality Date    BREAST SURGERY PROCEDURE UNLISTED  2001    right lumpectomy    CARDIAC SURG PROCEDURE UNLIST      cardiac cath; 3 stents placed    HX BREAST BIOPSY Left     years ago no scar seen    HX CARPAL TUNNEL RELEASE      b/l    HX HERNIA REPAIR      HX LUMBAR FUSION  July 2015    L4-L5    HX ORTHOPAEDIC      spinal fusion    VASCULAR SURGERY PROCEDURE UNLIST      right leg vein stripping       Family History:  Family History   Problem Relation Age of Onset    Diabetes Mother     Heart Disease Mother     Stroke Mother     Breast Cancer Mother 79    Diabetes Brother     Heart Disease Brother     Emphysema Father     Diabetes Daughter        Social History:  Social History     Tobacco Use    Smoking status: Never Smoker    Smokeless tobacco: Never Used   Substance Use Topics    Alcohol use: No     Alcohol/week: 0.0 standard drinks    Drug use: Yes     Types: Prescription, OTC       Allergies:   Allergies   Allergen Reactions    Codeine Other (comments)     Jerking sensation    Livalo [Pitavastatin] Diarrhea    Niaspan [Niacin] Myalgia    Statins-Hmg-Coa Reductase Inhibitors Myalgia     Zocor, pravachol, Lipitor, crestor    Welchol [Colesevelam] Other (comments)     Nausea, bloating and malaise    Zetia [Ezetimibe] Myalgia         Review of Systems   Review of Systems Constitutional: Negative for chills and fever. Respiratory: Negative for cough and shortness of breath. Cardiovascular: Negative for chest pain and leg swelling. Gastrointestinal: Positive for abdominal pain. Negative for nausea and vomiting. Genitourinary: Positive for flank pain. Negative for dysuria. Musculoskeletal: Negative for back pain and gait problem. Skin: Negative for color change and rash. Neurological: Negative for dizziness, weakness, light-headedness and headaches. All other systems reviewed and are negative. Physical Exam   Physical Exam  Vitals signs and nursing note reviewed. Constitutional:       General: She is not in acute distress. Appearance: Normal appearance. She is not ill-appearing or toxic-appearing. HENT:      Head: Normocephalic and atraumatic. Nose: Nose normal.      Mouth/Throat:      Mouth: Mucous membranes are moist.   Eyes:      Extraocular Movements: Extraocular movements intact. Pupils: Pupils are equal, round, and reactive to light. Neck:      Musculoskeletal: Normal range of motion and neck supple. Cardiovascular:      Rate and Rhythm: Normal rate and regular rhythm. Heart sounds: No murmur. Pulmonary:      Effort: Pulmonary effort is normal. No respiratory distress. Breath sounds: Normal breath sounds. No wheezing. Abdominal:      General: There is no distension. Palpations: Abdomen is soft. Tenderness: There is no abdominal tenderness. There is right CVA tenderness. There is no left CVA tenderness, guarding or rebound. Musculoskeletal: Normal range of motion. General: No swelling or tenderness. Right lower leg: No edema. Left lower leg: No edema. Comments: There is significant tenderness to palpation along the right lumbar paraspinal musculature to even light palpation. Skin:     General: Skin is warm and dry. Coloration: Skin is not pale. Findings: No erythema. Neurological:      General: No focal deficit present. Mental Status: She is alert and oriented to person, place, and time. Diagnostic Study Results     Labs -  Labs Reviewed   URINALYSIS W/ REFLEX CULTURE - Abnormal; Notable for the following components:       Result Value    Protein 100 (*)     Leukocyte Esterase TRACE (*)     All other components within normal limits   METABOLIC PANEL, COMPREHENSIVE - Abnormal; Notable for the following components:    Glucose 161 (*)     BUN 40 (*)     Creatinine 1.60 (*)     BUN/Creatinine ratio 25 (*)     GFR est AA 38 (*)     GFR est non-AA 31 (*)     AST (SGOT) 12 (*)     Globulin 4.1 (*)     A-G Ratio 0.9 (*)     All other components within normal limits   CBC WITH AUTOMATED DIFF   BILIRUBIN, CONFIRM       Radiologic Studies -   No orders to display     CT Results  (Last 48 hours)    None        CXR Results  (Last 48 hours)    None          Medical Decision Making   I am the first provider for this patient. I reviewed the vital signs, available nursing notes, past medical history, past surgical history, family history and social history. Vital Signs-Reviewed the patient's vital signs. Visit Vitals  /77   Pulse 84   Temp 98.6 °F (37 °C)   Resp 18   Wt 76.1 kg (167 lb 12.3 oz)   SpO2 98%   BMI 29.72 kg/m²     Records Reviewed: Nursing Notes, Old Medical Records, Previous Radiology Studies and Previous Laboratory Studies  I personally reviewed outside ED provider records from 9/22/2020. Provider Notes (Medical Decision Making):   76-year-old female here with right flank pain over the past 5 days, this is her second ER visit for similar symptoms. She has already been evaluated with CT imaging. She appears clinically well, sitting on side of bed and appears to be in no acute distress. She is afebrile and vital signs stable. Her abdomen is soft, benign, nontender non-peritoneal throughout all quadrants.   She does have significant tenderness to palpation throughout the right lumbar paraspinal muscle groups and right flank. Symptoms are significantly worsened when she moves around or with any sort of palpation making me feel that this is more musculoskeletal related. I reviewed her chart from [facility] ER 5 days ago which demonstrated normal work-up including CT imaging. Patient has minimally elevated creatinine level and patient is already going to follow-up with PCP regarding this. She has not been using any conventional methods to help with back pain including Tylenol, ibuprofen, lidocaine patches, stretching, heat. She has only tried hydrocodone which was prescribed to her which made her feel sick. I did offer repeat CT imaging however patient also feels this is musculoskeletal and does not want to have CT imaging again. Laboratory evaluation does not demonstrate any significant leukocytosis, anemia, electrolyte abnormality other than creatinine elevation at 1.6. Urinalysis is not consistent with hematuria or infection. I administered Tylenol, Toradol, lidocaine patch, Robaxin. And on reevaluation patient is feeling much improved. She feels comfortable plan for discharge home to follow-up closely with her PCP. She was given strict return ED precautions. All questions answered and she agrees with plan as above. ED Course:   Initial assessment performed. The patients presenting problems have been discussed, and they are in agreement with the care plan formulated and outlined with them. I have encouraged them to ask questions as they arise throughout their visit. Discharge Note:  The patient has been re-evaluated and is ready for discharge. Reviewed available results with patient. Counseled patient on diagnosis and care plan. Patient has expressed understanding, and all questions have been answered. Patient agrees with plan and agrees to follow up as recommended, or to return to the ED if their symptoms worsen. Discharge instructions have been provided and explained to the patient, along with reasons to return to the ED. Disposition:  Discharge home      DISCHARGE PLAN:  1. Discharge Medication List as of 9/27/2020  9:01 PM      START taking these medications    Details   methocarbamoL (Robaxin) 500 mg tablet Take 1 Tab by mouth four (4) times daily as needed for Muscle Spasm(s). , Print, Disp-20 Tab,R-0         CONTINUE these medications which have NOT CHANGED    Details   pantoprazole (PROTONIX) 40 mg tablet TAKE 1 TABLET BY MOUTH DAILY. IN PLACE OF PRILOSEC, Normal, Disp-30 Tab,R-2      famotidine (PEPCID) 20 mg tablet TAKE 1 TABLET BY MOUTH EVERY NIGHT, Normal, Disp-30 Tab,R-2      celecoxib (CELEBREX) 200 mg capsule TAKE 1 CAPSULE BY MOUTH ONCE DAILY, Normal, Disp-90 Cap, R-1      Janumet 50-1,000 mg per tablet TAKE 1 TABLET BY MOUTH TWICE A DAY WITH FOOD, Normal, Disp-180 Tab, R-3      pregabalin (LYRICA) 50 mg capsule TAKE 1 CAPSULE BY MOUTH THREE TIMES A DAY, Normal, Disp-90 Cap, R-5      montelukast (SINGULAIR) 10 mg tablet TAKE 1 TABLET BY MOUTH ONCE DAILY, Normal, Disp-90 Tab, R-3      amitriptyline (ELAVIL) 10 mg tablet Take 1 Tab by mouth nightly., Normal, Disp-90 Tab, R-3      losartan (COZAAR) 100 mg tablet take 1 tablet by mouth once daily, Normal, Disp-90 Tab, R-3      azelastine (ASTELIN) 137 mcg (0.1 %) nasal spray instill 2 sprays into each nostril twice a day, Historical Med, R-0      atorvastatin (LIPITOR) 40 mg tablet Take  by mouth daily. , Historical Med      diclofenac (VOLTAREN) 1 % gel Apply  to affected area four (4) times daily. , Normal, Disp-100 g, R-1      glucose blood VI test strips (ASCENSIA AUTODISC VI, ONE TOUCH ULTRA TEST VI) strip One touch ultra test strips min---check fsbs bid, Normal, Disp-200 Strip, R-3      fluticasone (FLONASE) 50 mcg/actuation nasal spray instill 2 sprays into each nostril daily, Normal, Disp-1 Bottle, R-8      Insulin Needles, Disposable, (PEN NEEDLE) 31 gauge x 3/16\" ndle Use as directed once daily, Print, Disp-100 Pen Needle, R-3      Blood-Glucose Meter monitoring kit One touch ultra mini glucometer--dx 250.00, Print, Disp-1 Kit, R-0      Lancets misc Check fsbs bid -250.02, Print, Disp-200 Each, R-3      nitroglycerin (NITROSTAT) 0.4 mg SL tablet 1 Tab by SubLINGual route every five (5) minutes as needed for Chest Pain., Normal, Disp-25 Tab, R-3      carvedilol (COREG) 6.25 mg tablet Take  by mouth two (2) times daily (with meals). , Historical Med      amLODIPine (NORVASC) 2.5 mg tablet Take  by mouth daily. , Historical Med      insulin glargine (LANTUS SOLOSTAR) 100 unit/mL (3 mL) pen Inject 16 units at bedtime--Dose change 10/14/16--updated med list--did not send prescription to the pharmacy, No Print, Disp-15 mL, R-3      fenofibrate (LOFIBRA) 54 mg tablet Take  by mouth daily. , Historical Med      cyanocobalamin (VITAMIN B12) 500 mcg tablet Take 500 mcg by mouth daily. , Historical Med      ranolazine ER (RANEXA) 500 mg SR tablet Take 1 Tab by mouth two (2) times a day., Print, Disp-60 Tab, R-12      clopidogrel (PLAVIX) 75 mg tablet Take 1 Tab by mouth daily. , Print, Disp-30 Tab, R-11      aspirin 81 mg Tab Take 81 mg by mouth daily. , Historical Med      MULTIVITAMINS W-MINERALS/LUT (CENTRUM SILVER PO) Take 1 Tab by mouth daily. , Historical Med           2. Follow-up Information     Follow up With Specialties Details Why Contact Info    Jason Gipson MD Internal Medicine Schedule an appointment as soon as possible for a visit   [address] [place] [address]  [phone]          3. Return to ED if worse     Diagnosis     Clinical Impression:   1. Right flank pain        Attestations:    Karina Espinosa MD    Please note that this dictation was completed with [employer], the [employer] voice recognition software.   Quite often unanticipated grammatical, syntax, homophones, and other interpretive errors are inadvertently transcribed by the computer software. Please disregard these errors. Please excuse any errors that have escaped final proofreading. Thank you.

## 2020-09-28 ENCOUNTER — TELEPHONE (OUTPATIENT)
Dept: INTERNAL MEDICINE CLINIC | Age: 80
End: 2020-09-28

## 2020-09-28 ENCOUNTER — PATIENT OUTREACH (OUTPATIENT)
Dept: CASE MANAGEMENT | Age: 80
End: 2020-09-28

## 2020-09-28 DIAGNOSIS — Z71.89 ACP (ADVANCE CARE PLANNING): Primary | ICD-10-CM

## 2020-09-28 NOTE — PROGRESS NOTES
Patient contacted regarding recent discharge and COVID-19 risk. [facility] ED 20 dx-Rt flank pain. Discussed COVID-19 related testing which was not done at this time. Test results were not done. Patient informed of results, if available? N/a    Pt stated she is feeling better after taking the prescription. Assisted pt will f/u appt with Dr Racquel Cervantes. A message was sent to Dr Reid Anderson office by [facility] requesting an appt. Pt agreed to CCM. Pt agreed to ACP specialist referral.      Care Transition Nurse/ Ambulatory Care Manager/ LPN Care Coordinator contacted the patient by telephone to perform post discharge assessment. Verified name and  with patient as identifiers. Patient has following risk factors of: diabetes and older adult, NSTEMI, CAD. CTN/ACM/LPN reviewed discharge instructions, medical action plan and red flags related to discharge diagnosis. Reviewed and educated them on any new and changed medications related to discharge diagnosis. Advised obtaining a 90-day supply of all daily and as-needed medications. Advance Care Planning:   Does patient have an Advance Directive: yes; reviewed and needs to be updated and pt stated she has AMD document, but would like a referral to get document updated or at least scanned to her chart. Education provided regarding infection prevention, and signs and symptoms of COVID-19 and when to seek medical attention with patient who verbalized understanding. Discussed exposure protocols and quarantine from [address] you at higher risk for severe illness  and given an opportunity for questions and concerns. The patient agrees to contact the COVID-19 hotline [phone] or PCP office for questions related to their healthcare. CTN/ACM/LPN provided contact information for future reference. From CDC: Are you at higher risk for severe illness?  Wash your hands often.    Avoid close contact (6 feet, which is about two arm lengths) with people who are sick.  Jonathon Aguirre Put distance between yourself and other people if COVID-19 is spreading in your community.  Clean and disinfect frequently touched surfaces.  Avoid all cruise travel and non-essential air travel.  Call your healthcare professional if you have concerns about COVID-19 and your underlying condition or if you are sick. For more information on steps you can take to protect yourself, see CDC's How to Protect Yourself      Patient/family/caregiver given information for [employer] and agrees to enroll no      Plan for follow-up call in 7-14 days based on severity of symptoms and risk factors.

## 2020-09-28 NOTE — ACP (ADVANCE CARE PLANNING)
Advance Care Planning:   Does patient have an Advance Directive: yes; reviewed and needs to be updated and pt stated she has AMD document, but would like a referral to get document updated or at least scanned to her chart.       Primary Decision MakerMillicent Henna - Daughter - [phone]

## 2020-09-28 NOTE — DISCHARGE INSTRUCTIONS
You were evaluated in the emergency department for right flank pain. Your examination was reassuring as was your work-up including blood work, urinalysis, and CT scan from 5 days ago. It will be important for you to follow-up with your primary care physician in 2-3 days. If you develop worsening symptoms such as fevers, vomiting, or worsening pain, please return to the emergency department immediately. You can use Tylenol 1000 mg every 6 hours as needed for pain, please do not exceed 4000 mg in a single day. You can use lidocaine patches (over-the-counter) in the affected area every 12 hours as needed for pain. You can also use Robaxin and hydrocodone as needed. Please use heat pads and stretching to help alleviate the pain.

## 2020-09-28 NOTE — TELEPHONE ENCOUNTER
Florencio Aldana. [facility]               Caller's first and last name: pt   Reason for call: ZULEYKA appt   Callback required yes/no and why: Yes, scheduling   Best contact number(s): [phone]   Details to clarify the request: No zuleyka appts were available within 1 week

## 2020-09-29 ENCOUNTER — TELEPHONE (OUTPATIENT)
Dept: INTERNAL MEDICINE CLINIC | Age: 80
End: 2020-09-29

## 2020-09-29 NOTE — TELEPHONE ENCOUNTER
Pt addressed. Pt will f/u in 2-4 weeks if not better. Pt verbalized understanding of information discussed w/ no further questions at this time.

## 2020-09-29 NOTE — TELEPHONE ENCOUNTER
Jama Bianchi MD  You 20 hours ago (1:38 PM)      Not needed, already had COLLINS in ER negative. Take the pain med prn. Can see me if not better in 2-4 weeks    Message text      Notified pt via voicemail.

## 2020-10-08 ENCOUNTER — TELEPHONE (OUTPATIENT)
Dept: INTERNAL MEDICINE CLINIC | Age: 80
End: 2020-10-08

## 2020-10-08 NOTE — TELEPHONE ENCOUNTER
#[phone] pt is still having right side pain. This has been for 3 wks and 2 trips to the ED. Her daughter is starting to wonder if this could be gall bladder issue? Please call pt to go over symptoms and advise as to where to go from here. 10-9-20     Daughter, Jaz Pierce states it has been over 24 hours for a return call to patient. Pt needs to speak with the nurse about her right side pain and perhaps what it is.

## 2020-10-09 ENCOUNTER — DOCUMENTATION ONLY (OUTPATIENT)
Dept: INTERNAL MEDICINE CLINIC | Age: 80
End: 2020-10-09

## 2020-10-09 DIAGNOSIS — R10.9 FLANK PAIN: ICD-10-CM

## 2020-10-09 DIAGNOSIS — R10.9 RIGHT SIDED ABDOMINAL PAIN: Primary | ICD-10-CM

## 2020-10-09 DIAGNOSIS — N17.9 AKI (ACUTE KIDNEY INJURY) (HCC): ICD-10-CM

## 2020-10-09 DIAGNOSIS — R10.9 ABDOMINAL PAIN, UNSPECIFIED ABDOMINAL LOCATION: ICD-10-CM

## 2020-10-09 NOTE — PROGRESS NOTES
Pt called c/o farily severe and almost constant pain in right flank and right abdomen area x 3 weeks Not always worse after eating, no n/v. Had CT in [facility] ER -negative per pt. Had bun/cr elevation on ER labs at [facility]. Will order US and CT scan.  To ER if feeling worse-discussed

## 2020-10-09 NOTE — TELEPHONE ENCOUNTER
MD Tayla Montelongo, LPN    Caller: Unspecified (Yesterday, 11:46 AM)               I called the pt      Noted

## 2020-10-10 DIAGNOSIS — R10.9 FLANK PAIN: ICD-10-CM

## 2020-10-10 DIAGNOSIS — N17.9 AKI (ACUTE KIDNEY INJURY) (HCC): ICD-10-CM

## 2020-10-10 DIAGNOSIS — R10.9 RIGHT SIDED ABDOMINAL PAIN: ICD-10-CM

## 2020-10-10 DIAGNOSIS — R10.9 ABDOMINAL PAIN, UNSPECIFIED ABDOMINAL LOCATION: ICD-10-CM

## 2020-10-12 ENCOUNTER — PATIENT OUTREACH (OUTPATIENT)
Dept: CASE MANAGEMENT | Age: 80
End: 2020-10-12

## 2020-10-12 NOTE — PROGRESS NOTES
Ambulatory Care Management Note    Date/Time:  10/12/2020 3:49 PM    This Ambulatory Care Manager (ACM) reviewed and updated the following screenings during this call; general assessment, disease specific assessment, self management assessment, ACP assessment and note, medication reconciliation. Patient's challenges to self management identified:   medical condition, PCP relationship and polypharmacy      Medication Management:  good adherence and good understanding    Advance Care Planning:   Does patient have an Advance Directive:  pt states she has an AMD, but would like to complete a new one; an ACP referral was placed on 9/28/20 and pt stated she has not received a phone call; pt stated she just needs a new document mailed to her    [employer], Referrals, and Durable Medical Equipment: n/a      Health Maintenance Due   Topic Date Due    DTaP/Tdap/Td series (1 - Tdap) 12/23/1962    Shingrix Vaccine Age 50> (1 of 2) 12/23/1991    Foot Exam Q1  04/11/2020    Medicare Yearly Exam  04/12/2020    Flu Vaccine (1) 09/01/2020     Health Maintenance reviewed - n/a. Patient was asked to consider health care goals that they would like to focus on with this ACM. ACM will follow up with patient to discuss goals and establish care plan in the next 7-14 days. PCP/Specialist follow up:   Future Appointments   Date Time Provider Santiago Perez   10/13/2020  5:00 PM [address]   10/13/2020  5:30 PM [facility] CT 2 [facility]   10/13/2020  6:00 PM [address] 2 [facility] REG   12/15/2020  2:15 PM Flynn Landers MD [facility] BS AMB      Pt stated yesterday and today she is finally feeling better as far as the right side pain goes. Eating bland diet has helped. Pt has U/S and CT of abd and pelvis scheduled for tomorrow late afternoon. Discussed pt's prep. She is to be fastng 6-8 hours prior to test and first test is at 5:00.  Pt will eat breakfast by 8AM and take her meds, then not eat again until late evening. Pt stated her fasting blood sugar is usually <150. Pt stated she would like to have a new AMD document mailed to her, which was done. Pt stated her goal is to be active again. She stays busy, works in the yard and cooks, and for the past three months or so has not been able to do this. Pt stated she would like to call this ACM if/when need be instead of this ACM reaching out to her on a regular basis. Pt has contact number [phone] if/when she needs assistance. Will follow pt after diagnostic tests tomorrow.

## 2020-10-13 ENCOUNTER — HOSPITAL ENCOUNTER (OUTPATIENT)
Dept: ULTRASOUND IMAGING | Age: 80
Discharge: HOME | End: 2020-10-13
Attending: INTERNAL MEDICINE
Payer: MEDICARE

## 2020-10-13 ENCOUNTER — HOSPITAL ENCOUNTER (OUTPATIENT)
Dept: CT IMAGING | Age: 80
End: 2020-10-13
Attending: INTERNAL MEDICINE
Payer: MEDICARE

## 2020-10-13 ENCOUNTER — HOSPITAL ENCOUNTER (OUTPATIENT)
Dept: CT IMAGING | Age: 80
Discharge: HOME | End: 2020-10-13
Attending: INTERNAL MEDICINE
Payer: MEDICARE

## 2020-10-13 DIAGNOSIS — R10.9 FLANK PAIN: ICD-10-CM

## 2020-10-13 DIAGNOSIS — K80.20 CALCULUS OF GALLBLADDER WITHOUT CHOLECYSTITIS WITHOUT OBSTRUCTION: Primary | ICD-10-CM

## 2020-10-13 DIAGNOSIS — N17.9 AKI (ACUTE KIDNEY INJURY) (HCC): ICD-10-CM

## 2020-10-13 DIAGNOSIS — R10.9 RIGHT SIDED ABDOMINAL PAIN: ICD-10-CM

## 2020-10-13 DIAGNOSIS — K80.50 BILIARY COLIC: ICD-10-CM

## 2020-10-13 DIAGNOSIS — R10.9 ABDOMINAL PAIN, UNSPECIFIED ABDOMINAL LOCATION: ICD-10-CM

## 2020-10-13 PROCEDURE — 74176 CT ABD & PELVIS W/O CONTRAST: CPT

## 2020-10-13 PROCEDURE — 76700 US EXAM ABDOM COMPLETE: CPT

## 2020-10-14 ENCOUNTER — PATIENT OUTREACH (OUTPATIENT)
Dept: CASE MANAGEMENT | Age: 80
End: 2020-10-14

## 2020-10-14 ENCOUNTER — TELEPHONE (OUTPATIENT)
Dept: INTERNAL MEDICINE CLINIC | Age: 80
End: 2020-10-14

## 2020-10-14 NOTE — TELEPHONE ENCOUNTER
Message was given to Gwendolyn Platt per Dr. Chiki Gutierrez to make an appt. ASAP with Gen. Surgeon for gallstones. Mrs. Husam Houston confirmed message verbally.

## 2020-10-14 NOTE — PROGRESS NOTES
Please schedule pt with Gen Sx Dr Marianela Skelton or associated for symptomatic gallstones asap, thx

## 2020-10-14 NOTE — PROGRESS NOTES
Discussed results--refer to gen Surgery at [facility] for gallstones Dr Jesica Pettit or associate

## 2020-10-16 ENCOUNTER — HOSPITAL ENCOUNTER (OUTPATIENT)
Dept: PREADMISSION TESTING | Age: 80
Discharge: HOME | End: 2020-10-16
Payer: MEDICARE

## 2020-10-16 ENCOUNTER — OFFICE VISIT (OUTPATIENT)
Dept: SURGERY | Age: 80
End: 2020-10-16
Payer: MEDICARE

## 2020-10-16 VITALS
SYSTOLIC BLOOD PRESSURE: 131 MMHG | DIASTOLIC BLOOD PRESSURE: 70 MMHG | HEIGHT: 63 IN | HEART RATE: 85 BPM | TEMPERATURE: 96.6 F | BODY MASS INDEX: 29.75 KG/M2 | WEIGHT: 167.9 LBS | OXYGEN SATURATION: 98 %

## 2020-10-16 DIAGNOSIS — K81.9 CHOLECYSTITIS: Primary | ICD-10-CM

## 2020-10-16 PROCEDURE — 99204 OFFICE O/P NEW MOD 45 MIN: CPT | Performed by: SURGERY

## 2020-10-16 PROCEDURE — G8510 SCR DEP NEG, NO PLAN REQD: HCPCS | Performed by: SURGERY

## 2020-10-16 PROCEDURE — G0444 DEPRESSION SCREEN ANNUAL: HCPCS | Performed by: SURGERY

## 2020-10-16 PROCEDURE — G8399 PT W/DXA RESULTS DOCUMENT: HCPCS | Performed by: SURGERY

## 2020-10-16 PROCEDURE — G8752 SYS BP LESS 140: HCPCS | Performed by: SURGERY

## 2020-10-16 PROCEDURE — G8427 DOCREV CUR MEDS BY ELIG CLIN: HCPCS | Performed by: SURGERY

## 2020-10-16 PROCEDURE — 1090F PRES/ABSN URINE INCON ASSESS: CPT | Performed by: SURGERY

## 2020-10-16 PROCEDURE — G8754 DIAS BP LESS 90: HCPCS | Performed by: SURGERY

## 2020-10-16 PROCEDURE — 87635 SARS-COV-2 COVID-19 AMP PRB: CPT

## 2020-10-16 PROCEDURE — G8417 CALC BMI ABV UP PARAM F/U: HCPCS | Performed by: SURGERY

## 2020-10-16 PROCEDURE — G8536 NO DOC ELDER MAL SCRN: HCPCS | Performed by: SURGERY

## 2020-10-16 PROCEDURE — 1101F PT FALLS ASSESS-DOCD LE1/YR: CPT | Performed by: SURGERY

## 2020-10-16 NOTE — Clinical Note
10/22/20 Patient: Joe Wahl YOB: 1940 Date of Visit: 10/16/2020 Denny Munroe MD 
[address] [facility] [address] [address] VIA In Basket Dear Denny Munroe MD, Thank you for referring Ms. Ilia Jewell to [address] [place], [place] [address]  for evaluation. My notes for this consultation are attached. If you have questions, please do not hesitate to call me. I look forward to following your patient along with you.  
 
 
Sincerely, 
 
Susanna Bach MD

## 2020-10-16 NOTE — PROGRESS NOTES
Surgery Instruction Sheet    You have been scheduled for surgery on Wednesday 10/21/20 at 1:00 pm at [facility]. Please report to the Surgery Center at 10:30 am, this is approximately 2 hours prior to your surgery time. The Surgery Center is located on the [facility] [address] side of the hospital, just next to the Emergency Room. Reserved parking is available and  parking if lot is full. You will not need to have a pre-op visit before surgery, but the Pre-op Nurse will call you before surgery. The Pre-op nurse will review your medical history, medications and give you additional instructions. They will also confirm your arrival time. Call your physician immediately if you notice a change in your health between the time you saw your physician and the day of surgery. If you take a blood thinner, please let us know. Call your ordering Doctor to make sure you can stop taking it prior to your surgery. Continue aspirin, take last dose plavix 10/16/20  DO NOT TAKE  IBUPROFEN, ADVIL, MOTRIN, ALEVE, EXCEDRIN, BC POWDER, GOODIES, FISH OIL OR ANY MEDICATION CONTAINING ASPIRIN 5 DAYS PRIOR TO YOUR SURGERY. MAY TAKE TYLENOL. Eat a light dinner the evening before your surgery. DO NOT EAT OR DRINK ANYTHING AFTER MIDNIGHT THE NIGHT BEFORE YOUR SURGERY. This includes water, chewing gum, lifesavers, etc.  The Pre op nurse will check with you about any medication that you may need to take the morning of surgery. Shower with a new bar of anti-bacterial soap (Dial, Safeguard) or solution given to you by Pre-op, the night before surgery. Do not use lotion, powder, deodorant on the skin after showering.   Wear loose, comfortable clothing the day of surgery and bring a container to store your contacts, eyeglasses, dentures, hearing aid, etc.  Do not bring money, valuables, jewelry, etc. to the hospital.      If you are having outpatient surgery, someone must come with you the morning of surgery to drive you home. You can not drive for 24 hours after any anesthesia. Sometimes it is necessary to stay overnight and leave the next morning. This is still considered outpatient for most insurance deductibles. Someone will still need to drive you home. If you have questions or concerns, please feel free to call Dr Teresa Murphy at [phone]. If you need to cancel your surgery, please call as soon as possible.

## 2020-10-16 NOTE — PERIOP NOTES
LM for patient requesting call back and with covid testing instructions. Patient did testing today, however needs to be done tomorrow, 4 days prior to procedure.

## 2020-10-16 NOTE — PROGRESS NOTES
Chief Complaint   Patient presents with    Abdominal Pain     seen at the request of Dr. Yelena bowles gallbladder     Discussed advanced directive. Patient states that she does not have an advanced directive.

## 2020-10-17 ENCOUNTER — HOSPITAL ENCOUNTER (OUTPATIENT)
Dept: PREADMISSION TESTING | Age: 80
Discharge: HOME | End: 2020-10-17

## 2020-10-17 DIAGNOSIS — K21.9 GASTROESOPHAGEAL REFLUX DISEASE WITHOUT ESOPHAGITIS: ICD-10-CM

## 2020-10-17 LAB
HEALTH STATUS, XMCV2T: NORMAL
SARS-COV-2, COV2NT: NOT DETECTED
SOURCE, COVRS: NORMAL
SPECIMEN SOURCE, FCOV2M: NORMAL
SPECIMEN TYPE, XMCV1T: NORMAL

## 2020-10-17 RX ORDER — FAMOTIDINE 20 MG/1
TABLET, FILM COATED ORAL
Qty: 90 TAB | Refills: 3 | Status: SHIPPED | OUTPATIENT
Start: 2020-10-17 | End: 2021-11-01

## 2020-10-17 RX ORDER — PANTOPRAZOLE SODIUM 40 MG/1
TABLET, DELAYED RELEASE ORAL
Qty: 90 TAB | Refills: 3 | Status: SHIPPED | OUTPATIENT
Start: 2020-10-17 | End: 2021-10-12

## 2020-10-19 NOTE — PERIOP NOTES
[facility]  Preoperative Instructions        Surgery Date 10/21/20          Time of Arrival 1030    1. On the day of your surgery, please report to the Surgical Services Registration Desk and sign in at your designated time. The Surgery Center is located to the right of the Emergency Room. 2. You must have someone with you to drive you home. You should not drive a car for 24 hours following surgery. Please make arrangements for a friend or family member to stay with you for the first 24 hours after your surgery. 3. Do not have anything to eat or drink (including water, gum, mints, coffee, juice) after midnight  10/20/20?? Krista Staggers ? This may not apply to medications prescribed by your physician. ?(Please note below the special instructions with medications to take the morning of your procedure.)    4. We recommend you do not drink any alcoholic beverages for 24 hours before and after your surgery. 5. Contact your surgeons office for instructions on the following medications: non-steroidal anti-inflammatory drugs (i.e. Advil, Aleve), vitamins, and supplements. (Some surgeons will want you to stop these medications prior to surgery and others may allow you to take them)  **If you are currently taking Plavix, Coumadin, Aspirin and/or other blood-thinning agents, contact your surgeon for instructions. ** Your surgeon will partner with the physician prescribing these medications to determine if it is safe to stop or if you need to continue taking. Please do not stop taking these medications without instructions from your surgeon    6. Wear comfortable clothes. Wear glasses instead of contacts. Do not bring any money or jewelry. Please bring picture ID, insurance card, and any prearranged co-payment or hospital payment. Do not wear make-up, particularly mascara the morning of your surgery. Do not wear nail polish, particularly if you are having foot /hand surgery.   Wear your hair loose or down, no ponytails, buns, marko pins or clips. All body piercings must be removed. Please shower with antibacterial soap for three consecutive days before and on the morning of surgery, but do not apply any lotions, powders or deodorants after the shower on the day of surgery. Please use a fresh towels after each shower. Please sleep in clean clothes and change bed linens the night before surgery. Please do not shave for 48 hours prior to surgery. Shaving of the face is acceptable. 7. You should understand that if you do not follow these instructions your surgery may be cancelled. If your physical condition changes (I.e. fever, cold or flu) please contact your surgeon as soon as possible. 8. It is important that you be on time. If a situation occurs where you may be late, please call [phone] (OR Holding Area). 9. If you have any questions and or problems, please call [phone] (Pre-admission Testing). 10. Your surgery time may be subject to change. You will receive a phone call the evening prior if your time changes. 11.  If having outpatient surgery, you must have someone to drive you here, stay with you during the duration of your stay, and to drive you home at time of discharge. Special Instructions: last dose of plavix on 10/16 per MD, do not stop aspirin per MD    TAKE ALL MEDICATIONS DAY OF SURGERY EXCEPT:  Aspirin, celebrex, plavix, vitamins/supplements, janumet, losartan      I understand a pre-operative phone call will be made to verify my surgery time. In the event that I am not available, I give permission for a message to be left on my answering service and/or with another person?   Yes [phone]         ___________________      __________   _________    (Signature of Patient)             (Witness)                (Date and Time)

## 2020-10-21 ENCOUNTER — ANESTHESIA (OUTPATIENT)
Dept: SURGERY | Age: 80
End: 2020-10-21
Payer: MEDICARE

## 2020-10-21 ENCOUNTER — APPOINTMENT (OUTPATIENT)
Dept: GENERAL RADIOLOGY | Age: 80
End: 2020-10-21
Attending: SURGERY
Payer: MEDICARE

## 2020-10-21 ENCOUNTER — ANESTHESIA EVENT (OUTPATIENT)
Dept: SURGERY | Age: 80
End: 2020-10-21
Payer: MEDICARE

## 2020-10-21 ENCOUNTER — HOSPITAL ENCOUNTER (OUTPATIENT)
Age: 80
Setting detail: OUTPATIENT SURGERY
Discharge: HOME | End: 2020-10-21
Attending: SURGERY | Admitting: SURGERY
Payer: MEDICARE

## 2020-10-21 VITALS
OXYGEN SATURATION: 98 % | RESPIRATION RATE: 13 BRPM | HEIGHT: 63 IN | BODY MASS INDEX: 28.87 KG/M2 | SYSTOLIC BLOOD PRESSURE: 148 MMHG | WEIGHT: 162.92 LBS | TEMPERATURE: 98.4 F | HEART RATE: 86 BPM | DIASTOLIC BLOOD PRESSURE: 77 MMHG

## 2020-10-21 DIAGNOSIS — K81.9 CHOLECYSTITIS: ICD-10-CM

## 2020-10-21 DIAGNOSIS — K80.20 GALLSTONES: Primary | ICD-10-CM

## 2020-10-21 LAB
GLUCOSE BLD STRIP.AUTO-MCNC: 106 MG/DL (ref 65–100)
GLUCOSE BLD STRIP.AUTO-MCNC: 93 MG/DL (ref 65–100)
SERVICE CMNT-IMP: ABNORMAL
SERVICE CMNT-IMP: NORMAL

## 2020-10-21 PROCEDURE — 74011250636 HC RX REV CODE- 250/636: Performed by: SURGERY

## 2020-10-21 PROCEDURE — 77030040361 HC SLV COMPR DVT MDII -B: Performed by: SURGERY

## 2020-10-21 PROCEDURE — 47563 LAPARO CHOLECYSTECTOMY/GRAPH: CPT | Performed by: SURGERY

## 2020-10-21 PROCEDURE — 74011000250 HC RX REV CODE- 250: Performed by: NURSE ANESTHETIST, CERTIFIED REGISTERED

## 2020-10-21 PROCEDURE — 74011250636 HC RX REV CODE- 250/636: Performed by: ANESTHESIOLOGY

## 2020-10-21 PROCEDURE — 77030021678 HC GLIDESCP STAT DISP VERT -B: Performed by: ANESTHESIOLOGY

## 2020-10-21 PROCEDURE — 77030002933 HC SUT MCRYL J&J -A: Performed by: SURGERY

## 2020-10-21 PROCEDURE — 77030020829: Performed by: SURGERY

## 2020-10-21 PROCEDURE — 77030020256 HC SOL INJ NACL 0.9%  500ML: Performed by: SURGERY

## 2020-10-21 PROCEDURE — 77030008606 HC TRCR ENDOSC KII AMR -B: Performed by: SURGERY

## 2020-10-21 PROCEDURE — 88304 TISSUE EXAM BY PATHOLOGIST: CPT

## 2020-10-21 PROCEDURE — 2709999900 HC NON-CHARGEABLE SUPPLY

## 2020-10-21 PROCEDURE — 77030018836 HC SOL IRR NACL ICUM -A: Performed by: SURGERY

## 2020-10-21 PROCEDURE — 77030008756 HC TU IRR SUC STRY -B: Performed by: SURGERY

## 2020-10-21 PROCEDURE — 77030031139 HC SUT VCRL2 J&J -A: Performed by: SURGERY

## 2020-10-21 PROCEDURE — 77030009851 HC PCH RTVR ENDOSC AMR -B: Performed by: SURGERY

## 2020-10-21 PROCEDURE — 74011250636 HC RX REV CODE- 250/636: Performed by: NURSE ANESTHETIST, CERTIFIED REGISTERED

## 2020-10-21 PROCEDURE — 82962 GLUCOSE BLOOD TEST: CPT

## 2020-10-21 PROCEDURE — 74300 X-RAY BILE DUCTS/PANCREAS: CPT

## 2020-10-21 PROCEDURE — 77030018875 HC APPL CLP LIG4 J&J -B: Performed by: SURGERY

## 2020-10-21 PROCEDURE — 76210000020 HC REC RM PH II FIRST 0.5 HR: Performed by: SURGERY

## 2020-10-21 PROCEDURE — 74011000250 HC RX REV CODE- 250: Performed by: SURGERY

## 2020-10-21 PROCEDURE — 77030012770 HC TRCR OPT FX AMR -B: Performed by: SURGERY

## 2020-10-21 PROCEDURE — 77030010507 HC ADH SKN DERMBND J&J -B: Performed by: SURGERY

## 2020-10-21 PROCEDURE — 2709999900 HC NON-CHARGEABLE SUPPLY: Performed by: SURGERY

## 2020-10-21 PROCEDURE — 74300 X-RAY BILE DUCTS/PANCREAS: CPT | Performed by: SURGERY

## 2020-10-21 PROCEDURE — 77030013079 HC BLNKT BAIR HGGR 3M -A: Performed by: ANESTHESIOLOGY

## 2020-10-21 PROCEDURE — 77030002916 HC SUT ETHLN J&J -A: Performed by: SURGERY

## 2020-10-21 PROCEDURE — 74011000636 HC RX REV CODE- 636: Performed by: SURGERY

## 2020-10-21 PROCEDURE — 77030008684 HC TU ET CUF COVD -B: Performed by: ANESTHESIOLOGY

## 2020-10-21 PROCEDURE — 76210000016 HC OR PH I REC 1 TO 1.5 HR: Performed by: SURGERY

## 2020-10-21 PROCEDURE — 77030037032 HC INSRT SCIS CLICKLLINE DISP STOR -B: Performed by: SURGERY

## 2020-10-21 PROCEDURE — 77030010837 HC CATH CHOL INTRO TELE -B: Performed by: SURGERY

## 2020-10-21 PROCEDURE — 77030026438 HC STYL ET INTUB CARD -A: Performed by: ANESTHESIOLOGY

## 2020-10-21 PROCEDURE — 76010000149 HC OR TIME 1 TO 1.5 HR: Performed by: SURGERY

## 2020-10-21 PROCEDURE — 77030020053 HC ELECTRD LAPSCP COVD -B: Performed by: SURGERY

## 2020-10-21 PROCEDURE — 77030008771 HC TU NG SALEM SUMP -A: Performed by: ANESTHESIOLOGY

## 2020-10-21 PROCEDURE — 76060000033 HC ANESTHESIA 1 TO 1.5 HR: Performed by: SURGERY

## 2020-10-21 PROCEDURE — 74011250637 HC RX REV CODE- 250/637: Performed by: SURGERY

## 2020-10-21 RX ORDER — ONDANSETRON 2 MG/ML
INJECTION INTRAMUSCULAR; INTRAVENOUS AS NEEDED
Status: DISCONTINUED | OUTPATIENT
Start: 2020-10-21 | End: 2020-10-21 | Stop reason: HOSPADM

## 2020-10-21 RX ORDER — SODIUM CHLORIDE, SODIUM LACTATE, POTASSIUM CHLORIDE, CALCIUM CHLORIDE 600; 310; 30; 20 MG/100ML; MG/100ML; MG/100ML; MG/100ML
25 INJECTION, SOLUTION INTRAVENOUS CONTINUOUS
Status: DISCONTINUED | OUTPATIENT
Start: 2020-10-21 | End: 2020-10-21 | Stop reason: HOSPADM

## 2020-10-21 RX ORDER — ONDANSETRON 2 MG/ML
4 INJECTION INTRAMUSCULAR; INTRAVENOUS AS NEEDED
Status: DISCONTINUED | OUTPATIENT
Start: 2020-10-21 | End: 2020-10-21 | Stop reason: HOSPADM

## 2020-10-21 RX ORDER — GLYCOPYRROLATE 0.2 MG/ML
INJECTION INTRAMUSCULAR; INTRAVENOUS AS NEEDED
Status: DISCONTINUED | OUTPATIENT
Start: 2020-10-21 | End: 2020-10-21 | Stop reason: HOSPADM

## 2020-10-21 RX ORDER — LIDOCAINE HYDROCHLORIDE 10 MG/ML
0.1 INJECTION, SOLUTION EPIDURAL; INFILTRATION; INTRACAUDAL; PERINEURAL AS NEEDED
Status: DISCONTINUED | OUTPATIENT
Start: 2020-10-21 | End: 2020-10-21 | Stop reason: HOSPADM

## 2020-10-21 RX ORDER — SUCCINYLCHOLINE CHLORIDE 20 MG/ML
INJECTION INTRAMUSCULAR; INTRAVENOUS AS NEEDED
Status: DISCONTINUED | OUTPATIENT
Start: 2020-10-21 | End: 2020-10-21 | Stop reason: HOSPADM

## 2020-10-21 RX ORDER — MIDAZOLAM HYDROCHLORIDE 1 MG/ML
1 INJECTION, SOLUTION INTRAMUSCULAR; INTRAVENOUS AS NEEDED
Status: DISCONTINUED | OUTPATIENT
Start: 2020-10-21 | End: 2020-10-21 | Stop reason: HOSPADM

## 2020-10-21 RX ORDER — ROCURONIUM BROMIDE 10 MG/ML
INJECTION, SOLUTION INTRAVENOUS AS NEEDED
Status: DISCONTINUED | OUTPATIENT
Start: 2020-10-21 | End: 2020-10-21 | Stop reason: HOSPADM

## 2020-10-21 RX ORDER — HYDROCODONE BITARTRATE AND ACETAMINOPHEN 5; 325 MG/1; MG/1
1-2 TABLET ORAL
Qty: 30 TAB | Refills: 0 | Status: SHIPPED | OUTPATIENT
Start: 2020-10-21 | End: 2020-10-26

## 2020-10-21 RX ORDER — DEXAMETHASONE SODIUM PHOSPHATE 4 MG/ML
INJECTION, SOLUTION INTRA-ARTICULAR; INTRALESIONAL; INTRAMUSCULAR; INTRAVENOUS; SOFT TISSUE AS NEEDED
Status: DISCONTINUED | OUTPATIENT
Start: 2020-10-21 | End: 2020-10-21 | Stop reason: HOSPADM

## 2020-10-21 RX ORDER — ONDANSETRON 4 MG/1
4 TABLET, ORALLY DISINTEGRATING ORAL
Qty: 25 TAB | Refills: 0 | Status: SHIPPED | OUTPATIENT
Start: 2020-10-21

## 2020-10-21 RX ORDER — PROPOFOL 10 MG/ML
INJECTION, EMULSION INTRAVENOUS AS NEEDED
Status: DISCONTINUED | OUTPATIENT
Start: 2020-10-21 | End: 2020-10-21 | Stop reason: HOSPADM

## 2020-10-21 RX ORDER — NEOSTIGMINE METHYLSULFATE 1 MG/ML
INJECTION, SOLUTION INTRAVENOUS AS NEEDED
Status: DISCONTINUED | OUTPATIENT
Start: 2020-10-21 | End: 2020-10-21 | Stop reason: HOSPADM

## 2020-10-21 RX ORDER — FENTANYL CITRATE 50 UG/ML
25 INJECTION, SOLUTION INTRAMUSCULAR; INTRAVENOUS
Status: DISCONTINUED | OUTPATIENT
Start: 2020-10-21 | End: 2020-10-21 | Stop reason: HOSPADM

## 2020-10-21 RX ORDER — FENTANYL CITRATE 50 UG/ML
50 INJECTION, SOLUTION INTRAMUSCULAR; INTRAVENOUS AS NEEDED
Status: DISCONTINUED | OUTPATIENT
Start: 2020-10-21 | End: 2020-10-21 | Stop reason: HOSPADM

## 2020-10-21 RX ORDER — MORPHINE SULFATE 10 MG/ML
2 INJECTION, SOLUTION INTRAMUSCULAR; INTRAVENOUS
Status: DISCONTINUED | OUTPATIENT
Start: 2020-10-21 | End: 2020-10-21 | Stop reason: HOSPADM

## 2020-10-21 RX ORDER — BUPIVACAINE HYDROCHLORIDE AND EPINEPHRINE 2.5; 5 MG/ML; UG/ML
INJECTION, SOLUTION EPIDURAL; INFILTRATION; INTRACAUDAL; PERINEURAL AS NEEDED
Status: DISCONTINUED | OUTPATIENT
Start: 2020-10-21 | End: 2020-10-21 | Stop reason: HOSPADM

## 2020-10-21 RX ORDER — LIDOCAINE HYDROCHLORIDE 20 MG/ML
INJECTION, SOLUTION EPIDURAL; INFILTRATION; INTRACAUDAL; PERINEURAL AS NEEDED
Status: DISCONTINUED | OUTPATIENT
Start: 2020-10-21 | End: 2020-10-21 | Stop reason: HOSPADM

## 2020-10-21 RX ORDER — POLYETHYLENE GLYCOL 3350 17 G/17G
17 POWDER, FOR SOLUTION ORAL DAILY
Qty: 510 G | Refills: 0 | Status: SHIPPED | OUTPATIENT
Start: 2020-10-21 | End: 2020-11-17 | Stop reason: ALTCHOICE

## 2020-10-21 RX ORDER — FENTANYL CITRATE 50 UG/ML
INJECTION, SOLUTION INTRAMUSCULAR; INTRAVENOUS AS NEEDED
Status: DISCONTINUED | OUTPATIENT
Start: 2020-10-21 | End: 2020-10-21 | Stop reason: HOSPADM

## 2020-10-21 RX ADMIN — WATER 2 G: 1 INJECTION INTRAMUSCULAR; INTRAVENOUS; SUBCUTANEOUS at 14:10

## 2020-10-21 RX ADMIN — FENTANYL CITRATE 50 MCG: 50 INJECTION, SOLUTION INTRAMUSCULAR; INTRAVENOUS at 14:15

## 2020-10-21 RX ADMIN — ONDANSETRON 4 MG: 2 INJECTION INTRAMUSCULAR; INTRAVENOUS at 16:28

## 2020-10-21 RX ADMIN — ONDANSETRON HYDROCHLORIDE 4 MG: 2 INJECTION, SOLUTION INTRAMUSCULAR; INTRAVENOUS at 14:33

## 2020-10-21 RX ADMIN — SODIUM CHLORIDE 20 MCG/MIN: 900 INJECTION, SOLUTION INTRAVENOUS at 13:59

## 2020-10-21 RX ADMIN — DEXAMETHASONE SODIUM PHOSPHATE 4 MG: 4 INJECTION, SOLUTION INTRAMUSCULAR; INTRAVENOUS at 13:59

## 2020-10-21 RX ADMIN — FENTANYL CITRATE 50 MCG: 50 INJECTION, SOLUTION INTRAMUSCULAR; INTRAVENOUS at 13:50

## 2020-10-21 RX ADMIN — Medication 3 MG: at 14:35

## 2020-10-21 RX ADMIN — SODIUM CHLORIDE, SODIUM LACTATE, POTASSIUM CHLORIDE, AND CALCIUM CHLORIDE 25 ML/HR: 600; 310; 30; 20 INJECTION, SOLUTION INTRAVENOUS at 11:20

## 2020-10-21 RX ADMIN — ROCURONIUM BROMIDE 25 MG: 10 INJECTION INTRAVENOUS at 14:01

## 2020-10-21 RX ADMIN — SODIUM CHLORIDE, SODIUM LACTATE, POTASSIUM CHLORIDE, AND CALCIUM CHLORIDE: 600; 310; 30; 20 INJECTION, SOLUTION INTRAVENOUS at 13:43

## 2020-10-21 RX ADMIN — Medication 3 AMPULE: at 11:20

## 2020-10-21 RX ADMIN — PROPOFOL 120 MG: 10 INJECTION, EMULSION INTRAVENOUS at 13:52

## 2020-10-21 RX ADMIN — PROPOFOL 20 MG: 10 INJECTION, EMULSION INTRAVENOUS at 14:37

## 2020-10-21 RX ADMIN — PROPOFOL 30 MG: 10 INJECTION, EMULSION INTRAVENOUS at 14:15

## 2020-10-21 RX ADMIN — GLYCOPYRROLATE 0.6 MG: 0.2 INJECTION, SOLUTION INTRAMUSCULAR; INTRAVENOUS at 14:35

## 2020-10-21 RX ADMIN — ROCURONIUM BROMIDE 5 MG: 10 INJECTION INTRAVENOUS at 13:51

## 2020-10-21 RX ADMIN — PROPOFOL 20 MG: 10 INJECTION, EMULSION INTRAVENOUS at 14:40

## 2020-10-21 RX ADMIN — SUCCINYLCHOLINE CHLORIDE 120 MG: 20 INJECTION, SOLUTION INTRAMUSCULAR; INTRAVENOUS at 13:52

## 2020-10-21 RX ADMIN — LIDOCAINE HYDROCHLORIDE 80 MG: 20 INJECTION, SOLUTION EPIDURAL; INFILTRATION; INTRACAUDAL; PERINEURAL at 13:50

## 2020-10-21 NOTE — H&P
Day of Surgery H&P    Assessment:   SUBACUTE CHoLECYSTITIS    Body mass index is 29.58 kg/m². Plan:   LAPAROSCOPIC CHOLECYSTECTOMY WITH CHOLANGIOGRAM (Right ) Discussed the risk of surgery including bleeding, infection, injury to adjacent structures, and the risks of general anesthetic. The patient understands the risks; any and all questions were answered to the patient's satisfaction. Subjective:      Navi Garg is a 67 y.o. female who presents for above procedure. Objective:   Height 5' 3\" (1.6 m), weight 75.8 kg (167 lb). No data recorded.       Physical Exam:  PHYSICAL EXAM:    Chest:   [x]   CTA bialterally, no wheezing/rhonchi/rales/crackles    []   wheezing     []   rhonchi     []   crackles     []   use of accessory muscles    Heart:  [x]   regular rate and rhythm     [x]   No murmurs/rubs/gallops    []   irregular rhythm     []   Murmur     []   Rubs     []   Gallops    ruq ttp     Past Medical History:   Diagnosis Date    Arthritis     [facility] cancer [facility] [address] 2002    s/p lumpectomy and XRT    CAD (coronary artery disease)     Chronic kidney disease     stage III    Diabetes ([facility] [address]     GERD (gastroesophageal reflux disease)     with hiatal hernia    Hypercholesterolemia     Hypertension     Liver disease     Long term current use of anticoagulant therapy     Neuropathy in diabetes ([facility] [address]     DOMINGO (obstructive sleep apnea)     on CPAP     Past Surgical History:   Procedure Laterality Date    BREAST SURGERY PROCEDURE UNLISTED  2001    right lumpectomy    CARDIAC SURG PROCEDURE UNLIST      cardiac cath; 3 stents placed    HX BREAST BIOPSY Left     years ago no scar seen    HX CARPAL TUNNEL RELEASE      b/l    HX HERNIA REPAIR      HX LUMBAR FUSION  July 2015    L4-L5    HX ORTHOPAEDIC      spinal fusion    VASCULAR SURGERY PROCEDURE UNLIST      right leg vein stripping      Family History   Problem Relation Age of Onset    Diabetes Mother     Heart Disease Mother     Stroke Mother     Breast Cancer Mother 79    Diabetes Brother     Heart Disease Brother     Emphysema Father     Diabetes Daughter      Social History     Socioeconomic History    Marital status: SINGLE     Spouse name: Not on file    Number of children: Not on file    Years of education: Not on file    Highest education level: Not on file   Tobacco Use    Smoking status: Never Smoker    Smokeless tobacco: Never Used   Substance and Sexual Activity    Alcohol use: No     Alcohol/week: 0.0 standard drinks    Drug use: Yes     Types: Prescription, OTC    Sexual activity: Never   Social History Narrative    Lives in [place] with son and 2 grandsons. . 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 Is busy with [employer] and singing in the [employer]. Prior to Admission medications    Medication Sig Start Date End Date Taking? Authorizing Provider   famotidine (PEPCID) 20 mg tablet TAKE 1 TABLET BY MOUTH EVERY NIGHT 10/17/20  Yes Shahida Leonard MD   pantoprazole (PROTONIX) 40 mg tablet TAKE 1 TABLET BY MOUTH DAILY 10/17/20  Yes Shahida Leonard MD   celecoxib (CELEBREX) 200 mg capsule TAKE 1 CAPSULE BY MOUTH ONCE DAILY 5/14/20  Yes Shahida Leonard MD   Janumet 50-1,000 mg per tablet TAKE 1 TABLET BY MOUTH TWICE A DAY WITH FOOD 4/7/20  Yes Shahida Leonard MD   pregabalin (LYRICA) 50 mg capsule TAKE 1 CAPSULE BY MOUTH THREE TIMES A DAY 4/7/20  Yes Shahida Leonard MD   montelukast (SINGULAIR) 10 mg tablet TAKE 1 TABLET BY MOUTH ONCE DAILY 3/24/20  Yes Shahida Leonard MD   losartan (COZAAR) 100 mg tablet take 1 tablet by mouth once daily 10/25/19  Yes Shahida Leonard MD   azelastine (ASTELIN) 137 mcg (0.1 %) nasal spray instill 2 sprays into each nostril twice a day 8/7/19  Yes Provider, Historical   atorvastatin (LIPITOR) 40 mg tablet Take  by mouth daily. Yes Provider, Historical   nitroglycerin (NITROSTAT) 0.4 mg SL tablet 1 Tab by SubLINGual route every five (5) minutes as needed for Chest Pain.  4/18/17 Yes Shahida Leonard MD   carvedilol (COREG) 6.25 mg tablet Take  by mouth two (2) times daily (with meals). Yes Provider, Historical   amLODIPine (NORVASC) 2.5 mg tablet Take  by mouth daily. Yes Provider, Historical   insulin glargine (LANTUS SOLOSTAR) 100 unit/mL (3 mL) pen Inject 16 units at bedtime--Dose change 10/14/16--updated med list--did not send prescription to the pharmacy  Patient taking differently: 26 Units by SubCUTAneous route. Inject 26 units at bedtime--Dose change reported by pt during med rec on 9/28/20 10/14/16  Yes Madai Simons MD   fenofibrate (LOFIBRA) 54 mg tablet Take  by mouth daily. Yes Provider, Historical   cyanocobalamin (VITAMIN B12) 500 mcg tablet Take 500 mcg by mouth daily. Yes Provider, Historical   ranolazine ER (RANEXA) 500 mg SR tablet Take 1 Tab by mouth two (2) times a day. 2/15/16  Yes Joana Maravilla MD   aspirin 81 mg Tab Take 81 mg by mouth daily. Yes Provider, Historical   MULTIVITAMINS W-MINERALS/LUT (CENTRUM SILVER PO) Take 1 Tab by mouth daily. Yes Provider, Historical   glucose blood VI test strips (ASCENSIA AUTODISC VI, ONE TOUCH ULTRA TEST VI) strip One touch ultra test strips min---check fsbs bid 3/13/19   Shahida Leonard MD   Insulin Needles, Disposable, (PEN NEEDLE) 31 gauge x 3/16\" ndle Use as directed once daily 12/1/17   Shahida Leonard MD   Blood-Glucose Meter monitoring kit One touch ultra mini glucometer--dx 250.00 12/1/17   Shahida Leonard MD   Lancets misc Check fsbs bid -250.02 12/1/17   Shahida Leonard MD   clopidogrel (PLAVIX) 75 mg tablet Take 1 Tab by mouth daily.  1/20/16   Daljit Kenndey MD     ALLERGIES:    Allergies   Allergen Reactions    Codeine Other (comments)     Jerking sensation    Livalo [Pitavastatin] Diarrhea    Niaspan [Niacin] Myalgia    Statins-Hmg-Coa Reductase Inhibitors Myalgia     Zocor, pravachol, Lipitor, crestor    Welchol [Colesevelam] Other (comments)     Nausea, bloating and malaise    Zetia [Ezetimibe] Myalgia       Review of Systems:    See prior consult, office note or scanned list in media section. 10 systems negative except as specified.                 Signed By: Shanel Roberts MD     October 21, 2020

## 2020-10-21 NOTE — ANESTHESIA PREPROCEDURE EVALUATION
Relevant Problems   No relevant active problems       Anesthetic History   No history of anesthetic complications            Review of Systems / Medical History  Patient summary reviewed, nursing notes reviewed and pertinent labs reviewed    Pulmonary        Sleep apnea: No treatment      Pertinent negatives: No shortness of breath     Neuro/Psych   Within defined limits           Cardiovascular    Hypertension          Past MI (2015), CAD, cardiac stents (x3 2015) and hyperlipidemia  Pertinent negatives: No angina  Exercise tolerance: >4 METS  Comments: EF=65% 2019   GI/Hepatic/Renal     GERD    Renal disease: CRI  Hiatal hernia and liver disease (Fatty liver)     Endo/Other    Diabetes: type 2    Arthritis     Other Findings            Physical Exam    Airway  Mallampati: III  TM Distance: 4 - 6 cm  Neck ROM: normal range of motion   Mouth opening: Normal     Cardiovascular  Regular rate and rhythm,  S1 and S2 normal,  no murmur, click, rub, or gallop             Dental  No notable dental hx       Pulmonary  Breath sounds clear to auscultation               Abdominal  GI exam deferred       Other Findings            Anesthetic Plan    ASA: 3  Anesthesia type: general    Monitoring Plan: BIS      Induction: Intravenous  Anesthetic plan and risks discussed with: Patient

## 2020-10-21 NOTE — PERIOP NOTES
1449-Handoff Report from Operating Room to PACU    Report received from Panfilo Vela CRNA regarding Para Solid. Surgeon(s):  Lyssa Kohli MD  And Procedure(s) (LRB):  LAPAROSCOPIC CHOLECYSTECTOMY WITH CHOLANGIOGRAM (Right)  confirmed   with allergies and dressings discussed. Anesthesia type, drugs, patient history, complications, estimated blood loss, vital signs, intake and output, and last pain medication, lines, reversal medications and temperature were reviewed. 1545- Family updated. [address] from Nurse    PATIENT INSTRUCTIONS:    After general anesthesia or intravenous sedation, for 24 hours or while taking prescription Narcotics:  · Limit your activities  · Do not drive and operate hazardous machinery  · Do not make important personal or business decisions  · Do  not drink alcoholic beverages  · If you have not urinated within 8 hours after discharge, please contact your surgeon on call. Report the following to your surgeon:  · Excessive pain, swelling, redness or odor of or around the surgical area  · Temperature over 100.5  · Nausea and vomiting lasting longer than 4 hours or if unable to take medications  · Any signs of decreased circulation or nerve impairment to extremity: change in color, persistent  numbness, tingling, coldness or increase pain  · Any questions    These are general instructions for a healthy lifestyle:    No smoking/ No tobacco products/ Avoid exposure to second hand smoke  Surgeon General's Warning:  Quitting smoking now greatly reduces serious risk to your health.     Obesity, smoking, and sedentary lifestyle greatly increases your risk for illness    A healthy diet, regular physical exercise & weight monitoring are important for maintaining a healthy lifestyle    You may be retaining fluid if you have a history of heart failure or if you experience any of the following symptoms:  Weight gain of 3 pounds or more overnight or 5 pounds in a week, increased swelling in our hands or feet or shortness of breath while lying flat in bed. Please call your doctor as soon as you notice any of these symptoms; do not wait until your next office visit. The discharge information has been reviewed with the patient and daughters. The patient and daughters verbalized understanding. Discharge medications reviewed with the patient and daughters and appropriate educational materials and side effects teaching were provided.   ___________________________________________________________________________________________________________________________________

## 2020-10-21 NOTE — PERIOP NOTES
10:54= COVID19 test results negative; pt states she quarantined according to protocol, has not been exposed or had any s/s virus. Warming blanket applied but not turned on.

## 2020-10-21 NOTE — ANESTHESIA POSTPROCEDURE EVALUATION
Procedure(s):  LAPAROSCOPIC CHOLECYSTECTOMY WITH CHOLANGIOGRAM.    general    Anesthesia Post Evaluation        Patient location during evaluation: PACU  Note status: Adequate. Level of consciousness: responsive to verbal stimuli and sleepy but conscious  Pain management: satisfactory to patient  Airway patency: patent  Anesthetic complications: no  Cardiovascular status: acceptable  Respiratory status: acceptable  Hydration status: acceptable  Comments: +Post-Anesthesia Evaluation and Assessment    Patient: Kristel Lewis MRN: [id number]  SSN: [id number]   YOB: 1940  Age: 67 y.o. Sex: female      Cardiovascular Function/Vital Signs    BP (!) 149/73   Pulse 83   Temp 36.9 °C (98.4 °F)   Resp 14   Ht 5' 3\" (1.6 m)   Wt 73.9 kg (162 lb 14.7 oz)   SpO2 97%   BMI 28.86 kg/m²     Patient is status post Procedure(s):  LAPAROSCOPIC CHOLECYSTECTOMY WITH CHOLANGIOGRAM.    Nausea/Vomiting: Controlled. Postoperative hydration reviewed and adequate. Pain:  Pain Scale 1: Numeric (0 - 10) (10/21/20 1600)  Pain Intensity 1: 0 (10/21/20 1600)   Managed. Neurological Status:   Neuro (WDL): Exceptions to WDL (10/21/20 1450)   At baseline. Mental Status and Level of Consciousness: Arousable. Pulmonary Status:   O2 Device: Room air (10/21/20 1600)   Adequate oxygenation and airway patent. Complications related to anesthesia: None    Post-anesthesia assessment completed. No concerns. Signed By: Ave Mccormick MD    10/21/2020  Post anesthesia nausea and vomiting:  controlled      INITIAL Post-op Vital signs:   Vitals Value Taken Time   /77 10/21/2020  4:20 PM   Temp 36.9 °C (98.4 °F) 10/21/2020  2:50 PM   Pulse 86 10/21/2020  4:20 PM   Resp 13 10/21/2020  4:20 PM   SpO2 98 % 10/21/2020  4:20 PM   Vitals shown include unvalidated device data.

## 2020-10-21 NOTE — PROGRESS NOTES
0905-Pt has yet to arrive. Pt was not informed of her surgery time change. It will take approximately 1 hour for her to arrive. Or charge notified.

## 2020-10-21 NOTE — OP NOTES
DATE OF PROCEDURE:  10/21/2020     PREOPERATIVE DIAGNOSIS:   Subacute Cholecystitis    POSTOPERATIVE DIAGNOSIS:   Same    OPERATIVE PROCEDURE:  Laparoscopic cholecystectomy with cholangiogram, interpretation of cholangiogram (CPT 87911 [id number])    SURGEON:  Kimberly Daniels MD    ANESTHESIA:  General endotracheal.    EBL: minimal    SPECIMENS:   ID Type Source Tests Collected by Time Destination   1 : gallbladder Preservative Gallbladder  Lesli Johnson MD 10/21/2020 1418 Pathology        FINDINGS:  Mild inflammation of the gallbladder, gallstones. No CBD filling defect on cholangiogram.    INDICATIONS:  RUQ and epigastric pain. Gallstones on ultrasound. DESCRIPTION OF PROCEDURE:  After obtaining informed consent, the patient was taken to the operating room and placed supine on the operating table. An operative time-out was performed, and general endotracheal anesthesia was induced. Preoperative antibiotics were administered, and the abdomen was prepped and draped in the usual sterile fashion. The abdomen was then entered just above the umbilicus using a 5-mm optical trocar. The abdomen was the insufflated without incident and was visually explored. There was no other visible pathology noted. Two right upper quadrant 5-mm ports were placed under direct vision, followed by a 12-mm port in the subxiphoid position. Local anesthetic was infiltrated at the port sites prior to placement. The gallbladder fundus was then grasped and retracted cephalad over the liver. The triangle of Calot was exposed, and the cystic duct and artery were skeletonized using a combination of blunt dissection with a [place] dissector and hook electrocautery. The cystic artery was then divided between clips with 2 clips left on the patient side. The cystic duct was then traced from the gallbladder infundibulum into its insertion into the portal triad. The cystic duct was swept with a grasper up into the gallbladder. A clip was placed on the gallbladder infundibulum and an incision in the cystic duct adjacent to the gallbladder infundibulum was made with mercy and the cholangiogram catheter was inserted and the cholangiogram was performed with the above noted findings. The cystic duct was then divided between clips directly adjacent to the gallbladder infundibulum, with 4 clips left on the patient's side. The gallbladder was then removed from the liver bed using hook electrocautery. It was removed from the abdominal cavity using a bag through the subxiphoid incision. The liver bed was inspected for hemostasis, and excellent hemostasis was achieved with minimal electrocautery. The clips on the cystic duct and artery were again visualized and were intact. The area below and lateral to the liver was suction irrigated until clear. The right upper quadrant ports were removed under direct vision and both were hemostatic. The abdomen was then desufflated through the subxiphoid port under direct vision via the umbilical port. These ports were subsequently removed and the fascial defect at the 12-mm incision was closed with an interrupted 0 Vicryl suture. The incisions were irrigated with sterile saline and made hemostatic with minimal electrocautery. They were closed with buried interrupted 4-0 Monocryl sutures, and dressed with sterile dressing. The patient was recovered from general anesthesia and taken to the recovery area in satisfactory condition. All instrument, sponge, and needle counts were reported as correct.     Michael Antunez MD

## 2020-10-21 NOTE — DISCHARGE INSTRUCTIONS
Discharge Instructions:  Laparoscopic Cholecystectomy (Gallbladder Removal)  Dr. Edgar Simpson    Call on next business day to arrange appointment for follow up in 3 weeks -- [phone]. Activity:  Walk regularly - can walk today as tolerated, but make yourself walk at least 50 yards at least 6 times per day starting tomorrow. No lifting more than 10 -15 pounds for 4 weeks. You may resume driving in 5-7 days unless still requiring narcotics for pain. Work:  You may return to work in 1 or 2 weeks to light activity. No lifting more than 10 pounds (=\"light duty\") for four weeks. If your employer can not accommodate \"light duty,\" your employer will need to provide any necessary paperwork to our office. This document with serve as the initial \"note\" to your employer. Diet:  You may resume normal diet after 24 hours. Fatty foods may still cause some stomach upset. Avoid fatty/greasy foods for 1 month, then add back slowly. Wound Care:  Dermabond glue dressing will fall off over the next couple of weeks. It is waterproof. You may shower, but no swimming or baths for 2 weeks. Your incisions may ooze for a few days. Do not worry about this. If you experience a lot of drainage, develop redness around the wound, or a fever over 101 F occurs please call the office. Medications:  See attached \"Medication Reconciliation. \"    Do not take your Plavix until Saturday. You can take your aspirin tomorrow. Do not take your Janumet today. You can take in tomorrow. PUT ICE ON YOUR INCISION(S) 20 MINUTES EVERY HOUR WHILE AWAKE FOR THE NEXT 3 DAYS AT LEAST. PLACE A THIN CLOTH BETWEEN YOUR SKIN AND THE ICE BAG. Colace or Miralax should be used twice daily to prevent constipation while on narcotics. If you are still having trouble having a BM after 1-2 days, try milk of magnesia. If this does not work within 24 hours, try a bottle of magnesium citrate. If this does not work, call us.     Narcotics and anesthesia sometimes cause nausea and vomiting. If persistent please call the office. Do not hesitate to call with questions or concerns. Yaya South MD  [facility]. Tel. [phone]  Fax [phone] from Nurse    PATIENT INSTRUCTIONS:    After general anesthesia or intravenous sedation, for 24 hours or while taking prescription Narcotics:  · Limit your activities  · Do not drive and operate hazardous machinery  · Do not make important personal or business decisions  · Do  not drink alcoholic beverages  · If you have not urinated within 8 hours after discharge, please contact your surgeon on call. Report the following to your surgeon:  · Excessive pain, swelling, redness or odor of or around the surgical area  · Temperature over 100.5  · Nausea and vomiting lasting longer than 4 hours or if unable to take medications  · Any signs of decreased circulation or nerve impairment to extremity: change in color, persistent  numbness, tingling, coldness or increase pain  · Any questions    What to do at Home:  A common side effect of anesthesia following surgery is nausea and/or vomiting. In order to decrease symptoms, it is wise to avoid foods that are high in fat, greasy foods, milk products, and spicy foods for the first 24 hours. Acceptable foods for the first 24 hours following surgery include but are not limited to:     soup   broth    toast    crackers    applesauce    bananas    mashed potatoes,   soft or scrambled eggs   oatmeal    jello    It is important to eat when taking your pain medication. This will help to prevent nausea. If possible, please try to time your meals with your medications. It is very important to stay hydrated following surgery. Sip fluids frequently while awake. Avoid acidic drinks such as citrus juices and soda for 24 hours. Carbonated beverages may cause bloating and gas.  Acceptable fluids include:    - water (flavor packets may add variety)  - coffee or tea (in moderation)  - Gatorade  - Perry-aid  - apple juice  - cranberry juice    You are encouraged to cough and deep breathe every hour when awake. This will help to prevent respiratory complications following anesthesia. You may want to hug a pillow when coughing and sneezing to add additional support to the surgical area and to decrease discomfort if you had abdominal or chest surgery. If you are discharged home with support stockings, you may remove them after 24 hours. Support stockings are used to help prevent blood clots in the legs following surgery. Please take time to review all of your Home Care Instructions and Medication Information sheets provided in your discharge packet. If you have any questions, please contact your surgeons office. Thank you. How to Care for Your Wound After Its Treated With  DERMABOND* Topical Skin Adhesive  DERMABOND* Topical Skin Adhesive (2-octyl cyanoacrylate) is a sterile, liquid skin adhesive  that holds wound edges together. The film will usually remain in place for 5 to 10 days, then  naturally fall off your skin. The following will answer some of your questions and provide instructions for proper care for your  wound while it is healing:    CHECK WOUND APPEARANCE   Some swelling, redness, and pain are common with all wounds and normally will go away as the  wound heals. If swelling, redness, or pain increases or if the wound feels warm to the touch,  contact a doctor. Also contact a doctor if the wound edges reopen or separate. REPLACE BANDAGES   If your wound is bandaged, keep the bandage dry.  Replace the dressing daily until the adhesive film has fallen off or if the  bandage should become wet, unless otherwise instructed by your  physician.    When changing the dressing, do not place tape directly over the  DERMABOND adhesive film, because removing the tape later may also  remove the film. AVOID TOPICAL MEDICATIONS   Do not apply liquid or ointment medications or any other product to your wound while the  DERMABOND adhesive film is in place. These may loosen the film before your wound is healed. KEEP WOUND DRY AND PROTECTED   You may occasionally and briefly wet your wound in the shower or bath. Do not soak or scrub  your wound, do not swim, and avoid periods of heavy perspiration until the DERMABOND  adhesive has naturally fallen off. After showering or bathing, gently blot your wound dry with a  soft towel. If a protective dressing is being used, apply a fresh, dry bandage, being sure to keep  the tape off the DERMABOND adhesive film.  Apply a clean, dry bandage over the wound if necessary to protect it.  Protect your wound from injury until the skin has had sufficient time to heal.   Do not scratch, rub, or pick at the DERMABOND adhesive film. This may loosen the film before  your wound is healed.  Protect the wound from prolonged exposure to sunlight or tanning lamps while the film is in  place. If you have any questions or concerns about this product, please consult your doctor. *Trademark ©[employer] 8144IZ PREVENT AN INFECTION      1. [address] YOUR HANDS     To prevent infection, good handwashing is the most important thing you or your caregiver can do.  Wash your hands with soap and water or use the hand  we gave you before you touch any wounds. 2. SHOWER     Use the antibacterial soap we gave you when you take a shower.  Shower with this soap until your wounds are healed.  To reach all areas of your body, you may need someone to help you.  Dont forget to clean your belly button with every shower. 3.  USE CLEAN SHEETS     Use freshly cleaned sheets on your bed after surgery.  To keep the surgery site clean, do not allow pets to sleep with you while your wound is still healing.      4. STOP SMOKING     Stop smoking, or at least cut back on smoking     Smoking slows your healing. 5.  CONTROL YOUR BLOOD SUGAR     High blood sugars slow wound healing. If you are diabetic, control your blood sugar levels before and after your surgery. Patient Education      Narcotic-Analgesic/Acetaminophen (Percocet, [address], [facility], Lortab 10/325) - (By mouth)   Why this medicine is used:   Relieves pain. Contact a nurse or doctor right away if you have:  · Extreme weakness, shallow breathing, slow heartbeat  · Severe confusion, lightheadedness, dizziness, fainting  · Yellow skin or eyes, dark urine or pale stools  · Severe constipation, severe stomach pain, nausea, vomiting, loss of appetite  · Sweating or cold, clammy skin     Common side effects:  · Mild constipation, nausea, vomiting  · Sleepiness, tiredness  · Itching, rash  © 2017 [facility] Information is for End User's use only and may not be sold, redistributed or otherwise used for commercial purposes. These are general instructions for a healthy lifestyle:    No smoking/ No tobacco products/ Avoid exposure to second hand smoke  Surgeon General's Warning:  Quitting smoking now greatly reduces serious risk to your health. Obesity, smoking, and sedentary lifestyle greatly increases your risk for illness    A healthy diet, regular physical exercise & weight monitoring are important for maintaining a healthy lifestyle    You may be retaining fluid if you have a history of heart failure or if you experience any of the following symptoms:  Weight gain of 3 pounds or more overnight or 5 pounds in a week, increased swelling in our hands or feet or shortness of breath while lying flat in bed. Please call your doctor as soon as you notice any of these symptoms; do not wait until your next office visit.

## 2020-10-22 NOTE — PROGRESS NOTES
To: Nba Mart MD      From: Dea He MD    Thank you for sending Isabella Bridges to see us. Please note that this dictation was completed with [employer], the computer voice recognition software. Quite often unanticipated grammatical, syntax, homophones, and other interpretive errors are inadvertently transcribed by the software. Please disregard these errors. Please excuse any errors that have escaped final proofreading. Encounter Date: 10/16/2020  History and Physical    Assessment:   Chief complaint: Right upper quadrant pain. Final diagnosis: Subacute cholecystitis, gallstones. Body mass index is 29.74 kg/m². Comorbid perioperative risk factors: On Plavix and aspirin. She had 2 stents done in 2016. I spoke with Dr. Flo Yuan who felt the risk benefit ratio favored holding her Plavix but continuing her aspirin through the perioperative period. She also has a history of chronic kidney disease, acid reflux, breast cancer, hypertension, sleep apnea, diabetes. She had an umbilical hernia repaired 15 years ago. Plan/Recommendations/Medical Decision Making:   Cholecystectomy. Discussed alternatives, risk and benefits of surgery. Risks include infection, hematoma, bleeding, bile duct or bowel injury, recurrence or persistence of symptoms, conversion to an open operation, retained CBD stones, possible ALYSHA drain, and the risks of general anesthetic. All questions were answered to the patient's satisfaction. Perioperative management of coronary artery disease and anticoagulation, diabetes, reflux, hypertension, chronic kidney disease. HPI:   Patient is a 67 y.o. female who is seen in consultation at the request of Nba Mart MD.   Notes that 4 weeks ago she had a very sharp pain in her right back that shot around the right upper quadrant to the epigastrium. This lasted for a day or so.   She went to the emergency department in [facility] and had a CAT scan done to look for possible kidney stones but none was seen. She was not having dysuria. She has not seen any blood in her urine. She came to this hospital to the ER 3 weeks ago for continued discomfort. Her primary care doctor, Dr. Reggy Frankel, saw her and CAT scan was done but this did not show any acute process but did show gallstones. Ultrasound was done which confirmed the gallstones but ruled out evidence of acute cholecystitis. She was referred here. She has no history of jaundice, aayush urine, or acholic stools.     Past Medical History:   Diagnosis Date    Arthritis     Breast cancer ([facility] 2002    s/p lumpectomy and XRT    CAD (coronary artery disease)     Chronic kidney disease     stage III    Diabetes ([facility]     GERD (gastroesophageal reflux disease)     with hiatal hernia    Hypercholesterolemia     Hypertension     Liver disease     Long term current use of anticoagulant therapy     Neuropathy in diabetes ([facility]     DOMINGO (obstructive sleep apnea)     on CPAP      Past Surgical History:   Procedure Laterality Date    BREAST SURGERY PROCEDURE UNLISTED  2001    right lumpectomy    CARDIAC SURG PROCEDURE UNLIST      cardiac cath; 3 stents placed    HX BREAST BIOPSY Left     years ago no scar seen    HX CARPAL TUNNEL RELEASE      b/l    HX HERNIA REPAIR      HX LUMBAR FUSION  July 2015    L4-L5    HX ORTHOPAEDIC      spinal fusion    VASCULAR SURGERY PROCEDURE UNLIST      right leg vein stripping     Social History     Tobacco Use    Smoking status: Never Smoker    Smokeless tobacco: Never Used   Substance Use Topics    Alcohol use: No     Alcohol/week: 0.0 standard drinks      Family History   Problem Relation Age of Onset    Diabetes Mother     Heart Disease Mother     Stroke Mother     Breast Cancer Mother 79    Diabetes Brother     Heart Disease Brother     Emphysema Father     Diabetes Daughter        Current Outpatient Medications   Medication Sig    celecoxib (CELEBREX) 200 mg capsule TAKE 1 CAPSULE BY MOUTH ONCE DAILY    Janumet 50-1,000 mg per tablet TAKE 1 TABLET BY MOUTH TWICE A DAY WITH FOOD    pregabalin (LYRICA) 50 mg capsule TAKE 1 CAPSULE BY MOUTH THREE TIMES A DAY    montelukast (SINGULAIR) 10 mg tablet TAKE 1 TABLET BY MOUTH ONCE DAILY    losartan (COZAAR) 100 mg tablet take 1 tablet by mouth once daily    azelastine (ASTELIN) 137 mcg (0.1 %) nasal spray instill 2 sprays into each nostril twice a day    atorvastatin (LIPITOR) 40 mg tablet Take  by mouth daily.  glucose blood VI test strips (ASCENSIA AUTODISC VI, ONE TOUCH ULTRA TEST VI) strip One touch ultra test strips min---check fsbs bid    Insulin Needles, Disposable, (PEN NEEDLE) 31 gauge x 3/16\" ndle Use as directed once daily    Blood-Glucose Meter monitoring kit One touch ultra mini glucometer--dx 250.00    Lancets misc Check fsbs bid -250.02    nitroglycerin (NITROSTAT) 0.4 mg SL tablet 1 Tab by SubLINGual route every five (5) minutes as needed for Chest Pain.  carvedilol (COREG) 6.25 mg tablet Take  by mouth two (2) times daily (with meals).  amLODIPine (NORVASC) 2.5 mg tablet Take  by mouth daily.  insulin glargine (LANTUS SOLOSTAR) 100 unit/mL (3 mL) pen Inject 16 units at bedtime--Dose change 10/14/16--updated med list--did not send prescription to the pharmacy (Patient taking differently: 26 Units by SubCUTAneous route. Inject 26 units at bedtime--Dose change reported by pt during med rec on 9/28/20)    fenofibrate (LOFIBRA) 54 mg tablet Take  by mouth daily.  cyanocobalamin (VITAMIN B12) 500 mcg tablet Take 500 mcg by mouth daily.  ranolazine ER (RANEXA) 500 mg SR tablet Take 1 Tab by mouth two (2) times a day.  clopidogrel (PLAVIX) 75 mg tablet Take 1 Tab by mouth daily.  aspirin 81 mg Tab Take 81 mg by mouth daily.  MULTIVITAMINS W-MINERALS/LUT (CENTRUM SILVER PO) Take 1 Tab by mouth daily.     HYDROcodone-acetaminophen (Norco) 5-325 mg per tablet Take 1-2 Tabs by mouth every six (6) hours as needed for Pain for up to 5 days. Max Daily Amount: 8 Tabs.  polyethylene glycol (MIRALAX) 17 gram/dose powder Take 17 g by mouth daily.  ondansetron (ZOFRAN ODT) 4 mg disintegrating tablet Take 1 Tab by mouth every eight (8) hours as needed for Nausea.  famotidine (PEPCID) 20 mg tablet TAKE 1 TABLET BY MOUTH EVERY NIGHT    pantoprazole (PROTONIX) 40 mg tablet TAKE 1 TABLET BY MOUTH DAILY     No current facility-administered medications for this visit. Allergies: Allergies   Allergen Reactions    Codeine Other (comments)     Jerking sensation    Livalo [Pitavastatin] Diarrhea    Niaspan [Niacin] Myalgia    Statins-Hmg-Coa Reductase Inhibitors Myalgia     Zocor, pravachol, Lipitor, crestor    Welchol [Colesevelam] Other (comments)     Nausea, bloating and malaise    Zetia [Ezetimibe] Myalgia        Review of Systems:  10 systems reviewed. See scanned sheet in \"Media\" section. See HPI for pertinent positives and negatives. Objective:     Visit Vitals  /70   Pulse 85   Temp (!) 96.6 °F (35.9 °C)   Ht 5' 3\" (1.6 m)   Wt 76.2 kg (167 lb 14.4 oz)   SpO2 98%   BMI 29.74 kg/m²     General appearance  Alert, cooperative, no distress, appears stated age   HEENT  Anicteric   Neck Supple   Back   No CVA tenderness   Lungs   CTAB   Heart  Regular rate and rhythm. Abdomen   Soft. Bowel sounds normal. No palpable masses. TTP in the right upper quadrant. There is no Gomez sign. There is a transverse scar just above the umbilicus without hernia. Extremities No CCE.    Pulses 2+ right radial   Skin Skin color, texture, turgor normal.        Neurologic Without overt sensory or motor deficit     Imaging and Lab Review:   images and reports reviewed    Signed By: Maryam Porras MD     October 22, 2020

## 2020-10-26 DIAGNOSIS — G62.9 NEUROPATHY: ICD-10-CM

## 2020-10-26 RX ORDER — PREGABALIN 50 MG/1
CAPSULE ORAL
Qty: 90 CAP | Refills: 5 | Status: SHIPPED | OUTPATIENT
Start: 2020-10-26 | End: 2020-10-28

## 2020-10-26 NOTE — TELEPHONE ENCOUNTER
Future Appointments:  Future Appointments   Date Time Provider Santiago Perez   11/10/2020  1:00 PM Colton Willingham MD [facility] BS AMB   12/15/2020  2:15 PM Nichelle Frank MD [facility] BS AMB        Last Appointment With Me:  6/15/2020     Requested Prescriptions     Pending Prescriptions Disp Refills    pregabalin (LYRICA) 50 mg capsule 90 Cap 5     Sig: TAKE 1 CAPSULE BY MOUTH THREE TIMES A DAY

## 2020-10-28 ENCOUNTER — HOSPITAL ENCOUNTER (EMERGENCY)
Age: 80
Discharge: HOME | End: 2020-10-28
Attending: EMERGENCY MEDICINE
Payer: MEDICARE

## 2020-10-28 ENCOUNTER — APPOINTMENT (OUTPATIENT)
Dept: CT IMAGING | Age: 80
End: 2020-10-28
Attending: PHYSICIAN ASSISTANT
Payer: MEDICARE

## 2020-10-28 ENCOUNTER — APPOINTMENT (OUTPATIENT)
Dept: GENERAL RADIOLOGY | Age: 80
End: 2020-10-28
Attending: PHYSICIAN ASSISTANT
Payer: MEDICARE

## 2020-10-28 ENCOUNTER — APPOINTMENT (OUTPATIENT)
Dept: CT IMAGING | Age: 80
End: 2020-10-28
Attending: EMERGENCY MEDICINE
Payer: MEDICARE

## 2020-10-28 VITALS
SYSTOLIC BLOOD PRESSURE: 140 MMHG | HEART RATE: 83 BPM | BODY MASS INDEX: 28.35 KG/M2 | OXYGEN SATURATION: 100 % | HEIGHT: 63 IN | TEMPERATURE: 99 F | RESPIRATION RATE: 17 BRPM | WEIGHT: 160 LBS | DIASTOLIC BLOOD PRESSURE: 67 MMHG
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
## 2020-10-28 DIAGNOSIS — S39.012A STRAIN OF LUMBAR REGION, INITIAL ENCOUNTER: ICD-10-CM

## 2020-10-28 DIAGNOSIS — S70.02XA CONTUSION OF LEFT HIP, INITIAL ENCOUNTER: ICD-10-CM

## 2020-10-28 DIAGNOSIS — S09.90XA CLOSED HEAD INJURY, INITIAL ENCOUNTER: ICD-10-CM

## 2020-10-28 LAB
GLUCOSE BLD STRIP.AUTO-MCNC: 77 MG/DL (ref 65–100)
SERVICE CMNT-IMP: NORMAL

## 2020-10-28 PROCEDURE — 72125 CT NECK SPINE W/O DYE: CPT

## 2020-10-28 PROCEDURE — 82962 GLUCOSE BLOOD TEST: CPT

## 2020-10-28 PROCEDURE — 73502 X-RAY EXAM HIP UNI 2-3 VIEWS: CPT

## 2020-10-28 PROCEDURE — 72100 X-RAY EXAM L-S SPINE 2/3 VWS: CPT

## 2020-10-28 PROCEDURE — 74011250637 HC RX REV CODE- 250/637: Performed by: EMERGENCY MEDICINE

## 2020-10-28 PROCEDURE — 99285 EMERGENCY DEPT VISIT HI MDM: CPT

## 2020-10-28 PROCEDURE — 72192 CT PELVIS W/O DYE: CPT

## 2020-10-28 PROCEDURE — 70450 CT HEAD/BRAIN W/O DYE: CPT

## 2020-10-28 RX ORDER — ACETAMINOPHEN 500 MG
1000 TABLET ORAL ONCE
Status: COMPLETED | OUTPATIENT
Start: 2020-10-28 | End: 2020-10-28

## 2020-10-28 RX ADMIN — ACETAMINOPHEN 1000 MG: 500 TABLET ORAL at 20:28

## 2020-10-28 NOTE — ED NOTES
Assumed care of pt from triage. Pt is A&O x 4. Pt reports CC of stepping  on her cats tail and fell. Pt states did not LOC & the fall was unwitnessed. Pt reports hitting  Back of head on the refrigerator and reports having a headache. Pt reports recently being have gallbladder (removal) surgery 1 week ago and states she is currently on blood thinners (plavix).  Pt is on the monitor x 3, VSS at this time, will continue to monitor

## 2020-10-28 NOTE — ED NOTES
Report received from [facility]. Advised of patient's chief complaint, orders that are completed, any outstanding orders that still need to be completed, and the current treatment plan. All questions addressed prior to assumption of patient care at this time.

## 2020-10-28 NOTE — ED NOTES
Bedside and Verbal shift change report given to Shaniqua Campoverde  (oncoming nurse) by Janeen Kern  (offgoing nurse). Report included the following information SBAR, Kardex, ED Summary, [facility] and Recent Results.

## 2020-10-29 ENCOUNTER — PATIENT OUTREACH (OUTPATIENT)
Dept: CASE MANAGEMENT | Age: 80
End: 2020-10-29

## 2020-10-29 NOTE — DISCHARGE INSTRUCTIONS
Patient Education        Preventing Falls: Care Instructions  Your Care Instructions     Getting around your home safely can be a challenge if you have injuries or health problems that make it easy for you to fall. Loose rugs and furniture in walkways are among the dangers for many older people who have problems walking or who have poor eyesight. People who have conditions such as arthritis, osteoporosis, or dementia also have to be careful not to fall. You can make your home safer with a few simple measures. Follow-up care is a key part of your treatment and safety. Be sure to make and go to all appointments, and call your doctor if you are having problems. It's also a good idea to know your test results and keep a list of the medicines you take. How can you care for yourself at home? Taking care of yourself  · You may get dizzy if you do not drink enough water. To prevent dehydration, drink plenty of fluids, enough so that your urine is light yellow or clear like water. Choose water and other caffeine-free clear liquids. If you have kidney, heart, or liver disease and have to limit fluids, talk with your doctor before you increase the amount of fluids you drink. · Exercise regularly to improve your strength, muscle tone, and balance. Walk if you can. Swimming may be a good choice if you cannot walk easily. · Have your vision and hearing checked each year or any time you notice a change. If you have trouble seeing and hearing, you might not be able to avoid objects and could lose your balance. · Know the side effects of the medicines you take. Ask your doctor or pharmacist whether the medicines you take can affect your balance. Sleeping pills or sedatives can affect your balance. · Limit the amount of alcohol you drink. Alcohol can impair your balance and other senses. · Ask your doctor whether calluses or corns on your feet need to be removed.  If you wear loose-fitting shoes because of calluses or corns, you can lose your balance and fall. · Talk to your doctor if you have numbness in your feet. Preventing falls at home  · Remove raised doorway thresholds, throw rugs, and clutter. Repair loose carpet or raised areas in the floor. · Move furniture and electrical cords to keep them out of walking paths. · Use nonskid floor wax, and wipe up spills right away, especially on ceramic tile floors. · If you use a walker or cane, put rubber tips on it. If you use crutches, clean the bottoms of them regularly with an abrasive pad, such as steel wool. · Keep your house well lit, especially [facility], and outside walkways. Use night-lights in areas such as hallways and bathrooms. Add extra light switches or use remote switches (such as switches that go on or off when you clap your hands) to make it easier to turn lights on if you have to get up during the night. · Install sturdy handrails on stairways. · Move items in your cabinets so that the things you use a lot are on the lower shelves (about waist level). · Keep a cordless phone and a flashlight with new batteries by your bed. If possible, put a phone in each of the main rooms of your house, or carry a cell phone in case you fall and cannot reach a phone. Or, you can wear a device around your neck or wrist. You push a button that sends a signal for help. · Wear low-heeled shoes that fit well and give your feet good support. Use footwear with nonskid soles. Check the heels and soles of your shoes for wear. Repair or replace worn heels or soles. · Do not wear socks without shoes on wood floors. · Walk on the grass when the sidewalks are slippery. If you live in an area that gets snow and ice in the winter, sprinkle salt on slippery steps and sidewalks. Preventing falls in the bath  · Install grab bars and nonskid mats inside and outside your shower or tub and near the toilet and sinks. · Use shower chairs and bath benches.   · Use a hand-held shower head that will allow you to sit while showering. · Get into a tub or shower by putting the weaker leg in first. Get out of a tub or shower with your strong side first.  · Repair loose toilet seats and consider installing a raised toilet seat to make getting on and off the toilet easier. · Keep your bathroom door unlocked while you are in the shower. Where can you learn more? Go to http://www.deng.com/  Enter G117 in the search box to learn more about \"Preventing Falls: Care Instructions. \"  Current as of: April 15, 2020               Content Version: 12.6  © 8019-8306 [employer]. Care instructions adapted under license by [employer] (which disclaims liability or warranty for this information). If you have questions about a medical condition or this instruction, always ask your healthcare professional. [place] [address] any warranty or liability for your use of this information. Patient Education        Learning About a Closed Head Injury  What is a closed head injury? A closed head injury happens when your head gets hit hard. The strong force of the blow causes your brain to shake in your skull. This movement can cause the brain to bruise, swell, or tear. Sometimes nerves or blood vessels also get damaged. This can cause bleeding in or around the brain. A concussion is a type of closed head injury. What are the symptoms? If you have a mild concussion, you may have a mild headache or feel \"not quite right. \" These symptoms are common. They usually go away over a few days to 4 weeks. But sometimes after a concussion, you feel like you can't function as well as before the injury. And you have new symptoms. This is called postconcussive syndrome. You may:  · Find it harder to solve problems, think, concentrate, or remember. · Have headaches.   · Have changes in your sleep patterns, such as not being able to sleep or sleeping all the time.  · Have changes in your personality. · Not be interested in your usual activities. · Feel angry or anxious without a clear reason. · Lose your sense of taste or smell. · Be dizzy, lightheaded, or unsteady. It may be hard to stand or walk. How is a closed head injury treated? Any person who may have a concussion needs to see a doctor. Some people have to stay in the hospital to be watched. Others can go home safely. If you go home, follow your doctor's instructions. He or she will tell you if you need someone to watch you closely for the next 24 hours or longer. Rest is the best treatment. Get plenty of sleep at night. And try to rest during the day. · Avoid activities that are physically or mentally demanding. These include housework, exercise, and schoolwork. And don't play video games, send text messages, or use the computer. You may need to change your school or work schedule to be able to avoid these activities. · Ask your doctor when it's okay to drive, ride a bike, or operate machinery. · Take an over-the-counter pain medicine, such as acetaminophen (Tylenol), ibuprofen (Advil, Motrin), or naproxen (Aleve). Be safe with medicines. Read and follow all instructions on the label. · Check with your doctor before you use any other medicines for pain. · Do not drink alcohol or use illegal drugs. They can slow recovery. They can also increase your risk of getting a second head injury. Follow-up care is a key part of your treatment and safety. Be sure to make and go to all appointments, and call your doctor if you are having problems. It's also a good idea to know your test results and keep a list of the medicines you take. Where can you learn more? Go to http://www.gray.com/  Enter E235 in the search box to learn more about \"Learning About a Closed Head Injury. \"  Current as of: November 20, 2019               Content Version: 12.6  © 7505-7558 [employer]. Care instructions adapted under license by [employer] (which disclaims liability or warranty for this information). If you have questions about a medical condition or this instruction, always ask your healthcare professional. [address] any warranty or liability for your use of this information. Patient Education   Patient Education        Contusion: Care Instructions  Your Care Instructions     Contusion is the medical term for a bruise. It is the result of a direct blow or an impact, such as a fall. Contusions are common sports injuries. Most people think of a bruise as a black-and-blue spot. This happens when small blood vessels get torn and leak blood under the skin. But bones, muscles, and organs can also get bruised. This may damage deep tissues but not cause a bruise you can see. The doctor will do a physical exam to find the location of your contusion. You may also have tests to make sure you do not have a more serious injury, such as a broken bone or nerve damage. These may include X-rays or other imaging tests like a CT scan or MRI. Deep-tissue contusions may cause pain and swelling. But if there is no serious damage, they will often get better in a few weeks with home treatment. The doctor has checked you carefully, but problems can develop later. If you notice any problems or new symptoms, get medical treatment right away. Follow-up care is a key part of your treatment and safety. Be sure to make and go to all appointments, and call your doctor if you are having problems. It's also a good idea to know your test results and keep a list of the medicines you take. How can you care for yourself at home? · Put ice or a cold pack on the sore area for 10 to 20 minutes at a time to stop swelling. Put a thin cloth between the ice pack and your skin. · Be safe with medicines. Read and follow all instructions on the label.   ? If the doctor gave you a prescription medicine for pain, take it as prescribed. ? If you are not taking a prescription pain medicine, ask your doctor if you can take an over-the-counter medicine. · If you can, prop up the sore area on pillows as much as possible for the next few days. Try to keep the sore area above the level of your heart. When should you call for help? Call your doctor now or seek immediate medical care if:    · Your pain gets worse.     · You have new or worse swelling.     · You have tingling, weakness, or numbness in the area near the contusion.     · The area near the contusion is cold or pale. Watch closely for changes in your health, and be sure to contact your doctor if:    · You do not get better as expected. Where can you learn more? Go to http://www.gray.com/  Enter [id number] in the search box to learn more about \"Contusion: Care Instructions. \"  Current as of: June 26, 2019               Content Version: 12.6  © 6562-3328 [employer]. Care instructions adapted under license by [employer] (which disclaims liability or warranty for this information). If you have questions about a medical condition or this instruction, always ask your healthcare professional. [address] any warranty or liability for your use of this information. Back Strain: Care Instructions  Overview     A back strain happens when you overstretch, or pull, a muscle in your back. You may hurt your back in an accident or when you exercise or lift something. Sometimes you may not know how you hurt your back. Most back pain will get better with rest and time. You can take care of yourself at home to help your back heal.  Follow-up care is a key part of your treatment and safety. Be sure to make and go to all appointments, and call your doctor if you are having problems. It's also a good idea to know your test results and keep a list of the medicines you take.   How can you care for yourself at home? · Try to stay as active as you can, but stop or reduce any activity that causes pain. · Put ice or a cold pack on the sore muscle for 10 to 20 minutes at a time to stop swelling. Try this every 1 to 2 hours for 3 days (when you are awake) or until the swelling goes down. Put a thin cloth between the ice pack and your skin. · After 2 or 3 days, apply a heating pad on low or a warm cloth to your back. Some doctors suggest that you go back and forth between hot and cold treatments. · Take pain medicines exactly as directed. ? If the doctor gave you a prescription medicine for pain, take it as prescribed. ? If you are not taking a prescription pain medicine, ask your doctor if you can take an over-the-counter medicine. · Try sleeping on your side with a pillow between your legs. Or put a pillow under your knees when you lie on your back. These measures can ease pain in your lower back. · Return to your usual level of activity slowly. When should you call for help? Call 911 anytime you think you may need emergency care. For example, call if:    · You are unable to move a leg at all. Call your doctor now or seek immediate medical care if:    · You have new or worse symptoms in your legs, belly, or buttocks. Symptoms may include:  ? Numbness or tingling. ? Weakness. ? Pain.     · You lose bladder or bowel control. Watch closely for changes in your health, and be sure to contact your doctor if:    · You have a fever, lose weight, or don't feel well.     · You are not getting better as expected. Where can you learn more? Go to http://www.gray.com/  Enter X566 in the search box to learn more about \"Back Strain: Care Instructions. \"  Current as of: March 2, 2020               Content Version: 12.6  © 7518-2611 [employer].    Care instructions adapted under license by [employer] (which disclaims liability or warranty for this information). If you have questions about a medical condition or this instruction, always ask your healthcare professional. [place] [address] any warranty or liability for your use of this information.

## 2020-10-29 NOTE — PROGRESS NOTES
Patient contacted regarding recent discharge and COVID-19 risk. [facility] ED 10/28/20 dx-fall, closed head injury, contusion Lt hip, strain lumbar region    Discussed COVID-19 related testing which was not done at this time. Test results were not done. Patient informed of results, if available? N/a    Spoke with pt who stated she is sore and hurting, but otherwise okay. She will f/u with surgeon in two weeks and will call Dr Mario Alberto Humphreys as needed. Care Transition Nurse/ Ambulatory Care Manager/ LPN Care Coordinator contacted the patient by telephone to perform post discharge assessment. Verified name and  with patient as identifiers. Patient has following risk factors of: diabetes and HTN, older adult, CAD. CTN/ACM/LPN reviewed discharge instructions, medical action plan and red flags related to discharge diagnosis. Reviewed and educated them on any new and changed medications related to discharge diagnosis. Advised obtaining a 90-day supply of all daily and as-needed medications. Advance Care Planning:   Does patient have an Advance Directive: Pt stated she did not receive the AMD in the mail and would like another one mailed to her. Her daughter has her old one that needs to be updated. Education provided regarding infection prevention, and signs and symptoms of COVID-19 and when to seek medical attention with patient who verbalized understanding. Discussed exposure protocols and quarantine from [address] you at higher risk for severe illness  and given an opportunity for questions and concerns. The patient agrees to contact the COVID-19 hotline [phone] or PCP office for questions related to their healthcare. CTN/ACM/LPN provided contact information for future reference. From CDC: Are you at higher risk for severe illness?  Wash your hands often.  Avoid close contact (6 feet, which is about two arm lengths) with people who are sick.    Put distance between yourself and other people if COVID-19 is spreading in your community.  Clean and disinfect frequently touched surfaces.  Avoid all cruise travel and non-essential air travel.  Call your healthcare professional if you have concerns about COVID-19 and your underlying condition or if you are sick. For more information on steps you can take to protect yourself, see CDC's How to Protect Yourself      Patient/family/caregiver given information for [facility] and agrees to enroll no    Plan for follow-up call in 7-14 days based on severity of symptoms and risk factors.

## 2020-10-29 NOTE — ACP (ADVANCE CARE PLANNING)
Advance Care Planning:   Does patient have an Advance Directive: Pt stated she did not receive the AMD in the mail and would like another one mailed to her. Her daughter has her old one that needs to be updated. Another AMD was mailed to pt.

## 2020-10-31 NOTE — ED PROVIDER NOTES
EMERGENCY DEPARTMENT HISTORY AND PHYSICAL EXAM      Date: 10/28/2020  Patient Name: Stephane Araiza    History of Presenting Illness     Chief Complaint   Patient presents with    Fall     GLf in kitchen after stepping on cat. Pt fell on left hip and struck head on fridge. Pt has surgical hx in affected area and recent gallbladder removal last week       History Provided By: Patient    HPI: Stephane Araiza, 67 y.o. female with PMHx as noted below presents the emergency department for evaluation after a fall. Patient states that just prior to arrival she had stepped on her cat causing her to fall backwards. Patient notes she hit the back of her head on the refrigerator and fell onto her left side injuring her left hip and lower back. Patient otherwise notes mild dull aching pain in the lower back, left hip and head is been constant and worse with movement since the incident. Pain does not radiate. She is also noting some associated diffuse abdominal pain which she states has been present since recent cholecystectomy. There was no loss of consciousness, nausea, vomiting. PCP: Dipti Rousseau MD    Current Outpatient Medications   Medication Sig Dispense Refill    ondansetron (ZOFRAN ODT) 4 mg disintegrating tablet Take 1 Tab by mouth every eight (8) hours as needed for Nausea. 25 Tab 0    famotidine (PEPCID) 20 mg tablet TAKE 1 TABLET BY MOUTH EVERY NIGHT 90 Tab 3    pantoprazole (PROTONIX) 40 mg tablet TAKE 1 TABLET BY MOUTH DAILY 90 Tab 3    losartan (COZAAR) 100 mg tablet take 1 tablet by mouth once daily 90 Tab 3    azelastine (ASTELIN) 137 mcg (0.1 %) nasal spray instill 2 sprays into each nostril twice a day  0    atorvastatin (LIPITOR) 40 mg tablet Take  by mouth daily.  carvedilol (COREG) 6.25 mg tablet Take  by mouth two (2) times daily (with meals).  amLODIPine (NORVASC) 2.5 mg tablet Take  by mouth daily.  fenofibrate (LOFIBRA) 54 mg tablet Take  by mouth daily.       cyanocobalamin (VITAMIN B12) 500 mcg tablet Take 500 mcg by mouth daily.  ranolazine ER (RANEXA) 500 mg SR tablet Take 1 Tab by mouth two (2) times a day. 60 Tab 12    clopidogrel (PLAVIX) 75 mg tablet Take 1 Tab by mouth daily. 30 Tab 11    aspirin 81 mg Tab Take 81 mg by mouth daily.  MULTIVITAMINS W-MINERALS/LUT (CENTRUM SILVER PO) Take 1 Tab by mouth daily.  polyethylene glycol (MIRALAX) 17 gram/dose powder Take 17 g by mouth daily. 510 g 0    Janumet 50-1,000 mg per tablet TAKE 1 TABLET BY MOUTH TWICE A DAY WITH FOOD 180 Tab 3    montelukast (SINGULAIR) 10 mg tablet TAKE 1 TABLET BY MOUTH ONCE DAILY 90 Tab 3    glucose blood VI test strips (ASCENSIA AUTODISC VI, ONE TOUCH ULTRA TEST VI) strip One touch ultra test strips min---check fsbs bid 200 Strip 3    Insulin Needles, Disposable, (PEN NEEDLE) 31 gauge x 3/16\" ndle Use as directed once daily 100 Pen Needle 3    Blood-Glucose Meter monitoring kit One touch ultra mini glucometer--dx 250.00 1 Kit 0    Lancets misc Check fsbs bid -250.02 200 Each 3    nitroglycerin (NITROSTAT) 0.4 mg SL tablet 1 Tab by SubLINGual route every five (5) minutes as needed for Chest Pain. 25 Tab 3    insulin glargine (LANTUS SOLOSTAR) 100 unit/mL (3 mL) pen Inject 16 units at bedtime--Dose change 10/14/16--updated med list--did not send prescription to the pharmacy (Patient taking differently: 26 Units by SubCUTAneous route.  Inject 26 units at bedtime--Dose change reported by pt during med rec on 9/28/20) 15 mL 3       Past History     Past Medical History:  Past Medical History:   Diagnosis Date    Arthritis     Breast cancer ([facility] 2002    s/p lumpectomy and XRT    CAD (coronary artery disease)     Chronic kidney disease     stage III    Diabetes ([facility]     GERD (gastroesophageal reflux disease)     with hiatal hernia    Hypercholesterolemia     Hypertension     Liver disease     Long term current use of anticoagulant therapy     Neuropathy in diabetes ([facility]     DOMINGO (obstructive sleep apnea)     on CPAP       Past Surgical History:  Past Surgical History:   Procedure Laterality Date    BREAST SURGERY PROCEDURE UNLISTED  2001    right lumpectomy    CARDIAC SURG PROCEDURE UNLIST      cardiac cath; 3 stents placed    HX BREAST BIOPSY Left     years ago no scar seen    HX CARPAL TUNNEL RELEASE      b/l    HX CHOLECYSTECTOMY      HX HERNIA REPAIR      HX LUMBAR FUSION  July 2015    L4-L5    HX ORTHOPAEDIC      spinal fusion    VASCULAR SURGERY PROCEDURE UNLIST      right leg vein stripping       Family History:  Family History   Problem Relation Age of Onset    Diabetes Mother     Heart Disease Mother    Lindsay Huber Stroke Mother     Breast Cancer Mother 79    Diabetes Brother     Heart Disease Brother     Emphysema Father     Diabetes Daughter        Social History:  Social History     Tobacco Use    Smoking status: Never Smoker    Smokeless tobacco: Never Used   Substance Use Topics    Alcohol use: No     Alcohol/week: 0.0 standard drinks    Drug use: Yes     Types: Prescription, OTC       Allergies: Allergies   Allergen Reactions    Codeine Other (comments)     Jerking sensation    Livalo [Pitavastatin] Diarrhea    Niaspan [Niacin] Myalgia    Statins-Hmg-Coa Reductase Inhibitors Myalgia     Zocor, pravachol, Lipitor, crestor    Welchol [Colesevelam] Other (comments)     Nausea, bloating and malaise    Zetia [Ezetimibe] Myalgia         Review of Systems   Review of Systems  Constitutional: Negative for fever, chills, and fatigue. HENT: Negative for congestion, sore throat, rhinorrhea, sneezing and neck stiffness   Eyes: Negative for discharge and redness. Respiratory: Negative for  shortness of breath, wheezing   Cardiovascular: Negative for chest pain, palpitations   Gastrointestinal: Negative for nausea, vomiting, abdominal pain, constipation, diarrhea and blood in stool.    Genitourinary: Negative for dysuria, hematuria, flank pain, decreased urine volume, discharge,   Musculoskeletal: Negative for myalgias. Positive hip and back pain. Skin: Negative for rash or lesions . Neurological: Negative weakness, light-headedness, numbness. Positive headaches. Physical Exam   Physical Exam    GENERAL: alert and oriented, no acute distress  EYES: PEERL, No injection, discharge or icterus. ENT: Mucous membranes pink and moist.  NECK: Supple, there is some paraspinal muscle tenderness, no obvious midline tenderness or deformities. LUNGS: Airway patent. Non-labored respirations. Breath sounds clear with good air entry bilaterally. HEART: Regular rate and rhythm. No peripheral edema  ABDOMEN: Non-distended, mild diffuse tenderness, without guarding or rebound. Surgical incision sites. Healing well. SKIN:  warm, dry  MSK/EXTREMITIES: No swelling or deformity noted. She does have some mild tenderness over her lower back and left hip. She does have full range of motion. NEUROLOGICAL: Alert, oriented. Strength 5/5 bilateral upper and lower extremities, sensation intact and equal throughout to light touch. Diagnostic Study Results     Labs -   No results found for this or any previous visit (from the past 12 hour(s)). Radiologic Studies -   CT SPINE CERV WO CONT   Final Result   IMPRESSION: No fracture. Degenerative stenoses as described above. CT PELV WO CONT   Final Result   IMPRESSION:   No pelvic fracture. XR SPINE LUMB 2 OR 3 V   Final Result   IMPRESSION:   Moderate to severe degenerative disease. XR HIP LT W OR WO PELV 2-3 VWS   Final Result   IMPRESSION:    No displaced fracture. CT HEAD WO CONT   Final Result   IMPRESSION: No acute intracranial abnormality. CT Results  (Last 48 hours)    None        CXR Results  (Last 48 hours)    None            Medical Decision Making     IKen MD am the first provider for this patient and am the attending of record for this patient encounter.     I reviewed the vital signs, available nursing notes, past medical history, past surgical history, family history and social history. Vital Signs-Reviewed the patient's vital signs. Records Reviewed: Nursing Notes and Old Medical Records    Provider Notes (Medical Decision Making): On presentation, the patient is well appearing, in no acute distress with normal vital signs. Based on my history and exam the differential diagnosis for this patient includes closed head injury, intracranial hemorrhage, cervical spine fracture, lumbar spine fracture, hip fracture. Reviewed x-rays and ct scanning which showed no acute pathology on my interpretation. Reevaluated patient and observe her ambulating so feel confident that she is able to perform her ADLs. Both patient and daughter are comfortable with discharge at this time. I provided education on closed head injuries and when to seek further care. ED Course:   Initial assessment performed. The patients presenting problems have been discussed, and they are in agreement with the care plan formulated and outlined with them. I have encouraged them to ask questions as they arise throughout their visit. Medications   acetaminophen (TYLENOL) tablet 1,000 mg (1,000 mg Oral Given 10/28/20 2028)       PROGRESS  Corey Mendoza's  results have been reviewed with her. She has been counseled regarding her diagnosis. She verbally conveys understanding and agreement of the signs, symptoms, diagnosis, treatment and prognosis and additionally agrees to follow up as recommended with Dr. Anais Hardy MD in 24 - 48 hours. She also agrees with the care-plan and conveys that all of her questions have been answered.   I have also put together some discharge instructions for her that include: 1) educational information regarding their diagnosis, 2) how to care for their diagnosis at home, as well a 3) list of reasons why they would want to return to the ED prior to their follow-up appointment, should their condition change. Disposition:  home    PLAN:  1. Discharge Medication List as of 10/28/2020  9:39 PM        2. Follow-up Information     Follow up With Specialties Details Why Contact Info    Jory Allen MD Internal Medicine Schedule an appointment as soon as possible for a visit in 2 days  [address] [facility]  [phone]      [facility] EMERGENCY DEPT Emergency Medicine  If symptoms worsen [address]  [address] [facility]  [phone]        Return to ED if worse     Diagnosis     Clinical Impression:   1. Fall, initial encounter    2. Closed head injury, initial encounter    3. Contusion of left hip, initial encounter    4. Strain of lumbar region, initial encounter        Please note that this dictation was completed with Dragon, computer voice recognition software. Quite often unanticipated grammatical, syntax, homophones, and other interpretive errors are inadvertently transcribed by the computer software. Please disregard these errors. Additionally, please excuse any errors that have escaped final proofreading.

## 2020-11-13 ENCOUNTER — PATIENT OUTREACH (OUTPATIENT)
Dept: INTERNAL MEDICINE CLINIC | Age: 80
End: 2020-11-13

## 2020-11-13 NOTE — PROGRESS NOTES
Ambulatory Care Management Note      Date/Time:  11/13/2020 4:10 PM    This patient was received as a referral from ED [address] [place] [address] reviewed with the provider   -weakness and risk for fall  -nausea, diarrhea, decreased appetite  -risk of hypoglycemia and need for possible antihyperglycemia med dose changes         Ambulatory  contacted patient for discussion and case management of right flank pain (resolved after GB removed), T2D, ACP   Summary of patients top problems:   1. Decreased appetite due to nausea and diarrhea  2. Weakness and risk for fall   3.  Risk of hypoglycemia due to decreased appetite  Patient's challenges to self management identified:   inability to attend provider appt due to feeling poorly    Medication Management:  good adherence, good understanding and experiencing side effects-possibly experiencing side effects of Janumet    Advance Care Planning:   Does patient have an Advance Directive:  yes; reviewed and needs to be updated -pt received both documents that were mailed to her  [employer], Referrals, and Durable Medical Equipment: none    PCP/Specialist follow up:   Future Appointments   Date Time Provider Santiago Perez   12/15/2020  2:15 PM Bea Gonzalez MD [facility] BS AMB      Goals      Coordinate pain management plan for patient-rt flank pain (pt-stated)      11/13/20-dkw  -on 10/21/20, pt has Laparoscopic cholecystectomy with cholangiogram at [facility]; incisions are healed and right flank pain has resolved; then on 10/28/20 pt went to the ED for a GLF after she stepped on her cat-her bruises are healing  -pt had to cancel post op appt w/surgeon due to has been feeling weak with diarrhea, nausea, constipation; this happens mostly after she takes her evening dose of Janumet 50/1000 mg; sometimes she skips her evening dose of Janumet  -pt is not taking Miralax or Norco  -pt also take Lantus 26 units at bedtime; discharge AVS states 16 units  -fasting blood sugars are  and bedtime are around 130  -she believes she has lost weight  -Dr Jose Heller is her GI; she stated she will call him   -pt received the AMD in the mail and will fill it out-discussed activating My Chart and sending the document back that way  -discussed follow up appt with Dr Piter Montero on 12/15/20 at 2:15 and rescheduling post op appt w/surgeon  -chart was routed to Dr Piter Montero        10/14/20-dkw  -it is noted that pt's imaging (U/S and CT) revealed gall stones; pt has been notified by Dr Piter Montero and [id number]  -will follow as needed, per pt outreach to this ACM, and around follow up with Dr Piter Montero in December    Future Appointments   Date Time Provider Santiago Perez   12/15/2020  2:15 PM Kanika Song MD [facility] BS AMB      10/12/20-dkw  -manage rt side pain in order to be able to be active again  -continue bland diet for now; pt thinks has gall stones  -have U/S of abd, CT of pelvis and abd [facility]  -continue meds as prescribed  -updated AMD  -call [address] at [phone] as needed; pt declined further follow from this AC unless diagnostic tests are abnormal tomorrow  -will follow diagnostic tests          Patient verbalized understanding of all information discussed. Patient has this Ambulatory Care Manager's contact information for any further questions, concerns, or needs.

## 2020-11-13 NOTE — PROGRESS NOTES
10/14/20-dkw  -it is noted that pt's imaging (U/S and CT) revealed gall stones; pt has been notified by Dr Jimenez Siu and has been referred to [facility] general surgeon  -will follow as needed, per pt outreach to this ACM, and around follow up with Dr Jimenez Siu in December    Future Appointments   Date Time Provider Santiago Perez   12/15/2020  2:15 PM Bea Gonzalez MD [facility] BS AMB      10/12/20-dkw  -manage rt side pain in order to be able to be active again  -continue bland diet for now; pt thinks has gall stones  -have U/S of abd, CT of pelvis and abd done tomorrow 10/13/20 at [facility]  -continue meds as prescribed  -updated AMD  -call Lis Babcock at [phone] as needed; pt declined further follow from this AC unless diagnostic tests are abnormal tomorrow  -will follow diagnostic tests

## 2020-11-17 ENCOUNTER — OFFICE VISIT (OUTPATIENT)
Dept: SURGERY | Age: 80
End: 2020-11-17
Payer: MEDICARE

## 2020-11-17 VITALS
HEART RATE: 91 BPM | BODY MASS INDEX: 28.99 KG/M2 | RESPIRATION RATE: 18 BRPM | HEIGHT: 63 IN | SYSTOLIC BLOOD PRESSURE: 116 MMHG | OXYGEN SATURATION: 97 % | TEMPERATURE: 97.5 F | WEIGHT: 163.6 LBS | DIASTOLIC BLOOD PRESSURE: 70 MMHG

## 2020-11-17 DIAGNOSIS — Z09 POSTOPERATIVE EXAMINATION: Primary | ICD-10-CM

## 2020-11-17 PROCEDURE — 99024 POSTOP FOLLOW-UP VISIT: CPT | Performed by: SURGERY

## 2020-11-17 RX ORDER — MONTELUKAST SODIUM 4 MG/1
2 TABLET, CHEWABLE ORAL DAILY
Qty: 60 TAB | Refills: 5 | Status: SHIPPED | OUTPATIENT
Start: 2020-11-17

## 2020-11-17 NOTE — PROGRESS NOTES
Received: Today 11/17/20   Message Contents   MD Paris Gaines, RN    Caller: Unspecified (4 days ago,  3:00 PM)               She may have post yousuf diarrhea. I escribed colestid tabs which might prevent the diarrhea. She can hold the janumet  for 1 week and see if diarrhea and GI sxs get better on colestid and can restart janumet next week if doing better      Pt stated she has not taken the evening Janumet for the past three days due to more of a problem with diarrhea and abd pain with the evening dose. Blood sugars are not effected much per pt. Pt will totally stop the Janumet for a week and start the cholestid. Advised pt colestid has been sent to her pharmacy. Pt stated she just left the surgeons office and everything is okay from his perspective. Pt has not called GI yet. Informed pt that her GI symptoms could be from her GB surgery. Will follow up with pt in a week. Pt will call with any questions or concerns.

## 2020-11-17 NOTE — Clinical Note
11/25/20 Patient: Albina Apodaca YOB: 1940 Date of Visit: 11/17/2020 Papo Malone MD 
[address] [facility] [address] [address] VIA In Basket Dear Papo Malone MD, Thank you for referring Ms. Osman Santiago to [address] for evaluation. My notes for this consultation are attached. If you have questions, please do not hesitate to call me. I look forward to following your patient along with you.  
 
 
Sincerely, 
 
Lelo Flores MD

## 2020-11-17 NOTE — PROGRESS NOTES
Chief Complaint   Patient presents with    Surgical Follow-up     GB surgery 10/21/20       1. Have you been to the ER, urgent care clinic since your last visit? Hospitalized since your last visit? No    2. Have you seen or consulted any other health care providers outside of the [address] [place] [address] since your last visit? Include any pap smears or colon screening.  No

## 2020-11-24 ENCOUNTER — PATIENT OUTREACH (OUTPATIENT)
Dept: INTERNAL MEDICINE CLINIC | Age: 80
End: 2020-11-24

## 2020-11-24 RX ORDER — CHOLESTYRAMINE 4 G/5G
1 POWDER, FOR SUSPENSION ORAL 2 TIMES DAILY WITH MEALS
Qty: 210 G | Refills: 5 | Status: SHIPPED | OUTPATIENT
Start: 2020-11-24 | End: 2021-07-28

## 2020-11-24 NOTE — PROGRESS NOTES
11/24/20-dkw  -pt called stating since she stopped the Janumet last week her stomach better-no more diarrhea, but now blood sugar is haywire; pt stated she has never had a blood sugar >300 and is having headaches now  -pt had a formed brownish stool this morning  -she has not started the Colestid due to pharmacy had to order  -blood sugars are as follows:  Date fasting  Bedtime-takes Lantus 26 units   Tuesday-today 11/24/20 146     Monday-11/23 194  296-took 28 units of Lantus   Sunday-11/22 190  362-took 30 units Lantus   Saturday-11/21 172  265   Friday-11/20 155  249   Thursday-11/19 146  222   Wednesday-11/18 146  162   -pt will restart the Janumet with lunch today; tomorrow she will take with breakfast and dinner; if diarrhea/GI issues return, she will take only in the morning  -pt does not have the extended release formulation of Janumet, but has 1.5 bottles on hand of her immediate release Janumet 50/1000 mg twice daily  Key Antihyperglycemic Medications               Janumet 50-1,000 mg per tablet TAKE 1 TABLET BY MOUTH TWICE A DAY WITH FOOD    insulin glargine (LANTUS SOLOSTAR) 100 unit/mL (3 mL) pen Inject 16 units at bedtime--Dose change 10/14/16--updated med list--did not send prescription to the pharmacy        -pt will hold the colestid at this time  -pt asked for a refill of glucometer test strips, which were sent to her  in [place]  -chart routed to Dr Arcos Form  -pt will call with any questions or concerns  -will follow    11/17/20-dkw  Note from Dr Arcos Form:  She may have post yousuf diarrhea. I escribed colestid tabs which might prevent the diarrhea. She can hold the janumet  for 1 week and see if diarrhea and GI sxs get better on colestid and can restart janumet next week if doing better    -Pt stated she has not taken the evening Janumet for the past three days due to more of a problem with diarrhea and abd pain with the evening dose. Blood sugars are not affected much per pt.    -Pt will totally stop the Janumet for a week and start the cholestid. Advised pt colestid has been sent to her pharmacy.   -Pt stated she just left the surgeons office and everything is okay from his perspective. -Pt has not called GI yet. Informed pt that her GI symptoms could be from her GB surgery.   -Will follow up with pt in a week. Pt will call with any questions or concerns.

## 2020-11-25 NOTE — PROGRESS NOTES
To: Simona Doll MD  From: Arely Yeung MD    Thank you for referring Taurus Mcgregor. Encounter Date: 11/17/2020    Subjective:      Tico Jessica is a 67 y.o. female presents for postop care. Has no complaints today. Preoperative symptoms are resolved. Eating a regular diet without difficulty. Bowel movements are regular. Objective:     General:  alert, cooperative, no distress, appears stated age   Abdomen: soft, bowel sounds active, non-tender   Incision(s):  healing well, no drainage, no erythema, no hernia, no seroma, no swelling, no dehiscence, incision well approximated. Assessment:     S/p laparoscopic cholecystectomy. Pathology revealed mild chronic cholecystitis. No CBD filling defects on intraoperative cholangiogram.   Doing well postoperatively. Plan:     Patient understands no further follow-up required. Told to call for any concerns.       Arely Yeung MD

## 2020-11-27 ENCOUNTER — PATIENT OUTREACH (OUTPATIENT)
Dept: INTERNAL MEDICINE CLINIC | Age: 80
End: 2020-11-27

## 2020-11-27 DIAGNOSIS — I10 ESSENTIAL HYPERTENSION: Primary | ICD-10-CM

## 2020-11-27 RX ORDER — LOSARTAN POTASSIUM 100 MG/1
TABLET ORAL
Qty: 90 TAB | Refills: 3 | Status: SHIPPED | OUTPATIENT
Start: 2020-11-27 | End: 2021-12-02

## 2020-11-27 NOTE — TELEPHONE ENCOUNTER
Pt left voice message mid afternoon that she just realized she does not have any refills left on her losartan 100 mg daily. Refill was pended to Dr Oscar Mehta. Pt notified, and she may ask pharmacy for enough medication to get through the weekend.

## 2020-12-15 ENCOUNTER — VIRTUAL VISIT (OUTPATIENT)
Dept: INTERNAL MEDICINE CLINIC | Age: 80
End: 2020-12-15
Payer: MEDICARE

## 2020-12-15 DIAGNOSIS — I25.10 CORONARY ARTERY DISEASE INVOLVING NATIVE CORONARY ARTERY OF NATIVE HEART WITHOUT ANGINA PECTORIS: ICD-10-CM

## 2020-12-15 DIAGNOSIS — Z00.00 MEDICARE ANNUAL WELLNESS VISIT, SUBSEQUENT: ICD-10-CM

## 2020-12-15 DIAGNOSIS — I10 ESSENTIAL HYPERTENSION: ICD-10-CM

## 2020-12-15 DIAGNOSIS — Z79.4 CONTROLLED TYPE 2 DIABETES MELLITUS WITH MICROALBUMINURIA, WITH LONG-TERM CURRENT USE OF INSULIN (HCC): Primary | ICD-10-CM

## 2020-12-15 DIAGNOSIS — R80.9 CONTROLLED TYPE 2 DIABETES MELLITUS WITH MICROALBUMINURIA, WITH LONG-TERM CURRENT USE OF INSULIN (HCC): Primary | ICD-10-CM

## 2020-12-15 DIAGNOSIS — E78.00 PURE HYPERCHOLESTEROLEMIA: ICD-10-CM

## 2020-12-15 DIAGNOSIS — E11.29 CONTROLLED TYPE 2 DIABETES MELLITUS WITH MICROALBUMINURIA, WITH LONG-TERM CURRENT USE OF INSULIN (HCC): Primary | ICD-10-CM

## 2020-12-15 DIAGNOSIS — E11.21 DIABETIC NEPHROPATHY ASSOCIATED WITH TYPE 2 DIABETES MELLITUS (HCC): ICD-10-CM

## 2020-12-15 PROCEDURE — G8536 NO DOC ELDER MAL SCRN: HCPCS | Performed by: INTERNAL MEDICINE

## 2020-12-15 PROCEDURE — G0439 PPPS, SUBSEQ VISIT: HCPCS | Performed by: INTERNAL MEDICINE

## 2020-12-15 PROCEDURE — G8432 DEP SCR NOT DOC, RNG: HCPCS | Performed by: INTERNAL MEDICINE

## 2020-12-15 PROCEDURE — G8399 PT W/DXA RESULTS DOCUMENT: HCPCS | Performed by: INTERNAL MEDICINE

## 2020-12-15 PROCEDURE — G8427 DOCREV CUR MEDS BY ELIG CLIN: HCPCS | Performed by: INTERNAL MEDICINE

## 2020-12-15 PROCEDURE — G8756 NO BP MEASURE DOC: HCPCS | Performed by: INTERNAL MEDICINE

## 2020-12-15 PROCEDURE — G8417 CALC BMI ABV UP PARAM F/U: HCPCS | Performed by: INTERNAL MEDICINE

## 2020-12-15 PROCEDURE — 99214 OFFICE O/P EST MOD 30 MIN: CPT | Performed by: INTERNAL MEDICINE

## 2020-12-15 PROCEDURE — 1090F PRES/ABSN URINE INCON ASSESS: CPT | Performed by: INTERNAL MEDICINE

## 2020-12-15 PROCEDURE — 1101F PT FALLS ASSESS-DOCD LE1/YR: CPT | Performed by: INTERNAL MEDICINE

## 2020-12-15 PROCEDURE — G0463 HOSPITAL OUTPT CLINIC VISIT: HCPCS | Performed by: INTERNAL MEDICINE

## 2020-12-15 RX ORDER — AZELASTINE 1 MG/ML
SPRAY, METERED NASAL
Qty: 1 BOTTLE | Refills: 0 | Status: SHIPPED | OUTPATIENT
Start: 2020-12-15 | End: 2021-02-03

## 2020-12-15 NOTE — PROGRESS NOTES
HISTORY OF PRESENT ILLNESS  Rikki Youngblood is a 67 y.o. female. HPI   Rikki Youngblood is a 67 y.o. female being evaluated by a Virtual Visit (video visit) encounter to address concerns as mentioned above. A caregiver was present when appropriate. Due to this being a TeleHealth encounter (During TKIBN-06 public health emergency), evaluation of the following organ systems was limited: Vitals/Constitutional/EENT/Resp/CV/GI//MS/Neuro/Skin/Heme-Lymph-Imm. Pursuant to the emergency declaration under the [facility], [address] [place] [address] authority and the [employer] Act, this Virtual Visit was conducted with patient's (and/or legal guardian's) consent, to reduce the risk of exposure to COVID-19 and provide necessary medical care. Services were provided through a video synchronous discussion virtually to substitute for in-person encounter. --Mathieu Loza MD on 12/15/2020 at 2:20 PM    An electronic signature was used to authenticate this note. F/u DM-2 with microalbuminuria htn hld cad fatty liver and medicare wellness--------------  S/p LapChole for symptomatic gallstones since last OV-Dr Pedro Combs  Had diarrhea after sx for janumet was held x 1 week but then fsbs 200-300 a night--restarted janumet  colestid was escribed   last a1c 6. 8LDL 72  Has been referred to nephrologist for microalbuminurua  Home BP wnl per /  No cp or angina  Has some neuropathy in hands and feet-stopped lyrica  Last OV    Last a1c 6.4 LDL 86   Takes lantus-24 units  No longer sees Dr Sukhdeep Fox  fsbs--132-156 sometimes >200  Home BP-none  Chest pain-none  [facility]     lyrica helps her neuorpathy in feet       Patient Active Problem List    Diagnosis Date Noted    History of breast cancer 04/20/2020    Type 2 diabetes with nephropathy ([facility] [address] 08/10/2018    Fatty liver 02/23/2018    Iron deficiency anemia 12/01/2017    Osteopenia of left thigh 04/02/2017    Advanced care planning/counseling discussion 12/17/2015    Hiatal hernia 05/15/2012    CAD (coronary artery disease) 05/15/2012    Type 2 diabetes, controlled, with neuropathy ([facility] [address] 11/16/2009    Essential hypertension, benign 11/16/2009    Pure hypercholesterolemia 11/16/2009    DJD (degenerative joint disease) 11/16/2009    DOMINGO (obstructive sleep apnea) 11/16/2009     Current Outpatient Medications   Medication Sig Dispense Refill    losartan (COZAAR) 100 mg tablet take 1 tablet by mouth once daily 90 Tab 3    glucose blood VI test strips (ASCENSIA AUTODISC VI, ONE TOUCH ULTRA TEST VI) strip One touch ultra test strips---check fsbs tid. Use 1 test stirp to check fasting blood sugar three times daily for dx of diabetes E11.9 300 Strip 3    colestipoL (Colestid) 1 gram tablet Take 2 Tabs by mouth daily. 60 Tab 5    ondansetron (ZOFRAN ODT) 4 mg disintegrating tablet Take 1 Tab by mouth every eight (8) hours as needed for Nausea. 25 Tab 0    famotidine (PEPCID) 20 mg tablet TAKE 1 TABLET BY MOUTH EVERY NIGHT 90 Tab 3    pantoprazole (PROTONIX) 40 mg tablet TAKE 1 TABLET BY MOUTH DAILY 90 Tab 3    Janumet 50-1,000 mg per tablet TAKE 1 TABLET BY MOUTH TWICE A DAY WITH FOOD 180 Tab 3    montelukast (SINGULAIR) 10 mg tablet TAKE 1 TABLET BY MOUTH ONCE DAILY 90 Tab 3    atorvastatin (LIPITOR) 40 mg tablet Take  by mouth daily.  Insulin Needles, Disposable, (PEN NEEDLE) 31 gauge x 3/16\" ndle Use as directed once daily 100 Pen Needle 3    Blood-Glucose Meter monitoring kit One touch ultra mini glucometer--dx 250.00 1 Kit 0    Lancets misc Check fsbs bid -250.02 200 Each 3    nitroglycerin (NITROSTAT) 0.4 mg SL tablet 1 Tab by SubLINGual route every five (5) minutes as needed for Chest Pain. 25 Tab 3    carvedilol (COREG) 6.25 mg tablet Take  by mouth two (2) times daily (with meals).  amLODIPine (NORVASC) 2.5 mg tablet Take  by mouth daily.       insulin glargine (LANTUS SOLOSTAR) 100 unit/mL (3 mL) pen Inject 16 units at bedtime--Dose change 10/14/16--updated med list--did not send prescription to the pharmacy (Patient taking differently: 16 Units by SubCUTAneous route. Inject up to 26 units at bedtime--Dose change reported by pt during med rec on 9/28/20) 15 mL 3    fenofibrate (LOFIBRA) 54 mg tablet Take  by mouth daily.  cyanocobalamin (VITAMIN B12) 500 mcg tablet Take 500 mcg by mouth daily.  ranolazine ER (RANEXA) 500 mg SR tablet Take 1 Tab by mouth two (2) times a day. 60 Tab 12    clopidogrel (PLAVIX) 75 mg tablet Take 1 Tab by mouth daily. 30 Tab 11    aspirin 81 mg Tab Take 81 mg by mouth daily.  MULTIVITAMINS W-MINERALS/LUT (CENTRUM SILVER PO) Take 1 Tab by mouth daily.  azelastine (ASTELIN) 137 mcg (0.1 %) nasal spray instill 2 sprays into each nostril twice a day 1 Bottle 0    Cholestyramine-Aspartame (Questran Light) 4 gram powder Take 1 g by mouth two (2) times daily (with meals).  210 g 5     Allergies   Allergen Reactions    Codeine Other (comments)     Jerking sensation    Livalo [Pitavastatin] Diarrhea    Niaspan [Niacin] Myalgia    Statins-Hmg-Coa Reductase Inhibitors Myalgia     Zocor, pravachol, Lipitor, crestor    Welchol [Colesevelam] Other (comments)     Nausea, bloating and malaise    Zetia [Ezetimibe] Myalgia      Lab Results   Component Value Date/Time    WBC 6.8 09/27/2020 04:10 PM    HGB 11.5 09/27/2020 04:10 PM    HCT 35.5 09/27/2020 04:10 PM    Hematocrit (POC) 33 (L) 04/16/2019 05:17 PM    PLATELET 358 33/50/0932 04:10 PM    MCV 89.0 09/27/2020 04:10 PM     Lab Results   Component Value Date/Time    Hemoglobin A1c 6.8 (H) 06/18/2020 01:44 PM    Hemoglobin A1c 6.4 (H) 11/01/2019 11:16 AM    Hemoglobin A1c 6.2 (H) 03/29/2019 12:40 PM    Hemoglobin A1c, External 6.3 04/30/2016    Glucose 161 (H) 09/27/2020 04:10 PM    Glucose (POC) 77 10/28/2020 09:45 PM    Microalb/Creat ratio (ug/mg creat.) 666 (H) 06/18/2020 01:44 PM    LDL, calculated 72 06/18/2020 01:44 PM    Creatinine (POC) 1.0 04/16/2019 05:17 PM    Creatinine 1.60 (H) 09/27/2020 04:10 PM      Lab Results   Component Value Date/Time    Cholesterol, total 162 06/18/2020 01:44 PM    Cholesterol (POC) 195 08/11/2011 10:08 AM    HDL Cholesterol 41 06/18/2020 01:44 PM    LDL, calculated 72 06/18/2020 01:44 PM    LDL Cholesterol (POC) 113 08/11/2011 10:08 AM    LDL-C, External 104 05/02/2016    Triglyceride 243 (H) 06/18/2020 01:44 PM    Triglycerides (POC) 256 08/11/2011 10:08 AM    CHOL/HDL Ratio 5.5 (H) 01/19/2016 12:42 PM     Lab Results   Component Value Date/Time    GFR est non-AA 31 (L) 09/27/2020 04:10 PM    GFRNA, POC 54 (L) 04/16/2019 05:17 PM    GFR est AA 38 (L) 09/27/2020 04:10 PM    GFRAA, POC >60 04/16/2019 05:17 PM    Creatinine 1.60 (H) 09/27/2020 04:10 PM    Creatinine (POC) 1.0 04/16/2019 05:17 PM    BUN 40 (H) 09/27/2020 04:10 PM    BUN (POC) 18 04/16/2019 05:17 PM    Sodium 137 09/27/2020 04:10 PM    Sodium (POC) 138 04/16/2019 05:17 PM    Potassium 4.2 09/27/2020 04:10 PM    Potassium (POC) 4.2 04/16/2019 05:17 PM    Chloride 105 09/27/2020 04:10 PM    Chloride (POC) 102 04/16/2019 05:17 PM    CO2 27 09/27/2020 04:10 PM    Magnesium 1.5 (L) 01/19/2016 12:42 PM        ROS    Physical Exam    ASSESSMENT and PLAN  Diagnoses and all orders for this visit:    1. Controlled type 2 diabetes mellitus with microalbuminuria, with long-term current use of insulin (HCC)  -     HEMOGLOBIN A1C WITH EAG  -     METABOLIC PANEL, COMPREHENSIVE    2. Essential hypertension  -     METABOLIC PANEL, COMPREHENSIVE    3. Pure hypercholesterolemia  -     METABOLIC PANEL, COMPREHENSIVE    4. Coronary artery disease involving native coronary artery of native heart without angina pectoris  -     METABOLIC PANEL, COMPREHENSIVE    5.  Diabetic nephropathy associated with type 2 diabetes mellitus (HCC)  -     METABOLIC PANEL, COMPREHENSIVE           This is the Subsequent Medicare Annual Wellness Exam, performed 12 months or more after the Initial AWV or the last Subsequent AWV    I have reviewed the patient's medical history in detail and updated the computerized patient record. Depression Risk Factor Screening:     3 most recent PHQ Screens 10/16/2020   Little interest or pleasure in doing things Not at all   Feeling down, depressed, irritable, or hopeless Not at all   Total Score PHQ 2 0       Alcohol Risk Screen   Do you average more than 1 drink per night or more than 7 drinks a week:  No    On any one occasion in the past three months have you have had more than 3 drinks containing alcohol:  No        Functional Ability and Level of Safety:   Hearing: Hearing is good. Activities of Daily Living: The home contains: handrails and grab bars  Patient needs help with:  transportation     Ambulation: with no difficulty     Fall Risk:  Fall Risk Assessment, last 12 mths 10/16/2020   Able to walk? Yes   Fall in past 12 months? No   Fall with injury? -     Abuse Screen:  Patient is not abused       Cognitive Screening   Has your family/caregiver stated any concerns about your memory: no    Cognitive Screening: Normal - serial 3    Assessment/Plan   Education and counseling provided:  Are appropriate based on today's review and evaluation  tdap and shingrix recommended    Diagnoses and all orders for this visit:    1. Controlled type 2 diabetes mellitus with microalbuminuria, with long-term current use of insulin (HCC)  -     HEMOGLOBIN A1C WITH EAG  -     METABOLIC PANEL, COMPREHENSIVE   Consider changing janumet to xr 2 every day  or one bid due to diarrhea  2. Essential hypertension  -     METABOLIC PANEL, COMPREHENSIVE   controlled  3. Pure hypercholesterolemia  -     METABOLIC PANEL, COMPREHENSIVE   LDL at goal  4.  Coronary artery disease involving native coronary artery of native heart without angina pectoris  -     METABOLIC PANEL, COMPREHENSIVE   No cp    Fu Dr Barron Banks  5.  Diabetic nephropathy associated with type 2 diabetes mellitus ([facility] [address]  -     METABOLIC PANEL, COMPREHENSIVE   Will see renal MD again prn      Health Maintenance Due     Health Maintenance Due   Topic Date Due    DTaP/Tdap/Td series (1 - Tdap) 12/23/1962    Shingrix Vaccine Age 50> (1 of 2) 12/23/1991    Foot Exam Q1  04/11/2020    Medicare Yearly Exam  04/12/2020    A1C test (Diabetic or Prediabetic)  12/18/2020       Patient Care Team   Patient Care Team:  Xuan Sheehan MD as PCP - General  X[facility]  Rainer Mujica MD as Consulting Provider (Endocrinology)  Katerin Frank MD (Neurology)  Danielle Ross MD (Gastroenterology)  Rebecca Schultz MD (Ophthalmology)  Lakisha Bui MD (Neurosurgery)  Matteo Vance MD as Physician (Sleep Medicine)  Maikol Espinoza RN as Ambulatory Care Manager  Xuan Sheehan MD as Referring Provider (Internal Medicine)  Guillermo Owen MD as Surgeon (General Surgery)  Xuan Sheehan MD as Referring Provider (Internal Medicine)    History     Patient Active Problem List   Diagnosis Code    Type 2 diabetes, controlled, with neuropathy ([facility] [address] E11.40    Essential hypertension, benign I10    Pure hypercholesterolemia E78.00    DJD (degenerative joint disease) M19.90    DOMINGO (obstructive sleep apnea) G47.33    Hiatal hernia K44.9    CAD (coronary artery disease) I25.10    Advanced care planning/counseling discussion Z71.89    Osteopenia of left thigh M85.852    Iron deficiency anemia D50.9    Fatty liver K76.0    Type 2 diabetes with nephropathy ([facility] [address] E11.21    History of breast cancer Z85.3     Past Medical History:   Diagnosis Date    Arthritis     Breast cancer ([facility] [address] 2002    s/p lumpectomy and XRT    CAD (coronary artery disease)     Chronic kidney disease     stage III    GERD (gastroesophageal reflux disease)     with hiatal hernia    Hypercholesterolemia     Hypertension     Liver disease     Long term current use of anticoagulant therapy     Neuropathy in diabetes ([facility] [address]     DOMINGO (obstructive sleep apnea)     on CPAP      Past Surgical History:   Procedure Laterality Date    BREAST SURGERY PROCEDURE UNLISTED  2001    right lumpectomy    CARDIAC SURG PROCEDURE UNLIST      cardiac cath; 3 stents placed    HX BREAST BIOPSY Left     years ago no scar seen    HX CARPAL TUNNEL RELEASE      b/l    HX CHOLECYSTECTOMY  10/21/2020    Dr. Radha Garcia HX LUMBAR FUSION  July 2015    L4-L5    HX ORTHOPAEDIC      spinal fusion    VASCULAR SURGERY PROCEDURE UNLIST      right leg vein stripping     Current Outpatient Medications   Medication Sig Dispense Refill    losartan (COZAAR) 100 mg tablet take 1 tablet by mouth once daily 90 Tab 3    glucose blood VI test strips (ASCENSIA AUTODISC VI, ONE TOUCH ULTRA TEST VI) strip One touch ultra test strips---check fsbs tid. Use 1 test stirp to check fasting blood sugar three times daily for dx of diabetes E11.9 300 Strip 3    colestipoL (Colestid) 1 gram tablet Take 2 Tabs by mouth daily. 60 Tab 5    ondansetron (ZOFRAN ODT) 4 mg disintegrating tablet Take 1 Tab by mouth every eight (8) hours as needed for Nausea. 25 Tab 0    famotidine (PEPCID) 20 mg tablet TAKE 1 TABLET BY MOUTH EVERY NIGHT 90 Tab 3    pantoprazole (PROTONIX) 40 mg tablet TAKE 1 TABLET BY MOUTH DAILY 90 Tab 3    Janumet 50-1,000 mg per tablet TAKE 1 TABLET BY MOUTH TWICE A DAY WITH FOOD 180 Tab 3    montelukast (SINGULAIR) 10 mg tablet TAKE 1 TABLET BY MOUTH ONCE DAILY 90 Tab 3    atorvastatin (LIPITOR) 40 mg tablet Take  by mouth daily.       Insulin Needles, Disposable, (PEN NEEDLE) 31 gauge x 3/16\" ndle Use as directed once daily 100 Pen Needle 3    Blood-Glucose Meter monitoring kit One touch ultra mini glucometer--dx 250.00 1 Kit 0    Lancets misc Check fsbs bid -250.02 200 Each 3    nitroglycerin (NITROSTAT) 0.4 mg SL tablet 1 Tab by SubLINGual route every five (5) minutes as needed for Chest Pain. 25 Tab 3    carvedilol (COREG) 6.25 mg tablet Take  by mouth two (2) times daily (with meals).  amLODIPine (NORVASC) 2.5 mg tablet Take  by mouth daily.  insulin glargine (LANTUS SOLOSTAR) 100 unit/mL (3 mL) pen Inject 16 units at bedtime--Dose change 10/14/16--updated med list--did not send prescription to the pharmacy (Patient taking differently: 16 Units by SubCUTAneous route. Inject up to 26 units at bedtime--Dose change reported by pt during med rec on 9/28/20) 15 mL 3    fenofibrate (LOFIBRA) 54 mg tablet Take  by mouth daily.  cyanocobalamin (VITAMIN B12) 500 mcg tablet Take 500 mcg by mouth daily.  ranolazine ER (RANEXA) 500 mg SR tablet Take 1 Tab by mouth two (2) times a day. 60 Tab 12    clopidogrel (PLAVIX) 75 mg tablet Take 1 Tab by mouth daily. 30 Tab 11    aspirin 81 mg Tab Take 81 mg by mouth daily.  MULTIVITAMINS W-MINERALS/LUT (CENTRUM SILVER PO) Take 1 Tab by mouth daily.  azelastine (ASTELIN) 137 mcg (0.1 %) nasal spray instill 2 sprays into each nostril twice a day 1 Bottle 0    Cholestyramine-Aspartame (Questran Light) 4 gram powder Take 1 g by mouth two (2) times daily (with meals).  210 g 5     Allergies   Allergen Reactions    Codeine Other (comments)     Jerking sensation    Livalo [Pitavastatin] Diarrhea    Niaspan [Niacin] Myalgia    Statins-Hmg-Coa Reductase Inhibitors Myalgia     Zocor, pravachol, Lipitor, crestor    Welchol [Colesevelam] Other (comments)     Nausea, bloating and malaise    Zetia [Ezetimibe] Myalgia       Family History   Problem Relation Age of Onset    Diabetes Mother     Heart Disease Mother     Stroke Mother     Breast Cancer Mother 79    Diabetes Brother     Heart Disease Brother     Emphysema Father     Diabetes Daughter      Social History     Tobacco Use    Smoking status: Never Smoker    Smokeless tobacco: Never Used   Substance Use Topics    Alcohol use: No     Alcohol/week: 0.0 standard drinks       Para Solid, who was evaluated through a synchronous (real-time) audio-video encounter, and/or her healthcare decision maker, is aware that it is a billable service, with coverage as determined by her insurance carrier. She provided verbal consent to proceed: Yes, and patient identification was verified. It was conducted pursuant to the emergency declaration under the [address] [place] [address] and the [employer]. A caregiver was present when appropriate. Ability to conduct physical exam was limited. I was at home. The patient was at home.     Michael Bo MD

## 2020-12-15 NOTE — TELEPHONE ENCOUNTER
Future Appointments:  Future Appointments   Date Time Provider Santiago Perez   12/15/2020  2:30 PM Simona Doll MD [facility] BS AMB        Last Appointment With Me:  Visit date not found     Requested Prescriptions     Pending Prescriptions Disp Refills    azelastine (ASTELIN) 137 mcg (0.1 %) nasal spray 1 Bottle 0     Sig: Use in each nostril as directed

## 2020-12-15 NOTE — PATIENT INSTRUCTIONS
This is an established visit conducted via telemedicine. The patient has been instructed that this meets HIPAA criteria and acknowledges and agrees to this method of visitation. Liyah Sanchez [place] 
31/18/80 
8:79 PM 
 1. Have you been to the ER, urgent care clinic since your last visit? Hospitalized since your last visit? No 
 
2. Have you seen or consulted any other health care providers outside of the [address] [place] [address] since your last visit? Include any pap smears or colon screening. No 
Medicare Wellness Visit, Female The best way to live healthy is to have a lifestyle where you eat a well-balanced diet, exercise regularly, limit alcohol use, and quit all forms of tobacco/nicotine, if applicable. Regular preventive services are another way to keep healthy. Preventive services (vaccines, screening tests, monitoring & exams) can help personalize your care plan, which helps you manage your own care. Screening tests can find health problems at the earliest stages, when they are easiest to treat. [facility] follows the current, evidence-based guidelines published by the [facility] [place] [employer]on (USPSTF) when recommending preventive services for our patients. Because we follow these guidelines, sometimes recommendations change over time as research supports it. (For example, mammograms used to be recommended annually. Even though Medicare will still pay for an annual mammogram, the newer guidelines recommend a mammogram every two years for women of average risk). Of course, you and your doctor may decide to screen more often for some diseases, based on your risk and your co-morbidities (chronic disease you are already diagnosed with). Preventive services for you include: - Medicare offers their members a free annual wellness visit, which is time for you and your primary care provider to discuss and plan for your preventive service needs. Take advantage of this benefit every year! 
-All adults over the age of 72 should receive the recommended pneumonia vaccines. Current USPSTF guidelines recommend a series of two vaccines for the best pneumonia protection.  
-All adults should have a flu vaccine yearly and a tetanus vaccine every 10 years.  
-All adults age 48 and older should receive the shingles vaccines (series of two vaccines). -All adults age 38-68 who are overweight should have a diabetes screening test once every three years.  
-All adults born between [birth year] and 1965 should be screened once for Hepatitis C. 
-Other screening tests and preventive services for persons with diabetes include: an eye exam to screen for diabetic retinopathy, a kidney function test, a foot exam, and stricter control over your cholesterol.  
-Cardiovascular screening for adults with routine risk involves an electrocardiogram (ECG) at intervals determined by your doctor.  
-Colorectal cancer screenings should be done for adults age 54-65 with no increased risk factors for colorectal cancer. There are a number of acceptable methods of screening for this type of cancer. Each test has its own benefits and drawbacks. Discuss with your doctor what is most appropriate for you during your annual wellness visit. The different tests include: colonoscopy (considered the best screening method), a fecal occult blood test, a fecal DNA test, and sigmoidoscopy. 
 
-A bone mass density test is recommended when a woman turns 65 to screen for osteoporosis. This test is only recommended one time, as a screening. Some providers will use this same test as a disease monitoring tool if you already have osteoporosis. -Breast cancer screenings are recommended every other year for women of normal risk, age 54-69. 
-Cervical cancer screenings for women over age 72 are only recommended with certain risk factors. Here is a list of your current Health Maintenance items (your personalized list of preventive services) with a due date: 
Health Maintenance Due Topic Date Due  
 DTaP/Tdap/Td  (1 - Tdap) 12/23/1962  Shingles Vaccine (1 of 2) 12/23/1991 [facility] Diabetic Foot Care  04/11/2020 [facility] Annual Well Visit  04/12/2020  Hemoglobin A1C    12/18/2020

## 2020-12-18 ENCOUNTER — HOSPITAL ENCOUNTER (OUTPATIENT)
Dept: LAB | Age: 80
Discharge: HOME | End: 2020-12-18
Payer: MEDICARE

## 2020-12-18 PROCEDURE — 80053 COMPREHEN METABOLIC PANEL: CPT

## 2020-12-18 PROCEDURE — 83036 HEMOGLOBIN GLYCOSYLATED A1C: CPT

## 2020-12-18 PROCEDURE — 36415 COLL VENOUS BLD VENIPUNCTURE: CPT

## 2020-12-19 LAB
ALBUMIN SERPL-MCNC: 4 G/DL (ref 3.7–4.7)
ALBUMIN/GLOB SERPL: 1.4 {RATIO} (ref 1.2–2.2)
ALP SERPL-CCNC: 53 IU/L (ref 39–117)
ALT SERPL-CCNC: 18 IU/L (ref 0–32)
AST SERPL-CCNC: 12 IU/L (ref 0–40)
BILIRUB SERPL-MCNC: 0.4 MG/DL (ref 0–1.2)
BUN SERPL-MCNC: 19 MG/DL (ref 8–27)
BUN/CREAT SERPL: 18 (ref 12–28)
CALCIUM SERPL-MCNC: 9.6 MG/DL (ref 8.7–10.3)
CHLORIDE SERPL-SCNC: 102 MMOL/L (ref 96–106)
CO2 SERPL-SCNC: 23 MMOL/L (ref 20–29)
CREAT SERPL-MCNC: 1.06 MG/DL (ref 0.57–1)
EST. AVERAGE GLUCOSE BLD GHB EST-MCNC: 154 MG/DL
GLOBULIN SER CALC-MCNC: 2.8 G/DL (ref 1.5–4.5)
GLUCOSE SERPL-MCNC: 149 MG/DL (ref 65–99)
HBA1C MFR BLD: 7 % (ref 4.8–5.6)
POTASSIUM SERPL-SCNC: 4.4 MMOL/L (ref 3.5–5.2)
PROT SERPL-MCNC: 6.8 G/DL (ref 6–8.5)
SODIUM SERPL-SCNC: 139 MMOL/L (ref 134–144)

## 2020-12-22 ENCOUNTER — PATIENT OUTREACH (OUTPATIENT)
Dept: INTERNAL MEDICINE CLINIC | Age: 80
End: 2020-12-22

## 2020-12-22 NOTE — PROGRESS NOTES
12/22/20-dkw  -a1c was 7.0% on 12/18/20 a little up from 6.8% on 6/21/20  -gave pt lab results and read letter from Dr Marichuy Reyes that was mailed to her and has not received yet  -she is taking one in the morning only and Lantus 16 units at bedtime  Key Antihyperglycemic Medications               Janumet 50-1,000 mg per tablet TAKE 1 TABLET BY MOUTH TWICE A DAY WITH FOOD    insulin glargine (LANTUS SOLOSTAR) 100 unit/mL (3 mL) pen Inject 16 units at bedtime--Dose change 10/14/16--updated med list--did not send prescription to the pharmacy        -pt stated since she has VV with Dr Marichuy Reyes on 12/15/20, she has had two episodes of a sinking feeling and chest pain/tightness on left side in breast-advised pt to call cardiologist tomorrow and if happens again needs to go to the ED; pt verbalized understanding  Future Appointments   Date Time Provider Santiago Perez   6/24/2021  1:30 PM Franklin Gonzalez MD [facility] BS AMB   Last Appointment My Department:  12/15/2020  -will follow up with pt in two months  -pt will call before then as needed

## 2021-02-01 ENCOUNTER — PATIENT OUTREACH (OUTPATIENT)
Dept: INTERNAL MEDICINE CLINIC | Age: 81
End: 2021-02-01

## 2021-02-01 NOTE — PROGRESS NOTES
Pt called and inquired whether Dr Guanakito Bates was giving the covid-19 vaccine in his office. Advised pt not at this time and to call her health district, [facility], at [phone]. Pt verbalized understanding. Otherwise, pt stated she is doing well.

## 2021-02-03 RX ORDER — AZELASTINE 1 MG/ML
SPRAY, METERED NASAL
Qty: 1 BOTTLE | Refills: 5 | Status: SHIPPED | OUTPATIENT
Start: 2021-02-03 | End: 2022-01-05

## 2021-03-02 ENCOUNTER — PATIENT OUTREACH (OUTPATIENT)
Dept: INTERNAL MEDICINE CLINIC | Age: 81
End: 2021-03-02

## 2021-03-02 NOTE — PROGRESS NOTES
Goals      Coordinate pain management plan for patient-rt flank pain (pt-stated)      3/2/21-dkw  -it is noted that pt saw cardiologist, Dr Georgia Santo, on 2/25/21  -next appt w/Dr Lindsey Avitia is 6/24/21  -left message asking pt to call back regarding how she is doing and with blood sugars  Future Appointments:  Future Appointments   Date Time Provider Santiago Perez   3/16/2021  4:00 PM Lorina Shone, MD [facility] BS AMB   6/24/2021  1:30 PM Unknown MD [facility] BS AMB   -will continue to follow up     2/1/21-dkw  -pt inquired about getting a covid-19 vaccine and was given the [address] phone ipntun-[phone]    12/22/20-dkw  -a1c was 7.0% on 12/18/20 a little up from 6.8% on 6/21/20  -gave pt lab results and read letter from Dr Lindsey Avitia that was mailed to her and has not received yet  -she is taking one Januvia in the morning only and Lantus 16 units at bedtime  Key Antihyperglycemic Medications               Janumet 50-1,000 mg per tablet TAKE 1 TABLET BY MOUTH TWICE A DAY WITH FOOD    insulin glargine (LANTUS SOLOSTAR) 100 unit/mL (3 mL) pen Inject 16 units at bedtime--Dose change 10/14/16--updated med list--did not send prescription to the pharmacy        -pt stated since she has VV with Dr Lindsey Avitia on 12/15/20, she has had two episodes of a sinking feeling and chest pain/tightness on left side in breast-advised pt to call cardiologist tomorrow and if happens again needs to go to the ED; pt verbalized understanding  Future Appointments   Date Time Provider Santiago Perez   6/24/2021  1:30 PM Unknown MD [facility] BS AMB   Last Appointment My Department:  12/15/2020  -will follow up with pt in two months  -pt will call before then as needed    11/27/20-dkw  -Pt left a voice message mid afternoon that she just realized she is out of losartan 100 mg and has no refills left. Pended refill to Dr Lindsey Avitia.  Pt informed, and also to ask her  pharmacy to supply her with two tablets of losartan to get her through the weekend  -will follow    11/25/20-dkw  MD Latisha Underwood, RN    Caller: Unspecified Elie Bah, 11:45 AM 11/24/20)               She can take questran light instead of coletid-will have to sprinkle it on her food-will escribee      -pt stated she took Abel Gissel yesterday for lunch and then again at bedtime and she has not had any GI symptoms so far; she will continue to take Janumet twice daily  -blood sugar last night was 254 and this morning 147  -pt will  the Questran light today  -pt will call with any problems  -will follow    11/24/20-dkw  -pt called stating since she stopped the Abel Gissel last week her stomach better-no more diarrhea, but now blood sugar is haywire; pt stated she has never had a blood sugar >300 and is having headaches now  -pt had a formed brownish stool this morning  -she has not started the Colestid due to pharmacy had to order  -blood sugars are as follows:  Date fasting  Bedtime-takes Lantus 26 units   Tuesday-today 11/24/20 146  254-reported in f/u call 11/25/20 Monday-11/23 194  296-took 28 units of Lantus   Sunday-11/22 190  362-took 30 units Lantus   Saturday-11/21 172  265   Friday-11/20 155  249   Thursday-11/19 146  222   Wednesday-11/18 146  162   -pt will restart the Janumet with lunch today; tomorrow she will take with breakfast and dinner; if diarrhea/GI issues return, she will take only in the morning  -pt does not have the extended release formulation of Janumet, but has 1.5 bottles on hand of her immediate release Janumet 50/1000 mg twice daily  Key Antihyperglycemic Medications               Janumet 50-1,000 mg per tablet TAKE 1 TABLET BY MOUTH TWICE A DAY WITH FOOD    insulin glargine (LANTUS SOLOSTAR) 100 unit/mL (3 mL) pen Inject 16 units at bedtime--Dose change 10/14/16--updated med list--did not send prescription to the pharmacy        -pt will hold the colestid at this time  -pt asked for a refill of glucometer test strips, which were sent to her [facility] in [place]  -chart routed to Dr Nathaly Pacheco  -pt will call with any questions or concerns  -will follow    11/17/20-dkw  Note from Dr Nathaly Pacheco:  She may have post yousuf diarrhea. I escribed colestid tabs which might prevent the diarrhea. She can hold the janumet  for 1 week and see if diarrhea and GI sxs get better on colestid and can restart janumet next week if doing better    -Pt stated she has not taken the evening Janumet for the past three days due to more of a problem with diarrhea and abd pain with the evening dose. Blood sugars are not affected much per pt. -Pt will totally stop the Janumet for a week and start the cholestid. Advised pt colestid has been sent to her pharmacy.   -Pt stated she just left the surgeons office and everything is okay from his perspective. -Pt has not called GI yet. Informed pt that her GI symptoms could be from her GB surgery.   -Will follow up with pt in a week. Pt will call with any questions or concerns.      11/13/20-dkw  -on 10/21/20, pt has Laparoscopic cholecystectomy with cholangiogram at [facility]; incisions are healed and right flank pain has resolved; then on 10/28/20 pt went to the ED for a GLF after she stepped on her cat-her bruises are healing  -pt had to cancel post op appt w/surgeon due to has been feeling weak with diarrhea, nausea, constipation; this happens mostly after she takes her evening dose of Janumet 50/1000 mg; sometimes she skips her evening dose of Janumet  -pt is not taking Miralax or Norco  -pt also take Lantus 26 units at bedtime; discharge AVS states 16 units  -fasting blood sugars are  and bedtime are around 130  -she believes she has lost weight  -Dr Tiffany Garner is her GI; she stated she will call him   -pt received the AMD in the mail and will fill it out-discussed activating My Chart and sending the document back that way  -discussed follow up appt with Dr Nathaly Pacheco on 12/15/20 at 2:15 and rescheduling post op appt w/surgeon  -chart was routed to Dr Nathaly Pacheco 10/14/20-dkw  -it is noted that pt's imaging (U/S and CT) revealed gall stones; pt has been notified by Dr Lily Waldrop and has been referred to [facility] general surgeon  -will follow as needed, per pt outreach to this ACM, and around follow up with Dr Lily Waldrop in December    Future Appointments   Date Time Provider Santiago Perez   12/15/2020  2:15 PM Gerard Pritchett MD [facility] BS AMB      10/12/20-kennyw  -manage rt side pain in order to be able to be active again  -continue bland diet for now; pt thinks has gall stones  -have U/S of abd, CT of pelvis and abd done tomorrow 10/13/20 at [facility]  -continue meds as prescribed  -updated AMD  -call [address] at [phone] as needed; pt declined further follow from this AC unless diagnostic tests are abnormal tomorrow  -will follow diagnostic tests

## 2021-03-16 ENCOUNTER — OFFICE VISIT (OUTPATIENT)
Dept: SLEEP MEDICINE | Age: 81
End: 2021-03-16

## 2021-03-16 ENCOUNTER — DOCUMENTATION ONLY (OUTPATIENT)
Dept: SLEEP MEDICINE | Age: 81
End: 2021-03-16

## 2021-03-16 VITALS
DIASTOLIC BLOOD PRESSURE: 77 MMHG | HEART RATE: 89 BPM | SYSTOLIC BLOOD PRESSURE: 134 MMHG | HEIGHT: 63 IN | BODY MASS INDEX: 28.95 KG/M2 | WEIGHT: 163.4 LBS | TEMPERATURE: 97.7 F

## 2021-03-16 DIAGNOSIS — I25.10 CORONARY ARTERY DISEASE INVOLVING NATIVE CORONARY ARTERY OF NATIVE HEART WITHOUT ANGINA PECTORIS: ICD-10-CM

## 2021-03-16 DIAGNOSIS — G47.33 OBSTRUCTIVE SLEEP APNEA (ADULT) (PEDIATRIC): Primary | ICD-10-CM

## 2021-03-16 DIAGNOSIS — I10 ESSENTIAL HYPERTENSION, BENIGN: ICD-10-CM

## 2021-03-16 PROCEDURE — G8754 DIAS BP LESS 90: HCPCS | Performed by: INTERNAL MEDICINE

## 2021-03-16 PROCEDURE — 99203 OFFICE O/P NEW LOW 30 MIN: CPT | Performed by: INTERNAL MEDICINE

## 2021-03-16 PROCEDURE — G8752 SYS BP LESS 140: HCPCS | Performed by: INTERNAL MEDICINE

## 2021-03-16 PROCEDURE — G8432 DEP SCR NOT DOC, RNG: HCPCS | Performed by: INTERNAL MEDICINE

## 2021-03-16 PROCEDURE — G8427 DOCREV CUR MEDS BY ELIG CLIN: HCPCS | Performed by: INTERNAL MEDICINE

## 2021-03-16 PROCEDURE — 1090F PRES/ABSN URINE INCON ASSESS: CPT | Performed by: INTERNAL MEDICINE

## 2021-03-16 PROCEDURE — G8536 NO DOC ELDER MAL SCRN: HCPCS | Performed by: INTERNAL MEDICINE

## 2021-03-16 PROCEDURE — G8399 PT W/DXA RESULTS DOCUMENT: HCPCS | Performed by: INTERNAL MEDICINE

## 2021-03-16 PROCEDURE — G8417 CALC BMI ABV UP PARAM F/U: HCPCS | Performed by: INTERNAL MEDICINE

## 2021-03-16 PROCEDURE — 1101F PT FALLS ASSESS-DOCD LE1/YR: CPT | Performed by: INTERNAL MEDICINE

## 2021-03-16 NOTE — PATIENT INSTRUCTIONS
[address] [facility] [place]. [address], [address] Tel.  [phone] Fax. 100 [facility] [address] [place], [address] Tel.  [phone] Fax. [phone] [address] [facility] Selene Herrera Tel.  [phone] Fax. [phone] PROPER SLEEP HYGIENE What to avoid · Do not have drinks with caffeine, such as coffee or black tea, for 8 hours before bed. · Do not smoke or use other types of tobacco near bedtime. Nicotine is a stimulant and can keep you awake. · Avoid drinking alcohol late in the evening, because it can cause you to wake in the middle of the night. · Do not eat a big meal close to bedtime. If you are hungry, eat a light snack. · Do not drink a lot of water close to bedtime, because the need to urinate may wake you up during the night. · Do not read or watch TV in bed. Use the bed only for sleeping and sexual activity. What to try · Go to bed at the same time every night, and wake up at the same time every morning. Do not take naps during the day. · Keep your bedroom quiet, dark, and cool. · Get regular exercise, but not within 3 to 4 hours of your bedtime. Papo Tyler · Sleep on a comfortable pillow and mattress. · If watching the clock makes you anxious, turn it facing away from you so you cannot see the time. · If you worry when you lie down, start a worry book. Well before bedtime, write down your worries, and then set the book and your concerns aside. · Try meditation or other relaxation techniques before you go to bed. · If you cannot fall asleep, get up and go to another room until you feel sleepy. Do something relaxing. Repeat your bedtime routine before you go to bed again. · Make your house quiet and calm about an hour before bedtime. Turn down the lights, turn off the TV, log off the computer, and turn down the volume on music. This can help you relax after a busy day. Drowsy Driving The [employer] cites drowsiness as a causing factor in more than 129,435 police reported crashes annually, resulting in 76,000 injuries and 1,500 deaths. Other surveys suggest 55% of people polled have driven while drowsy in the past year, 23% had fallen asleep but not crashed, 3% crashed, and 2% had and accident due to drowsy driving. Who is at risk? Young Drivers: One study of drowsy driving accidents states that 55% of the drivers were under 25 years. Of those, 75% were male. Shift Workers and Travelers: People who work overnight or travel across time zones frequently are at higher risk of experiencing Circadian Rhythm Disorders. They are trying to work and function when their body is programed to sleep. Sleep Deprived: Lack of sleep has a serious impact on your ability to pay attention or focus on a task. Consistently getting less than the average of 8 hours your body needs creates partial or cumulative sleep deprivation. Untreated Sleep Disorders: Sleep Apnea, Narcolepsy, R.L.S., and other sleep disorders (untreated) prevent a person from getting enough restful sleep. This leads to excessive daytime sleepiness and increases the risk for drowsy driving accidents by up to 7 times. Medications / Alcohol: Even over the counter medications can cause drowsiness. Medications that impair a drivers attention should have a warning label. Alcohol naturally makes you sleepy and on its own can cause accidents. Combined with excessive drowsiness its effects are amplified. Signs of Drowsy Driving: * You don't remember driving the last few miles * You may drift out of your cristian * You are unable to focus and your thoughts wander * You may yawn more often than normal 
 * You have difficulty keeping your eyes open / nodding off * Missing traffic signs, speeding, or tailgating Prevention-  
Good sleep hygiene, lifestyle and behavioral choices have the most impact on drowsy driving.  There is no substitute for sleep and the average person requires 8 hours nightly. If you find yourself driving drowsy, stop and sleep. Consider the sleep hygiene tips provided during your visit as well. Medication Refill Policy: Refills for all medications require 1 week advance notice. Please have your pharmacy fax a refill request. We are unable to fax, or call in \"controled substance\" medications and you will need to pick these prescriptions up from our office. [employer] Activation Thank you for requesting access to [employer]. Please follow the instructions below to securely access and download your online medical record. [employer] allows you to send messages to your doctor, view your test results, renew your prescriptions, schedule appointments, and more. How Do I Sign Up? 1. In your internet browser, go to [email or web address]. 2. Click on the First Time User? Click Here link in the Sign In box. You will see the New Member Sign Up page. 3. Enter your [employer] Access Code exactly as it appears below. You will not need to use this code after youve completed the sign-up process. If you do not sign up before the expiration date, you must request a new code. [employer] Access Code: [email or web address] Expires: 3/19/2021  3:10 PM (This is the date your [employer] access code will ) 4. Enter the last four digits of your Social Security Number (xxxx) and Date of Birth (mm/dd/yyyy) as indicated and click Submit. You will be taken to the next sign-up page. 5. Create a [employer] ID. This will be your [employer] login ID and cannot be changed, so think of one that is secure and easy to remember. 6. Create a [employer] password. You can change your password at any time. 7. Enter your Password Reset Question and Answer. This can be used at a later time if you forget your password. 8. Enter your e-mail address. You will receive e-mail notification when new information is available in [address] 9. Click Sign Up.  You can now view and download portions of your medical record. 10. Click the Download Summary menu link to download a portable copy of your medical information. Additional Information If you have questions, please call [phone]. Remember, [email or web address] is NOT to be used for urgent needs. For medical emergencies, dial 911.

## 2021-03-16 NOTE — PROGRESS NOTES
Changed pressure to 9-12cm H2O per Dr. Verito Bustos orders. New setting sent to SD card and upload in machine.

## 2021-03-16 NOTE — PROGRESS NOTES
[address] [place]. [place], [address]  Tel.  [phone]  Fax. 100 [facility] [address]  [place], [address] [facility]  Tel.  [phone]  Fax. [phone] [address] [facility] Selene Herrera   Tel.  [phone]  Fax. [phone]         Subjective:      Mercedes Nuno is an 79 y.o. female self-referred for evaluation for a sleep disorder. She complains of needing replacement supplies for her PAP device associated with she hasn't had new supplies in years. Download reviewed. She is known to be from [facility], seen over 3 years ago and been on CPAP for over 10 years. She is compliant to therapy. Estimated AHI on download is somewhat elevated at 10/hour. .  Symptoms began a few years ago, gradually worsening since that time. She usually can fall asleep in variable minutes. She cannot sleep without CPAP. She denies falling asleep while during conversation. Mercedes Nuno does wake up frequently at night. She is not bothered by waking up too early and left unable to get back to sleep. She actually sleeps about 8 hours at night and wakes up about 5 times during the night. She does not work shifts:  .   Lissette Villarreal indicates she does get too little sleep at night. Her bedtime is 2300. She awakens at 0800. She does take naps. She takes 4 naps a week lasting 1, Hour(s). She has the following observed behaviors: Loud snoring, Twitching of legs or feet;  . Loud snoring without PAP. Other remarks:       [place] Sleepiness Score: 12   which reflect mild daytime drowsiness.     Allergies   Allergen Reactions    Codeine Other (comments)     Jerking sensation    Livalo [Pitavastatin] Diarrhea    Niaspan [Niacin] Myalgia    Statins-Hmg-Coa Reductase Inhibitors Myalgia     Zocor, pravachol, Lipitor, crestor    Welchol [Colesevelam] Other (comments)     Nausea, bloating and malaise    Zetia [Ezetimibe] Myalgia         Current Outpatient Medications:     azelastine (ASTELIN) 137 mcg (0.1 %) nasal spray, USE 2 SPRAYS IN EACH NOSTRIL TWICE DAILY, Disp: 1 Bottle, Rfl: 5    losartan (COZAAR) 100 mg tablet, take 1 tablet by mouth once daily, Disp: 90 Tab, Rfl: 3    glucose blood VI test strips (ASCENSIA AUTODISC VI, ONE TOUCH ULTRA TEST VI) strip, One touch ultra test strips---check fsbs tid. Use 1 test stirp to check fasting blood sugar three times daily for dx of diabetes E11.9, Disp: 300 Strip, Rfl: 3    Cholestyramine-Aspartame (Questran Light) 4 gram powder, Take 1 g by mouth two (2) times daily (with meals). , Disp: 210 g, Rfl: 5    colestipoL (Colestid) 1 gram tablet, Take 2 Tabs by mouth daily. , Disp: 60 Tab, Rfl: 5    ondansetron (ZOFRAN ODT) 4 mg disintegrating tablet, Take 1 Tab by mouth every eight (8) hours as needed for Nausea., Disp: 25 Tab, Rfl: 0    famotidine (PEPCID) 20 mg tablet, TAKE 1 TABLET BY MOUTH EVERY NIGHT, Disp: 90 Tab, Rfl: 3    pantoprazole (PROTONIX) 40 mg tablet, TAKE 1 TABLET BY MOUTH DAILY, Disp: 90 Tab, Rfl: 3    Janumet 50-1,000 mg per tablet, TAKE 1 TABLET BY MOUTH TWICE A DAY WITH FOOD, Disp: 180 Tab, Rfl: 3    montelukast (SINGULAIR) 10 mg tablet, TAKE 1 TABLET BY MOUTH ONCE DAILY, Disp: 90 Tab, Rfl: 3    atorvastatin (LIPITOR) 40 mg tablet, Take  by mouth daily. , Disp: , Rfl:     Insulin Needles, Disposable, (PEN NEEDLE) 31 gauge x 3/16\" ndle, Use as directed once daily, Disp: 100 Pen Needle, Rfl: 3    Blood-Glucose Meter monitoring kit, One touch ultra mini glucometer--dx 250.00, Disp: 1 Kit, Rfl: 0    Lancets misc, Check fsbs bid -250.02, Disp: 200 Each, Rfl: 3    nitroglycerin (NITROSTAT) 0.4 mg SL tablet, 1 Tab by SubLINGual route every five (5) minutes as needed for Chest Pain., Disp: 25 Tab, Rfl: 3    insulin glargine (LANTUS SOLOSTAR) 100 unit/mL (3 mL) pen, Inject 16 units at bedtime--Dose change 10/14/16--updated med list--did not send prescription to the pharmacy (Patient taking differently: 16 Units by SubCUTAneous route.  Inject up to 26 units at bedtime--Dose change reported by pt during med rec on 9/28/20), Disp: 15 mL, Rfl: 3    fenofibrate (LOFIBRA) 54 mg tablet, Take  by mouth daily. , Disp: , Rfl:     cyanocobalamin (VITAMIN B12) 500 mcg tablet, Take 500 mcg by mouth daily. , Disp: , Rfl:     ranolazine ER (RANEXA) 500 mg SR tablet, Take 1 Tab by mouth two (2) times a day., Disp: 60 Tab, Rfl: 12    clopidogrel (PLAVIX) 75 mg tablet, Take 1 Tab by mouth daily. , Disp: 30 Tab, Rfl: 11    aspirin 81 mg Tab, Take 81 mg by mouth daily. , Disp: , Rfl:     MULTIVITAMINS W-MINERALS/LUT (CENTRUM SILVER PO), Take 1 Tab by mouth daily. , Disp: , Rfl:     carvedilol (COREG) 6.25 mg tablet, Take  by mouth two (2) times daily (with meals). , Disp: , Rfl:     amLODIPine (NORVASC) 2.5 mg tablet, Take  by mouth daily. , Disp: , Rfl:      She  has a past medical history of Arthritis, Breast cancer ([facility] [address] (2002), CAD (coronary artery disease), Chronic kidney disease, GERD (gastroesophageal reflux disease), Hypercholesterolemia, Hypertension, Liver disease, Long term current use of anticoagulant therapy, Neuropathy in diabetes ([facility] [address] and DOMINGO (obstructive sleep apnea). She  has a past surgical history that includes hx carpal tunnel release; pr breast surgery procedure unlisted (2001); vascular surgery procedure unlist; hx hernia repair; hx lumbar fusion (July 2015); hx orthopaedic; pr cardiac surg procedure unlist; hx breast biopsy (Left); and hx cholecystectomy (10/21/2020). She family history includes Breast Cancer (age of onset: 79) in her mother; Diabetes in her brother, daughter, and mother; Emphysema in her father; Heart Disease in her brother and mother; Stroke in her mother. She  reports that she has never smoked. She has never used smokeless tobacco. She reports current drug use. Drugs: Prescription and OTC. She reports that she does not drink alcohol.      Review of Systems:  Constitutional:  Few pounds weight loss since last seen  Eyes:  No blurred vision. CVS:  No significant chest pain  Pulm:  No significant shortness of breath  GI:  No significant nausea or vomiting  :  +significant nocturia  Musculoskeletal:  No significant joint pain at night  Skin:  No significant rashes  Neuro:  No significant dizziness   Psych:  No active mood issues    Sleep Review of Systems: notable for no difficulty falling asleep; +frequent awakenings at night;  rare dreaming noted; no nightmares ; no early morning headaches; no memory problems; no concentration issues      Objective:     Visit Vitals  /77 (BP 1 Location: Left upper arm, BP Patient Position: Sitting, BP Cuff Size: Adult)   Pulse 89   Temp 97.7 °F (36.5 °C)   Ht 5' 3\" (1.6 m)   Wt 163 lb 6.4 oz (74.1 kg)   BMI 28.95 kg/m²         General:   Not in acute distress   Eyes:  Anicteric sclerae, no obvious strabismus   Nose:  No obvious nasal septum deviation    Neck:    ; midline trachea   Chest/Lungs:  Equal lung expansion, clear on auscultation    CVS:  Normal rate, regular rhythm; no JVD   Skin:  Warm to touch; no obvious rashes   Neuro:  No focal deficits ; no obvious tremor    Psych:  Normal affect,  normal countenance;          Assessment:       ICD-10-CM ICD-9-CM    1. Obstructive sleep apnea (adult) (pediatric)  G47.33 327.23 AMB SUPPLY ORDER   2. Coronary artery disease involving native coronary artery of native heart without angina pectoris  I25.10 414.01    3. Essential hypertension, benign  I10 401.1          Plan:     * she is compliant with PAP therapy and PAP continues to benefit patient and remains necessary for control of her sleep apnea. Change settings to 9-12 cmH20. I have ordered replacement supplies  I have reviewed medicare requirements regarding PAP usage    * She was provided information on sleep apnea including coresponding risk factors and the importance of proper treatment. * Counseling was provided regarding proper sleep hygiene and safe driving.     2. Coronary Artery Disease - she continues on her current regimen. I have reviewed the relationship between heart disease and sleep disordered breathing. 3. Hypertension - she continues on her current regimen. I have reviewed the relationship between hypertension as it relates to sleep-disordered breathing.      *Electronically signed by    Tomer Alvarez MD  Diplomate in Sleep Medicine  [employer]

## 2021-03-17 ENCOUNTER — DOCUMENTATION ONLY (OUTPATIENT)
Dept: SLEEP MEDICINE | Age: 81
End: 2021-03-17

## 2021-05-10 NOTE — TELEPHONE ENCOUNTER
----- Message from Maricarmen Carmona sent at 5/10/2021  9:26 AM EDT -----  Regarding: Dr. Albert Manuel (if not patient): N/A      Relationship of caller (if not patient): N/A      Best contact number(s): [phone]      Name of medication and dosage if known: \"montelukaft\" - 10 mg      Is patient out of this medication (yes/no): Yes      Pharmacy name: [facility] listed in chart? (yes/no): Yes  Pharmacy phone number: [phone]      Message from [facility]

## 2021-05-11 RX ORDER — MONTELUKAST SODIUM 10 MG/1
TABLET ORAL
Qty: 90 TAB | Refills: 3 | Status: SHIPPED | OUTPATIENT
Start: 2021-05-11 | End: 2022-05-03

## 2021-05-11 NOTE — TELEPHONE ENCOUNTER
PCP: Leeroy Laguna MD    Last appt: 12/15/2020  Future Appointments   Date Time Provider Santiago Perez   6/24/2021  1:30 PM Leeroy Laguna MD [facility] BS AMB   3/17/2022  2:40 PM Nani Canales MD [facility] BS AMB       Requested Prescriptions     Pending Prescriptions Disp Refills    montelukast (SINGULAIR) 10 mg tablet 90 Tab 3       Prior labs and Blood pressures:  BP Readings from Last 3 Encounters:   03/16/21 134/77   11/17/20 116/70   10/28/20 (!) 140/67     Lab Results   Component Value Date/Time    Sodium 139 12/18/2020 10:38 AM    Potassium 4.4 12/18/2020 10:38 AM    Chloride 102 12/18/2020 10:38 AM    CO2 23 12/18/2020 10:38 AM    Anion gap 5 09/27/2020 04:10 PM    Glucose 149 (H) 12/18/2020 10:38 AM    BUN 19 12/18/2020 10:38 AM    Creatinine 1.06 (H) 12/18/2020 10:38 AM    BUN/Creatinine ratio 18 12/18/2020 10:38 AM    GFR est AA 58 (L) 12/18/2020 10:38 AM    GFR est non-AA 50 (L) 12/18/2020 10:38 AM    Calcium 9.6 12/18/2020 10:38 AM     Lab Results   Component Value Date/Time    Hemoglobin A1c 7.0 (H) 12/18/2020 10:38 AM    Hemoglobin A1c (POC) 8.4 08/11/2011 10:07 AM    Hemoglobin A1c, External 6.3 04/30/2016     Lab Results   Component Value Date/Time    Cholesterol, total 162 06/18/2020 01:44 PM    Cholesterol (POC) 195 08/11/2011 10:08 AM    HDL Cholesterol 41 06/18/2020 01:44 PM    HDL Cholesterol (POC) 31 08/11/2011 10:08 AM    LDL Cholesterol (POC) 113 08/11/2011 10:08 AM    LDL, calculated 72 06/18/2020 01:44 PM    VLDL, calculated 49 (H) 06/18/2020 01:44 PM    Triglyceride 243 (H) 06/18/2020 01:44 PM    Triglycerides (POC) 256 08/11/2011 10:08 AM    CHOL/HDL Ratio 5.5 (H) 01/19/2016 12:42 PM     No results found for: MATTIE Izaguirre    Lab Results   Component Value Date/Time    TSH 2.50 01/19/2016 12:42 PM

## 2021-05-19 ENCOUNTER — DOCUMENTATION ONLY (OUTPATIENT)
Dept: INTERNAL MEDICINE CLINIC | Age: 81
End: 2021-05-19

## 2021-05-19 NOTE — PROGRESS NOTES
Left voicemail for patient regarding a schedule change. New appt 6/29 at 12:00 if she confirms. Thanks.

## 2021-05-24 RX ORDER — SITAGLIPTIN AND METFORMIN HYDROCHLORIDE 50; 1000 MG/1; MG/1
1 TABLET, FILM COATED ORAL 2 TIMES DAILY WITH MEALS
Qty: 180 TABLET | Refills: 3 | Status: SHIPPED | OUTPATIENT
Start: 2021-05-24 | End: 2022-02-22 | Stop reason: ALTCHOICE

## 2021-05-24 NOTE — TELEPHONE ENCOUNTER
Future Appointments:  Future Appointments   Date Time Provider Santiago Perez   6/24/2021  1:30 PM Rich Thomas MD [facility] BS AMB   3/17/2022  2:40 PM Ana Rosa Shafer MD [facility] BS AMB        Last Appointment With Me:  12/15/2020     Requested Prescriptions     Pending Prescriptions Disp Refills    SITagliptin-metFORMIN (Janumet) 50-1,000 mg per tablet 180 Tablet 3     Sig: Take 1 Tablet by mouth two (2) times daily (with meals).

## 2021-06-03 ENCOUNTER — PATIENT OUTREACH (OUTPATIENT)
Dept: INTERNAL MEDICINE CLINIC | Age: 81
End: 2021-06-03

## 2021-06-03 NOTE — PROGRESS NOTES
Goals       Coordinate pain management plan for patient-rt flank pain; diabetes (pt-stated)       6/3/21-dkw  -a1c was 7.0% on 12/18/20 a little up from 6.8% on 6/21/20  -pt stated her blood sugars have been over 200, 203 this morning; a week ago she increased her insulin to 18 units and numbers have come down a little; she will increase to 20 units if her numbers do not go below 200  -nothing else has changed with food or activity  Key Antihyperglycemic Medications               SITagliptin-metFORMIN (Janumet) 50-1,000 mg per tablet Take 1 Tablet by mouth two (2) times daily (with meals).     insulin glargine (LANTUS SOLOSTAR) 100 unit/mL (3 mL) pen Inject 16 units at bedtime--Dose change 10/14/16--updated med list--did not send prescription to the pharmacy        -pt c/o fatigue- is wearing her CPCP  -pt believes her next appt with Dr Vee Gay is June 29th in the morning and stated she received a call that Dr Vee Gay was going to be out of the office on June 24th; pt will call [facility] and clarify  Future Appointments   Date Time Provider Santiago Perez   6/24/2021  1:30 PM Rich Thomas MD [facility] BS AMB   3/17/2022  2:40 PM [facility]   -plan to follow up one more outreach after upcoming appt with Dr Vee Gay    3/2/21-dkw  -it is noted that pt saw cardiologist, Dr Marty Carlos, on 2/25/21  -next appt w/Dr Vee Gay is 6/24/21  -left message asking pt to call back regarding how she is doing and with blood sugars  Future Appointments:  Future Appointments   Date Time Provider Santiago Perez   3/16/2021  4:00 PM [facility]   6/24/2021  1:30 PM Rich Thomas MD [facility] BS AMB   -will continue to follow up     2/1/21-dkw  -pt inquired about getting a covid-19 vaccine and was given the [address] phone laaucq-[phone]    12/22/20-dkw  -a1c was 7.0% on 12/18/20 a little up from 6.8% on 6/21/20  -gave pt lab results and read letter from Dr Vee Gay that was mailed to her and has not received yet  -she is taking one Januvia in the morning only and Lantus 16 units at bedtime  Key Antihyperglycemic Medications               Janumet 50-1,000 mg per tablet TAKE 1 TABLET BY MOUTH TWICE A DAY WITH FOOD    insulin glargine (LANTUS SOLOSTAR) 100 unit/mL (3 mL) pen Inject 16 units at bedtime--Dose change 10/14/16--updated med list--did not send prescription to the pharmacy        -pt stated since she has VV with Dr Jessee Gan on 12/15/20, she has had two episodes of a sinking feeling and chest pain/tightness on left side in breast-advised pt to call cardiologist tomorrow and if happens again needs to go to the ED; pt verbalized understanding  Future Appointments   Date Time Provider Santiago Perez   6/24/2021  1:30 PM Wilfred Villalobos MD [facility] BS AMB   Last Appointment My Department:  12/15/2020  -will follow up with pt in two months  -pt will call before then as needed    11/27/20-dkw  -Pt left a voice message mid afternoon that she just realized she is out of losartan 100 mg and has no refills left. Pended refill to Dr Jessee Gan.  Pt informed, and also to ask her  pharmacy to supply her with two tablets of losartan to get her through the weekend  -will follow    11/25/20-dkw  MD Miguel Veliz, RN    Caller: Unspecified (Yesterday, 11:45 AM 11/24/20)               She can take questran light instead of coletid-will have to sprinkle it on her food-will escribee      -pt stated she took Oletha Cruise yesterday for lunch and then again at bedtime and she has not had any GI symptoms so far; she will continue to take Janumet twice daily  -blood sugar last night was 254 and this morning 147  -pt will  the Questran light today  -pt will call with any problems  -will follow    11/24/20-dkw  -pt called stating since she stopped the Oletha Cruise last week her stomach better-no more diarrhea, but now blood sugar is haywire; pt stated she has never had a blood sugar >300 and is having headaches now  -pt had a formed brownish stool this morning  -she has not started the Colestid due to pharmacy had to order  -blood sugars are as follows:  Date fasting  Bedtime-takes Lantus 26 units   Tuesday-today 11/24/20 146  254-reported in f/u call 11/25/20 Monday-11/23 194  296-took 28 units of Lantus   Sunday-11/22 190  362-took 30 units Lantus   Saturday-11/21 172  265   Friday-11/20 155  249   Thursday-11/19 146  222   Wednesday-11/18 146  162   -pt will restart the Janumet with lunch today; tomorrow she will take with breakfast and dinner; if diarrhea/GI issues return, she will take only in the morning  -pt does not have the extended release formulation of Janumet, but has 1.5 bottles on hand of her immediate release Janumet 50/1000 mg twice daily  Key Antihyperglycemic Medications               Janumet 50-1,000 mg per tablet TAKE 1 TABLET BY MOUTH TWICE A DAY WITH FOOD    insulin glargine (LANTUS SOLOSTAR) 100 unit/mL (3 mL) pen Inject 16 units at bedtime--Dose change 10/14/16--updated med list--did not send prescription to the pharmacy        -pt will hold the colestid at this time  -pt asked for a refill of glucometer test strips, which were sent to her [facility] in [place]  -chart routed to Dr Tomasz Ritter  -pt will call with any questions or concerns  -will follow    11/17/20-dkw  Note from Dr Tomasz Ritter:  She may have post yousuf diarrhea. I escribed colestid tabs which might prevent the diarrhea. She can hold the janumet  for 1 week and see if diarrhea and GI sxs get better on colestid and can restart janumet next week if doing better    -Pt stated she has not taken the evening Janumet for the past three days due to more of a problem with diarrhea and abd pain with the evening dose. Blood sugars are not affected much per pt. -Pt will totally stop the Janumet for a week and start the cholestid.  Advised pt colestid has been sent to her pharmacy.   -Pt stated she just left the surgeons office and everything is okay from his perspective. -Pt has not called GI yet. Informed pt that her GI symptoms could be from her GB surgery.   -Will follow up with pt in a week. Pt will call with any questions or concerns.      11/13/20-dkw  -on 10/21/20, pt has Laparoscopic cholecystectomy with cholangi[facility] at [facility]; incisions are healed and right flank pain has resolved; then on 10/28/20 pt went to the ED for a GLF after she stepped on her cat-her bruises are healing  -pt had to cancel post op appt w/surgeon due to has been feeling weak with diarrhea, nausea, constipation; this happens mostly after she takes her evening dose of Janumet 50/1000 mg; sometimes she skips her evening dose of Janumet  -pt is not taking Miralax or Norco  -pt also take Lantus 26 units at bedtime; discharge AVS states 16 units  -fasting blood sugars are  and bedtime are around 130  -she believes she has lost weight  -Dr Wesley Bolaños is her GI; she stated she will call him   -pt received the AMD in the mail and will fill it out-discussed activating My Chart and sending the document back that way  -discussed follow up appt with Dr Faviola Carpenter on 12/15/20 at 2:15 and rescheduling post op appt w/surgeon  -chart was routed to Dr Faviola Carpenter        10/14/20-dkw  -it is noted that pt's imaging (U/S and CT) revealed gall stones; pt has been notified by Dr Faviola Carpenter and has been referred to [facility] general surgeon  -will follow as needed, per pt outreach to this ACM, and around follow up with Dr Faviola Carpenter in December    Future Appointments   Date Time Provider Santiago Perez   12/15/2020  2:15 PM Akanksha Thomson MD [facility] BS AMB      10/12/20-dkw  -manage rt side pain in order to be able to be active again  -continue bland diet for now; pt thinks has gall stones  -have U/S of abd, CT of pelvis and abd done tomorrow 10/13/20 at [facility]  -continue meds as prescribed  -updated AMD  -call Pascual Herbert at [phone] as needed; pt declined further follow from this ACM unless diagnostic tests are abnormal tomorrow  -will follow diagnostic tests

## 2021-06-29 ENCOUNTER — OFFICE VISIT (OUTPATIENT)
Dept: INTERNAL MEDICINE CLINIC | Age: 81
End: 2021-06-29
Payer: MEDICARE

## 2021-06-29 VITALS
WEIGHT: 163.2 LBS | OXYGEN SATURATION: 98 % | SYSTOLIC BLOOD PRESSURE: 145 MMHG | TEMPERATURE: 97.6 F | HEIGHT: 63 IN | BODY MASS INDEX: 28.92 KG/M2 | RESPIRATION RATE: 18 BRPM | DIASTOLIC BLOOD PRESSURE: 72 MMHG | HEART RATE: 86 BPM

## 2021-06-29 DIAGNOSIS — E11.9 TYPE 2 DIABETES MELLITUS WITHOUT COMPLICATION, WITH LONG-TERM CURRENT USE OF INSULIN (HCC): Primary | ICD-10-CM

## 2021-06-29 DIAGNOSIS — E78.00 PURE HYPERCHOLESTEROLEMIA: ICD-10-CM

## 2021-06-29 DIAGNOSIS — I10 ESSENTIAL HYPERTENSION: ICD-10-CM

## 2021-06-29 DIAGNOSIS — I25.10 CORONARY ARTERY DISEASE INVOLVING NATIVE CORONARY ARTERY OF NATIVE HEART WITHOUT ANGINA PECTORIS: ICD-10-CM

## 2021-06-29 DIAGNOSIS — Z79.4 TYPE 2 DIABETES MELLITUS WITHOUT COMPLICATION, WITH LONG-TERM CURRENT USE OF INSULIN (HCC): Primary | ICD-10-CM

## 2021-06-29 PROCEDURE — G8536 NO DOC ELDER MAL SCRN: HCPCS | Performed by: INTERNAL MEDICINE

## 2021-06-29 PROCEDURE — G8399 PT W/DXA RESULTS DOCUMENT: HCPCS | Performed by: INTERNAL MEDICINE

## 2021-06-29 PROCEDURE — G8510 SCR DEP NEG, NO PLAN REQD: HCPCS | Performed by: INTERNAL MEDICINE

## 2021-06-29 PROCEDURE — 99214 OFFICE O/P EST MOD 30 MIN: CPT | Performed by: INTERNAL MEDICINE

## 2021-06-29 PROCEDURE — G0463 HOSPITAL OUTPT CLINIC VISIT: HCPCS | Performed by: INTERNAL MEDICINE

## 2021-06-29 PROCEDURE — G8753 SYS BP > OR = 140: HCPCS | Performed by: INTERNAL MEDICINE

## 2021-06-29 PROCEDURE — 1101F PT FALLS ASSESS-DOCD LE1/YR: CPT | Performed by: INTERNAL MEDICINE

## 2021-06-29 PROCEDURE — G8754 DIAS BP LESS 90: HCPCS | Performed by: INTERNAL MEDICINE

## 2021-06-29 PROCEDURE — G8419 CALC BMI OUT NRM PARAM NOF/U: HCPCS | Performed by: INTERNAL MEDICINE

## 2021-06-29 PROCEDURE — 1090F PRES/ABSN URINE INCON ASSESS: CPT | Performed by: INTERNAL MEDICINE

## 2021-06-29 PROCEDURE — G8427 DOCREV CUR MEDS BY ELIG CLIN: HCPCS | Performed by: INTERNAL MEDICINE

## 2021-06-29 NOTE — PROGRESS NOTES
Fazal Garcia is a 79 y.o. female  HIPAA verified by two patient identifiers. Health Maintenance Due   Topic    Hepatitis C Screening     DTaP/Tdap/Td series (1 - Tdap)    Shingrix Vaccine Age 50> (1 of 2)    Foot Exam Q1     A1C test (Diabetic or Prediabetic)     MICROALBUMIN Q1     Lipid Screen      Chief Complaint   Patient presents with    Hypertension     follow up 6 months    Diabetes     follow up    Coronary Artery Disease     follow up     Visit Vitals  BP (!) 145/72   Pulse 86   Temp 97.6 °F (36.4 °C) (Oral)   Resp 18   Ht 5' 3\" (1.6 m)   Wt 163 lb 3.2 oz (74 kg)   SpO2 98%   BMI 28.91 kg/m²       Pain Scale: 0 - No pain/10  Pain Location:     1. Have you been to the ER, urgent care clinic since your last visit? Hospitalized since your last visit? No    2. Have you seen or consulted any other health care providers outside of the [address] [place] [address] since your last visit? Include any pap smears or colon screening.  No

## 2021-06-29 NOTE — PROGRESS NOTES
HISTORY OF PRESENT ILLNESS  Brian Marte is a 79 y.o. female. HPI   F/u DM-2 with microalbuminuria htn hld cad fatty liver   fsbs at home----> 200 since May  Takes janumet every day now due to nausea on bid dosing  Takes lantus 20 units every day now since klast week--fsbs still 160-160 to 300 range  Sees nephrologist q 6 months for microalbuminuria  Sees Dr Matthew Pastor for CAD-no chest pains, has some BIRD    Last a1c 7.0  Last OV  S/p LapChole for symptomatic gallstones since last OV-Dr Corie Shaikh  Had diarrhea after sx for janumet was held x 1 week but then fsbs 200-300 a night--restarted janumet  colestid was escribed   last a1c 6. 8LDL 72  Has been referred to nephrologist for microalbuminurua  Home BP wnl per /  No cp or angina  Has some neuropathy in hands and feet-stopped lyrica    Patient Active Problem List    Diagnosis Date Noted    History of breast cancer 04/20/2020    Type 2 diabetes with nephropathy ([facility] [address] 08/10/2018    Fatty liver 02/23/2018    Iron deficiency anemia 12/01/2017    Osteopenia of left thigh 04/02/2017    Advanced care planning/counseling discussion 12/17/2015    Hiatal hernia 05/15/2012    CAD (coronary artery disease) 05/15/2012    Type 2 diabetes, controlled, with neuropathy ([facility] [address] 11/16/2009    Essential hypertension, benign 11/16/2009    Pure hypercholesterolemia 11/16/2009    DJD (degenerative joint disease) 11/16/2009    DOMINGO (obstructive sleep apnea) 11/16/2009     Current Outpatient Medications   Medication Sig Dispense Refill    SITagliptin-metFORMIN (Janumet) 50-1,000 mg per tablet Take 1 Tablet by mouth two (2) times daily (with meals).  180 Tablet 3    montelukast (SINGULAIR) 10 mg tablet TAKE 1 TABLET BY MOUTH ONCE DAILY 90 Tab 3    azelastine (ASTELIN) 137 mcg (0.1 %) nasal spray USE 2 SPRAYS IN EACH NOSTRIL TWICE DAILY 1 Bottle 5    losartan (COZAAR) 100 mg tablet take 1 tablet by mouth once daily 90 Tab 3    glucose blood VI test strips (ASCENSIA AUTODISC VI, ONE TOUCH ULTRA TEST VI) strip One touch ultra test strips---check fsbs tid. Use 1 test stirp to check fasting blood sugar three times daily for dx of diabetes E11.9 300 Strip 3    Cholestyramine-Aspartame (Questran Light) 4 gram powder Take 1 g by mouth two (2) times daily (with meals). 210 g 5    colestipoL (Colestid) 1 gram tablet Take 2 Tabs by mouth daily. 60 Tab 5    ondansetron (ZOFRAN ODT) 4 mg disintegrating tablet Take 1 Tab by mouth every eight (8) hours as needed for Nausea. 25 Tab 0    famotidine (PEPCID) 20 mg tablet TAKE 1 TABLET BY MOUTH EVERY NIGHT 90 Tab 3    pantoprazole (PROTONIX) 40 mg tablet TAKE 1 TABLET BY MOUTH DAILY 90 Tab 3    atorvastatin (LIPITOR) 40 mg tablet Take  by mouth daily.  Insulin Needles, Disposable, (PEN NEEDLE) 31 gauge x 3/16\" ndle Use as directed once daily 100 Pen Needle 3    Blood-Glucose Meter monitoring kit One touch ultra mini glucometer--dx 250.00 1 Kit 0    Lancets misc Check fsbs bid -250.02 200 Each 3    nitroglycerin (NITROSTAT) 0.4 mg SL tablet 1 Tab by SubLINGual route every five (5) minutes as needed for Chest Pain. 25 Tab 3    carvedilol (COREG) 6.25 mg tablet Take  by mouth two (2) times daily (with meals).  insulin glargine (LANTUS SOLOSTAR) 100 unit/mL (3 mL) pen Inject 16 units at bedtime--Dose change 10/14/16--updated med list--did not send prescription to the pharmacy (Patient taking differently: 16 Units by SubCUTAneous route. Inject up to 26 units at bedtime--Dose change reported by pt during med rec on 9/28/20) 15 mL 3    fenofibrate (LOFIBRA) 54 mg tablet Take  by mouth daily.  cyanocobalamin (VITAMIN B12) 500 mcg tablet Take 500 mcg by mouth daily.  ranolazine ER (RANEXA) 500 mg SR tablet Take 1 Tab by mouth two (2) times a day. 60 Tab 12    clopidogrel (PLAVIX) 75 mg tablet Take 1 Tab by mouth daily. 30 Tab 11    aspirin 81 mg Tab Take 81 mg by mouth daily.       MULTIVITAMINS W-MINERALS/LUT (CENTRUM SILVER PO) Take 1 Tab by mouth daily.  amLODIPine (NORVASC) 2.5 mg tablet Take  by mouth daily. Allergies   Allergen Reactions    Codeine Other (comments)     Jerking sensation    Livalo [Pitavastatin] Diarrhea    Niaspan [Niacin] Myalgia    Statins-Hmg-Coa Reductase Inhibitors Myalgia     Zocor, pravachol, Lipitor, crestor    Welchol [Colesevelam] Other (comments)     Nausea, bloating and malaise    Zetia [Ezetimibe] Myalgia      Lab Results   Component Value Date/Time    WBC 6.8 09/27/2020 04:10 PM    HGB 11.5 09/27/2020 04:10 PM    HCT 35.5 09/27/2020 04:10 PM    Hematocrit (POC) 33 (L) 04/16/2019 05:17 PM    PLATELET 729 55/41/6669 04:10 PM    MCV 89.0 09/27/2020 04:10 PM     Lab Results   Component Value Date/Time    Hemoglobin A1c 7.0 (H) 12/18/2020 10:38 AM    Hemoglobin A1c 6.8 (H) 06/18/2020 01:44 PM    Hemoglobin A1c 6.4 (H) 11/01/2019 11:16 AM    Hemoglobin A1c, External 6.3 04/30/2016 12:00 AM    Glucose 149 (H) 12/18/2020 10:38 AM    Glucose (POC) 77 10/28/2020 09:45 PM    Microalb/Creat ratio (ug/mg creat.) 666 (H) 06/18/2020 01:44 PM    LDL, calculated 72 06/18/2020 01:44 PM    Creatinine (POC) 1.0 04/16/2019 05:17 PM    Creatinine 1.06 (H) 12/18/2020 10:38 AM      Lab Results   Component Value Date/Time    Cholesterol, total 162 06/18/2020 01:44 PM    Cholesterol (POC) 195 08/11/2011 10:08 AM    HDL Cholesterol 41 06/18/2020 01:44 PM    LDL, calculated 72 06/18/2020 01:44 PM    LDL Cholesterol (POC) 113 08/11/2011 10:08 AM    LDL-C, External 104 05/02/2016 12:00 AM    Triglyceride 243 (H) 06/18/2020 01:44 PM    Triglycerides (POC) 256 08/11/2011 10:08 AM    CHOL/HDL Ratio 5.5 (H) 01/19/2016 12:42 PM     Lab Results   Component Value Date/Time    ALT (POC) 38 (A) 08/11/2011 10:07 AM    ALT (SGPT) 18 12/18/2020 10:38 AM    AST (POC) 38 (A) 08/11/2011 10:07 AM    Alk.  phosphatase 53 12/18/2020 10:38 AM    Bilirubin, direct 0.12 03/29/2019 12:40 PM    Bilirubin, total 0.4 12/18/2020 10:38 AM Albumin 4.0 12/18/2020 10:38 AM    Protein, total 6.8 12/18/2020 10:38 AM    INR 1.0 04/16/2019 05:15 PM    Prothrombin time 10.5 04/16/2019 05:15 PM    PLATELET 006 63/33/7701 04:10 PM     Lab Results   Component Value Date/Time    GFR est non-AA 50 (L) 12/18/2020 10:38 AM    GFRNA, POC 54 (L) 04/16/2019 05:17 PM    GFR est AA 58 (L) 12/18/2020 10:38 AM    GFRAA, POC >60 04/16/2019 05:17 PM    Creatinine 1.06 (H) 12/18/2020 10:38 AM    Creatinine (POC) 1.0 04/16/2019 05:17 PM    BUN 19 12/18/2020 10:38 AM    BUN (POC) 18 04/16/2019 05:17 PM    Sodium 139 12/18/2020 10:38 AM    Sodium (POC) 138 04/16/2019 05:17 PM    Potassium 4.4 12/18/2020 10:38 AM    Potassium (POC) 4.2 04/16/2019 05:17 PM    Chloride 102 12/18/2020 10:38 AM    Chloride (POC) 102 04/16/2019 05:17 PM    CO2 23 12/18/2020 10:38 AM    Magnesium 1.5 (L) 01/19/2016 12:42 PM        ROS    Physical Exam  Vitals and nursing note reviewed. Constitutional:       Appearance: Normal appearance. She is well-developed. Comments: Appears stated age   Cardiovascular:      Rate and Rhythm: Normal rate and regular rhythm. Heart sounds: Normal heart sounds. No murmur heard. No friction rub. No gallop. Pulmonary:      Effort: Pulmonary effort is normal. No respiratory distress. Breath sounds: Normal breath sounds. No wheezing. Abdominal:      General: Bowel sounds are normal.      Palpations: Abdomen is soft. Neurological:      Mental Status: She is alert. ASSESSMENT and PLAN  Diagnoses and all orders for this visit:    1. Type 2 diabetes mellitus without complication, with long-term current use of insulin (HCC)  -     HEMOGLOBIN A1C WITH EAG; Future  -     METABOLIC PANEL, COMPREHENSIVE; Future  -     LIPID PANEL; Future  -     HEPATITIS C AB; Future  -     MICROALBUMIN, UR, RAND W/ MICROALB/CREAT RATIO; Future  -     REFERRAL TO ENDOCRINOLOGY   Increase lantus by 3 units q3d until fsbs fasting is < 140 -discussed  2.  Essential hypertension   Reasonable control  3. Pure hypercholesterolemia   On statin -goal LDL < 70  4. Coronary artery disease involving native coronary artery of native heart without angina pectoris   No cp or angina    Follow-up and Dispositions    · [facility] in about 6 months (around 12/29/2021[facility]

## 2021-06-30 ENCOUNTER — PATIENT OUTREACH (OUTPATIENT)
Dept: INTERNAL MEDICINE CLINIC | Age: 81
End: 2021-06-30

## 2021-06-30 LAB
ALBUMIN SERPL-MCNC: 3.6 G/DL (ref 3.5–5)
ALBUMIN/GLOB SERPL: 1 {RATIO} (ref 1.1–2.2)
ALP SERPL-CCNC: 60 U/L (ref 45–117)
ALT SERPL-CCNC: 31 U/L (ref 12–78)
ANION GAP SERPL CALC-SCNC: 6 MMOL/L (ref 5–15)
AST SERPL-CCNC: 14 U/L (ref 15–37)
BILIRUB SERPL-MCNC: 0.3 MG/DL (ref 0.2–1)
BUN SERPL-MCNC: 26 MG/DL (ref 6–20)
BUN/CREAT SERPL: 20 (ref 12–20)
CALCIUM SERPL-MCNC: 9.6 MG/DL (ref 8.5–10.1)
CHLORIDE SERPL-SCNC: 107 MMOL/L (ref 97–108)
CHOLEST SERPL-MCNC: 168 MG/DL
CO2 SERPL-SCNC: 25 MMOL/L (ref 21–32)
CREAT SERPL-MCNC: 1.33 MG/DL (ref 0.55–1.02)
CREAT UR-MCNC: 269 MG/DL
EST. AVERAGE GLUCOSE BLD GHB EST-MCNC: 169 MG/DL
GLOBULIN SER CALC-MCNC: 3.7 G/DL (ref 2–4)
GLUCOSE SERPL-MCNC: 161 MG/DL (ref 65–100)
HBA1C MFR BLD: 7.5 % (ref 4–5.6)
HCV AB SERPL QL IA: NONREACTIVE
HCV COMMENT,HCGAC: NORMAL
HDLC SERPL-MCNC: 42 MG/DL
HDLC SERPL: 4 {RATIO} (ref 0–5)
LDLC SERPL CALC-MCNC: 79.6 MG/DL (ref 0–100)
MICROALBUMIN UR-MCNC: 169 MG/DL
MICROALBUMIN/CREAT UR-RTO: 628 MG/G (ref 0–30)
POTASSIUM SERPL-SCNC: 4.6 MMOL/L (ref 3.5–5.1)
PROT SERPL-MCNC: 7.3 G/DL (ref 6.4–8.2)
SODIUM SERPL-SCNC: 138 MMOL/L (ref 136–145)
TRIGL SERPL-MCNC: 232 MG/DL (ref ?–150)
VLDLC SERPL CALC-MCNC: 46.4 MG/DL

## 2021-06-30 NOTE — PROGRESS NOTES
Patient has graduated from the Complex Case Management  program on 6/30/21 for diabetes. Patient/family has the ability to self-manage at this time. Care management goals have been completed. No further Ambulatory Care Manager follow up scheduled. Goals Addressed                    This Visit's Progress      COMPLETED: Coordinate pain management plan for patient-rt flank pain; diabetes (pt-stated)   On track      6/30/21-dkw  -pt's a1c up yesterday to 7.5%  Lab Results   Component Value Date/Time    Hemoglobin A1c 7.5 (H) 06/29/2021 12:43 PM    Hemoglobin A1c 7.0 (H) 12/18/2020 10:38 AM    Hemoglobin A1c 6.8 (H) 06/18/2020 01:44 PM    Hemoglobin A1c, External 6.3 04/30/2016 12:00 AM   -pt had follow up with Dr Valeri Arnett yesterday and insulin was increased by 3 units every three days until fasting BG is <140    Key Antihyperglycemic Medications               SITagliptin-metFORMIN (Janumet) 50-1,000 mg per tablet Take 1 Tablet by mouth two (2) times daily (with meals).     insulin glargine (LANTUS SOLOSTAR) 100 unit/mL (3 mL) pen Inject 16 units at bedtime--Dose change 10/14/16--updated med list--did not send prescription to the pharmacy          Future Appointments   Date Time Provider Santiago Perez   7/28/2021  1:15 PM Phares Bosworth, PHARMD [facility] BS AMB   1/4/2022 11:30 AM Romaine Leblanc MD [facility] BS AMB   3/17/2022  2:40 PM Quang Fink MD [facility] BS AMB   -reviewed Dr Jeevan Jurado instructions for insulin increase; pt takes Lantus at night and took 20 units last night; she will take 23 units tonight  -reviewed upcoming appts; pt will call endo for appt  -pt voiced appreciation for help with her health issues  -pt agreed to [facility] graduation, but will call if needs assistance until 7/28/21 appt with Adelina    6/3/21-dkw  -a1c was 7.0% on 12/18/20 a little up from 6.8% on 6/21/20  -pt stated her blood sugars have been over 200, 203 this morning; a week ago she increased her insulin to 18 units and numbers have come down a little; she will increase to 20 units if her numbers do not go below 200  -nothing else has changed with food or activity  Key Antihyperglycemic Medications               SITagliptin-metFORMIN (Janumet) 50-1,000 mg per tablet Take 1 Tablet by mouth two (2) times daily (with meals).     insulin glargine (LANTUS SOLOSTAR) 100 unit/mL (3 mL) pen Inject 16 units at bedtime--Dose change 10/14/16--updated med list--did not send prescription to the pharmacy        -pt c/o fatigue- is wearing her CPCP  -pt believes her next appt with Dr Haile Head is June 29th in the morning and stated she received a call that Dr Haile Head was going to be out of the office on June 24th; pt will call [facility] and clarify  Future Appointments   Date Time Provider Santiago Perez   6/24/2021  1:30 PM Willie Jacobsen MD [facility] BS AMB   3/17/2022  2:40 PM Charis Villarreal MD [facility] BS AMB   -plan to follow up one more outreach after upcoming appt with Dr Haile Head    3/2/21-dkw  -it is noted that pt saw cardiologist, Dr Silvia Grimm, on 2/25/21  -next appt w/Dr Haile Head is 6/24/21  -left message asking pt to call back regarding how she is doing and with blood sugars  Future Appointments:  Future Appointments   Date Time Provider Santiago Perez   3/16/2021  4:00 PM Charis [facility]real MD [facility] AM[facility]   6/24/2021  1:30 PM Willie Jacobsen MD [facility] BS AMB   -will continue to follow up     2/1/21-dkw  -pt inquired about getting a covid-19 vaccine and was given the [address] phone regvqz-[phone]    12/22/20-dkw  -a1c was 7.0% on 12/18/20 a little up from 6.8% on 6/21/20  -gave pt lab results and read letter from Dr Haile Head that was mailed to her and has not received yet  -she is taking one Januvia in the morning only and Lantus 16 units at bedtime  Key Antihyperglycemic Medications               Janumet 50-1,000 mg per tablet TAKE 1 TABLET BY MOUTH TWICE A DAY WITH FOOD    insulin glargine (LANTUS SOLOSTAR) 100 unit/mL (3 mL) pen Inject 16 units at bedtime--Dose change 10/14/16--updated med list--did not send prescription to the pharmacy        -pt stated since she has VV with Dr Ihsan Hadley on 12/15/20, she has had two episodes of a sinking feeling and chest pain/tightness on left side in breast-advised pt to call cardiologist tomorrow and if happens again needs to go to the ED; pt verbalized understanding  Future Appointments   Date Time Provider Santiago Perez   6/24/2021  1:30 PM Genie Liriano MD [facility] BS AMB   Last Appointment My Department:  12/15/2020  -will follow up with pt in two months  -pt will call before then as needed    11/27/20-dkw  -Pt left a voice message mid afternoon that she just realized she is out of losartan 100 mg and has no refills left. Pended refill to Dr Ihsan Hadley.  Pt informed, and also to ask her  pharmacy to supply her with two tablets of losartan to get her through the weekend  -will follow    11/25/20-dkw  MD Tayla Karimi, SHIRA    Caller: Unspecified Pagan Push, 11:45 AM 11/24/20)               She can take questran light instead of coletid-will have to sprinkle it on her food-will escribee      -pt stated she took Fredrich Wesly yesterday for lunch and then again at bedtime and she has not had any GI symptoms so far; she will continue to take Janumet twice daily  -blood sugar last night was 254 and this morning 147  -pt will  the Questran light today  -pt will call with any problems  -will follow    11/24/20-dkw  -pt called stating since she stopped the Fredrich Wesly last week her stomach better-no more diarrhea, but now blood sugar is haywire; pt stated she has never had a blood sugar >300 and is having headaches now  -pt had a formed brownish stool this morning  -she has not started the Colestid due to pharmacy had to order  -blood sugars are as follows:  Date fasting  Bedtime-takes Lantus 26 units   Tuesday-today 11/24/20 146  254-reported in f/u call 11/25/20 Monday-11/23 194  296-took 28 units of Lantus   Sunday-11/22 190  362-took 30 units Lantus   Saturday-11/21 172  265   Friday-11/20 155  249   Thursday-11/19 146  222   Wednesday-11/18 146  162   -pt will restart the Janumet with lunch today; tomorrow she will take with breakfast and dinner; if diarrhea/GI issues return, she will take only in the morning  -pt does not have the extended release formulation of Janumet, but has 1.5 bottles on hand of her immediate release Janumet 50/1000 mg twice daily  Key Antihyperglycemic Medications               Janumet 50-1,000 mg per tablet TAKE 1 TABLET BY MOUTH TWICE A DAY WITH FOOD    insulin glargine (LANTUS SOLOSTAR) 100 unit/mL (3 mL) pen Inject 16 units at bedtime--Dose change 10/14/16--updated med list--did not send prescription to the pharmacy        -pt will hold the colestid at this time  -pt asked for a refill of glucometer test strips, which were sent to her [facility] in [place]  -chart routed to Dr Joseline Burt  -pt will call with any questions or concerns  -will follow    11/17/20-dkw  Note from Dr Joseline Burt:  She may have post yousuf diarrhea. I escribed colestid tabs which might prevent the diarrhea. She can hold the janumet  for 1 week and see if diarrhea and GI sxs get better on colestid and can restart janumet next week if doing better    -Pt stated she has not taken the evening Janumet for the past three days due to more of a problem with diarrhea and abd pain with the evening dose. Blood sugars are not affected much per pt. -Pt will totally stop the Janumet for a week and start the cholestid. Advised pt colestid has been sent to her pharmacy.   -Pt stated she just left the surgeons office and everything is okay from his perspective. -Pt has not called GI yet. Informed pt that her GI symptoms could be from her GB surgery.   -Will follow up with pt in a week. Pt will call with any questions or concerns.      11/13/20-dkw  -on 10/21/20, pt has Laparoscopic cholecystectomy with cholangiogram at [facility]"; incisions are healed and right flank pain has resolved; then on 10/28/20 pt went to the ED for a GLF after she stepped on her cat-her bruises are healing  -pt had to cancel post op appt w/surgeon due to has been feeling weak with diarrhea, nausea, constipation; this happens mostly after she takes her evening dose of Janumet 50/1000 mg; sometimes she skips her evening dose of Janumet  -pt is not taking Miralax or Norco  -pt also take Lantus 26 units at bedtime; discharge AVS states 16 units  -fasting blood sugars are  and bedtime are around 130  -she believes she has lost weight  -Dr Tamar Montano is her GI; she stated she will call him   -pt received the AMD in the mail and will fill it out-discussed activating My Chart and sending the document back that way  -discussed follow up appt with Dr Shanda Hanna on 12/15/20 at 2:15 and rescheduling post op appt w/surgeon  -chart was routed to Dr Shanda Hanna        10/14/20-dkw  -it is noted that pt's imaging (U/S and CT) revealed gall stones; pt has been notified by Dr Shanda Hanna and has been referred to [facility] general surgeon  -will follow as needed, per pt outreach to this AC, and around follow up with Dr Shanda Hanna in December    Future Appointments   Date Time Provider Santiago Perez   12/15/2020  2:15 PM Henrique Martinez MD [facility] BS AMB      10/12/20-dkw  -manage rt side pain in order to be able to be active again  -continue bland diet for now; pt thinks has gall stones  -have U/S of abd, CT of pelvis and abd done tomorrow 10/13/20 at [facility]  -continue meds as prescribed  -updated AMD  -call Etta Lyons at [phone] as needed; pt declined further follow from this AC unless diagnostic tests are abnormal tomorrow  -will follow diagnostic tests            Patient has Ambulatory Care Manager's contact information for any further questions, concerns, or needs.   Patients upcoming visits:    Future Appointments   Date Time Provider Santiago Perez   7/28/2021  1:15 PM BIPIN Julien [facility] BS AMB   1/4/2022 11:30 AM Henrique Martinez MD [facility] BS AMB   3/17/2022 2:40 PM Maribell Gallardo MD [facility] AMB

## 2021-06-30 NOTE — Clinical Note
This is a eddy pt Dr Scooby Boudreaux referred to you. Her appt is 7/28. I reviewed Dr Roopa Grimm insulin titration instructions with her today and she verbalized understanding. I graduated her today, but she will call me if need be before her appt with you.

## 2021-07-01 RX ORDER — ATORVASTATIN CALCIUM 80 MG/1
80 TABLET, FILM COATED ORAL DAILY
Qty: 90 TABLET | Refills: 1 | Status: SHIPPED | OUTPATIENT
Start: 2021-07-01 | End: 2021-07-28 | Stop reason: DRUGHIGH

## 2021-07-01 NOTE — PROGRESS NOTES
Tell pt stable urine protein, hep c negative, LDL is above goal --increase lipitor dose to 80 mg every day--please escribeif willing to take 80 mg every day but has had myalgias in the past-one option is to add repatha or praluent to liptior 40 mg every day--can have her see Pharm D.  Glucose 161 and a1c 7.5--increase lantus by 3 units q3d until FSBS is <140 and get fu with endocrine MD

## 2021-07-01 NOTE — PROGRESS NOTES
Spoke with patient using 2 identifiers . Patient was informed of labs and Dr. Letha Paulson recommendations. Patient agreed to start Lipitor 80 mg and increase insulin 3 unit q3 day until BS is< 140.

## 2021-07-01 NOTE — TELEPHONE ENCOUNTER
Future Appointments:  Future Appointments   Date Time Provider Santiago Lundyi   7/28/2021  1:15 PM BIPIN Jennings [facility] BS AMB   1/4/2022 11:30 AM Cesar Canavan, MD [facility] BS AMB   3/17/2022  2:40 PM Jeremiah Vasquez MD [facility] BS AMB        Last Appointment With Me:  6/29/2021     Requested Prescriptions     Pending Prescriptions Disp Refills    atorvastatin (LIPITOR) 80 mg tablet 90 Tablet 1     Sig: Take 1 Tablet by mouth daily.

## 2021-07-24 NOTE — PROGRESS NOTES
Pharmacy Progress Note - Diabetes Management      Assessment / Plan:   Diabetes Management:  - Per ADA guidelines, Pt's A1c is almost at goal of < 7-7.5%. - Mindful of patient's advanced age, renal function, and fall risk. - Given current renal function (CrCl <50 ml/min), continue with Janumet 50/1000 mg daily  - Recommend for patient to check fasting, pre-dinner,and pre-HS SMBG. Will review in 1 week to determine if prandial insulin is needed. Would start with Lispro 4 units before dinner  - Continue with Lantus 29 units daily for now      S/O: Ms. Ruddy Rosario is a 79 y.o. female, referred by Dr. Justin Nuñez MD, with a PMH of T2DM + neuropathy/nephropathy, CAD, HTN, HLD, Anemia, Hx breast cancer, DJD , was seen today for diabetes management. Patient's last A1c was 7.5% (June 2021), 7% (Dec 2020), 6.8% (June 2020). Interim update:   Pt has been titrating Lantus insulin every 3 days -- increased dose to 32 units daily. ED visit @ [facility] last week 7/19 for dizziness.  during this visit  Pt decreased Lantus to 29 units daily following this ED visit    Atorvastatin dose recently increased to 80 mg daily. Pt reports to taking this dose for 4-5 days and stopped d/t significant myalgia. Sx resolved with decreased 40 mg daily dose.      Medication Adherence/Access:  - Brought in home medications including prescription/non-prescriptions:  no  - Endorses barriers to affording/accessing medications : no  - Uses a pill box/other methods to organize medications: yes  - Endorses adherence to current regimen?: yes    Diabetes Management:  Diabetes History:  - Endorses \"for many years\"   - Family hx: daughter also has DM   - Previous anti-hyperglycemic agents includes:    Current anti-hyperglycemic regimen includes:    - Lantus Solarstar - 29 units daily  - Janumet 50/1000 mg daily --- nausea w/ BID dosing     SCr 1.33 from 1.06    - Atorvastatin 40 mg daily * -- see note above   - Fenofibrate 54 mg daily - Plavix 75 mg daily, ASA 81 mg daily,  Losartan 100 mg daily  - LDL 79; ; non  (June 2021)    ROS:  Today, Pt endorses:  - Symptoms of Hyperglycemia: none  - Symptoms of Hypoglycemia: none    Self Monitoring Blood Glucose (SMBG) or CGM:  - Brought in home glucometer/blood glucose log today:  yes                Nutrition:  Reports to eating 3 meals/day ; breakfast at 8:30-9PM ; dinner at 6PM ; HS usually at 10 PM  Dinner is largest meal.  States she has been eating more soft foods lately d/t GERD  Enjoys eating vegetables from her garden  Dinner last night: chicken salad & tomato/lettuce salad & 1 roll   Will occasionally drink \"a sip\" of diet coke to help settle her reflux. May occur in the middle of the night    Physical Activity:   Reports hx participating in a wellness exercise program several times per week. Has not been back to this routine since her cholecystectomy in Nov 2020 and current pandemic   Wt up 3 lbs since June    Has routine f/u with podiatry     Vitals:   Wt Readings from Last 3 Encounters:   07/28/21 166 lb (75.3 kg)   06/29/21 163 lb 3.2 oz (74 kg)   03/16/21 163 lb 6.4 oz (74.1 kg)     BP Readings from Last 3 Encounters:   07/28/21 126/74   06/29/21 (!) 145/72   03/16/21 134/77     Pulse Readings from Last 3 Encounters:   07/28/21 80   06/29/21 86   03/16/21 89       Past Medical History:   Diagnosis Date    Arthritis     Breast cancer ([facility] [address] 2002    s/p lumpectomy and XRT    CAD (coronary artery disease)     Chronic kidney disease     stage III    GERD (gastroesophageal reflux disease)     with hiatal hernia    Hypercholesterolemia     Hypertension     Liver disease     Long term current use of anticoagulant therapy     Neuropathy in diabetes ([facility] [address]     DOMINGO (obstructive sleep apnea)     on CPAP     Allergies   Allergen Reactions    Codeine Other (comments)     Jerking sensation    Livalo [Pitavastatin] Diarrhea    Niaspan [Niacin] Myalgia    Statins-Hmg-Coa Reductase Inhibitors Myalgia     Zocor, pravachol, Lipitor, crestor    Welchol [Colesevelam] Other (comments)     Nausea, bloating and malaise    Zetia [Ezetimibe] Myalgia       Current Outpatient Medications   Medication Sig    atorvastatin (LIPITOR) 80 mg tablet Take 1 Tablet by mouth daily.  SITagliptin-metFORMIN (Janumet) 50-1,000 mg per tablet Take 1 Tablet by mouth two (2) times daily (with meals). (Patient taking differently: Take 1 Tablet by mouth daily (with breakfast). )    montelukast (SINGULAIR) 10 mg tablet TAKE 1 TABLET BY MOUTH ONCE DAILY    azelastine (ASTELIN) 137 mcg (0.1 %) nasal spray USE 2 SPRAYS IN EACH NOSTRIL TWICE DAILY    losartan (COZAAR) 100 mg tablet take 1 tablet by mouth once daily    glucose blood VI test strips (ASCENSIA AUTODISC VI, ONE TOUCH ULTRA TEST VI) strip One touch ultra test strips---check fsbs tid. Use 1 test stirp to check fasting blood sugar three times daily for dx of diabetes E11.9    Cholestyramine-Aspartame (Questran Light) 4 gram powder Take 1 g by mouth two (2) times daily (with meals).  colestipoL (Colestid) 1 gram tablet Take 2 Tabs by mouth daily.  ondansetron (ZOFRAN ODT) 4 mg disintegrating tablet Take 1 Tab by mouth every eight (8) hours as needed for Nausea.  famotidine (PEPCID) 20 mg tablet TAKE 1 TABLET BY MOUTH EVERY NIGHT    pantoprazole (PROTONIX) 40 mg tablet TAKE 1 TABLET BY MOUTH DAILY    atorvastatin (LIPITOR) 40 mg tablet Take  by mouth daily.  Insulin Needles, Disposable, (PEN NEEDLE) 31 gauge x 3/16\" ndle Use as directed once daily    Blood-Glucose Meter monitoring kit One touch ultra mini glucometer--dx 250.00    Lancets misc Check fsbs bid -250.02    nitroglycerin (NITROSTAT) 0.4 mg SL tablet 1 Tab by SubLINGual route every five (5) minutes as needed for Chest Pain.  carvedilol (COREG) 6.25 mg tablet Take  by mouth two (2) times daily (with meals).  amLODIPine (NORVASC) 2.5 mg tablet Take  by mouth daily.     insulin glargine (LANTUS SOLOSTAR) 100 unit/mL (3 mL) pen Inject 16 units at bedtime--Dose change 10/14/16--updated med list--did not send prescription to the pharmacy (Patient taking differently: 16 Units by SubCUTAneous route. Inject up to 26 units at bedtime--Dose change reported by pt during med rec on 9/28/20)    fenofibrate (LOFIBRA) 54 mg tablet Take  by mouth daily.  cyanocobalamin (VITAMIN B12) 500 mcg tablet Take 500 mcg by mouth daily.  ranolazine ER (RANEXA) 500 mg SR tablet Take 1 Tab by mouth two (2) times a day.  clopidogrel (PLAVIX) 75 mg tablet Take 1 Tab by mouth daily.  aspirin 81 mg Tab Take 81 mg by mouth daily.  MULTIVITAMINS W-MINERALS/LUT (CENTRUM SILVER PO) Take 1 Tab by mouth daily. No current facility-administered medications for this visit. Lab Results   Component Value Date/Time    Sodium 138 06/29/2021 12:43 PM    Potassium 4.6 06/29/2021 12:43 PM    Chloride 107 06/29/2021 12:43 PM    CO2 25 06/29/2021 12:43 PM    Anion gap 6 06/29/2021 12:43 PM    Glucose 161 (H) 06/29/2021 12:43 PM    BUN 26 (H) 06/29/2021 12:43 PM    Creatinine 1.33 (H) 06/29/2021 12:43 PM    BUN/Creatinine ratio 20 06/29/2021 12:43 PM    GFR est AA 47 (L) 06/29/2021 12:43 PM    GFR est non-AA 38 (L) 06/29/2021 12:43 PM    Calcium 9.6 06/29/2021 12:43 PM    Bilirubin, total 0.3 06/29/2021 12:43 PM    Alk.  phosphatase 60 06/29/2021 12:43 PM    Protein, total 7.3 06/29/2021 12:43 PM    Albumin 3.6 06/29/2021 12:43 PM    Globulin 3.7 06/29/2021 12:43 PM    A-G Ratio 1.0 (L) 06/29/2021 12:43 PM    ALT (SGPT) 31 06/29/2021 12:43 PM     Lab Results   Component Value Date/Time    Cholesterol, total 168 06/29/2021 12:43 PM    Cholesterol (POC) 195 08/11/2011 10:08 AM    HDL Cholesterol 42 06/29/2021 12:43 PM    HDL Cholesterol (POC) 31 08/11/2011 10:08 AM    LDL Cholesterol (POC) 113 08/11/2011 10:08 AM    LDL, calculated 79.6 06/29/2021 12:43 PM    VLDL, calculated 46.4 06/29/2021 12:43 PM Triglyceride 232 (H) 06/29/2021 12:43 PM    Triglycerides (POC) 256 08/11/2011 10:08 AM    CHOL/HDL Ratio 4.0 06/29/2021 12:43 PM     Lab Results   Component Value Date/Time    WBC 6.8 09/27/2020 04:10 PM    WBC 8.0 05/15/2012 12:00 AM    HGB 11.5 09/27/2020 04:10 PM    Hematocrit (POC) 33 (L) 04/16/2019 05:17 PM    HCT 35.5 09/27/2020 04:10 PM    PLATELET 850 82/17/8580 04:10 PM    MCV 89.0 09/27/2020 04:10 PM       Lab Results   Component Value Date/Time    Microalbumin/Creat ratio (mg/g creat) 628 (H) 06/29/2021 12:43 PM    Microalbumin,urine random 169.00 06/29/2021 12:43 PM    Microalbumin urine (POC) abnormal 08/11/2011 10:07 AM       Lab Results   Component Value Date/Time    Hemoglobin A1c 7.5 (H) 06/29/2021 12:43 PM    Hemoglobin A1c 7.0 (H) 12/18/2020 10:38 AM    Hemoglobin A1c 6.8 (H) 06/18/2020 01:44 PM    Hemoglobin A1c, External 6.3 04/30/2016 12:00 AM     Hemoglobin A1c (POC)   Date Value Ref Range Status   08/11/2011 8.4          Estimated Creatinine Clearance: 33.4 mL/min (A) (by C-G formula based on SCr of 1.33 mg/dL (H)). Patient's Immunization History:    Immunizations by Immunization family     Influenza Vaccine 9/29/2012 10/18/2013 11/30/2015 9/15/2017     10/13/2018 10/6/2019 10/27/2020     Pneumococcal Conjugate (PCV) 4/12/2019       Pneumococcal Vaccine (Unspecified Type) 1/1/2008       Sars-cov-2 (Covid-19) Vaccine, Unspecified  3/4/2021 4/1/2021      TB Skin Test (PPD) 5/19/2011       Zoster Vaccine, Unspecified Formulation 2/15/2017           Medication reconciliation was completed during the visit.     Medications Discontinued During This Encounter   Medication Reason    atorvastatin (LIPITOR) 80 mg tablet DOSE ADJUSTMENT    Cholestyramine-Aspartame (Questran Light) 4 gram powder Not A Current Medication    insulin glargine (LANTUS SOLOSTAR) 100 unit/mL (3 mL) pen      Orders Placed This Encounter    insulin glargine (Lantus Solostar U-100 Insulin) 100 unit/mL (3 mL) inpn     Sig: 29 Units by SubCUTAneous route daily. Indications: type 2 diabetes mellitus     Dispense:  15 mL     Refill:  3    insulin glulisine U-100 (Apidra SoloStar U-100 Insulin) 100 unit/mL pen     Sig: Inject 4 units under the skin daily before dinner  Indications: type 2 diabetes mellitus     Dispense:  1 Pen     Refill:  0     Order Specific Question:   Expiration Date     Answer:   5/31/2022     Order Specific Question:   Lot#     Answer:   [id number]     Order Specific Question:        Answer:   [employer]    insulin glargine (LANTUS,BASAGLAR) 100 unit/mL (3 mL) inpn     Sig: Inject 29 units under the skin daily  Indications: type 2 diabetes mellitus     Dispense:  2 Pen     Refill:  0     Order Specific Question:   Expiration Date     Answer:   8/31/2022     Order Specific Question:   Lot#     Answer:   [id number]     Order Specific Question:        Answer:   [employer]     Patient verbalized understanding of the information presented and all of the patients questions were answered. AVS was handed to the patient. Patient advised to call the office with any additional questions or concerns. Notifications of recommendations will be sent to Dr. Jony Ambrocio MD for review. Patient will return to clinic in 1 week(s) for follow up. Thank you for the consult,  Katia Huynh, PharmD, BCACP, [address] [facility] in place:  Yes   Recommendation Provided To: Patient/Caregiver: 1 via In person   Intervention Detail: Discontinued Rx: 3, reason: Patient Preference and Therapy Complete, Dose Adjustment: 1, reason: Therapy Optimization, New Rx: 1, reason: Needs Additional Therapy and Patient Access Assistance/Sample Provided   Time Spent (min): 45

## 2021-07-28 ENCOUNTER — OFFICE VISIT (OUTPATIENT)
Dept: INTERNAL MEDICINE CLINIC | Age: 81
End: 2021-07-28

## 2021-07-28 VITALS
HEIGHT: 63 IN | DIASTOLIC BLOOD PRESSURE: 74 MMHG | HEART RATE: 80 BPM | WEIGHT: 166 LBS | BODY MASS INDEX: 29.41 KG/M2 | SYSTOLIC BLOOD PRESSURE: 126 MMHG

## 2021-07-28 DIAGNOSIS — E11.9 TYPE 2 DIABETES MELLITUS WITHOUT COMPLICATION, WITH LONG-TERM CURRENT USE OF INSULIN (HCC): Primary | ICD-10-CM

## 2021-07-28 DIAGNOSIS — Z79.4 TYPE 2 DIABETES MELLITUS WITHOUT COMPLICATION, WITH LONG-TERM CURRENT USE OF INSULIN (HCC): Primary | ICD-10-CM

## 2021-07-28 RX ORDER — INSULIN GLULISINE 100 [IU]/ML
INJECTION, SOLUTION SUBCUTANEOUS
Qty: 1 PEN | Refills: 0 | Status: SHIPPED | COMMUNITY
Start: 2021-07-28 | End: 2021-09-13 | Stop reason: SDUPTHER

## 2021-07-28 RX ORDER — INSULIN GLARGINE 100 [IU]/ML
INJECTION, SOLUTION SUBCUTANEOUS
Qty: 2 PEN | Refills: 0 | Status: SHIPPED | COMMUNITY
Start: 2021-07-28 | End: 2021-08-23 | Stop reason: ALTCHOICE

## 2021-07-28 RX ORDER — INSULIN GLARGINE 100 [IU]/ML
29 INJECTION, SOLUTION SUBCUTANEOUS DAILY
Qty: 15 ML | Refills: 3
Start: 2021-07-28 | End: 2021-08-03 | Stop reason: SDUPTHER

## 2021-07-28 NOTE — PATIENT INSTRUCTIONS
· Check your blood sugar --- fasting, before dinner and before bedtime. We will review your blood sugars on Monday  · Continue Lantus 29 units daily in the evening.   · Continue Janumet 50/1000 mg daily  · Bring your blood sugar log and all medications to the next visit

## 2021-07-31 NOTE — PROGRESS NOTES
Pharmacy Progress Note - Diabetes Management    Assessment / Plan:   Diabetes Management:  - Per ADA guidelines, Pt's A1c is not at goal of < 7-7.5%. - Based on current SMBGs, fasting and post prandial BG remain elevated for goal  - Add Apidra 4 units before dinner  - Continue Lantus at 29 units daily and Janumet 50/1000 mg daily  - VV in 2 weeks to f/u on SMBGs     S/O: Ms. Becca Fernandez is a 79 y.o. female, referred by Dr. Geraldo Saini MD, with a PMH of T2DM + neuropathy/nephropathy, CAD, HTN, HLD, Anemia, Hx breast cancer, DJD , was seen virtually today for diabetes management follow up. Patient's last A1c was 7.5% (June 2021), 7% (Dec 2020), 6.8% (June 2020). PHI verified. Current anti-hyperglycemic regimen include(s):    - Lantus Solarstar - 29 units daily  - Janumet 50/1000 mg daily - dosed per renal fxn     ROS:  Today, Pt endorses:  - Symptoms of Hyperglycemia: none  - Symptoms of Hypoglycemia: none    Self Monitoring Blood Glucose (SMBG) or CGM:  Reports:  Date Before Breakfast Before Dinner Before Bedtime   7/29/2021 180 117 155   7/30/2021 143 155 178   7/31/2021 196 179 211   8/1/2021 175 265 177   8/2/2021 165     Avg 172 179 180     Nutrition/Lifestyle Modifications:  Keeping a meal log: recent meals ---   B:  Corn flakes & 2% milk or 1 cheese toast or ellsworth & 1 egg & 1 toast   L: salad or baked chicken sandwich w/ lettuce & tomato w/ thin wheat bread  D: 1 cup of spaghetti and 1 cup of sugar free apple sauce or baked chicken & green beans/ potato  Shanika: water or decaf coffee      Vitals:   Wt Readings from Last 3 Encounters:   07/28/21 166 lb (75.3 kg)   06/29/21 163 lb 3.2 oz (74 kg)   03/16/21 163 lb 6.4 oz (74.1 kg)     BP Readings from Last 3 Encounters:   07/28/21 126/74   06/29/21 (!) 145/72   03/16/21 134/77     Pulse Readings from Last 3 Encounters:   07/28/21 80   06/29/21 86   03/16/21 89       Past Medical History:   Diagnosis Date    Arthritis     Breast cancer ([facility] [address] 2002    s/p lumpectomy and XRT    CAD (coronary artery disease)     Chronic kidney disease     stage III    GERD (gastroesophageal reflux disease)     with hiatal hernia    Hypercholesterolemia     Hypertension     Liver disease     Long term current use of anticoagulant therapy     Neuropathy in diabetes (HCC)     DMOINGO (obstructive sleep apnea)     on CPAP     Allergies   Allergen Reactions    Codeine Other (comments)     Jerking sensation    Livalo [Pitavastatin] Diarrhea    Niaspan [Niacin] Myalgia    Statins-Hmg-Coa Reductase Inhibitors Myalgia     Zocor, pravachol, Lipitor, crestor    Welchol [Colesevelam] Other (comments)     Nausea, bloating and malaise    Zetia [Ezetimibe] Myalgia       Current Outpatient Medications   Medication Sig    insulin glargine (Lantus Solostar U-100 Insulin) 100 unit/mL (3 mL) inpn 29 Units by SubCUTAneous route daily. Indications: type 2 diabetes mellitus    insulin glulisine U-100 (Apidra SoloStar U-100 Insulin) 100 unit/mL pen Inject 4 units under the skin daily before dinner  Indications: type 2 diabetes mellitus    insulin glargine (LANTUS,BASAGLAR) 100 unit/mL (3 mL) inpn Inject 29 units under the skin daily  Indications: type 2 diabetes mellitus    SITagliptin-metFORMIN (Janumet) 50-1,000 mg per tablet Take 1 Tablet by mouth two (2) times daily (with meals). (Patient taking differently: Take 1 Tablet by mouth daily (with breakfast). )    montelukast (SINGULAIR) 10 mg tablet TAKE 1 TABLET BY MOUTH ONCE DAILY    azelastine (ASTELIN) 137 mcg (0.1 %) nasal spray USE 2 SPRAYS IN EACH NOSTRIL TWICE DAILY    losartan (COZAAR) 100 mg tablet take 1 tablet by mouth once daily    glucose blood VI test strips (ASCENSIA AUTODISC VI, ONE TOUCH ULTRA TEST VI) strip One touch ultra test strips---check fsbs tid. Use 1 test stirp to check fasting blood sugar three times daily for dx of diabetes E11.9    colestipoL (Colestid) 1 gram tablet Take 2 Tabs by mouth daily.     ondansetron (ZOFRAN ODT) 4 mg disintegrating tablet Take 1 Tab by mouth every eight (8) hours as needed for Nausea.  famotidine (PEPCID) 20 mg tablet TAKE 1 TABLET BY MOUTH EVERY NIGHT    pantoprazole (PROTONIX) 40 mg tablet TAKE 1 TABLET BY MOUTH DAILY    atorvastatin (LIPITOR) 40 mg tablet Take  by mouth daily.  Insulin Needles, Disposable, (PEN NEEDLE) 31 gauge x 3/16\" ndle Use as directed once daily    Blood-Glucose Meter monitoring kit One touch ultra mini glucometer--dx 250.00    Lancets misc Check fsbs bid -250.02    nitroglycerin (NITROSTAT) 0.4 mg SL tablet 1 Tab by SubLINGual route every five (5) minutes as needed for Chest Pain.  carvedilol (COREG) 6.25 mg tablet Take  by mouth two (2) times daily (with meals).  amLODIPine (NORVASC) 2.5 mg tablet Take 2.5 mg by mouth daily.  fenofibrate (LOFIBRA) 54 mg tablet Take  by mouth daily.  cyanocobalamin (VITAMIN B12) 500 mcg tablet Take 500 mcg by mouth daily.  ranolazine ER (RANEXA) 500 mg SR tablet Take 1 Tab by mouth two (2) times a day.  clopidogrel (PLAVIX) 75 mg tablet Take 1 Tab by mouth daily.  aspirin 81 mg Tab Take 81 mg by mouth daily.  MULTIVITAMINS W-MINERALS/LUT (CENTRUM SILVER PO) Take 1 Tab by mouth daily. No current facility-administered medications for this visit. Lab Results   Component Value Date/Time    Sodium 138 06/29/2021 12:43 PM    Potassium 4.6 06/29/2021 12:43 PM    Chloride 107 06/29/2021 12:43 PM    CO2 25 06/29/2021 12:43 PM    Anion gap 6 06/29/2021 12:43 PM    Glucose 161 (H) 06/29/2021 12:43 PM    BUN 26 (H) 06/29/2021 12:43 PM    Creatinine 1.33 (H) 06/29/2021 12:43 PM    BUN/Creatinine ratio 20 06/29/2021 12:43 PM    GFR est AA 47 (L) 06/29/2021 12:43 PM    GFR est non-AA 38 (L) 06/29/2021 12:43 PM    Calcium 9.6 06/29/2021 12:43 PM    Bilirubin, total 0.3 06/29/2021 12:43 PM    Alk.  phosphatase 60 06/29/2021 12:43 PM    Protein, total 7.3 06/29/2021 12:43 PM    Albumin 3.6 06/29/2021 12:43 PM Globulin 3.7 06/29/2021 12:43 PM    A-G Ratio 1.0 (L) 06/29/2021 12:43 PM    ALT (SGPT) 31 06/29/2021 12:43 PM       Lab Results   Component Value Date/Time    Cholesterol, total 168 06/29/2021 12:43 PM    Cholesterol (POC) 195 08/11/2011 10:08 AM    HDL Cholesterol 42 06/29/2021 12:43 PM    HDL Cholesterol (POC) 31 08/11/2011 10:08 AM    LDL Cholesterol (POC) 113 08/11/2011 10:08 AM    LDL, calculated 79.6 06/29/2021 12:43 PM    VLDL, calculated 46.4 06/29/2021 12:43 PM    Triglyceride 232 (H) 06/29/2021 12:43 PM    Triglycerides (POC) 256 08/11/2011 10:08 AM    CHOL/HDL Ratio 4.0 06/29/2021 12:43 PM       Lab Results   Component Value Date/Time    WBC 6.8 09/27/2020 04:10 PM    WBC 8.0 05/15/2012 12:00 AM    HGB 11.5 09/27/2020 04:10 PM    Hematocrit (POC) 33 (L) 04/16/2019 05:17 PM    HCT 35.5 09/27/2020 04:10 PM    PLATELET 089 42/90/5887 04:10 PM    MCV 89.0 09/27/2020 04:10 PM       Lab Results   Component Value Date/Time    Microalbumin/Creat ratio (mg/g creat) 628 (H) 06/29/2021 12:43 PM    Microalbumin,urine random 169.00 06/29/2021 12:43 PM    Microalbumin urine (POC) abnormal 08/11/2011 10:07 AM       HbA1c:  Lab Results   Component Value Date/Time    Hemoglobin A1c 7.5 (H) 06/29/2021 12:43 PM    Hemoglobin A1c (POC) 8.4 08/11/2011 10:07 AM    Hemoglobin A1c, External 6.3 04/30/2016 12:00 AM          Last Point of Care HGB A1C  Hemoglobin A1c (POC)   Date Value Ref Range Status   08/11/2011 8.4          CrCl cannot be calculated (Unknown ideal weight. ). Medication reconciliation was completed during the visit. There are no discontinued medications. Patient verbalized understanding of the information presented and all of the patients questions were answered. AVS was handed to the patient. Patient advised to call the office with any additional questions or concerns. Notifications of recommendations will be sent to Dr. Afsaneh Castro MD for review.     Patient will return to clinic in 2 week(s) for follow up. Thank you for the consult,  Katia Amin, PharmD, BCACP, [address] [place] [address],[address]:  Yes   Recommendation Provided To: Patient/Caregiver: 1 via Virtual Visit   Intervention Detail: Adherence Monitorin and New Rx: 1, reason: Needs Additional Therapy   Time Spent (min): 25

## 2021-08-02 ENCOUNTER — VIRTUAL VISIT (OUTPATIENT)
Dept: INTERNAL MEDICINE CLINIC | Age: 81
End: 2021-08-02

## 2021-08-02 DIAGNOSIS — E11.40 TYPE 2 DIABETES, CONTROLLED, WITH NEUROPATHY (HCC): ICD-10-CM

## 2021-08-02 DIAGNOSIS — E11.21 TYPE 2 DIABETES WITH NEPHROPATHY (HCC): Primary | ICD-10-CM

## 2021-08-03 RX ORDER — INSULIN GLARGINE 100 [IU]/ML
29 INJECTION, SOLUTION SUBCUTANEOUS DAILY
Qty: 15 ML | Refills: 3 | Status: SHIPPED | OUTPATIENT
Start: 2021-08-03 | End: 2022-02-08

## 2021-08-03 NOTE — TELEPHONE ENCOUNTER
Future Appointments:  Future Appointments   Date Time Provider Santiago Perez   2021 11:45 AM BIPIN March [facility] BS AMB   2022 11:30 AM Narendra Iglesias MD [facility] BS AMB   2022  2:30 PM Aranza Alves MD [facility] BS AMB   3/17/2022  2:40 PM Roxana Harrington MD [facility] BS AMB        Last Appointment With Me:  2021     Requested Prescriptions     Pending Prescriptions Disp Refills    insulin glargine (Lantus Solostar U-100 Insulin) 100 unit/mL (3 mL) inpn 15 mL 3     Si Units by SubCUTAneous route daily.  Indications: type 2 diabetes mellitus

## 2021-08-23 ENCOUNTER — VIRTUAL VISIT (OUTPATIENT)
Dept: INTERNAL MEDICINE CLINIC | Age: 81
End: 2021-08-23

## 2021-08-23 DIAGNOSIS — E11.21 TYPE 2 DIABETES WITH NEPHROPATHY (HCC): Primary | ICD-10-CM

## 2021-08-23 RX ORDER — AMOXICILLIN AND CLAVULANATE POTASSIUM 500; 125 MG/1; MG/1
1 TABLET, FILM COATED ORAL EVERY 12 HOURS
COMMUNITY
Start: 2021-08-17 | End: 2021-08-23 | Stop reason: ALTCHOICE

## 2021-09-13 ENCOUNTER — VIRTUAL VISIT (OUTPATIENT)
Dept: INTERNAL MEDICINE CLINIC | Age: 81
End: 2021-09-13

## 2021-09-13 DIAGNOSIS — E11.21 TYPE 2 DIABETES WITH NEPHROPATHY (HCC): Primary | ICD-10-CM

## 2021-09-13 RX ORDER — INSULIN GLULISINE 100 [IU]/ML
INJECTION, SOLUTION SUBCUTANEOUS
Qty: 2 PEN | Refills: 0 | Status: SHIPPED | COMMUNITY
Start: 2021-09-13 | End: 2022-02-08 | Stop reason: DRUGHIGH

## 2021-09-13 NOTE — PROGRESS NOTES
Pharmacy Progress Note - Diabetes Management    Assessment / Plan:   Diabetes Management:  - Per ADA guidelines, Pt's A1c is at goal of < 7-7.5% (advanced age, fall risk). - Reported SMBG closer to her fasting and post prandial goals   - Continue 4 units Apidra before supper. -- If pre-meal BG > 150, give 6 units. Will assist with more samples  - Continue Lantus 29 units daily and Janumet 50/1000 mg daily    Follow up in 4 weeks in office   Check: Vitals, Weight and HbA1c at the next visit. S/O: Ms. Alis Mendoza is a 79 y.o. female, referred by Mercy Scott MD, with a PMH of T2DM + neuropathy/nephropathy, CAD, HTN, HLD, Anemia, Hx breast cancer, DJD , was seen virtually today for diabetes management follow up.  Patient's last A1c was 7.5% (June 2021), 7% (Dec 2020), 6.8% (June 2020).   PHI verified.     Interim update:   Reports she has a new tooth placement  Reports finger remains red/painful. Completed amoxil. Had f/u with Dr Hayes Fox (cardio) last week. No changes to meds. Recommended for patient to have rheumatology eval for arthritis. Hx of taking celebrex  Request for referral to rheumatology     Current anti-hyperglycemic regimen include(s):    - Lantus Solarstar 29 units daily  - Apidra  8 units before dinner  - reports to giving 4 units before dinner  - Janumet 50/1000 mg daily - dose per renal fxn    ROS:  Today, Pt endorses:  - Symptoms of Hyperglycemia: none  - Symptoms of Hypoglycemia: none    Self Monitoring Blood Glucose (SMBG) or CGM:  Except for 1 time - Denies any BG > 200  Fasting today: 157  Other readings: 180,152,131, 169,139, 128  113, 129, 106, 152, 167, 180  Denies BG < 70     Nutrition/Lifestyle Modifications:  Denies any significant changes     The ASCVD Risk score (Navya Damon, et al., 2013) failed to calculate for the following reasons: The patient has a prior MI or stroke diagnosis     Vitals:   Wt Readings from Last 3 Encounters:   07/28/21 166 lb (75.3 kg) 06/29/21 163 lb 3.2 oz (74 kg)   03/16/21 163 lb 6.4 oz (74.1 kg)     BP Readings from Last 3 Encounters:   07/28/21 126/74   06/29/21 (!) 145/72   03/16/21 134/77     Pulse Readings from Last 3 Encounters:   07/28/21 80   06/29/21 86   03/16/21 89       Past Medical History:   Diagnosis Date    Arthritis     Breast cancer ([facility] [address] 2002    s/p lumpectomy and XRT    CAD (coronary artery disease)     Chronic kidney disease     stage III    GERD (gastroesophageal reflux disease)     with hiatal hernia    Hypercholesterolemia     Hypertension     Liver disease     Long term current use of anticoagulant therapy     Neuropathy in diabetes (HCC)     DOMINGO (obstructive sleep apnea)     on CPAP     Allergies   Allergen Reactions    Codeine Other (comments)     Jerking sensation    Livalo [Pitavastatin] Diarrhea    Niaspan [Niacin] Myalgia    Statins-Hmg-Coa Reductase Inhibitors Myalgia     Zocor, pravachol, Lipitor, crestor    Welchol [Colesevelam] Other (comments)     Nausea, bloating and malaise    Zetia [Ezetimibe] Myalgia       Current Outpatient Medications   Medication Sig    insulin glargine (Lantus Solostar U-100 Insulin) 100 unit/mL (3 mL) inpn 29 Units by SubCUTAneous route daily. Indications: type 2 diabetes mellitus    insulin glulisine U-100 (Apidra SoloStar U-100 Insulin) 100 unit/mL pen Inject 4 units under the skin daily before dinner  Indications: type 2 diabetes mellitus    SITagliptin-metFORMIN (Janumet) 50-1,000 mg per tablet Take 1 Tablet by mouth two (2) times daily (with meals). (Patient taking differently: Take 1 Tablet by mouth daily (with breakfast). )    montelukast (SINGULAIR) 10 mg tablet TAKE 1 TABLET BY MOUTH ONCE DAILY    azelastine (ASTELIN) 137 mcg (0.1 %) nasal spray USE 2 SPRAYS IN EACH NOSTRIL TWICE DAILY    losartan (COZAAR) 100 mg tablet take 1 tablet by mouth once daily    glucose blood VI test strips (ASCENSIA AUTODISC VI, ONE TOUCH ULTRA TEST VI) strip One touch ultra test strips---check fsbs tid. Use 1 test stirp to check fasting blood sugar three times daily for dx of diabetes E11.9    colestipoL (Colestid) 1 gram tablet Take 2 Tabs by mouth daily.  ondansetron (ZOFRAN ODT) 4 mg disintegrating tablet Take 1 Tab by mouth every eight (8) hours as needed for Nausea.  famotidine (PEPCID) 20 mg tablet TAKE 1 TABLET BY MOUTH EVERY NIGHT    pantoprazole (PROTONIX) 40 mg tablet TAKE 1 TABLET BY MOUTH DAILY    atorvastatin (LIPITOR) 40 mg tablet Take  by mouth daily.  Insulin Needles, Disposable, (PEN NEEDLE) 31 gauge x 3/16\" ndle Use as directed once daily    Blood-Glucose Meter monitoring kit One touch ultra mini glucometer--dx 250.00    Lancets misc Check fsbs bid -250.02    nitroglycerin (NITROSTAT) 0.4 mg SL tablet 1 Tab by SubLINGual route every five (5) minutes as needed for Chest Pain.  carvedilol (COREG) 6.25 mg tablet Take  by mouth two (2) times daily (with meals).  amLODIPine (NORVASC) 2.5 mg tablet Take 2.5 mg by mouth daily.  fenofibrate (LOFIBRA) 54 mg tablet Take  by mouth daily.  cyanocobalamin (VITAMIN B12) 500 mcg tablet Take 500 mcg by mouth daily.  ranolazine ER (RANEXA) 500 mg SR tablet Take 1 Tab by mouth two (2) times a day.  clopidogrel (PLAVIX) 75 mg tablet Take 1 Tab by mouth daily.  aspirin 81 mg Tab Take 81 mg by mouth daily.  MULTIVITAMINS W-MINERALS/LUT (CENTRUM SILVER PO) Take 1 Tab by mouth daily. No current facility-administered medications for this visit.        Lab Results   Component Value Date/Time    Sodium 138 06/29/2021 12:43 PM    Potassium 4.6 06/29/2021 12:43 PM    Chloride 107 06/29/2021 12:43 PM    CO2 25 06/29/2021 12:43 PM    Anion gap 6 06/29/2021 12:43 PM    Glucose 161 (H) 06/29/2021 12:43 PM    BUN 26 (H) 06/29/2021 12:43 PM    Creatinine 1.33 (H) 06/29/2021 12:43 PM    BUN/Creatinine ratio 20 06/29/2021 12:43 PM    GFR est AA 47 (L) 06/29/2021 12:43 PM    GFR est non-AA 38 (L) 06/29/2021 12:43 PM    Calcium 9.6 06/29/2021 12:43 PM    Bilirubin, total 0.3 06/29/2021 12:43 PM    Alk. phosphatase 60 06/29/2021 12:43 PM    Protein, total 7.3 06/29/2021 12:43 PM    Albumin 3.6 06/29/2021 12:43 PM    Globulin 3.7 06/29/2021 12:43 PM    A-G Ratio 1.0 (L) 06/29/2021 12:43 PM    ALT (SGPT) 31 06/29/2021 12:43 PM       Lab Results   Component Value Date/Time    Cholesterol, total 168 06/29/2021 12:43 PM    Cholesterol (POC) 195 08/11/2011 10:08 AM    HDL Cholesterol 42 06/29/2021 12:43 PM    HDL Cholesterol (POC) 31 08/11/2011 10:08 AM    LDL Cholesterol (POC) 113 08/11/2011 10:08 AM    LDL, calculated 79.6 06/29/2021 12:43 PM    VLDL, calculated 46.4 06/29/2021 12:43 PM    Triglyceride 232 (H) 06/29/2021 12:43 PM    Triglycerides (POC) 256 08/11/2011 10:08 AM    CHOL/HDL Ratio 4.0 06/29/2021 12:43 PM       Lab Results   Component Value Date/Time    WBC 6.8 09/27/2020 04:10 PM    WBC 8.0 05/15/2012 12:00 AM    HGB 11.5 09/27/2020 04:10 PM    Hematocrit (POC) 33 (L) 04/16/2019 05:17 PM    HCT 35.5 09/27/2020 04:10 PM    PLATELET 383 87/25/9617 04:10 PM    MCV 89.0 09/27/2020 04:10 PM       Lab Results   Component Value Date/Time    Microalbumin/Creat ratio (mg/g creat) 628 (H) 06/29/2021 12:43 PM    Microalbumin,urine random 169.00 06/29/2021 12:43 PM    Microalbumin urine (POC) abnormal 08/11/2011 10:07 AM       HbA1c:  Lab Results   Component Value Date/Time    Hemoglobin A1c 7.5 (H) 06/29/2021 12:43 PM    Hemoglobin A1c (POC) 8.4 08/11/2011 10:07 AM    Hemoglobin A1c, External 6.3 04/30/2016 12:00 AM     No components found for: 2     Last Point of Care HGB A1C  Hemoglobin A1c (POC)   Date Value Ref Range Status   08/11/2011 8.4          CrCl cannot be calculated (Unknown ideal weight. ). Medication reconciliation was completed during the visit.     Medications Discontinued During This Encounter   Medication Reason    insulin glulisine U-100 (Apidra SoloStar U-100 Insulin) 100 unit/mL pen REORDER Orders Placed This Encounter    insulin glulisine U-100 (Apidra SoloStar U-100 Insulin) 100 unit/mL pen     Sig: Inject 4-6 units under the skin daily before dinner  Indications: type 2 diabetes mellitus     Dispense:  2 Pen     Refill:  0     Order Specific Question:   Expiration Date     Answer:   2022     Order Specific Question:   Lot#     Answer:   [id number]     Order Specific Question:        Answer:   [employer]     Patient verbalized understanding of the information presented and all of the patients questions were answered. Patient advised to call the office with any additional questions or concerns. Notifications of recommendations will be sent to Dr. May Argueta MD for review. Patient will return to clinic in 4 week(s) for follow up. Thank you for the consult,  Katia Drake, PharmD, BCACP, [address]:  Yes   Recommendation Provided To: Patient/Caregiver: 1 via Virtual Visit   Intervention Detail: Adherence Monitorin, Dose Adjustment: 1, reason: Therapy Optimization and Patient Access Assistance/Sample Provided   Time Spent (min): 20

## 2021-09-13 NOTE — Clinical Note
Estrada!   Ms. Katharina Closs is hoping to have a referral to rheumatology to evaluate her arthritic pain. Any thoughts/recs?      Jessica Sees

## 2021-10-12 DIAGNOSIS — K21.9 GASTROESOPHAGEAL REFLUX DISEASE WITHOUT ESOPHAGITIS: ICD-10-CM

## 2021-10-12 RX ORDER — PANTOPRAZOLE SODIUM 40 MG/1
TABLET, DELAYED RELEASE ORAL
Qty: 90 TABLET | Refills: 3 | Status: SHIPPED | OUTPATIENT
Start: 2021-10-12 | End: 2022-08-23 | Stop reason: SDUPTHER

## 2021-10-25 ENCOUNTER — TELEPHONE (OUTPATIENT)
Dept: INTERNAL MEDICINE CLINIC | Age: 81
End: 2021-10-25

## 2021-10-25 NOTE — TELEPHONE ENCOUNTER
----- Message from Ravinder Hernandez sent at 10/25/2021  2:39 PM EDT -----  Subject: Message to Provider    QUESTIONS  Information for Provider? patient wants to reschedule her appointment says   she thought the appointment today was a phone call and it was not wants to   reschedule only with doctor trans nothing available wants a call back to   schedule   ---------------------------------------------------------------------------  --------------  CALL BACK INFO  What is the best way for the office to contact you? OK to leave message on   voicemail  Preferred Call Back Phone Number? [phone]  ---------------------------------------------------------------------------  --------------  SCRIPT ANSWERS  Relationship to Patient?  Self    Message Copy/Pasted from the [facility] ([email or web address])

## 2021-11-01 DIAGNOSIS — K21.9 GASTROESOPHAGEAL REFLUX DISEASE WITHOUT ESOPHAGITIS: ICD-10-CM

## 2021-11-01 RX ORDER — FAMOTIDINE 20 MG/1
TABLET, FILM COATED ORAL
Qty: 90 TABLET | Refills: 3 | Status: SHIPPED | OUTPATIENT
Start: 2021-11-01 | End: 2022-08-18 | Stop reason: SDUPTHER

## 2021-11-04 ENCOUNTER — VIRTUAL VISIT (OUTPATIENT)
Dept: INTERNAL MEDICINE CLINIC | Age: 81
End: 2021-11-04

## 2021-11-04 DIAGNOSIS — E11.21 TYPE 2 DIABETES WITH NEPHROPATHY (HCC): Primary | ICD-10-CM

## 2021-11-04 NOTE — PROGRESS NOTES
Pharmacy Progress Note - Diabetes Management    Assessment / Plan:   Diabetes Management:  - Per ADA guidelines, Pt's A1c is not at goal of < 7%-7.5% (advanced age, fall risk). - Emphasize importance of monitoring SMBG consistently  - Continue with Apidra 4 units before supper. Emphasize if pre-meal BG > 150, give 6 units.   - Continue with Lantus 29 units daily for now  - Continue with Janumet 50/1000 mg daily - renally dosed. - Check A1c, BMP at next visit       S/O: Ms. Alis Mendoza is a 79 y.o. female, referred by Dannielle Primrose, MD, with a PMH of T2DM + neuropathy/nephropathy, CAD, HTN, HLD, Anemia, Hx breast cancer, DJD , was seen virtually today for diabetes management follow up.  Patient's last A1c was 7.5% (June 2021), 7% (Dec 2020), 6.8% (June 2020).   PHI verified.     Interim update:   Was busy with her grandson's wedding last month. Denies any missed doses    Current anti-hyperglycemic regimen include(s):    - Lantus Solarstar 29 units daily  - Apidra 4 units before supper. If pre-meal BG > 150, give 6 units daily   - Janumet 50/1000 mg daily AM- dose per renal fxn    ROS:  Today, Pt endorses:  - Symptoms of Hyperglycemia: none  - Symptoms of Hypoglycemia: none    Self Monitoring Blood Glucose (SMBG) or CGM:  Reports  Not checking SMBG consistently  Date Before Breakfast Before Dinner Before Bedtime   10/31/2021  81 184   11/1/2021 160  151   11/2/2021 146  167   11/3/2021 172  163     Lowest was: 138    Nutrition/Lifestyle Modifications:  Denies significant changes to nutrition  Unable to report recent wt check    Vitals:   Wt Readings from Last 3 Encounters:   07/28/21 166 lb (75.3 kg)   06/29/21 163 lb 3.2 oz (74 kg)   03/16/21 163 lb 6.4 oz (74.1 kg)     BP Readings from Last 3 Encounters:   07/28/21 126/74   06/29/21 (!) 145/72   03/16/21 134/77     Pulse Readings from Last 3 Encounters:   07/28/21 80   06/29/21 86   03/16/21 89       Past Medical History:   Diagnosis Date    Arthritis     [facility]    s/p lumpectomy and XRT    CAD (coronary artery disease)     Chronic kidney disease     stage III    GERD (gastroesophageal reflux disease)     with hiatal hernia    Hypercholesterolemia     Hypertension     Liver disease     Long term current use of anticoagulant therapy     Neuropathy in diabetes (HCC)     DOMINGO (obstructive sleep apnea)     on CPAP     Allergies   Allergen Reactions    Codeine Other (comments)     Jerking sensation    Livalo [Pitavastatin] Diarrhea    Niaspan [Niacin] Myalgia    Statins-Hmg-Coa Reductase Inhibitors Myalgia     Zocor, pravachol, Lipitor, crestor    Welchol [Colesevelam] Other (comments)     Nausea, bloating and malaise    Zetia [Ezetimibe] Myalgia       Current Outpatient Medications   Medication Sig    famotidine (PEPCID) 20 mg tablet TAKE 1 TABLET BY MOUTH EVERY NIGHT    pantoprazole (PROTONIX) 40 mg tablet TAKE 1 TABLET BY MOUTH DAILY    insulin glulisine U-100 (Apidra SoloStar U-100 Insulin) 100 unit/mL pen Inject 4-6 units under the skin daily before dinner  Indications: type 2 diabetes mellitus    insulin glargine (Lantus Solostar U-100 Insulin) 100 unit/mL (3 mL) inpn 29 Units by SubCUTAneous route daily. Indications: type 2 diabetes mellitus    SITagliptin-metFORMIN (Janumet) 50-1,000 mg per tablet Take 1 Tablet by mouth two (2) times daily (with meals). (Patient taking differently: Take 1 Tablet by mouth daily (with breakfast). )    montelukast (SINGULAIR) 10 mg tablet TAKE 1 TABLET BY MOUTH ONCE DAILY    azelastine (ASTELIN) 137 mcg (0.1 %) nasal spray USE 2 SPRAYS IN EACH NOSTRIL TWICE DAILY    losartan (COZAAR) 100 mg tablet take 1 tablet by mouth once daily    glucose blood VI test strips (ASCENSIA AUTODISC VI, ONE TOUCH ULTRA TEST VI) strip One touch ultra test strips---check fsbs tid.  Use 1 test stirp to check fasting blood sugar three times daily for dx of diabetes E11.9    colestipoL (Colestid) 1 gram tablet Take 2 Tabs by mouth daily.  ondansetron (ZOFRAN ODT) 4 mg disintegrating tablet Take 1 Tab by mouth every eight (8) hours as needed for Nausea.  atorvastatin (LIPITOR) 40 mg tablet Take  by mouth daily.  Insulin Needles, Disposable, (PEN NEEDLE) 31 gauge x 3/16\" ndle Use as directed once daily    Blood-Glucose Meter monitoring kit One touch ultra mini glucometer--dx 250.00    Lancets misc Check fsbs bid -250.02    nitroglycerin (NITROSTAT) 0.4 mg SL tablet 1 Tab by SubLINGual route every five (5) minutes as needed for Chest Pain.  carvedilol (COREG) 6.25 mg tablet Take  by mouth two (2) times daily (with meals).  amLODIPine (NORVASC) 2.5 mg tablet Take 2.5 mg by mouth daily.  fenofibrate (LOFIBRA) 54 mg tablet Take  by mouth daily.  cyanocobalamin (VITAMIN B12) 500 mcg tablet Take 500 mcg by mouth daily.  ranolazine ER (RANEXA) 500 mg SR tablet Take 1 Tab by mouth two (2) times a day.  clopidogrel (PLAVIX) 75 mg tablet Take 1 Tab by mouth daily.  aspirin 81 mg Tab Take 81 mg by mouth daily.  MULTIVITAMINS W-MINERALS/LUT (CENTRUM SILVER PO) Take 1 Tab by mouth daily. No current facility-administered medications for this visit. Lab Results   Component Value Date/Time    Sodium 138 06/29/2021 12:43 PM    Potassium 4.6 06/29/2021 12:43 PM    Chloride 107 06/29/2021 12:43 PM    CO2 25 06/29/2021 12:43 PM    Anion gap 6 06/29/2021 12:43 PM    Glucose 161 (H) 06/29/2021 12:43 PM    BUN 26 (H) 06/29/2021 12:43 PM    Creatinine 1.33 (H) 06/29/2021 12:43 PM    BUN/Creatinine ratio 20 06/29/2021 12:43 PM    GFR est AA 47 (L) 06/29/2021 12:43 PM    GFR est non-AA 38 (L) 06/29/2021 12:43 PM    Calcium 9.6 06/29/2021 12:43 PM    Bilirubin, total 0.3 06/29/2021 12:43 PM    Alk.  phosphatase 60 06/29/2021 12:43 PM    Protein, total 7.3 06/29/2021 12:43 PM    Albumin 3.6 06/29/2021 12:43 PM    Globulin 3.7 06/29/2021 12:43 PM    A-G Ratio 1.0 (L) 06/29/2021 12:43 PM    ALT (SGPT) 31 06/29/2021 12:43 PM       Lab Results   Component Value Date/Time    Cholesterol, total 168 06/29/2021 12:43 PM    Cholesterol (POC) 195 08/11/2011 10:08 AM    HDL Cholesterol 42 06/29/2021 12:43 PM    HDL Cholesterol (POC) 31 08/11/2011 10:08 AM    LDL Cholesterol (POC) 113 08/11/2011 10:08 AM    LDL, calculated 79.6 06/29/2021 12:43 PM    VLDL, calculated 46.4 06/29/2021 12:43 PM    Triglyceride 232 (H) 06/29/2021 12:43 PM    Triglycerides (POC) 256 08/11/2011 10:08 AM    CHOL/HDL Ratio 4.0 06/29/2021 12:43 PM       Lab Results   Component Value Date/Time    WBC 6.8 09/27/2020 04:10 PM    WBC 8.0 05/15/2012 12:00 AM    HGB 11.5 09/27/2020 04:10 PM    Hematocrit (POC) 33 (L) 04/16/2019 05:17 PM    HCT 35.5 09/27/2020 04:10 PM    PLATELET 768 58/02/4538 04:10 PM    MCV 89.0 09/27/2020 04:10 PM       Lab Results   Component Value Date/Time    Microalbumin/Creat ratio (mg/g creat) 628 (H) 06/29/2021 12:43 PM    Microalbumin,urine random 169.00 06/29/2021 12:43 PM    Microalbumin urine (POC) abnormal 08/11/2011 10:07 AM     HbA1c:  Lab Results   Component Value Date/Time    Hemoglobin A1c 7.5 (H) 06/29/2021 12:43 PM    Hemoglobin A1c (POC) 8.4 08/11/2011 10:07 AM    Hemoglobin A1c, External 6.3 04/30/2016 12:00 AM     Last Point of Care HGB A1C  Hemoglobin A1c (POC)   Date Value Ref Range Status   08/11/2011 8.4        CrCl cannot be calculated (Unknown ideal weight. ). Medication reconciliation was completed during the visit. There are no discontinued medications. Patient verbalized understanding of the information presented and all of the patients questions were answered. Patient advised to call the office with any additional questions or concerns. Notifications of recommendations will be sent to Dr. Irma Calderón MD for review. Patient will return to clinic in 8 week(s) for follow up.      Thank you for the consult,  Katia Carrera, PharmD, DERIC, [address]: Yes   Recommendation Provided To: Patient/Caregiver: 2 via Virtual Visit   Intervention Detail: Adherence Monitorin and Scheduled Appointment   Gap Closed?:    Intervention Accepted By: Patient/Caregiver: 2   Time Spent (min): 20

## 2021-12-02 DIAGNOSIS — I10 ESSENTIAL HYPERTENSION: ICD-10-CM

## 2021-12-02 RX ORDER — LOSARTAN POTASSIUM 100 MG/1
TABLET ORAL
Qty: 90 TABLET | Refills: 3 | Status: SHIPPED | OUTPATIENT
Start: 2021-12-02

## 2021-12-14 RX ORDER — BLOOD SUGAR DIAGNOSTIC
STRIP MISCELLANEOUS
Qty: 300 STRIP | Refills: 3 | Status: SHIPPED | OUTPATIENT
Start: 2021-12-14 | End: 2022-02-08

## 2022-01-03 NOTE — PROGRESS NOTES
HISTORY OF PRESENT ILLNESS  Hanny Frazier is a [de-identified] y.o. female. HPI   Hanny Frazier, who was evaluated through a synchronous (real-time) audio only encounter, and/or her healthcare decision maker, is aware that it is a billable service, with coverage as determined by her insurance carrier. She provided verbal consent to proceed: Yes, and patient identification was verified. This visit was conducted pursuant to the emergency declaration under the [facility], [address] [place] [address] authority and the [employer] Act. A caregiver was present when appropriate. Ability to conduct physical exam was limited. The patient was located in a state where the provider was credentialed to provide care.      --Sánchez Mayes MD on 1/4/2022 at 11:44 AM      Tc x 11 minutes        F/u DM-2 with microalbuminuria htn hld cad s/p MI and stents DOMINGO on cpap fatty liver   Last a1c 7.5 LDL 79 Cr 1.33 June 2021  Some OA pain all over--right arm and shoulder and hands, shoulders and hips  Had labs done in November per pt with renal MD  fsbs  >150 per pt    Was referred to endocrine MD  Met with pharm D--apidra 4 units ac dinner was added  Chest pain -none  Dr Sadie Mcrae   hx ckd 3 and proteinuria-see nephrologist  Last mammogram    Last OV    fsbs at home----> 200 since May  Takes janumet every day now due to nausea on bid dosing  Takes lantus 20 units every day now since klast week--fsbs still 160-160 to 300 range  Sees nephrologist q 6 months for microalbuminuria  Sees Dr Sadie Mcrae for CAD-no chest pains, has some BIRD     Last a1c 7.0    Patient Active Problem List    Diagnosis Date Noted    History of breast cancer 04/20/2020    Type 2 diabetes with nephropathy ([facility] 08/10/2018    Fatty liver 02/23/2018    Iron deficiency anemia 12/01/2017    Osteopenia of left thigh 04/02/2017    Advanced care planning/counseling discussion 12/17/2015    Hiatal hernia 05/15/2012  CAD (coronary artery disease) 05/15/2012    Type 2 diabetes, controlled, with neuropathy ([facility] 11/16/2009    Essential hypertension, benign 11/16/2009    Pure hypercholesterolemia 11/16/2009    DJD (degenerative joint disease) 11/16/2009    DOMINGO (obstructive sleep apnea) 11/16/2009     Current Outpatient Medications   Medication Sig Dispense Refill    glucose blood VI test strips (OneTouch Ultra Test) strip CHECK BLOOD SUGAR 3 TIMES A  Strip 3    losartan (COZAAR) 100 mg tablet TAKE 1 TABLET BY MOUTH DAILY 90 Tablet 3    famotidine (PEPCID) 20 mg tablet TAKE 1 TABLET BY MOUTH EVERY NIGHT 90 Tablet 3    pantoprazole (PROTONIX) 40 mg tablet TAKE 1 TABLET BY MOUTH DAILY 90 Tablet 3    insulin glulisine U-100 (Apidra SoloStar U-100 Insulin) 100 unit/mL pen Inject 4-6 units under the skin daily before dinner  Indications: type 2 diabetes mellitus 2 Pen 0    insulin glargine (Lantus Solostar U-100 Insulin) 100 unit/mL (3 mL) inpn 29 Units by SubCUTAneous route daily. Indications: type 2 diabetes mellitus 15 mL 3    SITagliptin-metFORMIN (Janumet) 50-1,000 mg per tablet Take 1 Tablet by mouth two (2) times daily (with meals). (Patient taking differently: Take 1 Tablet by mouth daily (with breakfast). ) 180 Tablet 3    montelukast (SINGULAIR) 10 mg tablet TAKE 1 TABLET BY MOUTH ONCE DAILY 90 Tab 3    azelastine (ASTELIN) 137 mcg (0.1 %) nasal spray USE 2 SPRAYS IN EACH NOSTRIL TWICE DAILY 1 Bottle 5    colestipoL (Colestid) 1 gram tablet Take 2 Tabs by mouth daily. 60 Tab 5    ondansetron (ZOFRAN ODT) 4 mg disintegrating tablet Take 1 Tab by mouth every eight (8) hours as needed for Nausea. 25 Tab 0    atorvastatin (LIPITOR) 40 mg tablet Take  by mouth daily.       Insulin Needles, Disposable, (PEN NEEDLE) 31 gauge x 3/16\" ndle Use as directed once daily 100 Pen Needle 3    Blood-Glucose Meter monitoring kit One touch ultra mini glucometer--dx 250.00 1 Kit 0    Lancets misc Check fsbs bid -250.02 200 Each 3    nitroglycerin (NITROSTAT) 0.4 mg SL tablet 1 Tab by SubLINGual route every five (5) minutes as needed for Chest Pain. 25 Tab 3    carvedilol (COREG) 6.25 mg tablet Take  by mouth two (2) times daily (with meals).  amLODIPine (NORVASC) 2.5 mg tablet Take 2.5 mg by mouth daily.  fenofibrate (LOFIBRA) 54 mg tablet Take  by mouth daily.  cyanocobalamin (VITAMIN B12) 500 mcg tablet Take 500 mcg by mouth daily.  ranolazine ER (RANEXA) 500 mg SR tablet Take 1 Tab by mouth two (2) times a day. 60 Tab 12    clopidogrel (PLAVIX) 75 mg tablet Take 1 Tab by mouth daily. 30 Tab 11    aspirin 81 mg Tab Take 81 mg by mouth daily.  MULTIVITAMINS W-MINERALS/LUT (CENTRUM SILVER PO) Take 1 Tab by mouth daily.        Allergies   Allergen Reactions    Codeine Other (comments)     Jerking sensation    Livalo [Pitavastatin] Diarrhea    Niaspan [Niacin] Myalgia    Statins-Hmg-Coa Reductase Inhibitors Myalgia     Zocor, pravachol, Lipitor, crestor    Welchol [Colesevelam] Other (comments)     Nausea, bloating and malaise    Zetia [Ezetimibe] Myalgia      Lab Results   Component Value Date/Time    WBC 6.8 09/27/2020 04:10 PM    HGB 11.5 09/27/2020 04:10 PM    HCT 35.5 09/27/2020 04:10 PM    Hematocrit (POC) 33 (L) 04/16/2019 05:17 PM    PLATELET 163 74/31/3915 04:10 PM    MCV 89.0 09/27/2020 04:10 PM     Lab Results   Component Value Date/Time    Hemoglobin A1c 7.5 (H) 06/29/2021 12:43 PM    Hemoglobin A1c 7.0 (H) 12/18/2020 10:38 AM    Hemoglobin A1c 6.8 (H) 06/18/2020 01:44 PM    Hemoglobin A1c, External 6.3 04/30/2016 12:00 AM    Glucose 161 (H) 06/29/2021 12:43 PM    Glucose (POC) 77 10/28/2020 09:45 PM    Microalbumin/Creat ratio (mg/g creat) 628 (H) 06/29/2021 12:43 PM    Microalbumin,urine random 169.00 06/29/2021 12:43 PM    LDL, calculated 79.6 06/29/2021 12:43 PM    Creatinine (POC) 1.0 04/16/2019 05:17 PM    Creatinine 1.33 (H) 06/29/2021 12:43 PM      Lab Results   Component Value Date/Time    Cholesterol, total 168 06/29/2021 12:43 PM    Cholesterol (POC) 195 08/11/2011 10:08 AM    HDL Cholesterol 42 06/29/2021 12:43 PM    LDL, calculated 79.6 06/29/2021 12:43 PM    LDL Cholesterol (POC) 113 08/11/2011 10:08 AM    LDL-C, External 104 05/02/2016 12:00 AM    Triglyceride 232 (H) 06/29/2021 12:43 PM    Triglycerides (POC) 256 08/11/2011 10:08 AM    CHOL/HDL Ratio 4.0 06/29/2021 12:43 PM     Lab Results   Component Value Date/Time    GFR est non-AA 38 (L) 06/29/2021 12:43 PM    GFRNA, POC 54 (L) 04/16/2019 05:17 PM    GFR est AA 47 (L) 06/29/2021 12:43 PM    GFRAA, POC >60 04/16/2019 05:17 PM    Creatinine 1.33 (H) 06/29/2021 12:43 PM    Creatinine (POC) 1.0 04/16/2019 05:17 PM    BUN 26 (H) 06/29/2021 12:43 PM    BUN (POC) 18 04/16/2019 05:17 PM    Sodium 138 06/29/2021 12:43 PM    Sodium (POC) 138 04/16/2019 05:17 PM    Potassium 4.6 06/29/2021 12:43 PM    Potassium (POC) 4.2 04/16/2019 05:17 PM    Chloride 107 06/29/2021 12:43 PM    Chloride (POC) 102 04/16/2019 05:17 PM    CO2 25 06/29/2021 12:43 PM    Magnesium 1.5 (L) 01/19/2016 12:42 PM     Lab Results   Component Value Date/Time    TSH 2.50 01/19/2016 12:42 PM      Lab Results   Component Value Date/Time    Glucose 161 (H) 06/29/2021 12:43 PM    Glucose (POC) 77 10/28/2020 09:45 PM         ROS    Physical Exam  Vitals and nursing note reviewed. Constitutional:       Appearance: Normal appearance. She is well-developed. Comments: Appears stated age   HENT:      Right Ear: Tympanic membrane normal.      Left Ear: Tympanic membrane normal.   Eyes:      Pupils: Pupils are equal, round, and reactive to light. Cardiovascular:      Rate and Rhythm: Normal rate and regular rhythm. Heart sounds: Normal heart sounds. No murmur heard. No friction rub. No gallop. Pulmonary:      Effort: Pulmonary effort is normal. No respiratory distress. Breath sounds: Normal breath sounds. No wheezing.    Abdominal:      General: Bowel sounds are normal.      Palpations: Abdomen is soft. Musculoskeletal:      Cervical back: Normal range of motion. Skin:     General: Skin is warm. Neurological:      General: No focal deficit present. Mental Status: She is alert. Psychiatric:         Mood and Affect: Mood normal.         Behavior: Behavior normal.         Thought Content: Thought content normal.         Judgment: Judgment normal.         ASSESSMENT and PLAN  Diagnoses and all orders for this visit:    1. Type 2 diabetes mellitus with stage 3 chronic kidney disease, with long-term current use of insulin, unspecified whether stage 3a or 3b CKD ([facility])  -     METABOLIC PANEL, COMPREHENSIVE; Future  -     HEMOGLOBIN A1C WITH EAG; Future   lantus up to 38 units every day andx apidra 8 units ac dinner   Fu pharm D and Dr Denice Hobbs  2. Hypertension, unspecified type  -     CBC W/O DIFF; Future  -     METABOLIC PANEL, COMPREHENSIVE; Future   bp monitoring  3. Pure hypercholesterolemia  -     METABOLIC PANEL, COMPREHENSIVE; Future  -     LIPID PANEL; Future   Goal LDL <70  4. Coronary artery disease involving native coronary artery of native heart without angina pectoris   No cp or angina  5. Breast screening  -     [facility] MAMMO BI SCREENING INCL CAD; Future      Follow-up and Dispositions    · Return in about 6 months (around 7/4/2022) for medicare wellness x 30 min.

## 2022-01-04 ENCOUNTER — VIRTUAL VISIT (OUTPATIENT)
Dept: INTERNAL MEDICINE CLINIC | Age: 82
End: 2022-01-04

## 2022-01-04 DIAGNOSIS — I25.10 CORONARY ARTERY DISEASE INVOLVING NATIVE CORONARY ARTERY OF NATIVE HEART WITHOUT ANGINA PECTORIS: ICD-10-CM

## 2022-01-04 DIAGNOSIS — Z12.39 BREAST SCREENING: ICD-10-CM

## 2022-01-04 DIAGNOSIS — E11.40 TYPE 2 DIABETES, CONTROLLED, WITH NEUROPATHY (HCC): Primary | ICD-10-CM

## 2022-01-04 DIAGNOSIS — E11.22 TYPE 2 DIABETES MELLITUS WITH STAGE 3 CHRONIC KIDNEY DISEASE, WITH LONG-TERM CURRENT USE OF INSULIN, UNSPECIFIED WHETHER STAGE 3A OR 3B CKD (HCC): Primary | ICD-10-CM

## 2022-01-04 DIAGNOSIS — Z79.4 TYPE 2 DIABETES MELLITUS WITH STAGE 3 CHRONIC KIDNEY DISEASE, WITH LONG-TERM CURRENT USE OF INSULIN, UNSPECIFIED WHETHER STAGE 3A OR 3B CKD (HCC): Primary | ICD-10-CM

## 2022-01-04 DIAGNOSIS — I10 HYPERTENSION, UNSPECIFIED TYPE: ICD-10-CM

## 2022-01-04 DIAGNOSIS — E78.00 PURE HYPERCHOLESTEROLEMIA: ICD-10-CM

## 2022-01-04 DIAGNOSIS — E11.21 TYPE 2 DIABETES WITH NEPHROPATHY (HCC): ICD-10-CM

## 2022-01-04 DIAGNOSIS — N18.30 TYPE 2 DIABETES MELLITUS WITH STAGE 3 CHRONIC KIDNEY DISEASE, WITH LONG-TERM CURRENT USE OF INSULIN, UNSPECIFIED WHETHER STAGE 3A OR 3B CKD (HCC): Primary | ICD-10-CM

## 2022-01-04 PROCEDURE — 99442 PR PHYS/QHP TELEPHONE EVALUATION 11-20 MIN: CPT | Performed by: INTERNAL MEDICINE

## 2022-01-04 NOTE — PROGRESS NOTES
Chief Complaint   Patient presents with   Emma Mcfadden Annual Wellness Visit       1. Have you been to the ER, urgent care clinic since your last visit? Hospitalized since your last visit? No    2. Have you seen or consulted any other health care providers outside of the [address] [place] [address] since your last visit? Include any pap smears or colon screening. No    Pt has had both vaccines and the booster. Pt c/o generalized joint pain that is chronic.

## 2022-01-04 NOTE — PROGRESS NOTES
Pharmacy Progress Note - Diabetes Management    Assessment / Plan:   Diabetes Management:  - Per ADA guidelines, Pt's A1c is not at goal of < 7-7.5% (advanced age, fall risk) . - Reported SMBGs elevated for fasting and post prandial goals. - Increase Lantus to 32 units daily. - Change Apidra to 8 units before largest meal. If pre meal BG > 150, give 10 units.  - Continue Janumet 50/1000 mg daily - renally dosed. - Hopefully will see patient back in the office next month. Check: Vitals, Weight and HbA1c at the next visit. S/O: Ms. Alis Mendoza is a 81 y.o. female, referred by Donzetta Burkitt, MD, with a PMH of T2DM + neuropathy/nephropathy, CAD, HTN, HLD, Anemia, Hx breast cancer, DJD , was seen virtually today for diabetes management follow up.  Patient's last A1c was 7.5% (June 2021), 7% (Dec 2020), 6.8% (June 2020).   PHI verified.     Interim update:   Multiple holiday and recent birthday celebrations. Reports nutrition has been more carb heavy. Current anti-hyperglycemic regimen include(s):    - Lantus 29 units daily  - Apidra 4 units before supper; pre-meal BG> 150, give 6 units---> was able to report. - Janumet 50/1000 mg daily - renally dosed    ROS:  Today, Pt endorses:  - Symptoms of Hyperglycemia: none  - Symptoms of Hypoglycemia: none    Self Monitoring Blood Glucose (SMBG) or CGM:  Reports the following recent readings:  Date Before Breakfast Before Dinner Before Bedtime   12/27/2021 180 211 199   12/28/2021 210 198 185   12/29/2021 156 141 190   12/31/2021 117 209 225   1/3/2022 162 220 205     Nutrition/Lifestyle Modifications:  No recent wt check. Recently shifted lunch to be her largest meal.   Occasional snack before bedtime       The ASCVD Risk score (Delphine Gudino., et al., 2013) failed to calculate for the following reasons: The 2013 ASCVD risk score is only valid for ages 36 to 78    The patient has a prior MI or stroke diagnosis     Vitals:   Wt Readings from Last 3 Encounters:   07/28/21 166 lb (75.3 kg)   06/29/21 163 lb 3.2 oz (74 kg)   03/16/21 163 lb 6.4 oz (74.1 kg)     BP Readings from Last 3 Encounters:   07/28/21 126/74   06/29/21 (!) 145/72   03/16/21 134/77     Pulse Readings from Last 3 Encounters:   07/28/21 80   06/29/21 86   03/16/21 89       Past Medical History:   Diagnosis Date    Arthritis     Breast cancer ([facility] [address] 2002    s/p lumpectomy and XRT    CAD (coronary artery disease)     Chronic kidney disease     stage III    GERD (gastroesophageal reflux disease)     with hiatal hernia    Hypercholesterolemia     Hypertension     Liver disease     Long term current use of anticoagulant therapy     Neuropathy in diabetes (HCC)     DOMINGO (obstructive sleep apnea)     on CPAP     Allergies   Allergen Reactions    Codeine Other (comments)     Jerking sensation    Livalo [Pitavastatin] Diarrhea    Niaspan [Niacin] Myalgia    Statins-Hmg-Coa Reductase Inhibitors Myalgia     Zocor, pravachol, Lipitor, crestor    Welchol [Colesevelam] Other (comments)     Nausea, bloating and malaise    Zetia [Ezetimibe] Myalgia       Current Outpatient Medications   Medication Sig    glucose blood VI test strips (OneTouch Ultra Test) strip CHECK BLOOD SUGAR 3 TIMES A DAY    losartan (COZAAR) 100 mg tablet TAKE 1 TABLET BY MOUTH DAILY    famotidine (PEPCID) 20 mg tablet TAKE 1 TABLET BY MOUTH EVERY NIGHT    pantoprazole (PROTONIX) 40 mg tablet TAKE 1 TABLET BY MOUTH DAILY    insulin glulisine U-100 (Apidra SoloStar U-100 Insulin) 100 unit/mL pen Inject 4-6 units under the skin daily before dinner  Indications: type 2 diabetes mellitus    insulin glargine (Lantus Solostar U-100 Insulin) 100 unit/mL (3 mL) inpn 29 Units by SubCUTAneous route daily. Indications: type 2 diabetes mellitus    SITagliptin-metFORMIN (Janumet) 50-1,000 mg per tablet Take 1 Tablet by mouth two (2) times daily (with meals).  (Patient taking differently: Take 1 Tablet by mouth daily (with breakfast). )    montelukast (SINGULAIR) 10 mg tablet TAKE 1 TABLET BY MOUTH ONCE DAILY    azelastine (ASTELIN) 137 mcg (0.1 %) nasal spray USE 2 SPRAYS IN EACH NOSTRIL TWICE DAILY    colestipoL (Colestid) 1 gram tablet Take 2 Tabs by mouth daily.  ondansetron (ZOFRAN ODT) 4 mg disintegrating tablet Take 1 Tab by mouth every eight (8) hours as needed for Nausea.  atorvastatin (LIPITOR) 40 mg tablet Take  by mouth daily.  Insulin Needles, Disposable, (PEN NEEDLE) 31 gauge x 3/16\" ndle Use as directed once daily    Blood-Glucose Meter monitoring kit One touch ultra mini glucometer--dx 250.00    Lancets misc Check fsbs bid -250.02    nitroglycerin (NITROSTAT) 0.4 mg SL tablet 1 Tab by SubLINGual route every five (5) minutes as needed for Chest Pain.  carvedilol (COREG) 6.25 mg tablet Take  by mouth two (2) times daily (with meals).  amLODIPine (NORVASC) 2.5 mg tablet Take 2.5 mg by mouth daily.  fenofibrate (LOFIBRA) 54 mg tablet Take  by mouth daily.  cyanocobalamin (VITAMIN B12) 500 mcg tablet Take 500 mcg by mouth daily.  ranolazine ER (RANEXA) 500 mg SR tablet Take 1 Tab by mouth two (2) times a day.  clopidogrel (PLAVIX) 75 mg tablet Take 1 Tab by mouth daily.  aspirin 81 mg Tab Take 81 mg by mouth daily.  MULTIVITAMINS W-MINERALS/LUT (CENTRUM SILVER PO) Take 1 Tab by mouth daily. No current facility-administered medications for this visit.        Lab Results   Component Value Date/Time    Sodium 138 06/29/2021 12:43 PM    Potassium 4.6 06/29/2021 12:43 PM    Chloride 107 06/29/2021 12:43 PM    CO2 25 06/29/2021 12:43 PM    Anion gap 6 06/29/2021 12:43 PM    Glucose 161 (H) 06/29/2021 12:43 PM    BUN 26 (H) 06/29/2021 12:43 PM    Creatinine 1.33 (H) 06/29/2021 12:43 PM    BUN/Creatinine ratio 20 06/29/2021 12:43 PM    GFR est AA 47 (L) 06/29/2021 12:43 PM    GFR est non-AA 38 (L) 06/29/2021 12:43 PM    Calcium 9.6 06/29/2021 12:43 PM    Bilirubin, total 0.3 06/29/2021 12:43 PM    Alk. phosphatase 60 06/29/2021 12:43 PM    Protein, total 7.3 06/29/2021 12:43 PM    Albumin 3.6 06/29/2021 12:43 PM    Globulin 3.7 06/29/2021 12:43 PM    A-G Ratio 1.0 (L) 06/29/2021 12:43 PM    ALT (SGPT) 31 06/29/2021 12:43 PM       Lab Results   Component Value Date/Time    Cholesterol, total 168 06/29/2021 12:43 PM    Cholesterol (POC) 195 08/11/2011 10:08 AM    HDL Cholesterol 42 06/29/2021 12:43 PM    HDL Cholesterol (POC) 31 08/11/2011 10:08 AM    LDL Cholesterol (POC) 113 08/11/2011 10:08 AM    LDL, calculated 79.6 06/29/2021 12:43 PM    VLDL, calculated 46.4 06/29/2021 12:43 PM    Triglyceride 232 (H) 06/29/2021 12:43 PM    Triglycerides (POC) 256 08/11/2011 10:08 AM    CHOL/HDL Ratio 4.0 06/29/2021 12:43 PM       Lab Results   Component Value Date/Time    WBC 6.8 09/27/2020 04:10 PM    WBC 8.0 05/15/2012 12:00 AM    HGB 11.5 09/27/2020 04:10 PM    Hematocrit (POC) 33 (L) 04/16/2019 05:17 PM    HCT 35.5 09/27/2020 04:10 PM    PLATELET 825 20/15/5206 04:10 PM    MCV 89.0 09/27/2020 04:10 PM       Lab Results   Component Value Date/Time    Microalbumin/Creat ratio (mg/g creat) 628 (H) 06/29/2021 12:43 PM    Microalbumin,urine random 169.00 06/29/2021 12:43 PM    Microalbumin urine (POC) abnormal 08/11/2011 10:07 AM       HbA1c:  Lab Results   Component Value Date/Time    Hemoglobin A1c 7.5 (H) 06/29/2021 12:43 PM    Hemoglobin A1c (POC) 8.4 08/11/2011 10:07 AM    Hemoglobin A1c, External 6.3 04/30/2016 12:00 AM     No components found for: 2     Last Point of Care HGB A1C  Hemoglobin A1c (POC)   Date Value Ref Range Status   08/11/2011 8.4          CrCl cannot be calculated (Patient's most recent lab result is older than the maximum 180 days allowed. ). Medication reconciliation was completed during the visit. There are no discontinued medications. Patient verbalized understanding of the information presented and all of the patients questions were answered.   Patient advised to call the office with any additional questions or concerns. Notifications of recommendations will be sent to Dr. Raheem Damon MD for review. Patient will return to clinic in 4 week(s) for follow up. Thank you for the consult,  Katia Gamboa, PharmD, BCACP, [address] [place] [address] in place:  Yes   Recommendation Provided To: Patient/Caregiver: 3 via Virtual Visit   Intervention Detail: Dose Adjustment: 2, reason: Renal Adjustment and Therapy Optimization and Scheduled Appointment   Intervention Accepted By: Patient/Caregiver: 3   Time Spent (min): 20

## 2022-01-05 RX ORDER — AZELASTINE 1 MG/ML
SPRAY, METERED NASAL
Qty: 1 EACH | Refills: 5 | Status: SHIPPED | OUTPATIENT
Start: 2022-01-05

## 2022-01-15 LAB
ALBUMIN SERPL-MCNC: 4 G/DL (ref 3.7–4.7)
ALBUMIN/GLOB SERPL: 1.4 {RATIO} (ref 1.2–2.2)
ALP SERPL-CCNC: 63 IU/L (ref 44–121)
ALT SERPL-CCNC: 19 IU/L (ref 0–32)
AST SERPL-CCNC: 11 IU/L (ref 0–40)
BILIRUB SERPL-MCNC: 0.3 MG/DL (ref 0–1.2)
BUN SERPL-MCNC: 23 MG/DL (ref 8–27)
BUN/CREAT SERPL: 22 (ref 12–28)
CALCIUM SERPL-MCNC: 9.5 MG/DL (ref 8.7–10.3)
CHLORIDE SERPL-SCNC: 102 MMOL/L (ref 96–106)
CHOLEST SERPL-MCNC: 158 MG/DL (ref 100–199)
CO2 SERPL-SCNC: 24 MMOL/L (ref 20–29)
CREAT SERPL-MCNC: 1.03 MG/DL (ref 0.57–1)
ERYTHROCYTE [DISTWIDTH] IN BLOOD BY AUTOMATED COUNT: 13 % (ref 11.7–15.4)
EST. AVERAGE GLUCOSE BLD GHB EST-MCNC: 166 MG/DL
GLOBULIN SER CALC-MCNC: 2.8 G/DL (ref 1.5–4.5)
GLUCOSE SERPL-MCNC: 130 MG/DL (ref 65–99)
HBA1C MFR BLD: 7.4 % (ref 4.8–5.6)
HCT VFR BLD AUTO: 34 % (ref 34–46.6)
HDLC SERPL-MCNC: 39 MG/DL
HGB BLD-MCNC: 11.2 G/DL (ref 11.1–15.9)
LDLC SERPL CALC-MCNC: 95 MG/DL (ref 0–99)
MCH RBC QN AUTO: 28.5 PG (ref 26.6–33)
MCHC RBC AUTO-ENTMCNC: 32.9 G/DL (ref 31.5–35.7)
MCV RBC AUTO: 87 FL (ref 79–97)
PLATELET # BLD AUTO: 314 X10E3/UL (ref 150–450)
POTASSIUM SERPL-SCNC: 4.9 MMOL/L (ref 3.5–5.2)
PROT SERPL-MCNC: 6.8 G/DL (ref 6–8.5)
RBC # BLD AUTO: 3.93 X10E6/UL (ref 3.77–5.28)
SODIUM SERPL-SCNC: 139 MMOL/L (ref 134–144)
TRIGL SERPL-MCNC: 132 MG/DL (ref 0–149)
VLDLC SERPL CALC-MCNC: 24 MG/DL (ref 5–40)
WBC # BLD AUTO: 7.3 X10E3/UL (ref 3.4–10.8)

## 2022-01-15 RX ORDER — ATORVASTATIN CALCIUM 80 MG/1
80 TABLET, FILM COATED ORAL DAILY
Qty: 90 TABLET | Refills: 3 | Status: SHIPPED | OUTPATIENT
Start: 2022-01-15

## 2022-01-15 NOTE — PROGRESS NOTES
Tell pt normal blood cell counts  Franca Albarran is better ,closer to normal, lft wnl  A1c table 7.4 166 bs avg--work on diet  LDL still above goal--increae lipitor to 80 mg every day--will escribe

## 2022-01-17 NOTE — PROGRESS NOTES
Spoke with patient using 2 identifiers. Patient was informed of recent labs and Dr. Modesto Collins recommendations. Patient verbalized understanding.

## 2022-02-02 ENCOUNTER — TRANSCRIBE ORDER (OUTPATIENT)
Dept: SCHEDULING | Age: 82
End: 2022-02-02

## 2022-02-02 DIAGNOSIS — Z12.31 SCREENING MAMMOGRAM FOR HIGH-RISK PATIENT: Primary | ICD-10-CM

## 2022-02-06 NOTE — PROGRESS NOTES
Pharmacy Progress Note - Diabetes Management    Assessment / Plan:   Diabetes Management:  - Per ADA guidelines, Pt's A1c is not at goal of < 7%. - Based on current SMBGs, increase glargine basal to 35 units daily. Will help with sample. - Increase Apidra to 10 units before largest meal. Will help w/ sample. - Continue Janumet 50/1000 mg daily. - Will check with Dr Yaneth Fisher regarding GI referral.      S/O: Ms. Alis Mendoza is a 81 y.o. female, referred by Vicki Weems MD, with a PMH of T2DM + neuropathy/nephropathy, CAD, HTN, HLD, Anemia, Hx breast cancer, DJD , was seen today for diabetes management follow up.  Patient's last A1c was 7.4% (Jan 2022), 7.5% (June 2021), 7% (Dec 2020), 6.8% (June 2020). Interim update:   Reports increased frequency of diarrhea. Has not been able to associate onset w/ food or activity. Has colonoscopy was in March 2017. Recommended for repeat in 5 yrs   Hx of seeing Dr. Koby Martinez. He has since retired. Current anti-hyperglycemic regimen include(s):    - Janumet 50/1000 mg daily - renally dosed  - Lantus 32 units daily     - Apidra 8 units before largest meal.  If pre-meal BG > 150, give 10 units. Denies any missed doses. ROS:  Today, Pt endorses:  - Symptoms of Hyperglycemia: none  - Symptoms of Hypoglycemia: none    Self Monitoring Blood Glucose (SMBG) or CGM:   Brought in BG log                Nutrition/Lifestyle Modifications:  Denies any significant changes to nutrition. Wt stable. Had annual eye exam completed last month  Seeing podiatry for routine f/u this week. Dr. Brianna Foreman    The ASCVD Risk score (Lonza Scranton., et al., 2013) failed to calculate for the following reasons: The 2013 ASCVD risk score is only valid for ages 36 to 78    The patient has a prior MI or stroke diagnosis     Vitals:   Wt Readings from Last 3 Encounters:   02/08/22 167 lb (75.8 kg)   07/28/21 166 lb (75.3 kg)   06/29/21 163 lb 3.2 oz (74 kg)     BP Readings from Last 3 Encounters:   02/08/22 114/71   07/28/21 126/74   06/29/21 (!) 145/72     Pulse Readings from Last 3 Encounters:   02/08/22 80   07/28/21 80   06/29/21 86       Past Medical History:   Diagnosis Date    Arthritis     Breast cancer ([facility] 2002    s/p lumpectomy and XRT    CAD (coronary artery disease)     Chronic kidney disease     stage III    GERD (gastroesophageal reflux disease)     with hiatal hernia    Hypercholesterolemia     Hypertension     Liver disease     Long term current use of anticoagulant therapy     Neuropathy in diabetes ([facility]     DOMINGO (obstructive sleep apnea)     on CPAP     Allergies   Allergen Reactions    Codeine Other (comments)     Jerking sensation    Livalo [Pitavastatin] Diarrhea    Niaspan [Niacin] Myalgia    Statins-Hmg-Coa Reductase Inhibitors Myalgia     Zocor, pravachol, Lipitor, crestor    Welchol [Colesevelam] Other (comments)     Nausea, bloating and malaise    Zetia [Ezetimibe] Myalgia       Current Outpatient Medications   Medication Sig    atorvastatin (LIPITOR) 80 mg tablet Take 1 Tablet by mouth daily.  azelastine (ASTELIN) 137 mcg (0.1 %) nasal spray USE 2 SPRAYS IN EACH NOSTRIL TWICE DAILY    glucose blood VI test strips (OneTouch Ultra Test) strip CHECK BLOOD SUGAR 3 TIMES A DAY    losartan (COZAAR) 100 mg tablet TAKE 1 TABLET BY MOUTH DAILY    famotidine (PEPCID) 20 mg tablet TAKE 1 TABLET BY MOUTH EVERY NIGHT    pantoprazole (PROTONIX) 40 mg tablet TAKE 1 TABLET BY MOUTH DAILY    insulin glulisine U-100 (Apidra SoloStar U-100 Insulin) 100 unit/mL pen Inject 4-6 units under the skin daily before dinner  Indications: type 2 diabetes mellitus (Patient taking differently: Inject 8 units under the skin daily before dinner  Indications: type 2 diabetes mellitus)    insulin glargine (Lantus Solostar U-100 Insulin) 100 unit/mL (3 mL) inpn 29 Units by SubCUTAneous route daily.  Indications: type 2 diabetes mellitus (Patient taking differently: 32 Units by SubCUTAneous route daily. Indications: type 2 diabetes mellitus)    SITagliptin-metFORMIN (Janumet) 50-1,000 mg per tablet Take 1 Tablet by mouth two (2) times daily (with meals). (Patient taking differently: Take 1 Tablet by mouth daily (with breakfast). )    montelukast (SINGULAIR) 10 mg tablet TAKE 1 TABLET BY MOUTH ONCE DAILY    colestipoL (Colestid) 1 gram tablet Take 2 Tabs by mouth daily. (Patient taking differently: Take 1 g by mouth two (2) times a day.)    ondansetron (ZOFRAN ODT) 4 mg disintegrating tablet Take 1 Tab by mouth every eight (8) hours as needed for Nausea.  Insulin Needles, Disposable, (PEN NEEDLE) 31 gauge x 3/16\" ndle Use as directed once daily    Blood-Glucose Meter monitoring kit One touch ultra mini glucometer--dx 250.00    Lancets misc Check fsbs bid -250.02    nitroglycerin (NITROSTAT) 0.4 mg SL tablet 1 Tab by SubLINGual route every five (5) minutes as needed for Chest Pain.  carvedilol (COREG) 6.25 mg tablet Take  by mouth two (2) times daily (with meals).  amLODIPine (NORVASC) 2.5 mg tablet Take 2.5 mg by mouth daily.  fenofibrate (LOFIBRA) 54 mg tablet Take  by mouth daily.  cyanocobalamin (VITAMIN B12) 500 mcg tablet Take 500 mcg by mouth daily.  ranolazine ER (RANEXA) 500 mg SR tablet Take 1 Tab by mouth two (2) times a day.  clopidogrel (PLAVIX) 75 mg tablet Take 1 Tab by mouth daily.  aspirin 81 mg Tab Take 81 mg by mouth daily.  MULTIVITAMINS W-MINERALS/LUT (CENTRUM SILVER PO) Take 1 Tab by mouth daily. No current facility-administered medications for this visit.        Lab Results   Component Value Date/Time    Sodium 139 01/14/2022 10:28 AM    Potassium 4.9 01/14/2022 10:28 AM    Chloride 102 01/14/2022 10:28 AM    CO2 24 01/14/2022 10:28 AM    Anion gap 6 06/29/2021 12:43 PM    Glucose 130 (H) 01/14/2022 10:28 AM    BUN 23 01/14/2022 10:28 AM    Creatinine 1.03 (H) 01/14/2022 10:28 AM    BUN/Creatinine ratio 22 01/14/2022 10:28 AM    GFR est AA 59 (L) 01/14/2022 10:28 AM    GFR est non-AA 51 (L) 01/14/2022 10:28 AM    Calcium 9.5 01/14/2022 10:28 AM    Bilirubin, total 0.3 01/14/2022 10:28 AM    Alk. phosphatase 63 01/14/2022 10:28 AM    Protein, total 6.8 01/14/2022 10:28 AM    Albumin 4.0 01/14/2022 10:28 AM    Globulin 3.7 06/29/2021 12:43 PM    A-G Ratio 1.4 01/14/2022 10:28 AM    ALT (SGPT) 19 01/14/2022 10:28 AM       Lab Results   Component Value Date/Time    Cholesterol, total 158 01/14/2022 10:28 AM    Cholesterol (POC) 195 08/11/2011 10:08 AM    HDL Cholesterol 39 (L) 01/14/2022 10:28 AM    HDL Cholesterol (POC) 31 08/11/2011 10:08 AM    LDL Cholesterol (POC) 113 08/11/2011 10:08 AM    LDL, calculated 95 01/14/2022 10:28 AM    LDL, calculated 79.6 06/29/2021 12:43 PM    VLDL, calculated 24 01/14/2022 10:28 AM    VLDL, calculated 46.4 06/29/2021 12:43 PM    Triglyceride 132 01/14/2022 10:28 AM    Triglycerides (POC) 256 08/11/2011 10:08 AM    CHOL/HDL Ratio 4.0 06/29/2021 12:43 PM       Lab Results   Component Value Date/Time    WBC 7.3 01/14/2022 10:28 AM    WBC 8.0 05/15/2012 12:00 AM    HGB 11.2 01/14/2022 10:28 AM    Hematocrit (POC) 33 (L) 04/16/2019 05:17 PM    HCT 34.0 01/14/2022 10:28 AM    PLATELET 550 30/24/6145 10:28 AM    MCV 87 01/14/2022 10:28 AM       Lab Results   Component Value Date/Time    Microalbumin/Creat ratio (mg/g creat) 628 (H) 06/29/2021 12:43 PM    Microalbumin,urine random 169.00 06/29/2021 12:43 PM    Microalbumin urine (POC) abnormal 08/11/2011 10:07 AM       HbA1c:  Lab Results   Component Value Date/Time    Hemoglobin A1c 7.4 (H) 01/14/2022 10:28 AM    Hemoglobin A1c (POC) 8.4 08/11/2011 10:07 AM    Hemoglobin A1c, External 6.3 04/30/2016 12:00 AM     No components found for: 2     Last Point of Care HGB A1C  Hemoglobin A1c (POC)   Date Value Ref Range Status   08/11/2011 8.4          Estimated Creatinine Clearance: 42.5 mL/min (A) (by C-G formula based on SCr of 1.03 mg/dL (H)).       Medication reconciliation was completed during the visit. Medications Discontinued During This Encounter   Medication Reason    glucose blood VI test strips (OneTouch Ultra Test) strip     insulin glulisine U-100 (Apidra SoloStar U-100 Insulin) 100 unit/mL pen DOSE ADJUSTMENT    insulin glargine (Lantus Solostar U-100 Insulin) 100 unit/mL (3 mL) inpn      Orders Placed This Encounter    glucose blood VI test strips (OneTouch Ultra Test) strip     Sig: Use to check blood sugar three times daily. E11.65     Dispense:  300 Strip     Refill:  11    insulin glargine (Lantus Solostar U-100 Insulin) 100 unit/mL (3 mL) inpn     Si Units by SubCUTAneous route daily. Indications: type 2 diabetes mellitus     Dispense:  15 mL     Refill:  3    insulin glargine U-300 conc ([place] SoloStar U-300 Insulin) 300 unit/mL (1.5 mL) inpn pen     Si Units by SubCUTAneous route daily. Dispense:  1 Pen     Refill:  0    insulin glulisine U-100 (Apidra SoloStar U-100 Insulin) 100 unit/mL pen     Sig: Inject 10 units under the skin before your largest meal.  Indications: type 2 diabetes mellitus     Dispense:  2 Pen     Refill:  0     Patient's Immunization History:    Immunizations by Immunization family     Influenza Vaccine 2012 10/18/2013 2015 9/15/2017     2017 10/13/2018 10/6/2019 10/6/2019     10/27/2020 2020 2021     Pneumococcal Conjugate (PCV) 2019       Pneumococcal Polysaccharide (PPSV-23) 2018       Pneumococcal Vaccine (Unspecified Type) 2008       Sars-cov-2 (Covid-19) Vaccine, Unspecified  3/4/2021 2021 11/15/2021     TB Skin Test (PPD) 2011       Zoster Vaccine, Unspecified Formulation 2/15/2017           Patient verbalized understanding of the information presented and all of the patients questions were answered. AVS was handed to the patient. Patient advised to call the office with any additional questions or concerns.     Notifications of recommendations will be sent to Dr. Belgica Beltran MD for review. Patient will return to clinic in 6 week(s) for follow up. Thank you for the consult,  Katia Vieira, PharmD, BCACP, [address] in place:  Yes   Recommendation Provided To: Provider: 1 via Verbally to provider  and Patient/Caregiver: 8 via In person   Intervention Detail: Discontinued Rx: 3, reason: Duplicate Therapy and Therapy Complete, Dose Adjustment: 2, reason: Therapy Optimization, Patient Access Assistance/Sample Provided, Refill(s) Provided and Scheduled Appointment   Intervention Accepted By: Patient/Caregiver: 8   Time Spent (min): 45

## 2022-02-08 ENCOUNTER — OFFICE VISIT (OUTPATIENT)
Dept: INTERNAL MEDICINE CLINIC | Age: 82
End: 2022-02-08

## 2022-02-08 ENCOUNTER — TELEPHONE (OUTPATIENT)
Dept: INTERNAL MEDICINE CLINIC | Age: 82
End: 2022-02-08

## 2022-02-08 VITALS
DIASTOLIC BLOOD PRESSURE: 71 MMHG | OXYGEN SATURATION: 97 % | TEMPERATURE: 97.2 F | WEIGHT: 167 LBS | HEART RATE: 80 BPM | SYSTOLIC BLOOD PRESSURE: 114 MMHG | BODY MASS INDEX: 29.59 KG/M2 | HEIGHT: 63 IN

## 2022-02-08 DIAGNOSIS — Z86.010 H/O COLONOSCOPY WITH POLYPECTOMY: Primary | ICD-10-CM

## 2022-02-08 DIAGNOSIS — E11.22 TYPE 2 DIABETES MELLITUS WITH STAGE 3 CHRONIC KIDNEY DISEASE, WITH LONG-TERM CURRENT USE OF INSULIN, UNSPECIFIED WHETHER STAGE 3A OR 3B CKD (HCC): ICD-10-CM

## 2022-02-08 DIAGNOSIS — Z79.4 TYPE 2 DIABETES MELLITUS WITH STAGE 3 CHRONIC KIDNEY DISEASE, WITH LONG-TERM CURRENT USE OF INSULIN, UNSPECIFIED WHETHER STAGE 3A OR 3B CKD (HCC): ICD-10-CM

## 2022-02-08 DIAGNOSIS — E11.21 TYPE 2 DIABETES WITH NEPHROPATHY (HCC): ICD-10-CM

## 2022-02-08 DIAGNOSIS — Z98.890 H/O COLONOSCOPY WITH POLYPECTOMY: Primary | ICD-10-CM

## 2022-02-08 DIAGNOSIS — N18.30 TYPE 2 DIABETES MELLITUS WITH STAGE 3 CHRONIC KIDNEY DISEASE, WITH LONG-TERM CURRENT USE OF INSULIN, UNSPECIFIED WHETHER STAGE 3A OR 3B CKD (HCC): ICD-10-CM

## 2022-02-08 DIAGNOSIS — E11.40 TYPE 2 DIABETES, CONTROLLED, WITH NEUROPATHY (HCC): Primary | ICD-10-CM

## 2022-02-08 RX ORDER — BLOOD SUGAR DIAGNOSTIC
STRIP MISCELLANEOUS
Qty: 300 STRIP | Refills: 11 | Status: SHIPPED | OUTPATIENT
Start: 2022-02-08

## 2022-02-08 RX ORDER — INSULIN GLULISINE 100 [IU]/ML
INJECTION, SOLUTION SUBCUTANEOUS
Qty: 2 PEN | Refills: 0 | Status: SHIPPED | COMMUNITY
Start: 2022-02-08 | End: 2022-02-22

## 2022-02-08 RX ORDER — INSULIN GLARGINE 300 U/ML
35 INJECTION, SOLUTION SUBCUTANEOUS DAILY
Qty: 1 PEN | Refills: 0 | Status: SHIPPED | COMMUNITY
Start: 2022-02-08 | End: 2022-02-22

## 2022-02-08 RX ORDER — INSULIN GLARGINE 100 [IU]/ML
35 INJECTION, SOLUTION SUBCUTANEOUS DAILY
Qty: 15 ML | Refills: 3
Start: 2022-02-08 | End: 2022-02-22

## 2022-02-08 NOTE — PATIENT INSTRUCTIONS
· Increase Lantus to 35 units daily.     · Increase Apidra - meal time insulin --- 10 units before largest meal.   · Continue Janumet 50/1000 mg daily   · Will touch base with you regarding the GI doctor

## 2022-02-22 ENCOUNTER — TELEPHONE (OUTPATIENT)
Dept: ENDOCRINOLOGY | Age: 82
End: 2022-02-22

## 2022-02-22 ENCOUNTER — HOSPITAL ENCOUNTER (OUTPATIENT)
Dept: MAMMOGRAPHY | Age: 82
Discharge: HOME | End: 2022-02-22
Attending: INTERNAL MEDICINE
Payer: MEDICARE

## 2022-02-22 ENCOUNTER — OFFICE VISIT (OUTPATIENT)
Dept: ENDOCRINOLOGY | Age: 82
End: 2022-02-22
Payer: MEDICARE

## 2022-02-22 VITALS
WEIGHT: 172.6 LBS | HEART RATE: 89 BPM | HEIGHT: 63 IN | BODY MASS INDEX: 30.58 KG/M2 | SYSTOLIC BLOOD PRESSURE: 97 MMHG | DIASTOLIC BLOOD PRESSURE: 58 MMHG

## 2022-02-22 DIAGNOSIS — Z12.31 SCREENING MAMMOGRAM FOR HIGH-RISK PATIENT: ICD-10-CM

## 2022-02-22 DIAGNOSIS — I10 ESSENTIAL HYPERTENSION, BENIGN: ICD-10-CM

## 2022-02-22 DIAGNOSIS — E78.00 PURE HYPERCHOLESTEROLEMIA: ICD-10-CM

## 2022-02-22 DIAGNOSIS — E11.21 TYPE 2 DIABETES WITH NEPHROPATHY (HCC): Primary | ICD-10-CM

## 2022-02-22 PROCEDURE — G8754 DIAS BP LESS 90: HCPCS | Performed by: INTERNAL MEDICINE

## 2022-02-22 PROCEDURE — 99204 OFFICE O/P NEW MOD 45 MIN: CPT | Performed by: INTERNAL MEDICINE

## 2022-02-22 PROCEDURE — G8427 DOCREV CUR MEDS BY ELIG CLIN: HCPCS | Performed by: INTERNAL MEDICINE

## 2022-02-22 PROCEDURE — 77063 BREAST TOMOSYNTHESIS BI: CPT

## 2022-02-22 PROCEDURE — G8752 SYS BP LESS 140: HCPCS | Performed by: INTERNAL MEDICINE

## 2022-02-22 PROCEDURE — 1090F PRES/ABSN URINE INCON ASSESS: CPT | Performed by: INTERNAL MEDICINE

## 2022-02-22 PROCEDURE — G8417 CALC BMI ABV UP PARAM F/U: HCPCS | Performed by: INTERNAL MEDICINE

## 2022-02-22 PROCEDURE — G8399 PT W/DXA RESULTS DOCUMENT: HCPCS | Performed by: INTERNAL MEDICINE

## 2022-02-22 PROCEDURE — 1101F PT FALLS ASSESS-DOCD LE1/YR: CPT | Performed by: INTERNAL MEDICINE

## 2022-02-22 PROCEDURE — G8536 NO DOC ELDER MAL SCRN: HCPCS | Performed by: INTERNAL MEDICINE

## 2022-02-22 PROCEDURE — 3051F HG A1C>EQUAL 7.0%<8.0%: CPT | Performed by: INTERNAL MEDICINE

## 2022-02-22 PROCEDURE — G8432 DEP SCR NOT DOC, RNG: HCPCS | Performed by: INTERNAL MEDICINE

## 2022-02-22 RX ORDER — INSULIN GLARGINE 100 [IU]/ML
32 INJECTION, SOLUTION SUBCUTANEOUS
Qty: 15 ML | Refills: 3
Start: 2022-02-22 | End: 2022-03-22 | Stop reason: ALTCHOICE

## 2022-02-22 RX ORDER — INSULIN GLULISINE 100 [IU]/ML
INJECTION, SOLUTION SUBCUTANEOUS
Qty: 2 PEN | Refills: 0
Start: 2022-02-22 | End: 2022-04-05

## 2022-02-22 RX ORDER — METFORMIN HYDROCHLORIDE 500 MG/1
TABLET, EXTENDED RELEASE ORAL
Qty: 60 TABLET | Refills: 11 | Status: SHIPPED | OUTPATIENT
Start: 2022-02-22

## 2022-02-22 NOTE — PATIENT INSTRUCTIONS
1) For the next 2 weeks, try stopping the janumet and instead we will try metformin ER (extended release) 500 mg 2 tabs together with dinner and see if this helps with the diarrhea you have been experiencing. This will be ready for  at the pharmacy today. 2) Stay on lantus 32 units at bedtime and apidra 8 units before dinner. 3) Give me a call at [phone] next Monday with how your diarrhea is doing and how your blood sugars are doing on this new regimen. 4) Your Hemoglobin A1c (3 month test of blood sugar) is 7.4% and goal for you with your history of heart disease and being [de-identified]years old is 7.5-8% or less so you are at goal currently.

## 2022-02-22 NOTE — PROGRESS NOTES
Chief Complaint   Patient presents with    Diabetes     pcp and pharmacy confirmed     History of Present Illness: Carol Villalobos is a [de-identified] y.o. female who is a new patient for evaluation of diabetes. However, I had seen her for several years from 2011 to 2016 and last visit was in 10/16 and I had her just f/u with Dr. Mary Darnell as her DM was so well controlled. For the next 4 years she continued to have A1c values under 7% but in 2020 started having more trouble with her stomach and diarrhea and needed her gallbladder removed in 10/20. Her janumet was decreased from 1 tab bid to 1 tab in the morning only. Her sugars started climbing and her lantus dose was progressively increased over the past year with the help of Pedro Marie while waiting for this visit and is currently taking 32 units at night. Also had samples of Apidra insulin given and was initially taking 4 units before dinner and currently is on 8 units before dinner. Her A1c was 7% in 12/20, 7.5% in 6/21 and then most recently 7.4% in 1/21. Review of her sugars over the past 2 weeks since her visit with Adelina show fasting sugars in the 100-140 range and pre-dinner sugars under 150 and bedtime readings in the 130-180 range. She was on janumet when she came to me in 2011 and doesn't recall being on metformin ER in the past.  Mita Barajas is very expensive so she is willing to try metformin ER to see if this helps with cost and diarrhea. I did clarify that given her age and history of CAD, her A1c of 7.4% is at goal for her. Has a new patient visit with Dr. Aniket Colindres for 3/4/22 for diarrhea and I told her that hopefully the change in her metformin will help.     Past Medical History:   Diagnosis Date    Arthritis     Breast cancer ([facility] [address] 2002    s/p lumpectomy and XRT    CAD (coronary artery disease) 02/2016    Chronic kidney disease     stage III    GERD (gastroesophageal reflux disease)     with hiatal hernia    Hypercholesterolemia     Hypertension     Liver disease     Long term current use of anticoagulant therapy     Neuropathy in diabetes ([facility] [address]     DOMINGO (obstructive sleep apnea)     on CPAP     Past Surgical History:   Procedure Laterality Date    HX BREAST BIOPSY Left     years ago no scar seen    HX CARPAL TUNNEL RELEASE      b/l    HX CHOLECYSTECTOMY  10/21/2020    Dr. Kelly Blair HX LUMBAR FUSION  July 2015    L4-L5    HX ORTHOPAEDIC      spinal fusion    [place] BREAST SURGERY PROCEDURE UNLISTED  2001    right lumpectomy    [place] CARDIAC SURG PROCEDURE UNLIST      cardiac cath; 3 stents placed    VASCULAR SURGERY PROCEDURE UNLIST      right leg vein stripping     Current Outpatient Medications   Medication Sig    insulin glargine (Lantus Solostar U-100 Insulin) 100 unit/mL (3 mL) inpn 32 Units by SubCUTAneous route nightly. Dose change 02/22/22--updated med list--did not send prescription to the pharmacy  Indications: type 2 diabetes mellitus    insulin glulisine U-100 (Apidra SoloStar U-100 Insulin) 100 unit/mL pen Inject 8 units under the skin before your largest meal.--Dose change 02/22/22--updated med list--did not send prescription to the pharmacy  Indications: type 2 diabetes mellitus    glucose blood VI test strips (OneTouch Ultra Test) strip Use to check blood sugar three times daily. E11.65    atorvastatin (LIPITOR) 80 mg tablet Take 1 Tablet by mouth daily.  azelastine (ASTELIN) 137 mcg (0.1 %) nasal spray USE 2 SPRAYS IN EACH NOSTRIL TWICE DAILY    losartan (COZAAR) 100 mg tablet TAKE 1 TABLET BY MOUTH DAILY    famotidine (PEPCID) 20 mg tablet TAKE 1 TABLET BY MOUTH EVERY NIGHT    pantoprazole (PROTONIX) 40 mg tablet TAKE 1 TABLET BY MOUTH DAILY    SITagliptin-metFORMIN (Janumet) 50-1,000 mg per tablet Take 1 Tablet by mouth two (2) times daily (with meals). (Patient taking differently: Take 1 Tablet by mouth daily (with breakfast). )    montelukast (SINGULAIR) 10 mg tablet TAKE 1 TABLET BY MOUTH ONCE DAILY    colestipoL (Colestid) 1 gram tablet Take 2 Tabs by mouth daily. (Patient taking differently: Take 1 g by mouth two (2) times a day.)    ondansetron (ZOFRAN ODT) 4 mg disintegrating tablet Take 1 Tab by mouth every eight (8) hours as needed for Nausea.  Insulin Needles, Disposable, (PEN NEEDLE) 31 gauge x 3/16\" ndle Use as directed once daily    Blood-Glucose Meter monitoring kit One touch ultra mini glucometer--dx 250.00    Lancets misc Check fsbs bid -250.02    nitroglycerin (NITROSTAT) 0.4 mg SL tablet 1 Tab by SubLINGual route every five (5) minutes as needed for Chest Pain.  carvedilol (COREG) 6.25 mg tablet Take  by mouth two (2) times daily (with meals).  fenofibrate (LOFIBRA) 54 mg tablet Take  by mouth daily.  cyanocobalamin (VITAMIN B12) 500 mcg tablet Take 500 mcg by mouth daily.  ranolazine ER (RANEXA) 500 mg SR tablet Take 1 Tab by mouth two (2) times a day.  clopidogrel (PLAVIX) 75 mg tablet Take 1 Tab by mouth daily.  aspirin 81 mg Tab Take 81 mg by mouth daily.  MULTIVITAMINS W-MINERALS/LUT (CENTRUM SILVER PO) Take 1 Tab by mouth daily. No current facility-administered medications for this visit.      Allergies   Allergen Reactions    Codeine Other (comments)     Jerking sensation    Livalo [Pitavastatin] Diarrhea    Niaspan [Niacin] Myalgia    Statins-Hmg-Coa Reductase Inhibitors Myalgia     Zocor, pravachol, Lipitor, crestor    Welchol [Colesevelam] Other (comments)     Nausea, bloating and malaise    Zetia [Ezetimibe] Myalgia     Family History   Problem Relation Age of Onset    Diabetes Mother     Heart Disease Mother     Stroke Mother     Breast Cancer Mother 79    Diabetes Brother     Heart Disease Brother     Emphysema Father     Diabetes Daughter      Social History     Socioeconomic History    Marital status: SINGLE     Spouse name: Not on file    Number of children: Not on file    Years of education: Not on file    Highest education level: Not on file   Occupational History    Not on file   Tobacco Use    Smoking status: Never Smoker    Smokeless tobacco: Never Used   Vaping Use    Vaping Use: Never used   Substance and Sexual Activity    Alcohol use: No     Alcohol/week: 0.0 standard drinks    Drug use: Yes     Types: Prescription, OTC    Sexual activity: Never   Other Topics Concern    Not on file   Social History Narrative    Lives in [place] with son. . 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 Is busy with [employer]ec[employer]ism and singing in the choir. Social Determinants of Health     Financial Resource Strain:     Difficulty of Paying Living Expenses: Not on file   Food Insecurity:     Worried About Running Out of Food in the Last Year: Not on file    [place] of Food in the Last Year: Not on file   Transportation Needs:     Lack of Transportation (Medical): Not on file    Lack of Transportation (Non-Medical):  Not on file   Physical Activity:     Days of Exercise per Week: Not on file    Minutes of Exercise per Session: Not on file   Stress:     Feeling of Stress : Not on file   Social Connections:     Frequency of Communication with Friends and Family: Not on file    Frequency of Social Gatherings with Friends and Family: Not on file    Attends Sabianism Services: Not on file    Active Member of [address] [place] [address] or Organizations: Not on file    Attends Club or Organization Meetings: Not on file    Marital Status: Not on file   Intimate Partner Violence: Not At Risk    Fear of Current or Ex-Partner: No    Emotionally Abused: No    Physically Abused: No    Sexually Abused: No   Housing Stability:     Unable to Pay for Housing in the Last Year: Not on file    Number of [place] in the Last Year: Not on file    Unstable Housing in the Last Year: Not on file     Review of Systems: per HPI    Physical Examination:  Blood pressure (!) 97/58, pulse 89, height 5' 3\" (1.6 m), weight 172 lb 9.6 oz (78.3 kg). - General: pleasant, no distress, good eye contact  - HEENT: no exopthalmos, no periorbital edema, no scleral/conjunctival injection, EOMI, no lid lag or stare  - Neck: supple, no thyromegaly, masses, lymph nodes, or carotid bruits, no supraclavicular or dorsocervical fat pads  - Cardiovascular: regular, normal rate, normal S1 and S2, no murmurs/rubs/gallops,  - Respiratory: clear to auscultation bilaterally  - Gastrointestinal: soft, nontender, nondistended, no masses, no hepatosplenomegaly  - Musculoskeletal: no proximal muscle weakness in upper or lower extremities  - Integumentary: no acanthosis nigricans, no abdominal striae, no rashes, no edema,   - Neurological: grossly intact  - Psychiatric: normal mood and affect      Data Reviewed:   Component      Latest Ref Rng & Units 1/14/2022 1/14/2022 1/14/2022          10:28 AM 10:28 AM 10:28 AM   Glucose      65 - 99 mg/dL   130 (H)   BUN      8 - 27 mg/dL   23   Creatinine      0.57 - 1.00 mg/dL   1.03 (H)   GFR est non-AA      >59 mL/min/1.73   51 (L)   GFR est AA      >59 mL/min/1.73   59 (L)   BUN/Creatinine ratio      12 - 28   22   Sodium      134 - 144 mmol/L   139   Potassium      3.5 - 5.2 mmol/L   4.9   Chloride      96 - 106 mmol/L   102   CO2      20 - 29 mmol/L   24   Calcium      8.7 - 10.3 mg/dL   9.5   Protein, total      6.0 - 8.5 g/dL   6.8   Albumin      3.7 - 4.7 g/dL   4.0   GLOBULIN, TOTAL      1.5 - 4.5 g/dL   2.8   A-G Ratio      1.2 - 2.2   1.4   Bilirubin, total      0.0 - 1.2 mg/dL   0.3   Alk.  phosphatase      44 - 121 IU/L   63   AST      0 - 40 IU/L   11   ALT      0 - 32 IU/L   19   Cholesterol, total      100 - 199 mg/dL  158    Triglyceride      0 - 149 mg/dL  132    HDL Cholesterol      >39 mg/dL  39 (L)    VLDL, calculated      5 - 40 mg/dL  24    LDL, calculated      0 - 99 mg/dL  95    Hemoglobin A1c, (calculated)      4.8 - 5.6 % 7.4 (H) Estimated average glucose      mg/dL 166         Assessment/Plan:     1. 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  Her A1c is at goal of 7.5-8% or less given her age and CAD. I will try changing her from janumet to metformin ER to see if this helps with cost and diarrhea as it's unlikely that the [place] [facility] [place] is adding much at this time given her diagnosis of DM over 15 years ago. - cont Lantus 32 units at bedtime   - cont apidra 8 units before dinner  - stop janumet 50/1000 mg 1 tab daily  - begin metformin  mg 2 tabs with dinner  - check bs 3 times daily  - foot exam done 4/19  - optho UTD 1/21  - microalbumin nl 12/11, up to 35 in 2/13 and 39 in 5/13 and 48 in 3/14 and 150 in 10/14 (but just had a steroid injection 4 days before) and down to 126 in 4/15 (but had a steroid injection the week before), down to 84 in 4/16, up to 628 in 6/21       2. Essential hypertension, benign (401.1) her BP was at goal < 140/90  - cont current regimen       3. Pure hypercholesterolemia (272.0) Given DM and CAD, Goal LDL < 70, non-HDL < 100, and TG < 150.  and non- in 5/13 on zetia alone. Couldn't tolerate welchol or livalo.  and non- in 9/13. Stopped zetia in 9/13 due to myalgias and  and non- in 3/14 and 127 and 189 in 9/14 and 178 in 4/15 and 155 in 10/15.   Put on prava 10 after MI in 1/16 and down to 103 in 4/16 and Dr. Muna Duncan increased this to 40 mg daily but then changed her to lipitor 40 mg and added lofibra 54 mg and LDL 68 in 7/16. LDL 95 in 1/22 so lipitor increased to 80 mg daily. - cont atorvastatin 80 mg daily  - cont lofibra 54 mg daily              Patient Instructions   1) For the next 2 weeks, try stopping the janumet and instead we will try metformin ER (extended release) 500 mg 2 tabs together with dinner and see if this helps with the diarrhea you have been experiencing. This will be ready for  at the pharmacy today. 2) Stay on lantus 32 units at bedtime and apidra 8 units before dinner. 3) Give me a call at [phone] next Monday with how your diarrhea is doing and how your blood sugars are doing on this new regimen. 4) Your Hemoglobin A1c (3 month test of blood sugar) is 7.4% and goal for you with your history of heart disease and being [de-identified]years old is 7.5-8% or less so you are at goal currently. Follow-up and Dispositions    · Return in about 6 months (around 8/22/2022).            Copy sent to:  Aime Galloway MD as Referring Provider (Internal Medicine)  Puma Bynum MD ([address] [place] [address] Vascular Surgery)  Edis Su MD (Gastroenterology)

## 2022-02-22 NOTE — TELEPHONE ENCOUNTER
Please let her know that I accidentally wrote on her after visit, continue taking apidra 6 units before dinner but it should be 8 units before dinner. She may be driving home to [place] so call closer to 4:45pm.  Thanks.

## 2022-02-24 NOTE — TELEPHONE ENCOUNTER
I spoke to her today and clarified that she should continue taking apidra 8 units at dinner and lantus 32 units at night but if she is having any low sugars under 80 to let me know. she voiced understanding of this plan.

## 2022-02-24 NOTE — TELEPHONE ENCOUNTER
2/24/2022  10:30 AM    Pt called and stated she is returning Nurse Miesha's phone call. Pt can be reached at [phone].

## 2022-02-28 ENCOUNTER — TELEPHONE (OUTPATIENT)
Dept: ENDOCRINOLOGY | Age: 82
End: 2022-02-28

## 2022-02-28 NOTE — TELEPHONE ENCOUNTER
Patient stated she took Metformin Tuesday and Wednesday and the diarrhea had finally  stopped. She stated she did not have another bowel movement until that Sunday. She stated Thursday night she started having abdominal cramps and discomfort. She took some Gas-X and stool softener and after having a bowel movement her discomfort eases up however she is still having some tenderness in her stomach area where it was hurting. .  Patient stated that today she has diarrhea again however she will continue taking the Metformin as directed.

## 2022-02-28 NOTE — TELEPHONE ENCOUNTER
2/28/2022  3:01 PM    Patient called and left message on machine at 2.30 to advise that she was told to call dr. Ayo Aguirre back today to let him know if her medication is working.  Please call her back at [phone] thanks

## 2022-03-01 RX ORDER — AMOXICILLIN 500 MG/1
500 CAPSULE ORAL 3 TIMES DAILY
Qty: 21 CAPSULE | Refills: 0 | Status: SHIPPED | OUTPATIENT
Start: 2022-03-01 | End: 2022-03-22 | Stop reason: ALTCHOICE

## 2022-03-01 NOTE — TELEPHONE ENCOUNTER
I recommend staying on 2 tabs of metformin ER for now. She is welcome to try splitting the dose to 1 tab with breakfast and 1 tab with dinner instead of 2 tabs with dinner if she'd like  How are her blood sugars running on this new regimen? As long as they are staying between  in the morning, this should be a good dose of metformin and insulin.

## 2022-03-04 NOTE — TELEPHONE ENCOUNTER
Called and spoke to patient on her home phone and let her know we have been trying to reach her on her cell phone and that her mailbox is full and she said she gets so many spam calls all day and doesn't tend to answer her cell phone. She states she has been tolerating the metformin ER 2 tabs at dinner and bowels were a little constipated but then got a sinus infection and was put on abx and started having diarrhea so she stopped this abx pill after a day and a half. Overall she is fine with staying on the metformin for now and I told her she can stay on 2 tabs at dinner or split to 1 tab bid if she'd like. She has only had one reading over 200 since starting the metformin and most are staying 150 or less. she voiced understanding of this plan.

## 2022-03-17 ENCOUNTER — OFFICE VISIT (OUTPATIENT)
Dept: SLEEP MEDICINE | Age: 82
End: 2022-03-17
Payer: MEDICARE

## 2022-03-17 ENCOUNTER — TELEPHONE (OUTPATIENT)
Dept: INTERNAL MEDICINE CLINIC | Age: 82
End: 2022-03-17

## 2022-03-17 VITALS
RESPIRATION RATE: 20 BRPM | HEIGHT: 63 IN | BODY MASS INDEX: 30.09 KG/M2 | OXYGEN SATURATION: 96 % | HEART RATE: 88 BPM | SYSTOLIC BLOOD PRESSURE: 131 MMHG | TEMPERATURE: 97.2 F | WEIGHT: 169.8 LBS | DIASTOLIC BLOOD PRESSURE: 67 MMHG

## 2022-03-17 DIAGNOSIS — G47.33 OBSTRUCTIVE SLEEP APNEA (ADULT) (PEDIATRIC): Primary | ICD-10-CM

## 2022-03-17 DIAGNOSIS — I25.10 CORONARY ARTERY DISEASE INVOLVING NATIVE CORONARY ARTERY OF NATIVE HEART WITHOUT ANGINA PECTORIS: ICD-10-CM

## 2022-03-17 DIAGNOSIS — E11.40 TYPE 2 DIABETES, CONTROLLED, WITH NEUROPATHY (HCC): ICD-10-CM

## 2022-03-17 PROCEDURE — G8752 SYS BP LESS 140: HCPCS | Performed by: INTERNAL MEDICINE

## 2022-03-17 PROCEDURE — G8536 NO DOC ELDER MAL SCRN: HCPCS | Performed by: INTERNAL MEDICINE

## 2022-03-17 PROCEDURE — G8399 PT W/DXA RESULTS DOCUMENT: HCPCS | Performed by: INTERNAL MEDICINE

## 2022-03-17 PROCEDURE — G8754 DIAS BP LESS 90: HCPCS | Performed by: INTERNAL MEDICINE

## 2022-03-17 PROCEDURE — G8417 CALC BMI ABV UP PARAM F/U: HCPCS | Performed by: INTERNAL MEDICINE

## 2022-03-17 PROCEDURE — 1101F PT FALLS ASSESS-DOCD LE1/YR: CPT | Performed by: INTERNAL MEDICINE

## 2022-03-17 PROCEDURE — G8432 DEP SCR NOT DOC, RNG: HCPCS | Performed by: INTERNAL MEDICINE

## 2022-03-17 PROCEDURE — 3051F HG A1C>EQUAL 7.0%<8.0%: CPT | Performed by: INTERNAL MEDICINE

## 2022-03-17 PROCEDURE — 1090F PRES/ABSN URINE INCON ASSESS: CPT | Performed by: INTERNAL MEDICINE

## 2022-03-17 PROCEDURE — 99213 OFFICE O/P EST LOW 20 MIN: CPT | Performed by: INTERNAL MEDICINE

## 2022-03-17 PROCEDURE — G8427 DOCREV CUR MEDS BY ELIG CLIN: HCPCS | Performed by: INTERNAL MEDICINE

## 2022-03-17 NOTE — TELEPHONE ENCOUNTER
Pt came into the [facility]. Pt has been recently having some BP issues. BP has been running higher the past week. Pt has been keeping a log for each BP reading. Pt has also been logging in her diabetic log. Pt will run out of insulin before the appt on 4/15. Pt will need a refill before then. Please reach out to pt and advise. Pt does have enough to throughout the weekend.

## 2022-03-17 NOTE — PROGRESS NOTES
[address] [place]. [place], [address]  Tel.  [phone]  Fax. [address] [facility] [address] [address]  [place], [address] [place]  Tel.  [phone]  Fax. [phone] [address]   Tel.  [phone]  Fax. [phone]     S>Alis Anne is a [de-identified] y.o. female seen for a positive airway pressure follow-up. She reports no problems using the device. The following problems are identified:    Drowsiness no Problems exhaling No-but feels the airflow is strong. She would not like it higher   Snoring no Forget to put on no   Mask Comfortable yes Can't fall asleep no   [place] no Mask falls off no   Air Leaking no Frequent awakenings no     Download reviewed. she  is aware of the [employer] recall of PAP devices and has registered her device on the recall registry    She admits that her sleep has improved. Therapy Apnea Index averaged over PAP use: 10 /hr which reflects significantly improved sleep breathing condition. Allergies   Allergen Reactions    Codeine Other (comments)     Jerking sensation    Livalo [Pitavastatin] Diarrhea    Niaspan [Niacin] Myalgia    Statins-Hmg-Coa Reductase Inhibitors Myalgia     Zocor, pravachol, Lipitor, crestor    Welchol [Colesevelam] Other (comments)     Nausea, bloating and malaise    Zetia [Ezetimibe] Myalgia       She has a current medication list which includes the following prescription(s): amoxicillin, lantus solostar u-100 insulin, apidra solostar u-100 insulin, metformin er, onetouch ultra test, atorvastatin, azelastine, losartan, famotidine, pantoprazole, montelukast, ondansetron, insulin needles (disposable), blood-glucose meter, lancets, nitroglycerin, carvedilol, fenofibrate, cyanocobalamin, ranolazine er, clopidogrel, aspirin, folic acid/multivit-min/lutein, and colestipol. .      She  has a past medical history of Arthritis, Breast cancer ([facility] [address] (2002), CAD (coronary artery disease) (02/2016), Chronic kidney disease, GERD (gastroesophageal reflux disease), Hypercholesterolemia, Hypertension, Liver disease, Long term current use of anticoagulant therapy, Neuropathy in diabetes ([facility] [address] and DOMINGO (obstructive sleep apnea). [employer] Sleepiness Score: 12    O>    Visit Vitals  /67 (BP 1 Location: Right arm, BP Patient Position: Sitting, BP Cuff Size: Large adult)   Pulse 88   Temp 97.2 °F (36.2 °C) (Temporal)   Resp 20   Ht 5' 3\" (1.6 m)   Wt 169 lb 12.8 oz (77 kg)   SpO2 96%   BMI 30.08 kg/m²           General:   Alert, oriented, not in distress   Neck:   No JVD    Chest/Lungs:  symetrical lung expansion , no accessory muscle use    Extremities:  no obvious rashes , negative edema    Neuro:  No focal deficits ; No obvious tremor    Psych:  Normal affect ,  Normal countenance ;         A>    ICD-10-CM ICD-9-CM    1. Obstructive sleep apnea (adult) (pediatric)  G47.33 327.23 AMB SUPPLY ORDER      CANCELED: AMB SUPPLY ORDER   2. Coronary artery disease involving native coronary artery of native heart without angina pectoris  I25.10 414.01    3. Type 2 diabetes, controlled, with neuropathy ([facility] [address]  E11.40 250.60      357.2      AHI = 27(2016). On CPAP, Respironics :  9 cmH2O. Compliant:      yes    Therapeutic Response:  Positive    P>      *   Follow-up and Dispositions    · Return in about 3 months (around 6/17/2022). she is compliant with PAP therapy and PAP continues to benefit patient and remains necessary for control of her sleep apnea. Patient's PAP device has exceeded its  Lifespan. Replacement device ordered. The [employer] PAP recall was reviewed. We discussed the problem of potential breakdown of the sound abatement foam. she did not use a commercial PAP cleaning device (such as the so-clean device). she understands that the use of those devices may increase the likelihood of foam breakdown. We discussed the pros and cons of continuing use of the recalled PAP device.  she was informed that [employer] is working to replace the recalled devices. she was advised to check the [employer] site regularly for updates. Should she decide to discontinue to use the recalled PAP device, she should sleep with the head of the bed elevated or avoid sleeping on her  back to help minimize respiratory events. she was advised that if/when she receives the replacement PAP device from [employer], she should contact his medical supply company so they can set the PAP to her previous settings and venus the [facility] access to the modem. Previous hose and mask should be compatible with the new device. I have counseled the patient regarding the benefits of weight loss. * She was asked to contact our office for any problems regarding PAP therapy. * Counseling was provided regarding the importance of regular PAP use and on proper sleep hygiene and safe driving. * Re-enforced proper and regular cleaning for the device. 2. Coronary Artery Disease - she continues on his current regimen. I have reviewed the relationship between heart disease and sleep disordered breathing. 3. Type II diabetes - she continues on her current regimen. I have reviewed the relationship between sleep disordered breathing as it relates to diabetes.     Electronically signed by    Jasper Patel MD  Diplomate in Sleep Medicine  [facility]    3/17/2022

## 2022-03-17 NOTE — PATIENT INSTRUCTIONS
[address] [place]. [place], [address]  Tel.  [phone]  Fax. 100 [facility] 60  [place], [address]  Tel.  [phone]  Fax. [phone] [address] [facility] Selene Herrera  Tel.  [phone]  Fax. [phone]     PROPER SLEEP HYGIENE    What to avoid  · Do not have drinks with caffeine, such as coffee or black tea, for 8 hours before bed. · Do not smoke or use other types of tobacco near bedtime. Nicotine is a stimulant and can keep you awake. · Avoid drinking alcohol late in the evening, because it can cause you to wake in the middle of the night. · Do not eat a big meal close to bedtime. If you are hungry, eat a light snack. · Do not drink a lot of water close to bedtime, because the need to urinate may wake you up during the night. · Do not read or watch TV in bed. Use the bed only for sleeping and sexual activity. What to try  · Go to bed at the same time every night, and wake up at the same time every morning. Do not take naps during the day. · Keep your bedroom quiet, dark, and cool. · Get regular exercise, but not within 3 to 4 hours of your bedtime. .  · Sleep on a comfortable pillow and mattress. · If watching the clock makes you anxious, turn it facing away from you so you cannot see the time. · If you worry when you lie down, start a worry book. Well before bedtime, write down your worries, and then set the book and your concerns aside. · Try meditation or other relaxation techniques before you go to bed. · If you cannot fall asleep, get up and go to another room until you feel sleepy. Do something relaxing. Repeat your bedtime routine before you go to bed again. · Make your house quiet and calm about an hour before bedtime. Turn down the lights, turn off the TV, log off the computer, and turn down the volume on music. This can help you relax after a busy day.     Drowsy Driving  The [address] [place] [address] Road Traffic Safety Administration cites drowsiness as a causing factor in more than 491,955 police reported crashes annually, resulting in 76,000 injuries and 1,500 deaths. Other surveys suggest 55% of people polled have driven while drowsy in the past year, 23% had fallen asleep but not crashed, 3% crashed, and 2% had and accident due to drowsy driving. Who is at risk? Young Drivers: One study of drowsy driving accidents states that 55% of the drivers were under 25 years. Of those, 75% were male. Shift Workers and Travelers: People who work overnight or travel across time zones frequently are at higher risk of experiencing Circadian Rhythm Disorders. They are trying to work and function when their body is programed to sleep. Sleep Deprived: Lack of sleep has a serious impact on your ability to pay attention or focus on a task. Consistently getting less than the average of 8 hours your body needs creates partial or cumulative sleep deprivation. Untreated Sleep Disorders: Sleep Apnea, Narcolepsy, R.L.S., and other sleep disorders (untreated) prevent a person from getting enough restful sleep. This leads to excessive daytime sleepiness and increases the risk for drowsy driving accidents by up to 7 times. Medications / Alcohol: Even over the counter medications can cause drowsiness. Medications that impair a drivers attention should have a warning label. Alcohol naturally makes you sleepy and on its own can cause accidents. Combined with excessive drowsiness its effects are amplified. Signs of Drowsy Driving:   * You don't remember driving the last few miles   * You may drift out of your cristian   * You are unable to focus and your thoughts wander   * You may yawn more often than normal   * You have difficulty keeping your eyes open / nodding off   * Missing traffic signs, speeding, or tailgating  Prevention-   Good sleep hygiene, lifestyle and behavioral choices have the most impact on drowsy driving.  There is no substitute for sleep and the average person requires 8 hours nightly. If you find yourself driving drowsy, stop and sleep. Consider the sleep hygiene tips provided during your visit as well. Medication Refill Policy: Refills for all medications require 1 week advance notice. Please have your pharmacy fax a refill request. We are unable to fax, or call in \"controled substance\" medications and you will need to pick these prescriptions up from our office. [employer] Activation    Thank you for requesting access to [employer]. Please follow the instructions below to securely access and download your online medical record. [employer] allows you to send messages to your doctor, view your test results, renew your prescriptions, schedule appointments, and more. How Do I Sign Up? 1. In your internet browser, go to [email or web address]. 2. Click on the First Time User? Click Here link in the Sign In box. You will see the New Member Sign Up page. 3. Enter your [employer] Access Code exactly as it appears below. You will not need to use this code after youve completed the sign-up process. If you do not sign up before the expiration date, you must request a new code. [employer] Access Code: Activation code not generated  Current [employer] Status: Active (This is the date your [employer] access code will )    4. Enter the last four digits of your Social Security Number (xxxx) and Date of Birth (mm/dd/yyyy) as indicated and click Submit. You will be taken to the next sign-up page. 5. Create a [employer] ID. This will be your [employer] login ID and cannot be changed, so think of one that is secure and easy to remember. 6. Create a [employer] password. You can change your password at any time. 7. Enter your Password Reset Question and Answer. This can be used at a later time if you forget your password. 8. Enter your e-mail address. You will receive e-mail notification when new information is available in [address]   9. Click Sign Up. You can now view and download portions of your medical record. 10. Click the Download Summary menu link to download a portable copy of your medical information. Additional Information    If you have questions, please call [phone]. Remember, [employer] is NOT to be used for urgent needs. For medical emergencies, dial 911.

## 2022-03-18 ENCOUNTER — DOCUMENTATION ONLY (OUTPATIENT)
Dept: SLEEP MEDICINE | Age: 82
End: 2022-03-18

## 2022-03-18 PROBLEM — M85.852 OSTEOPENIA OF LEFT THIGH: Status: ACTIVE | Noted: 2017-04-02

## 2022-03-19 PROBLEM — Z85.3 HISTORY OF BREAST CANCER: Status: ACTIVE | Noted: 2020-04-20

## 2022-03-19 PROBLEM — E11.21 TYPE 2 DIABETES WITH NEPHROPATHY (HCC): Status: ACTIVE | Noted: 2018-08-10

## 2022-03-19 PROBLEM — D50.9 IRON DEFICIENCY ANEMIA: Status: ACTIVE | Noted: 2017-12-01

## 2022-03-19 PROBLEM — K76.0 FATTY LIVER: Status: ACTIVE | Noted: 2018-02-23

## 2022-03-22 ENCOUNTER — TELEPHONE (OUTPATIENT)
Dept: INTERNAL MEDICINE CLINIC | Age: 82
End: 2022-03-22

## 2022-03-22 RX ORDER — INSULIN GLARGINE 300 U/ML
32 INJECTION, SOLUTION SUBCUTANEOUS DAILY
Qty: 4.5 ML | Refills: 2 | Status: SHIPPED | OUTPATIENT
Start: 2022-03-22 | End: 2022-06-01

## 2022-03-22 RX ORDER — INSULIN GLARGINE 300 U/ML
32 INJECTION, SOLUTION SUBCUTANEOUS DAILY
Qty: 2 PEN | Refills: 0 | Status: SHIPPED | COMMUNITY
Start: 2022-03-22 | End: 2022-06-01 | Stop reason: SDUPTHER

## 2022-03-22 NOTE — TELEPHONE ENCOUNTER
Pharmacy Progress Note - Telephone Encounter    S/O: Ms. Helga Salmeron [de-identified] y.o. female, referred by Dr. Lupe Sullivan MD, was contacted via an outbound telephone call to discuss her earlier call today. Verified patients identifiers (name & ) per HIPAA policy.     - States she found transportation and would like to stop by office for insulin sample if possible     A/P:  - Will assist w/ sample. - Patient endorses understanding to the provided information. All questions answered at this time. There are no discontinued medications. Orders Placed This Encounter    insulin glargine U-300 conc (Toujeo SoloStar U-300 Insulin) 300 unit/mL (1.5 mL) inpn pen     Si Units by SubCUTAneous route daily. Indications: type 2 diabetes mellitus     Dispense:  2 Pen     Refill:  0         Thank you,  Katia Bennett, PharmD, BCACP, [place] [address] in place:  Yes   Recommendation Provided To: Patient/Caregiver: 1 via Telephone   Intervention Detail: Patient Access Assistance/Sample Provided   Gap Closed?:    Intervention Accepted By: Patient/Caregiver: 1   Time Spent (min): 5

## 2022-03-22 NOTE — TELEPHONE ENCOUNTER
Pharmacy Progress Note - Telephone Encounter    S/O: Ms. Stacy Ridley [de-identified] y.o. female, referred by Dr. Nick Morejon MD, was contacted via an outbound telephone call to discuss her insulin today. Verified patients identifiers (name & ) per HIPAA policy.     - Toujeo 32 units daily. Reports to running out of insulin after today. BP Readings from Last 3 Encounters:   22 131/67   22 (!) 97/58   22 114/71       A/P:  - Offer to provide patient with a sample. Pt states she would like for rx to be submitted to pharmacy at this time. She is unable to travel to the office at this time. - Patient endorses understanding to the provided information. All questions answered at this time. Medications Discontinued During This Encounter   Medication Reason    amoxicillin (AMOXIL) 500 mg capsule Therapy Completed    insulin glargine (Lantus Solostar U-100 Insulin) 100 unit/mL (3 mL) inpn Therapy Completed     Orders Placed This Encounter    insulin glargine U-300 conc (Toujeo SoloStar U-300 Insulin) 300 unit/mL (1.5 mL) inpn pen     Si Units by SubCUTAneous route daily. Dispense:  4.5 mL     Refill:  2         Thank you,  Katia Ortiz, PharmD, BCACP, [address] [facility] in place:  Yes   Recommendation Provided To: Patient/Caregiver: 3 via Telephone   Intervention Detail: Discontinued Rx: 2, reason: Therapy Complete and New Rx: 1, reason: Improve Adherence and Needs Additional Therapy   Intervention Accepted By: Patient/Caregiver: 3   Time Spent (min): 10

## 2022-03-22 NOTE — TELEPHONE ENCOUNTER
Per [facility] patient states she needs a call back to Further discuss conversation from earlier today with you. Please call.  Thank you

## 2022-04-03 NOTE — PROGRESS NOTES
Pharmacy Progress Note - Diabetes Management    Assessment / Plan:   Diabetes Management:  - Per ADA guidelines, Pt's A1c is at goal of < 7-7.5% (advanced age). - Fasting and post prandial readings trending higher for goal.  - Check fasting, pre-lunch and pre-HS BG  - Increase Apidra to 10 units before largest meal.  If pre meal blood sugar is > 150, give 12 units.    - Continue Toujeo 32 units daily  - Continue Janumet 50/1000 mg daily      S/O: Ms. Alis Mendoza is a 81 y.o. female, referred by Ross Cruz MD, with a PMH of T2DM + neuropathy/nephropathy, CAD, HTN, HLD, Anemia, Hx breast cancer, DJD , was seen virtually today for diabetes management follow up.  Patient's last A1c was 7.4% (Jan 2022), 7.5% (June 2021), 7% (Dec 2020), 6.8% (June 2020). PHI verified. Interim update:   Saw Dr Nic Holliday (Endo) 2/22/22  Saw GI. Started back on colestipol 1 gm daily - Yessy Pavon NP  Has colonoscopy scheduled for August    Current anti-hyperglycemic regimen include(s):    - Janumet 50/1000 mg daily - renally dosed  - Toujeo 32 units daily     - Apidra 8 units before largest meal.  If pre-meal BG > 150, give 10 units.  --> last pen     ROS:  Today, Pt endorses:  - Symptoms of Hyperglycemia: none  - Symptoms of Hypoglycemia: none    Blood Glucose Monitoring (BGM) or CGM:  Reports:  Date Before Breakfast Before Dinner Before Bedtime Notes   3/24/2022  256 179    3/25/2022 131 110 165    3/26/2022 155 256 179    3/27/2022 154 311 210     148  198    3/29/2022 129 152 166    3/30/2022 145 105 162    3/31/2022 126 210 196    4/1/2022 165 205 185    4/2/2022 141  176 Daughter' birthday   4/4/2022 175 348 237 Gave 10 units Apidra   4/5/2022 166          Nutrition/Lifestyle Modifications:  Lunch yesterday: 1 mini sub - ham & cheese & lettuce+tomato  Dinner last night: home made chicken soup   Had ice cream & birthday cake during her daughter' birthday - 4/2/22     Vitals:   Wt Readings from Last 3 Encounters: 03/17/22 169 lb 12.8 oz (77 kg)   02/22/22 172 lb 9.6 oz (78.3 kg)   02/08/22 167 lb (75.8 kg)     BP Readings from Last 3 Encounters:   03/17/22 131/67   02/22/22 (!) 97/58   02/08/22 114/71     Pulse Readings from Last 3 Encounters:   03/17/22 88   02/22/22 89   02/08/22 80       Past Medical History:   Diagnosis Date    Arthritis     Breast cancer ([facility] [address] 2002    s/p lumpectomy and XRT    CAD (coronary artery disease) 02/2016    Chronic kidney disease     stage III    GERD (gastroesophageal reflux disease)     with hiatal hernia    Hypercholesterolemia     Hypertension     Liver disease     Long term current use of anticoagulant therapy     Neuropathy in diabetes (HCC)     DOMINGO (obstructive sleep apnea)     on CPAP     Allergies   Allergen Reactions    Codeine Other (comments)     Jerking sensation    Livalo [Pitavastatin] Diarrhea    Niaspan [Niacin] Myalgia    Statins-Hmg-Coa Reductase Inhibitors Myalgia     Zocor, pravachol, Lipitor, crestor    Welchol [Colesevelam] Other (comments)     Nausea, bloating and malaise    Zetia [Ezetimibe] Myalgia       Current Outpatient Medications   Medication Sig    insulin glargine U-300 conc (Toujeo SoloStar U-300 Insulin) 300 unit/mL (1.5 mL) inpn pen 32 Units by SubCUTAneous route daily.  insulin glargine U-300 conc (Toujeo SoloStar U-300 Insulin) 300 unit/mL (1.5 mL) inpn pen 32 Units by SubCUTAneous route daily. Indications: type 2 diabetes mellitus    insulin glulisine U-100 (Apidra SoloStar U-100 Insulin) 100 unit/mL pen Inject 8 units under the skin before your largest meal.--Dose change 02/22/22--updated med list--did not send prescription to the pharmacy  Indications: type 2 diabetes mellitus    metFORMIN ER (GLUCOPHAGE XR) 500 mg tablet Take 2 tabs with dinner--replaces janumet    glucose blood VI test strips (OneTouch Ultra Test) strip Use to check blood sugar three times daily.  E11.65    atorvastatin (LIPITOR) 80 mg tablet Take 1 Tablet by mouth daily.  azelastine (ASTELIN) 137 mcg (0.1 %) nasal spray USE 2 SPRAYS IN EACH NOSTRIL TWICE DAILY    losartan (COZAAR) 100 mg tablet TAKE 1 TABLET BY MOUTH DAILY    famotidine (PEPCID) 20 mg tablet TAKE 1 TABLET BY MOUTH EVERY NIGHT    pantoprazole (PROTONIX) 40 mg tablet TAKE 1 TABLET BY MOUTH DAILY    montelukast (SINGULAIR) 10 mg tablet TAKE 1 TABLET BY MOUTH ONCE DAILY    colestipoL (Colestid) 1 gram tablet Take 2 Tabs by mouth daily. (Patient taking differently: Take 1 g by mouth two (2) times a day.)    ondansetron (ZOFRAN ODT) 4 mg disintegrating tablet Take 1 Tab by mouth every eight (8) hours as needed for Nausea.  Insulin Needles, Disposable, (PEN NEEDLE) 31 gauge x 3/16\" ndle Use as directed once daily    Blood-Glucose Meter monitoring kit One touch ultra mini glucometer--dx 250.00    Lancets misc Check fsbs bid -250.02    nitroglycerin (NITROSTAT) 0.4 mg SL tablet 1 Tab by SubLINGual route every five (5) minutes as needed for Chest Pain.  carvedilol (COREG) 6.25 mg tablet Take  by mouth two (2) times daily (with meals).  fenofibrate (LOFIBRA) 54 mg tablet Take  by mouth daily.  cyanocobalamin (VITAMIN B12) 500 mcg tablet Take 500 mcg by mouth daily.  ranolazine ER (RANEXA) 500 mg SR tablet Take 1 Tab by mouth two (2) times a day.  clopidogrel (PLAVIX) 75 mg tablet Take 1 Tab by mouth daily.  aspirin 81 mg Tab Take 81 mg by mouth daily.  MULTIVITAMINS W-MINERALS/LUT (CENTRUM SILVER PO) Take 1 Tab by mouth daily. No current facility-administered medications for this visit.        Lab Results   Component Value Date/Time    Sodium 139 01/14/2022 10:28 AM    Potassium 4.9 01/14/2022 10:28 AM    Chloride 102 01/14/2022 10:28 AM    CO2 24 01/14/2022 10:28 AM    Anion gap 6 06/29/2021 12:43 PM    Glucose 130 (H) 01/14/2022 10:28 AM    BUN 23 01/14/2022 10:28 AM    Creatinine 1.03 (H) 01/14/2022 10:28 AM    BUN/Creatinine ratio 22 01/14/2022 10:28 AM    GFR est AA 59 (L) 01/14/2022 10:28 AM    GFR est non-AA 51 (L) 01/14/2022 10:28 AM    Calcium 9.5 01/14/2022 10:28 AM    Bilirubin, total 0.3 01/14/2022 10:28 AM    Alk. phosphatase 63 01/14/2022 10:28 AM    Protein, total 6.8 01/14/2022 10:28 AM    Albumin 4.0 01/14/2022 10:28 AM    Globulin 3.7 06/29/2021 12:43 PM    A-G Ratio 1.4 01/14/2022 10:28 AM    ALT (SGPT) 19 01/14/2022 10:28 AM       Lab Results   Component Value Date/Time    Cholesterol, total 158 01/14/2022 10:28 AM    Cholesterol (POC) 195 08/11/2011 10:08 AM    HDL Cholesterol 39 (L) 01/14/2022 10:28 AM    HDL Cholesterol (POC) 31 08/11/2011 10:08 AM    LDL Cholesterol (POC) 113 08/11/2011 10:08 AM    LDL, calculated 95 01/14/2022 10:28 AM    LDL, calculated 79.6 06/29/2021 12:43 PM    VLDL, calculated 24 01/14/2022 10:28 AM    VLDL, calculated 46.4 06/29/2021 12:43 PM    Triglyceride 132 01/14/2022 10:28 AM    Triglycerides (POC) 256 08/11/2011 10:08 AM    CHOL/HDL Ratio 4.0 06/29/2021 12:43 PM       Lab Results   Component Value Date/Time    WBC 7.3 01/14/2022 10:28 AM    WBC 8.0 05/15/2012 12:00 AM    HGB 11.2 01/14/2022 10:28 AM    Hematocrit (POC) 33 (L) 04/16/2019 05:17 PM    HCT 34.0 01/14/2022 10:28 AM    PLATELET 166 53/26/7244 10:28 AM    MCV 87 01/14/2022 10:28 AM       Lab Results   Component Value Date/Time    Microalbumin/Creat ratio (mg/g creat) 628 (H) 06/29/2021 12:43 PM    Microalbumin,urine random 169.00 06/29/2021 12:43 PM    Microalbumin urine (POC) abnormal 08/11/2011 10:07 AM       HbA1c:  Lab Results   Component Value Date/Time    Hemoglobin A1c 7.4 (H) 01/14/2022 10:28 AM    Hemoglobin A1c (POC) 8.4 08/11/2011 10:07 AM    Hemoglobin A1c, External 6.3 04/30/2016 12:00 AM     Last Point of Care HGB A1C  Hemoglobin A1c (POC)   Date Value Ref Range Status   08/11/2011 8.4          CrCl cannot be calculated (Unknown ideal weight. ). Medication reconciliation was completed during the visit. There are no discontinued medications.     Patient verbalized understanding of the information presented and all of the patients questions were answered. Patient advised to call the office with any additional questions or concerns. Notifications of recommendations will be sent to Dr. Erika Conklin MD for review. Patient will return to clinic in 8 week(s) for follow up. Thank you for the consult,  Katia Parker, PharmD, BCACP, [address] [place] [address] in place:  Yes   Recommendation Provided To: Patient/Caregiver: 2 via Virtual Visit   Intervention Detail: Dose Adjustment: 1, reason: Therapy Optimization and Scheduled Appointment   Intervention Accepted By: Patient/Caregiver: 2   Time Spent (min): 20

## 2022-04-05 ENCOUNTER — VIRTUAL VISIT (OUTPATIENT)
Dept: INTERNAL MEDICINE CLINIC | Age: 82
End: 2022-04-05

## 2022-04-05 DIAGNOSIS — E11.40 TYPE 2 DIABETES, CONTROLLED, WITH NEUROPATHY (HCC): Primary | ICD-10-CM

## 2022-04-05 DIAGNOSIS — E11.21 TYPE 2 DIABETES WITH NEPHROPATHY (HCC): ICD-10-CM

## 2022-04-05 RX ORDER — INSULIN GLULISINE 100 [IU]/ML
INJECTION, SOLUTION SUBCUTANEOUS
Qty: 2 PEN | Refills: 0
Start: 2022-04-05 | End: 2022-05-25 | Stop reason: SDUPTHER

## 2022-05-03 RX ORDER — MONTELUKAST SODIUM 10 MG/1
TABLET ORAL
Qty: 90 TABLET | Refills: 3 | Status: SHIPPED | OUTPATIENT
Start: 2022-05-03

## 2022-05-25 RX ORDER — INSULIN GLULISINE 100 [IU]/ML
INJECTION, SOLUTION SUBCUTANEOUS
Qty: 2 PEN | Refills: 0 | Status: SHIPPED | OUTPATIENT
Start: 2022-05-25 | End: 2022-06-01 | Stop reason: CLARIF

## 2022-05-25 NOTE — TELEPHONE ENCOUNTER
Called to confirm 6/1 apt with Adelina. Pt requests a refill prior to this appointment Pt wants rx filled at pharmacy on file.

## 2022-05-25 NOTE — TELEPHONE ENCOUNTER
PCP: Justin Nuñez MD    Last appt: 4/5/2022  Future Appointments   Date Time Provider Santiago Perez   6/1/2022  1:15 PM Armando Frye PHARMD [facility] BS AMB   7/5/2022 10:45 AM Justin Nuñez MD [facility] BS AMB   8/26/2022  3:30 PM Adrianne Lawrence MD [facility] BS AMB   3/23/2023  2:20 PM Demetrius Mayberry MD [facility] AMB       Requested Prescriptions     Pending Prescriptions Disp Refills    insulin glulisine U-100 (Apidra SoloStar U-100 Insulin) 100 unit/mL pen 2 Pen 0     Sig: Inject 10 units under the skin before your largest meal.--Dose change 04/05/22--updated med list--did not send prescription to the pharmacy  Indications: type 2 diabetes mellitus       Prior labs and Blood pressures:  BP Readings from Last 3 Encounters:   03/17/22 131/67   02/22/22 (!) 97/58   02/08/22 114/71     Lab Results   Component Value Date/Time    Sodium 139 01/14/2022 10:28 AM    Potassium 4.9 01/14/2022 10:28 AM    Chloride 102 01/14/2022 10:28 AM    CO2 24 01/14/2022 10:28 AM    Anion gap 6 06/29/2021 12:43 PM    Glucose 130 (H) 01/14/2022 10:28 AM    BUN 23 01/14/2022 10:28 AM    Creatinine 1.03 (H) 01/14/2022 10:28 AM    BUN/Creatinine ratio 22 01/14/2022 10:28 AM    GFR est AA 59 (L) 01/14/2022 10:28 AM    GFR est non-AA 51 (L) 01/14/2022 10:28 AM    Calcium 9.5 01/14/2022 10:28 AM     Lab Results   Component Value Date/Time    Hemoglobin A1c 7.4 (H) 01/14/2022 10:28 AM    Hemoglobin A1c (POC) 8.4 08/11/2011 10:07 AM    Hemoglobin A1c, External 6.3 04/30/2016 12:00 AM     Lab Results   Component Value Date/Time    Cholesterol, total 158 01/14/2022 10:28 AM    Cholesterol (POC) 195 08/11/2011 10:08 AM    HDL Cholesterol 39 (L) 01/14/2022 10:28 AM    HDL Cholesterol (POC) 31 08/11/2011 10:08 AM    LDL Cholesterol (POC) 113 08/11/2011 10:08 AM    LDL, calculated 95 01/14/2022 10:28 AM    LDL, calculated 79.6 06/29/2021 12:43 PM    VLDL, calculated 24 01/14/2022 10:28 AM    VLDL, calculated 46.4 06/29/2021 12:43 PM Triglyceride 132 01/14/2022 10:28 AM    Triglycerides (POC) 256 08/11/2011 10:08 AM    CHOL/HDL Ratio 4.0 06/29/2021 12:43 PM     No results found for: MATTIE Martinez    Lab Results   Component Value Date/Time    TSH 2.50 01/19/2016 12:42 PM

## 2022-05-28 NOTE — PROGRESS NOTES
Pharmacy Progress Note - Diabetes Management    Assessment / Plan:   Diabetes Management:  - Per ADA guidelines, Pt's A1c is at goal of < 7-7.5% (advanced age, fall risk). - Latest BGM - particularly early afternoon trends remain elevated for goal. Reinforce meal time insulin dosage. - Increase Toujeo to 34 units daily  - Continue meal time insulin with - 10 units before largest meal. If pre-meal blood sugar is above 150, give 12 units. - Will send in rx for Humalog to [facility]. - Change timing of metformin 500 mg - two tablets to lunch time. - Call if Pt has trouble filling insulin. S/O: Ms. Alis Mendoza is a 81 y.o. female, referred by Sameer Antunez MD, with a PMH of T2DM + neuropathy/nephropathy, CAD, HTN, HLD, Anemia, Hx breast cancer, DJD , was seen virtually today for diabetes management follow up.  Patient's last A1c was 7.4% (Jan 2022), 7.5% (June 2021), 7% (Dec 2020), 6.8% (June 2020). PHI verified. Interim update:   Reports to feeling much better. Colestipol 1 gm daily is helping. Colonoscopy scheduled for August.      Having trouble filling her Apidra. Ran out of this insulin on Friday. States insurance denied Apidra coverage. No longer on Janumet. Now taking 500 mg ER - two tabs daily     Current anti-hyperglycemic regimen include(s):    - Toujeo 32 units daily ---> may increase 35 units daily if BG > 200.    - Apidra 10 units before largest meal. If pre meal BG is > 150, give 12 units  - Metformin 500 mg ER - two tabs daily     ROS:  Today, Pt endorses:  - Symptoms of Hyperglycemia: none  - Symptoms of Hypoglycemia: none    Blood Glucose Monitoring (BGM) or CGM:  Reports    Date Before Breakfast Before Dinner Before Bedtime   5/18/2022 149 219 145   5/19/2022 114 186 138   5/20/2022 91  130   5/21/2022 116 235 138   5/22/2022 136 155 189   5/23/2022 146 227 142   5/24/2022 144 159 125   5/25/2022 153  237   5/26/2022 139 208 200   5/27/2022 175 133 153   5/28/2022 165 158 171   5/29/2022  251 171   5/30/2022 176 189    5/31/2022 171     6/1/2022 171  216   6/2/2022 165         Nutrition/Lifestyle Modifications:  Denies any changes to nutrition  No recent wt check. The ASCVD Risk score (Malinda Atkinson., et al., 2013) failed to calculate for the following reasons: The 2013 ASCVD risk score is only valid for ages 36 to 78    The patient has a prior MI or stroke diagnosis     Vitals:   Wt Readings from Last 3 Encounters:   03/17/22 169 lb 12.8 oz (77 kg)   02/22/22 172 lb 9.6 oz (78.3 kg)   02/08/22 167 lb (75.8 kg)     BP Readings from Last 3 Encounters:   03/17/22 131/67   02/22/22 (!) 97/58   02/08/22 114/71     Pulse Readings from Last 3 Encounters:   03/17/22 88   02/22/22 89   02/08/22 80       Past Medical History:   Diagnosis Date    Arthritis     Breast cancer ([facility] [address] 2002    s/p lumpectomy and XRT    CAD (coronary artery disease) 02/2016    Chronic kidney disease     stage III    GERD (gastroesophageal reflux disease)     with hiatal hernia    Hypercholesterolemia     Hypertension     Liver disease     Long term current use of anticoagulant therapy     Neuropathy in diabetes (HCC)     DOMINGO (obstructive sleep apnea)     on CPAP     Allergies   Allergen Reactions    Codeine Other (comments)     Jerking sensation    Livalo [Pitavastatin] Diarrhea    Niaspan [Niacin] Myalgia    Statins-Hmg-Coa Reductase Inhibitors Myalgia     Zocor, pravachol, Lipitor, crestor    Welchol [Colesevelam] Other (comments)     Nausea, bloating and malaise    Zetia [Ezetimibe] Myalgia       Current Outpatient Medications   Medication Sig    insulin glulisine U-100 (Apidra SoloStar U-100 Insulin) 100 unit/mL pen Inject 10 units under the skin before your largest meal.--Dose change 04/05/22--updated med list--did not send prescription to the pharmacy  Indications: type 2 diabetes mellitus    montelukast (SINGULAIR) 10 mg tablet TAKE 1 TABLET BY MOUTH EVERY DAY    insulin glargine U-300 conc (Toujeo SoloStar U-300 Insulin) 300 unit/mL (1.5 mL) inpn pen 32 Units by SubCUTAneous route daily.  insulin glargine U-300 conc (Toujeo SoloStar U-300 Insulin) 300 unit/mL (1.5 mL) inpn pen 32 Units by SubCUTAneous route daily. Indications: type 2 diabetes mellitus    metFORMIN ER (GLUCOPHAGE XR) 500 mg tablet Take 2 tabs with dinner--replaces janumet    glucose blood VI test strips (OneTouch Ultra Test) strip Use to check blood sugar three times daily. E11.65    atorvastatin (LIPITOR) 80 mg tablet Take 1 Tablet by mouth daily.  azelastine (ASTELIN) 137 mcg (0.1 %) nasal spray USE 2 SPRAYS IN EACH NOSTRIL TWICE DAILY    losartan (COZAAR) 100 mg tablet TAKE 1 TABLET BY MOUTH DAILY    famotidine (PEPCID) 20 mg tablet TAKE 1 TABLET BY MOUTH EVERY NIGHT    pantoprazole (PROTONIX) 40 mg tablet TAKE 1 TABLET BY MOUTH DAILY    colestipoL (Colestid) 1 gram tablet Take 2 Tabs by mouth daily. (Patient taking differently: Take 1 g by mouth daily.)    ondansetron (ZOFRAN ODT) 4 mg disintegrating tablet Take 1 Tab by mouth every eight (8) hours as needed for Nausea.  Insulin Needles, Disposable, (PEN NEEDLE) 31 gauge x 3/16\" ndle Use as directed once daily    Blood-Glucose Meter monitoring kit One touch ultra mini glucometer--dx 250.00    Lancets misc Check fsbs bid -250.02    nitroglycerin (NITROSTAT) 0.4 mg SL tablet 1 Tab by SubLINGual route every five (5) minutes as needed for Chest Pain.  carvedilol (COREG) 6.25 mg tablet Take  by mouth two (2) times daily (with meals).  fenofibrate (LOFIBRA) 54 mg tablet Take  by mouth daily.  cyanocobalamin (VITAMIN B12) 500 mcg tablet Take 500 mcg by mouth daily.  ranolazine ER (RANEXA) 500 mg SR tablet Take 1 Tab by mouth two (2) times a day.  clopidogrel (PLAVIX) 75 mg tablet Take 1 Tab by mouth daily.  aspirin 81 mg Tab Take 81 mg by mouth daily.  MULTIVITAMINS W-MINERALS/LUT (CENTRUM SILVER PO) Take 1 Tab by mouth daily.      No current facility-administered medications for this visit. Lab Results   Component Value Date/Time    Sodium 139 01/14/2022 10:28 AM    Potassium 4.9 01/14/2022 10:28 AM    Chloride 102 01/14/2022 10:28 AM    CO2 24 01/14/2022 10:28 AM    Anion gap 6 06/29/2021 12:43 PM    Glucose 130 (H) 01/14/2022 10:28 AM    BUN 23 01/14/2022 10:28 AM    Creatinine 1.03 (H) 01/14/2022 10:28 AM    BUN/Creatinine ratio 22 01/14/2022 10:28 AM    GFR est AA 59 (L) 01/14/2022 10:28 AM    GFR est non-AA 51 (L) 01/14/2022 10:28 AM    Calcium 9.5 01/14/2022 10:28 AM    Bilirubin, total 0.3 01/14/2022 10:28 AM    Alk.  phosphatase 63 01/14/2022 10:28 AM    Protein, total 6.8 01/14/2022 10:28 AM    Albumin 4.0 01/14/2022 10:28 AM    Globulin 3.7 06/29/2021 12:43 PM    A-G Ratio 1.4 01/14/2022 10:28 AM    ALT (SGPT) 19 01/14/2022 10:28 AM       Lab Results   Component Value Date/Time    Cholesterol, total 158 01/14/2022 10:28 AM    Cholesterol (POC) 195 08/11/2011 10:08 AM    HDL Cholesterol 39 (L) 01/14/2022 10:28 AM    HDL Cholesterol (POC) 31 08/11/2011 10:08 AM    LDL Cholesterol (POC) 113 08/11/2011 10:08 AM    LDL, calculated 95 01/14/2022 10:28 AM    LDL, calculated 79.6 06/29/2021 12:43 PM    VLDL, calculated 24 01/14/2022 10:28 AM    VLDL, calculated 46.4 06/29/2021 12:43 PM    Triglyceride 132 01/14/2022 10:28 AM    Triglycerides (POC) 256 08/11/2011 10:08 AM    CHOL/HDL Ratio 4.0 06/29/2021 12:43 PM       Lab Results   Component Value Date/Time    WBC 7.3 01/14/2022 10:28 AM    WBC 8.0 05/15/2012 12:00 AM    HGB 11.2 01/14/2022 10:28 AM    Hematocrit (POC) 33 (L) 04/16/2019 05:17 PM    HCT 34.0 01/14/2022 10:28 AM    PLATELET 301 48/35/4598 10:28 AM    MCV 87 01/14/2022 10:28 AM       Lab Results   Component Value Date/Time    Microalbumin/Creat ratio (mg/g creat) 628 (H) 06/29/2021 12:43 PM    Microalbumin,urine random 169.00 06/29/2021 12:43 PM    Microalbumin urine (POC) abnormal 08/11/2011 10:07 AM       HbA1c:  Lab Results Component Value Date/Time    Hemoglobin A1c 7.4 (H) 2022 10:28 AM    Hemoglobin A1c (POC) 8.4 2011 10:07 AM    Hemoglobin A1c, External 6.3 2016 12:00 AM     Last Point of Care HGB A1C  Hemoglobin A1c (POC)   Date Value Ref Range Status   2011 8.4          CrCl cannot be calculated (Unknown ideal weight. ). Medication reconciliation was completed during the visit. Medications Discontinued During This Encounter   Medication Reason    insulin glargine U-300 conc (Toujeo SoloStar U-300 Insulin) 300 unit/mL (1.5 mL) inpn pen DUPLICATE ORDER    insulin glulisine U-100 (Apidra SoloStar U-100 Insulin) 100 unit/mL pen Formulary Change    insulin glargine U-300 conc (Toujeo SoloStar U-300 Insulin) 300 unit/mL (1.5 mL) inpn pen      Orders Placed This Encounter    insulin lispro (HUMALOG) 100 unit/mL kwikpen     Sig: Inject 10 units before largest meal. If pre-meal blood sugar is above 150, give 12 units. Dispense:  15 mL     Refill:  1    insulin glargine U-300 conc (Toujeo SoloStar U-300 Insulin) 300 unit/mL (1.5 mL) inpn pen     Si Units by SubCUTAneous route daily. Indications: type 2 diabetes mellitus     Dispense:  4.5 mL     Refill:  2     Patient verbalized understanding of the information presented and all of the patients questions were answered. Patient advised to call the office with any additional questions or concerns. Notifications of recommendations will be sent to Dr. Dennise Barton MD for review. Will wait for upcoming PCP visit progress to determine f/u interval. Due for A1c check. Thank you for the consult,  Ktaia Schwartz, PharmD, BCACP, [address] [place] [address] in place:  Yes   Recommendation Provided To: Patient/Caregiver: 7 via Virtual Visit   Intervention Detail: Discontinued Rx: 3, reason: Cost/Formulary Change and Duplicate Therapy, Dose Adjustment: 2, reason: Therapy Optimization and New Rx: 2, reason: Needs Additional Therapy and Patient Preference   Intervention Accepted By: Patient/Caregiver: 7   Time Spent (min): 30

## 2022-06-01 ENCOUNTER — VIRTUAL VISIT (OUTPATIENT)
Dept: INTERNAL MEDICINE CLINIC | Age: 82
End: 2022-06-01

## 2022-06-01 DIAGNOSIS — E11.21 TYPE 2 DIABETES WITH NEPHROPATHY (HCC): ICD-10-CM

## 2022-06-01 DIAGNOSIS — E11.40 TYPE 2 DIABETES, CONTROLLED, WITH NEUROPATHY (HCC): Primary | ICD-10-CM

## 2022-06-01 RX ORDER — INSULIN LISPRO 100 [IU]/ML
INJECTION, SOLUTION INTRAVENOUS; SUBCUTANEOUS
Qty: 15 ML | Refills: 1 | Status: SHIPPED | OUTPATIENT
Start: 2022-06-01 | End: 2022-08-26

## 2022-06-01 RX ORDER — INSULIN GLARGINE 300 U/ML
34 INJECTION, SOLUTION SUBCUTANEOUS DAILY
Qty: 4.5 ML | Refills: 2
Start: 2022-06-01 | End: 2022-08-18 | Stop reason: SDUPTHER

## 2022-06-28 ENCOUNTER — TELEPHONE (OUTPATIENT)
Dept: ENDOCRINOLOGY | Age: 82
End: 2022-06-28

## 2022-06-28 NOTE — TELEPHONE ENCOUNTER
6/28/2022  10:51 AM      Pt called and said her foot was swollen 2 weeks ago it was her right foot. Pt saw a podiatrist he gave her medication and cream.Pt stated the swelling has gone down but its still sore. Pt stayed off her foot for a week. Pt wanted to know if there was anything else she need to do because  always check her feet when she come in. Pt wanted to make  aware of what was going on with her feet. #[phone]      Thanks,  Celena Combs

## 2022-06-29 NOTE — TELEPHONE ENCOUNTER
Please let her know that since the swelling was one sided, this is unlikely to be due to her diabetes and if it continues, she should follow up with her PCP, Dr. Suzanna Jeans, or the podiatrist.

## 2022-06-29 NOTE — TELEPHONE ENCOUNTER
Patient notified of message per Dr. Gabbie Mendoza and voiced understanding of what was read to them. Pt confirmed that she has seen her podiatrist last week and has an appt with Dr Naty iVncent this week.

## 2022-07-04 PROBLEM — N18.30 CHRONIC RENAL DISEASE, STAGE III (HCC): Status: ACTIVE | Noted: 2022-07-04

## 2022-07-04 NOTE — PROGRESS NOTES
HISTORY OF PRESENT ILLNESS  Tana Bustos is a [de-identified] y.o. female. HPI   F/u DM-2 with microalbuminuria and neuropathyhtn hld cad s/p MI and stents DOMINGO on cpap fatty liver and medicare wellness-----  Last a1c 7.4 LDL 95  fsbs on lantus 34 units every day and apidra 8 at dinner and metformin-Dr Luis Armando Malloy and and Dr Jessi Mccain  Has fu Dr Luis Armando Malloy next month for DM-2  Had diarrhea that has resolved--stool studies from GI MD were negative--will get colonoscopy next month. On colestipol from GI MD--s/p [place] 2 yrs ago  Sees Dr Deanne Chou for CAD  Saw podiatrist last moth-labs ordered for foot edema -normal uric acid.  Dx with OA of foot  Wants to see RheumMD for OA all over  Wants to get PT for chronic neck pain with limited ROM  C/po increased neuropathy pain in feet at night--took lyrica many years ago which may have helped  Last OV       Last a1c 7.5 LDL 79 Cr 1.33 June 2021  Some OA pain all over--right arm and shoulder and hands, shoulders and hips  Had labs done in November per pt with renal MD  fsbs  >150 per pt     Was referred to endocrine MD  Met with pharm D--apidra 4 units ac dinner was added  Chest pain -none  Dr Deanne Chou   hx ckd 3 and proteinuria-see nephrologist  Last mammogram    Patient Active Problem List    Diagnosis Date Noted    Chronic renal disease, stage III 07/04/2022    History of breast cancer 04/20/2020    Type 2 diabetes with nephropathy ([address] 08/10/2018    Fatty liver 02/23/2018    Iron deficiency anemia 12/01/2017    Osteopenia of left thigh 04/02/2017    Advanced care planning/counseling discussion 12/17/2015    Hiatal hernia 05/15/2012    CAD (coronary artery disease) 05/15/2012    Type 2 diabetes, controlled, with neuropathy ([address] 11/16/2009    Essential hypertension, benign 11/16/2009    Pure hypercholesterolemia 11/16/2009    DJD (degenerative joint disease) 11/16/2009    DOMINGO (obstructive sleep apnea) 11/16/2009     Current Outpatient Medications   Medication Sig Dispense Refill    insulin lispro (HUMALOG) 100 unit/mL kwikpen Inject 10 units before largest meal. If pre-meal blood sugar is above 150, give 12 units. 15 mL 1    insulin glargine U-300 conc (Toujeo SoloStar U-300 Insulin) 300 unit/mL (1.5 mL) inpn pen 34 Units by SubCUTAneous route daily. Indications: type 2 diabetes mellitus 4.5 mL 2    montelukast (SINGULAIR) 10 mg tablet TAKE 1 TABLET BY MOUTH EVERY DAY 90 Tablet 3    metFORMIN ER (GLUCOPHAGE XR) 500 mg tablet Take 2 tabs with dinner--replaces janumet 60 Tablet 11    glucose blood VI test strips (OneTouch Ultra Test) strip Use to check blood sugar three times daily. E11.65 300 Strip 11    atorvastatin (LIPITOR) 80 mg tablet Take 1 Tablet by mouth daily. 90 Tablet 3    azelastine (ASTELIN) 137 mcg (0.1 %) nasal spray USE 2 SPRAYS IN EACH NOSTRIL TWICE DAILY 1 Each 5    losartan (COZAAR) 100 mg tablet TAKE 1 TABLET BY MOUTH DAILY 90 Tablet 3    famotidine (PEPCID) 20 mg tablet TAKE 1 TABLET BY MOUTH EVERY NIGHT 90 Tablet 3    pantoprazole (PROTONIX) 40 mg tablet TAKE 1 TABLET BY MOUTH DAILY 90 Tablet 3    colestipoL (Colestid) 1 gram tablet Take 2 Tabs by mouth daily. (Patient taking differently: Take 1 g by mouth daily.) 60 Tab 5    ondansetron (ZOFRAN ODT) 4 mg disintegrating tablet Take 1 Tab by mouth every eight (8) hours as needed for Nausea. 25 Tab 0    Insulin Needles, Disposable, (PEN NEEDLE) 31 gauge x 3/16\" ndle Use as directed once daily 100 Pen Needle 3    Blood-Glucose Meter monitoring kit One touch ultra mini glucometer--dx 250.00 1 Kit 0    Lancets misc Check fsbs bid -250.02 200 Each 3    nitroglycerin (NITROSTAT) 0.4 mg SL tablet 1 Tab by SubLINGual route every five (5) minutes as needed for Chest Pain. 25 Tab 3    carvedilol (COREG) 6.25 mg tablet Take  by mouth two (2) times daily (with meals).  fenofibrate (LOFIBRA) 54 mg tablet Take  by mouth daily.  cyanocobalamin (VITAMIN B12) 500 mcg tablet Take 500 mcg by mouth daily.       ranolazine ER (RANEXA) 500 mg SR tablet Take 1 Tab by mouth two (2) times a day. 60 Tab 12    clopidogrel (PLAVIX) 75 mg tablet Take 1 Tab by mouth daily. 30 Tab 11    aspirin 81 mg Tab Take 81 mg by mouth daily.  MULTIVITAMINS W-MINERALS/LUT (CENTRUM SILVER PO) Take 1 Tab by mouth daily.        Allergies   Allergen Reactions    Codeine Other (comments)     Jerking sensation    Livalo [Pitavastatin] Diarrhea    Niaspan [Niacin] Myalgia    Statins-Hmg-Coa Reductase Inhibitors Myalgia     Zocor, pravachol, Lipitor, crestor    Welchol [Colesevelam] Other (comments)     Nausea, bloating and malaise    Zetia [Ezetimibe] Myalgia      Lab Results   Component Value Date/Time    WBC 7.3 01/14/2022 10:28 AM    HGB 11.2 01/14/2022 10:28 AM    HCT 34.0 01/14/2022 10:28 AM    Hematocrit (POC) 33 (L) 04/16/2019 05:17 PM    PLATELET 780 51/36/3703 10:28 AM    MCV 87 01/14/2022 10:28 AM     Lab Results   Component Value Date/Time    Hemoglobin A1c 7.4 (H) 01/14/2022 10:28 AM    Hemoglobin A1c 7.5 (H) 06/29/2021 12:43 PM    Hemoglobin A1c 7.0 (H) 12/18/2020 10:38 AM    Hemoglobin A1c, External 6.3 04/30/2016 12:00 AM    Glucose 130 (H) 01/14/2022 10:28 AM    Glucose (POC) 77 10/28/2020 09:45 PM    Microalbumin/Creat ratio (mg/g creat) 628 (H) 06/29/2021 12:43 PM    Microalbumin,urine random 169.00 06/29/2021 12:43 PM    LDL, calculated 95 01/14/2022 10:28 AM    LDL, calculated 79.6 06/29/2021 12:43 PM    Creatinine (POC) 1.0 04/16/2019 05:17 PM    Creatinine 1.03 (H) 01/14/2022 10:28 AM      Lab Results   Component Value Date/Time    Cholesterol, total 158 01/14/2022 10:28 AM    Cholesterol (POC) 195 08/11/2011 10:08 AM    HDL Cholesterol 39 (L) 01/14/2022 10:28 AM    LDL, calculated 95 01/14/2022 10:28 AM    LDL, calculated 79.6 06/29/2021 12:43 PM    LDL Cholesterol (POC) 113 08/11/2011 10:08 AM    LDL-C, External 104 05/02/2016 12:00 AM    Triglyceride 132 01/14/2022 10:28 AM    Triglycerides (POC) 256 08/11/2011 10:08 AM    CHOL/HDL Ratio 4.0 06/29/2021 12:43 PM     Lab Results   Component Value Date/Time    GFR est non-AA 51 (L) 01/14/2022 10:28 AM    GFRNA, POC 54 (L) 04/16/2019 05:17 PM    GFR est AA 59 (L) 01/14/2022 10:28 AM    GFRAA, POC >60 04/16/2019 05:17 PM    Creatinine 1.03 (H) 01/14/2022 10:28 AM    Creatinine (POC) 1.0 04/16/2019 05:17 PM    BUN 23 01/14/2022 10:28 AM    BUN (POC) 18 04/16/2019 05:17 PM    Sodium 139 01/14/2022 10:28 AM    Sodium (POC) 138 04/16/2019 05:17 PM    Potassium 4.9 01/14/2022 10:28 AM    Potassium (POC) 4.2 04/16/2019 05:17 PM    Chloride 102 01/14/2022 10:28 AM    Chloride (POC) 102 04/16/2019 05:17 PM    CO2 24 01/14/2022 10:28 AM    Magnesium 1.5 (L) 01/19/2016 12:42 PM        ROS    Physical Exam  Vitals and nursing note reviewed. Constitutional:       Appearance: Normal appearance. She is well-developed. She is obese. Comments: Appears stated age   Neck:      Comments: Limited neck ROM in all directions  Cardiovascular:      Rate and Rhythm: Normal rate and regular rhythm. Heart sounds: Normal heart sounds. No murmur heard. No friction rub. No gallop. Pulmonary:      Effort: Pulmonary effort is normal. No respiratory distress. Breath sounds: Normal breath sounds. No wheezing. Abdominal:      General: Bowel sounds are normal.      Palpations: Abdomen is soft. Neurological:      Mental Status: She is alert. Comments:      Diabetic foot exam performed by Roc Cesar MD       Measurement  Response Nurse Comment Physician Comment  Monofilament  R - absent sensation with micro filament  L - absent sensation with micro filament    Pulse DP R - 2+ (normal)  L - 2+ (normal)    Pulse TP R - 2+ (normal)  L - 2+ (normal)    Structural deformity R - None  L - None    Skin Integrity / Deformity R - None  L - None       Reviewed by:                 ASSESSMENT and PLAN  Diagnoses and all orders for this visit:    1.  Type 2 diabetes mellitus with microalbuminuria, with long-term current use of insulin (HCC)  -     MICROALBUMIN, UR, RAND W/ MICROALB/CREAT RATIO; Future    2. Hypertension, unspecified type    3. Pure hypercholesterolemia  -     LIPID PANEL; Future  -     TSH 3RD GENERATION; Future    4. Coronary artery disease involving native coronary artery of native heart without angina pectoris  -     LIPID PANEL; Future    5. Stage 3 chronic kidney disease, unspecified whether stage 3a or 3b CKD ([facility]    6. Osteoarthritis of both hands, unspecified osteoarthritis type  -     REFERRAL TO RHEUMATOLOGY    7. Neck pain  -     REFERRAL TO PHYSICAL THERAPY           This is the Subsequent Medicare Annual Wellness Exam, performed 12 months or more after the Initial AWV or the last Subsequent AWV    I have reviewed the patient's medical history in detail and updated the computerized patient record. Assessment/Plan   Education and counseling provided:  Are appropriate based on today's review and evaluation  tdap and shingrix recommended    1. Type 2 diabetes mellitus with microalbuminuria, with long-term current use of insulin (HCC)-fu Dr Kayley Fisher. Ur microalbumin  2. Hypertension, unspecified type-controlled  3. Pure hypercholesterolemiaon statin and colestipol--lipid panel  4. Coronary artery disease involving native coronary artery of native heart without angina pectoris-no angina-fu at [facility]  5. Stage 3 chronic kidney disease, unspecified whether stage 3a or 3b CKD (HCC)-no nsaids  6. Osteoarthritis of both hands, unspecified osteoarthritis type-refer rheum MD  7. Neck OA--refer to PT  8. Diabetic neuropathy--rx for lyrica 50 mg tid for pain -fu podiatrist. Daily foot inspection   9.  Diarrhea-resolved with colestipol but will get colonoscopy next month    Depression Risk Factor Screening     3 most recent PHQ Screens 7/5/2022   Little interest or pleasure in doing things Not at all   Feeling down, depressed, irritable, or hopeless Not at all   Total Score PHQ 2 0 Alcohol & Drug Abuse Risk Screen    Do you average more than 1 drink per night or more than 7 drinks a week:  No    On any one occasion in the past three months have you have had more than 3 drinks containing alcohol:  No          Functional Ability and Level of Safety    Hearing: Hearing is good. Activities of Daily Living: The home contains: grab bars  Patient needs help with:  transportation      Ambulation: with no difficulty     Fall Risk:  Fall Risk Assessment, last 12 mths 7/5/2022   Able to walk? Yes   Fall in past 12 months? 0   Do you feel unsteady? 0   Are you worried about falling 0   Fall with injury?  -      Abuse Screen:  Patient is not abused       Cognitive Screening    Has your family/caregiver stated any concerns about your memory: no     Cognitive Screening: Normal - Verbal Fluency Test    Health Maintenance Due     Health Maintenance Due   Topic Date Due    DTaP/Tdap/Td series (1 - Tdap) Never done    Shingrix Vaccine Age 50> (1 of 2) Never done    Foot Exam Q1  04/11/2020    Medicare Yearly Exam  12/16/2021    MICROALBUMIN Q1  06/29/2022    A1C test (Diabetic or Prediabetic)  07/14/2022       Patient Care Team   Patient Care Team:  Kaylie Ceron MD as PCP - General  Kaylie Ceron MD as PCP - REHABILITATION [facility] Empaneled Provider  Nimesh Ramos MD as Consulting Provider (Endocrinology Physician)  Feliciano Aguirre MD (Neurology)  Rudolph Rodriguez MD (Gastroenterology)  Nickolas Stinson MD (Ophthalmology)  Maximiliano Woodson MD (Neurosurgery)  Lynentte Hager MD as Physician (Sleep Medicine Physician)  Kaylie Ceron MD as Referring Provider (Internal Medicine Physician)  Kelvin Frank MD as Surgeon (General Surgery)  Kaylie Ceron MD as Referring Provider (Internal Medicine Physician)  Subha Mack MD ([facility] Vascular Surgery)  Yo Jones MD (Gastroenterology)    History     Patient Active Problem List   Diagnosis Code    Type 2 diabetes, controlled, with neuropathy ([facility] E11.40    Essential hypertension, benign I10    Pure hypercholesterolemia E78.00    DJD (degenerative joint disease) M19.90    DOMINGO (obstructive sleep apnea) G47.33    Hiatal hernia K44.9    CAD (coronary artery disease) I25.10    Advanced care planning/counseling discussion Z71.89    Osteopenia of left thigh M85.852    Iron deficiency anemia D50.9    Fatty liver K76.0    Type 2 diabetes with nephropathy (HCC) E11.21    History of breast cancer Z85.3    Chronic renal disease, stage III N18.30     Past Medical History:   Diagnosis Date    Arthritis     Breast cancer ([facility] 2002    s/p lumpectomy and XRT    CAD (coronary artery disease) 02/2016    Chronic kidney disease     stage III    GERD (gastroesophageal reflux disease)     with hiatal hernia    Hypercholesterolemia     Hypertension     Liver disease     Long term current use of anticoagulant therapy     Neuropathy in diabetes ([facility]     DOMINGO (obstructive sleep apnea)     on CPAP      Past Surgical History:   Procedure Laterality Date    HX BREAST BIOPSY Left     years ago no scar seen    HX CARPAL TUNNEL RELEASE      b/l    HX CHOLECYSTECTOMY  10/21/2020    Dr. Esequiel Wilkinson HX LUMBAR FUSION  July 2015    L4-L5    HX ORTHOPAEDIC      spinal fusion    MN BREAST SURGERY PROCEDURE UNLISTED  2001    right lumpectomy    MN CARDIAC SURG PROCEDURE UNLIST      cardiac cath; 3 stents placed    VASCULAR SURGERY PROCEDURE UNLIST      right leg vein stripping     Current Outpatient Medications   Medication Sig Dispense Refill    insulin lispro (HUMALOG) 100 unit/mL kwikpen Inject 10 units before largest meal. If pre-meal blood sugar is above 150, give 12 units. 15 mL 1    insulin glargine U-300 conc (Toujeo SoloStar U-300 Insulin) 300 unit/mL (1.5 mL) inpn pen 34 Units by SubCUTAneous route daily.  Indications: type 2 diabetes mellitus 4.5 mL 2    montelukast (SINGULAIR) 10 mg tablet TAKE 1 TABLET BY MOUTH EVERY DAY 90 Tablet 3    metFORMIN ER (GLUCOPHAGE XR) 500 mg tablet Take 2 tabs with dinner--replaces janumet 60 Tablet 11    glucose blood VI test strips (OneTouch Ultra Test) strip Use to check blood sugar three times daily. E11.65 300 Strip 11    atorvastatin (LIPITOR) 80 mg tablet Take 1 Tablet by mouth daily. 90 Tablet 3    azelastine (ASTELIN) 137 mcg (0.1 %) nasal spray USE 2 SPRAYS IN EACH NOSTRIL TWICE DAILY 1 Each 5    losartan (COZAAR) 100 mg tablet TAKE 1 TABLET BY MOUTH DAILY 90 Tablet 3    famotidine (PEPCID) 20 mg tablet TAKE 1 TABLET BY MOUTH EVERY NIGHT 90 Tablet 3    pantoprazole (PROTONIX) 40 mg tablet TAKE 1 TABLET BY MOUTH DAILY 90 Tablet 3    colestipoL (Colestid) 1 gram tablet Take 2 Tabs by mouth daily. (Patient taking differently: Take 1 g by mouth daily.) 60 Tab 5    ondansetron (ZOFRAN ODT) 4 mg disintegrating tablet Take 1 Tab by mouth every eight (8) hours as needed for Nausea. 25 Tab 0    Insulin Needles, Disposable, (PEN NEEDLE) 31 gauge x 3/16\" ndle Use as directed once daily 100 Pen Needle 3    Blood-Glucose Meter monitoring kit One touch ultra mini glucometer--dx 250.00 1 Kit 0    Lancets misc Check fsbs bid -250.02 200 Each 3    nitroglycerin (NITROSTAT) 0.4 mg SL tablet 1 Tab by SubLINGual route every five (5) minutes as needed for Chest Pain. 25 Tab 3    carvedilol (COREG) 6.25 mg tablet Take  by mouth two (2) times daily (with meals).  fenofibrate (LOFIBRA) 54 mg tablet Take  by mouth daily.  cyanocobalamin (VITAMIN B12) 500 mcg tablet Take 500 mcg by mouth daily.  ranolazine ER (RANEXA) 500 mg SR tablet Take 1 Tab by mouth two (2) times a day. 60 Tab 12    clopidogrel (PLAVIX) 75 mg tablet Take 1 Tab by mouth daily. 30 Tab 11    aspirin 81 mg Tab Take 81 mg by mouth daily.  MULTIVITAMINS W-MINERALS/LUT (CENTRUM SILVER PO) Take 1 Tab by mouth daily.        Allergies   Allergen Reactions    Codeine Other (comments)     Jerking sensation    Livalo [Pitavastatin] Diarrhea    Niaspan [Niacin] Myalgia    Statins-Hmg-Coa Reductase Inhibitors Myalgia     Zocor, pravachol, Lipitor, crestor    Welchol [Colesevelam] Other (comments)     Nausea, bloating and malaise    Zetia [Ezetimibe] Myalgia       Family History   Problem Relation Age of Onset    Diabetes Mother     Heart Disease Mother    Abril Vance Stroke Mother     Breast Cancer Mother 79    Diabetes Brother     Heart Disease Brother     Emphysema Father     Diabetes Daughter      Social History     Tobacco Use    Smoking status: Never Smoker    Smokeless tobacco: Never Used   Substance Use Topics    Alcohol use: No     Alcohol/week: 0.0 standard drinks         Marily Flanagan MD

## 2022-07-05 ENCOUNTER — OFFICE VISIT (OUTPATIENT)
Dept: INTERNAL MEDICINE CLINIC | Age: 82
End: 2022-07-05
Payer: MEDICARE

## 2022-07-05 VITALS
SYSTOLIC BLOOD PRESSURE: 120 MMHG | BODY MASS INDEX: 32.79 KG/M2 | OXYGEN SATURATION: 99 % | TEMPERATURE: 97.1 F | WEIGHT: 167 LBS | RESPIRATION RATE: 16 BRPM | HEIGHT: 60 IN | HEART RATE: 90 BPM | DIASTOLIC BLOOD PRESSURE: 70 MMHG

## 2022-07-05 DIAGNOSIS — E11.42 DIABETIC POLYNEUROPATHY ASSOCIATED WITH TYPE 2 DIABETES MELLITUS (HCC): ICD-10-CM

## 2022-07-05 DIAGNOSIS — Z00.00 MEDICARE ANNUAL WELLNESS VISIT, SUBSEQUENT: ICD-10-CM

## 2022-07-05 DIAGNOSIS — Z79.4 TYPE 2 DIABETES MELLITUS WITH MICROALBUMINURIA, WITH LONG-TERM CURRENT USE OF INSULIN (HCC): Primary | ICD-10-CM

## 2022-07-05 DIAGNOSIS — M19.041 OSTEOARTHRITIS OF BOTH HANDS, UNSPECIFIED OSTEOARTHRITIS TYPE: ICD-10-CM

## 2022-07-05 DIAGNOSIS — I25.10 CORONARY ARTERY DISEASE INVOLVING NATIVE CORONARY ARTERY OF NATIVE HEART WITHOUT ANGINA PECTORIS: ICD-10-CM

## 2022-07-05 DIAGNOSIS — E11.29 TYPE 2 DIABETES MELLITUS WITH MICROALBUMINURIA, WITH LONG-TERM CURRENT USE OF INSULIN (HCC): Primary | ICD-10-CM

## 2022-07-05 DIAGNOSIS — N18.30 STAGE 3 CHRONIC KIDNEY DISEASE, UNSPECIFIED WHETHER STAGE 3A OR 3B CKD (HCC): ICD-10-CM

## 2022-07-05 DIAGNOSIS — M54.2 NECK PAIN: ICD-10-CM

## 2022-07-05 DIAGNOSIS — E78.00 PURE HYPERCHOLESTEROLEMIA: ICD-10-CM

## 2022-07-05 DIAGNOSIS — R80.9 TYPE 2 DIABETES MELLITUS WITH MICROALBUMINURIA, WITH LONG-TERM CURRENT USE OF INSULIN (HCC): Primary | ICD-10-CM

## 2022-07-05 DIAGNOSIS — I10 HYPERTENSION, UNSPECIFIED TYPE: ICD-10-CM

## 2022-07-05 DIAGNOSIS — M19.042 OSTEOARTHRITIS OF BOTH HANDS, UNSPECIFIED OSTEOARTHRITIS TYPE: ICD-10-CM

## 2022-07-05 LAB
CHOLEST SERPL-MCNC: 136 MG/DL
CREAT UR-MCNC: 71 MG/DL
HDLC SERPL-MCNC: 46 MG/DL
HDLC SERPL: 3 {RATIO} (ref 0–5)
LDLC SERPL CALC-MCNC: 49.2 MG/DL (ref 0–100)
MICROALBUMIN UR-MCNC: 75.8 MG/DL
MICROALBUMIN/CREAT UR-RTO: 1068 MG/G (ref 0–30)
TRIGL SERPL-MCNC: 204 MG/DL (ref ?–150)
TSH SERPL DL<=0.05 MIU/L-ACNC: 1.27 UIU/ML (ref 0.36–3.74)
VLDLC SERPL CALC-MCNC: 40.8 MG/DL

## 2022-07-05 PROCEDURE — G8427 DOCREV CUR MEDS BY ELIG CLIN: HCPCS | Performed by: INTERNAL MEDICINE

## 2022-07-05 PROCEDURE — G8752 SYS BP LESS 140: HCPCS | Performed by: INTERNAL MEDICINE

## 2022-07-05 PROCEDURE — G0463 HOSPITAL OUTPT CLINIC VISIT: HCPCS | Performed by: INTERNAL MEDICINE

## 2022-07-05 PROCEDURE — 99214 OFFICE O/P EST MOD 30 MIN: CPT | Performed by: INTERNAL MEDICINE

## 2022-07-05 PROCEDURE — G8510 SCR DEP NEG, NO PLAN REQD: HCPCS | Performed by: INTERNAL MEDICINE

## 2022-07-05 PROCEDURE — G8417 CALC BMI ABV UP PARAM F/U: HCPCS | Performed by: INTERNAL MEDICINE

## 2022-07-05 PROCEDURE — 1101F PT FALLS ASSESS-DOCD LE1/YR: CPT | Performed by: INTERNAL MEDICINE

## 2022-07-05 PROCEDURE — G8399 PT W/DXA RESULTS DOCUMENT: HCPCS | Performed by: INTERNAL MEDICINE

## 2022-07-05 PROCEDURE — G8536 NO DOC ELDER MAL SCRN: HCPCS | Performed by: INTERNAL MEDICINE

## 2022-07-05 PROCEDURE — 1090F PRES/ABSN URINE INCON ASSESS: CPT | Performed by: INTERNAL MEDICINE

## 2022-07-05 PROCEDURE — G0439 PPPS, SUBSEQ VISIT: HCPCS | Performed by: INTERNAL MEDICINE

## 2022-07-05 PROCEDURE — G8754 DIAS BP LESS 90: HCPCS | Performed by: INTERNAL MEDICINE

## 2022-07-05 RX ORDER — PREGABALIN 50 MG/1
50 CAPSULE ORAL 3 TIMES DAILY
Qty: 90 CAPSULE | Refills: 5 | Status: SHIPPED | OUTPATIENT
Start: 2022-07-05

## 2022-07-05 NOTE — PROGRESS NOTES
1. \"Have you been to the ER, urgent care clinic since your last visit? Hospitalized since your last visit? \" no    2. \"Have you seen or consulted any other health care providers outside of the [address] [place] [address] since your last visit? \" No     3. For patients aged 39-70: Has the patient had a colonoscopy / FIT/ Cologuard? Scheduled 8/22      If the patient is female:    4. For patients aged 41-77: Has the patient had a mammogram within the past 2 years? Yes, Noted in chart      5. For patients aged 21-65: Has the patient had a pap smear?  No

## 2022-07-05 NOTE — PATIENT INSTRUCTIONS
Office Policies    Phone calls/patient messages:            Please allow up to 24 hours for someone in the office to contact you about your call or message. Be mindful your provider may be out of the office or your message may require further review. We encourage you to use [employer] for your messages as this is a faster, more efficient way to communicate with our office                         Medication Refills:            Prescription medications require 48-72 business hours to process. We encourage you to use [employer] for your refills. For controlled medications: Please allow 72 business hours to process. Certain medications may require you to  a written prescription at our office. NO narcotic/controlled medications will be prescribed after 4pm Monday through Friday or on weekends              Form/Paperwork Completion:            Please note a $25 fee may incur for all paperwork for completed by our providers. We ask that you allow 7-10 business days. Pre-payment is due prior to picking up/faxing the completed form. You may also download your forms to [employer] to have your doctor print off. Medicare Wellness Visit, Female     The best way to live healthy is to have a lifestyle where you eat a well-balanced diet, exercise regularly, limit alcohol use, and quit all forms of tobacco/nicotine, if applicable. Regular preventive services are another way to keep healthy. Preventive services (vaccines, screening tests, monitoring & exams) can help personalize your care plan, which helps you manage your own care. Screening tests can find health problems at the earliest stages, when they are easiest to treat. [facility] follows the current, evidence-based guidelines published by the [employer] (USPSTF) when recommending preventive services for our patients.  Because we follow these guidelines, sometimes recommendations change over time as research supports it. (For example, mammograms used to be recommended annually. Even though Medicare will still pay for an annual mammogram, the newer guidelines recommend a mammogram every two years for women of average risk). Of course, you and your doctor may decide to screen more often for some diseases, based on your risk and your co-morbidities (chronic disease you are already diagnosed with). Preventive services for you include:  - Medicare offers their members a free annual wellness visit, which is time for you and your primary care provider to discuss and plan for your preventive service needs. Take advantage of this benefit every year!  -All adults over the age of 72 should receive the recommended pneumonia vaccines. Current USPSTF guidelines recommend a series of two vaccines for the best pneumonia protection.   -All adults should have a flu vaccine yearly and a tetanus vaccine every 10 years.   -All adults age 48 and older should receive the shingles vaccines (series of two vaccines). -All adults age 38-68 who are overweight should have a diabetes screening test once every three years.   -All adults born between [birth year] and 1964 should be screened once for Hepatitis C.  -Other screening tests and preventive services for persons with diabetes include: an eye exam to screen for diabetic retinopathy, a kidney function test, a foot exam, and stricter control over your cholesterol.   -Cardiovascular screening for adults with routine risk involves an electrocardiogram (ECG) at intervals determined by your doctor.   -Colorectal cancer screenings should be done for adults age 54-65 with no increased risk factors for colorectal cancer. There are a number of acceptable methods of screening for this type of cancer. Each test has its own benefits and drawbacks. Discuss with your doctor what is most appropriate for you during your annual wellness visit.  The different tests include: colonoscopy (considered the best screening method), a fecal occult blood test, a fecal DNA test, and sigmoidoscopy.    -A bone mass density test is recommended when a woman turns 65 to screen for osteoporosis. This test is only recommended one time, as a screening. Some providers will use this same test as a disease monitoring tool if you already have osteoporosis. -Breast cancer screenings are recommended every other year for women of normal risk, age 54-69.  -Cervical cancer screenings for women over age 72 are only recommended with certain risk factors.      Here is a list of your current Health Maintenance items (your personalized list of preventive services) with a due date:  Health Maintenance Due   Topic Date Due    DTaP/Tdap/Td  (1 - Tdap) Never done    Shingles Vaccine (1 of 2) Never done    Diabetic Foot Care  04/11/2020    Albumin Urine Test  06/29/2022    Hemoglobin A1C    07/14/2022 Pfizer

## 2022-07-06 DIAGNOSIS — R80.9 MICROALBUMINURIA: ICD-10-CM

## 2022-07-06 DIAGNOSIS — N18.2 CKD (CHRONIC KIDNEY DISEASE) STAGE 2, GFR 60-89 ML/MIN: Primary | ICD-10-CM

## 2022-07-06 DIAGNOSIS — R80.9 MICROALBUMINURIA: Primary | ICD-10-CM

## 2022-07-06 NOTE — PROGRESS NOTES
Tell pt normal thyroid level and cholesterol levels are controlled--LDL at goal, only mild TG elevation--low fat diet to lower TG>    Has moderate protein in urine now--kindey\"strain\" from DM-2 and htn.  Increased risk for developing progressive kidney dz----refer to Dr Janell Bentley or associate for diabetic kidney dx with proteinuria

## 2022-07-06 NOTE — PROGRESS NOTES
2 identifiers used name and : Patient was informed of recent labs and Dr. Oz Peter recommendations. Patient verbalized understanding.

## 2022-07-15 ENCOUNTER — TELEPHONE (OUTPATIENT)
Dept: INTERNAL MEDICINE CLINIC | Age: 82
End: 2022-07-15

## 2022-07-15 DIAGNOSIS — E11.40 TYPE 2 DIABETES, CONTROLLED, WITH NEUROPATHY (HCC): Primary | ICD-10-CM

## 2022-07-15 DIAGNOSIS — E11.21 TYPE 2 DIABETES WITH NEPHROPATHY (HCC): Primary | ICD-10-CM

## 2022-07-15 NOTE — TELEPHONE ENCOUNTER
Called and spoke with patient, she is overdue for a Hgb A1C. Patient would like this ordered and sent to the [facility] in [facility] so she can have it done there. She is asking to go there on Monday and have this done. She is also requesting to have a referral (just needs a referral requisition) faxed over to Dr. Shawn Hanna office so they will schedule her an appointment.

## 2022-07-18 ENCOUNTER — TELEPHONE (OUTPATIENT)
Dept: INTERNAL MEDICINE CLINIC | Age: 82
End: 2022-07-18

## 2022-07-18 NOTE — TELEPHONE ENCOUNTER
I have attempted without success to contact this patient by phone. Unable to reach patient at this time. No personal VM, patient's VM box is full and unable to receive messages at this time.

## 2022-07-19 LAB
EST. AVERAGE GLUCOSE BLD GHB EST-MCNC: 180 MG/DL
HBA1C MFR BLD: 7.9 % (ref 4.8–5.6)

## 2022-07-19 NOTE — TELEPHONE ENCOUNTER
Tell pt a1c is 7.9 avg bs 180--slightly above goal. Has appt with Dr Aundrea Coello next month. Needs to work on diet more. Check fsbs bid ac.  She can contact his office if needed prior to visit for any insulin adjustments  Also ask why pt needs to see Dr Margarito Webber rheum and fax the referral please

## 2022-07-25 NOTE — PROGRESS NOTES
Called patient. ID verified with Name and . Spoke with patient in regards to recent lab results. Writer informed patient, per provider, \"Tell pt a1c is 7.9 avg bs 180--slightly above goal. Has appt with Dr Levi Morales next month. Needs to work on diet more. Check fsbs bid ac. She can contact hisoffice if needed prior to visit for any insulin adjustments  Also ask why pt needs to see Dr David Shanks rheum and fax the referral please\"    Patient states that, per last office visit, she informed PCP that she wanted to see someone in regards to arthritis that she has been experiencing. Patient states that she spoke with someone last week in regards to another provider, as Dr. Ca Granegr was not accepting new patients. Writer informed patient that there was no documentation in regards to new provider information at this time. Patient states that she would call office back with information so that referral could be placed.

## 2022-07-27 DIAGNOSIS — M19.042 OSTEOARTHRITIS OF BOTH HANDS, UNSPECIFIED OSTEOARTHRITIS TYPE: Primary | ICD-10-CM

## 2022-07-27 DIAGNOSIS — M19.041 OSTEOARTHRITIS OF BOTH HANDS, UNSPECIFIED OSTEOARTHRITIS TYPE: Primary | ICD-10-CM

## 2022-08-18 DIAGNOSIS — K21.9 GASTROESOPHAGEAL REFLUX DISEASE WITHOUT ESOPHAGITIS: ICD-10-CM

## 2022-08-18 RX ORDER — FAMOTIDINE 20 MG/1
20 TABLET, FILM COATED ORAL
Qty: 90 TABLET | Refills: 3 | Status: SHIPPED | OUTPATIENT
Start: 2022-08-18

## 2022-08-18 RX ORDER — INSULIN GLARGINE 300 U/ML
34 INJECTION, SOLUTION SUBCUTANEOUS DAILY
Qty: 4.5 ML | Refills: 2 | Status: SHIPPED | OUTPATIENT
Start: 2022-08-18

## 2022-08-18 NOTE — TELEPHONE ENCOUNTER
Future Appointments:  Future Appointments   Date Time Provider Santiago Perez   2022  3:30 PM Nimesh Ramos MD [facility] BS AMB   2023 11:00 AM Kaylie Ceron MD [facility] BS AMB   3/23/2023  2:20 PM Lynnette Hager MD [facility] BS AMB        Last Appointment With Me:  2022     Requested Prescriptions     Pending Prescriptions Disp Refills    insulin glargine U-300 conc (Toujeo SoloStar U-300 Insulin) 300 unit/mL (1.5 mL) inpn pen 4.5 mL 2     Si Units by SubCUTAneous route daily.  Indications: type 2 diabetes mellitus

## 2022-08-18 NOTE — TELEPHONE ENCOUNTER
Future Appointments:  Future Appointments   Date Time Provider Santiago Perez   8/26/2022  3:30 PM Julio Cesar Rivera MD [facility] BS AMB   1/6/2023 11:00 AM Destiny Esteban MD [facility] BS AMB   3/23/2023  2:20 PM Edward Cohn MD [facility] BS AMB        Last Appointment With Me:  7/5/2022     Requested Prescriptions     Pending Prescriptions Disp Refills    famotidine (PEPCID) 20 mg tablet 90 Tablet 3     Sig: Take 1 Tablet by mouth nightly.

## 2022-08-23 DIAGNOSIS — K21.9 GASTROESOPHAGEAL REFLUX DISEASE WITHOUT ESOPHAGITIS: ICD-10-CM

## 2022-08-23 RX ORDER — PANTOPRAZOLE SODIUM 40 MG/1
40 TABLET, DELAYED RELEASE ORAL DAILY
Qty: 90 TABLET | Refills: 3 | Status: SHIPPED | OUTPATIENT
Start: 2022-08-23

## 2022-08-26 ENCOUNTER — OFFICE VISIT (OUTPATIENT)
Dept: ENDOCRINOLOGY | Age: 82
End: 2022-08-26
Payer: MEDICARE

## 2022-08-26 VITALS
WEIGHT: 175.4 LBS | HEIGHT: 60 IN | BODY MASS INDEX: 34.44 KG/M2 | SYSTOLIC BLOOD PRESSURE: 132 MMHG | DIASTOLIC BLOOD PRESSURE: 66 MMHG | HEART RATE: 81 BPM

## 2022-08-26 DIAGNOSIS — E78.00 PURE HYPERCHOLESTEROLEMIA: ICD-10-CM

## 2022-08-26 DIAGNOSIS — E11.21 TYPE 2 DIABETES WITH NEPHROPATHY (HCC): Primary | ICD-10-CM

## 2022-08-26 DIAGNOSIS — I10 ESSENTIAL HYPERTENSION, BENIGN: ICD-10-CM

## 2022-08-26 PROCEDURE — G8427 DOCREV CUR MEDS BY ELIG CLIN: HCPCS | Performed by: INTERNAL MEDICINE

## 2022-08-26 PROCEDURE — 1123F ACP DISCUSS/DSCN MKR DOCD: CPT | Performed by: INTERNAL MEDICINE

## 2022-08-26 PROCEDURE — G8536 NO DOC ELDER MAL SCRN: HCPCS | Performed by: INTERNAL MEDICINE

## 2022-08-26 PROCEDURE — 3051F HG A1C>EQUAL 7.0%<8.0%: CPT | Performed by: INTERNAL MEDICINE

## 2022-08-26 PROCEDURE — 1101F PT FALLS ASSESS-DOCD LE1/YR: CPT | Performed by: INTERNAL MEDICINE

## 2022-08-26 PROCEDURE — G8752 SYS BP LESS 140: HCPCS | Performed by: INTERNAL MEDICINE

## 2022-08-26 PROCEDURE — G8754 DIAS BP LESS 90: HCPCS | Performed by: INTERNAL MEDICINE

## 2022-08-26 PROCEDURE — G8417 CALC BMI ABV UP PARAM F/U: HCPCS | Performed by: INTERNAL MEDICINE

## 2022-08-26 PROCEDURE — G8432 DEP SCR NOT DOC, RNG: HCPCS | Performed by: INTERNAL MEDICINE

## 2022-08-26 PROCEDURE — G8399 PT W/DXA RESULTS DOCUMENT: HCPCS | Performed by: INTERNAL MEDICINE

## 2022-08-26 PROCEDURE — 99214 OFFICE O/P EST MOD 30 MIN: CPT | Performed by: INTERNAL MEDICINE

## 2022-08-26 PROCEDURE — 1090F PRES/ABSN URINE INCON ASSESS: CPT | Performed by: INTERNAL MEDICINE

## 2022-08-26 RX ORDER — INSULIN LISPRO 100 [IU]/ML
INJECTION, SOLUTION INTRAVENOUS; SUBCUTANEOUS
Qty: 15 ML | Refills: 1
Start: 2022-08-26

## 2022-08-26 NOTE — PATIENT INSTRUCTIONS
1) Continue taking toujeo 34 units at bedtime and metformin 2 tabs with lunch. 2) Check your sugar before breakfast and dinner and take a dose of humalog 5-10 minutes BEFORE you eat based on the scale below:  Blood sugar  Insulin dose          Less than 90  Eat first, 6 units    100-150  8 units    151-200  10 units      201-250  12 units     251-300  14 units      301-350  16 units      351-400  18 units  401-450  20 units  451-500  22 units  Over 500  24 units    If you eat a lunch meal, you don't need to check your sugar but I would take 8 units of humalog before lunch to cover the carbs.

## 2022-08-26 NOTE — PROGRESS NOTES
Chief Complaint   Patient presents with    Diabetes     PCP and pharmacy confirmed      History of Present Illness: Nancy Samson is a [de-identified] y.o. female here for follow up of diabetes. Weight up 3 lbs since last visit in 2/22. She has still been working with [place] and her lantus was changed to toujeo in 3/22 and currently is taking 34 units at bedtime. Her apidra was changed to humalog and is taking 10 units after dinner. Her metformin ER is taken at lunch and takes 2 tabs together. Saw Dr. Geoff Huffman NP and her diarrhea has been better on the colestid 1 tab daily. She has been checking 3 times daily fasting, before dinner and bedtime. Fasting sugars are usually in the 150-170 range and before dinner can be about the same but can be over 200 if she eats a lunch meal and bedtime can be 150-225. Compliant with BP and lipid regimen. Current Outpatient Medications   Medication Sig    pantoprazole (PROTONIX) 40 mg tablet Take 1 Tablet by mouth daily. famotidine (PEPCID) 20 mg tablet Take 1 Tablet by mouth nightly. insulin glargine U-300 conc (Toujeo SoloStar U-300 Insulin) 300 unit/mL (1.5 mL) inpn pen 34 Units by SubCUTAneous route daily. Indications: type 2 diabetes mellitus    pregabalin (LYRICA) 50 mg capsule Take 1 Capsule by mouth three (3) times daily. Max Daily Amount: 150 mg.    insulin lispro (HUMALOG) 100 unit/mL kwikpen Inject 10 units before largest meal. If pre-meal blood sugar is above 150, give 12 units. montelukast (SINGULAIR) 10 mg tablet TAKE 1 TABLET BY MOUTH EVERY DAY    metFORMIN ER (GLUCOPHAGE XR) 500 mg tablet Take 2 tabs with dinner--replaces janumet    glucose blood VI test strips (OneTouch Ultra Test) strip Use to check blood sugar three times daily. E11.65    atorvastatin (LIPITOR) 80 mg tablet Take 1 Tablet by mouth daily.     azelastine (ASTELIN) 137 mcg (0.1 %) nasal spray USE 2 SPRAYS IN EACH NOSTRIL TWICE DAILY    losartan (COZAAR) 100 mg tablet TAKE 1 TABLET BY MOUTH DAILY colestipoL (Colestid) 1 gram tablet Take 2 Tabs by mouth daily. (Patient taking differently: Take 1 g by mouth daily.)    ondansetron (ZOFRAN ODT) 4 mg disintegrating tablet Take 1 Tab by mouth every eight (8) hours as needed for Nausea. Insulin Needles, Disposable, (PEN NEEDLE) 31 gauge x 3/16\" ndle Use as directed once daily    Blood-Glucose Meter monitoring kit One touch ultra mini glucometer--dx 250.00    Lancets misc Check fsbs bid -250.02    nitroglycerin (NITROSTAT) 0.4 mg SL tablet 1 Tab by SubLINGual route every five (5) minutes as needed for Chest Pain. carvediloL (COREG) 6.25 mg tablet Take  by mouth two (2) times daily (with meals). fenofibrate (LOFIBRA) 54 mg tablet Take  by mouth daily. cyanocobalamin (VITAMIN B12) 500 mcg tablet Take 500 mcg by mouth daily. ranolazine ER (RANEXA) 500 mg SR tablet Take 1 Tab by mouth two (2) times a day. clopidogrel (PLAVIX) 75 mg tablet Take 1 Tab by mouth daily. aspirin 81 mg Tab Take 81 mg by mouth daily. MULTIVITAMINS W-MINERALS/LUT (CENTRUM SILVER PO) Take 1 Tab by mouth daily. No current facility-administered medications for this visit. Allergies   Allergen Reactions    Codeine Other (comments)     Jerking sensation    Livalo [Pitavastatin] Diarrhea    Niaspan [Niacin] Myalgia    Statins-Hmg-Coa Reductase Inhibitors Myalgia     Zocor, pravachol, Lipitor, crestor    Welchol [Colesevelam] Other (comments)     Nausea, bloating and malaise    Zetia [Ezetimibe] Myalgia     Review of Systems: PER HPI    Physical Examination:  Blood pressure 132/66, pulse 81, height 5' (1.524 m), weight 175 lb 6.4 oz (79.6 kg).   General: pleasant, no distress, good eye contact   Neck: no carotid bruits  Cardiovascular: regular, normal rate, nl s1 and s2, no m/r/g,   Respiratory: clear bilaterally  Integumentary: no edema,   Psychiatric: normal mood and affect    Data Reviewed:   Component      Latest Ref Rng & Units 7/18/2022 7/5/2022 7/5/2022 7/5/2022 12:14 PM 11:42 AM 11:42 AM 11:42 AM   Cholesterol, total      <200 MG/DL   136    Triglyceride      <150 MG/DL   204 (H)    HDL Cholesterol      MG/DL   46    LDL, calculated      0 - 100 MG/DL   49.2    VLDL, calculated      MG/DL   40.8    CHOL/HDL Ratio      0.0 - 5.0     3.0    Microalbumin,urine random      MG/DL  75.80     Creatinine, urine      mg/dL  71.00     Microalbumin/Creat. Ratio      0 - 30 mg/g  1,068 (H)     Hemoglobin A1c, (calculated)      4.8 - 5.6 % 7.9 (H)      Estimated average glucose      mg/dL 180      TSH      0.36 - 3.74 uIU/mL    1.27       Assessment/Plan:     1. 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  Her A1c is at goal of 7.5-8% or less given her age and CAD so she is at goal but will benefit from proactively taking humalog before she eats to prevent her sugars from going over 200 so gave her a scale to follow as below. - cont toujeo 34 units at bedtime   - take humalog 8 units before meals + 2 units for every 50 mg/dl above 150 mg/dl  - cont metformin  mg 2 tabs with lunch  - check bs 3 times daily  - foot exam done 7/22 by PCP  - optho UTD 1/21  - microalbumin nl 12/11, up to 35 in 2/13 and 39 in 5/13 and 48 in 3/14 and 150 in 10/14 (but just had a steroid injection 4 days before) and down to 126 in 4/15 (but had a steroid injection the week before), down to 84 in 4/16, up to 628 in 6/21       2.  Essential hypertension, benign (401.1) her BP was at goal < 140/90  - cont current regimen       3. Pure hypercholesterolemia (272.0) Given DM and CAD, Goal LDL < 70, non-HDL < 100, and TG < 150.  and non- in 5/13 on zetia alone. Couldn't tolerate welchol or livalo.  and non- in 9/13. Stopped zetia in 9/13 due to myalgias and  and non- in 3/14 and 127 and 189 in 9/14 and 178 in 4/15 and 155 in 10/15. Put on prava 10 after MI in 1/16 and down to 103 in 4/16 and Dr. Willard Later increased this to 40 mg daily but then changed her to lipitor 40 mg and added lofibra 54 mg and LDL 68 in 7/16. LDL 95 in 1/22 so lipitor increased to 80 mg daily and down to 49 in 8/22.  - cont atorvastatin 80 mg daily  - cont lofibra 54 mg daily            Patient Instructions   1) Continue taking toujeo 34 units at bedtime and metformin 2 tabs with lunch. 2) Check your sugar before breakfast and dinner and take a dose of humalog 5-10 minutes BEFORE you eat based on the scale below:  Blood sugar  Insulin dose          Less than 90  Eat first, 6 units    100-150  8 units    151-200  10 units      201-250  12 units     251-300  14 units      301-350  16 units      351-400  18 units  401-450  20 units  451-500  22 units  Over 500  24 units    If you eat a lunch meal, you don't need to check your sugar but I would take 8 units of humalog before lunch to cover the carbs. Follow-up and Dispositions    Return in about 6 months (around 2/26/2023).                Copy sent to:  Dominga Amador MD as Referring Provider (Internal Medicine)  Toni Calixto MD ([address] [place] [address] Vascular Surgery)  Albina Gorman MD (Gastroenterology)

## 2022-10-11 ENCOUNTER — TRANSCRIBE ORDER (OUTPATIENT)
Dept: REGISTRATION | Age: 82
End: 2022-10-11

## 2022-10-11 ENCOUNTER — HOSPITAL ENCOUNTER (OUTPATIENT)
Dept: GENERAL RADIOLOGY | Age: 82
Discharge: HOME | End: 2022-10-11
Payer: MEDICARE

## 2022-10-11 DIAGNOSIS — M79.18 DIFFUSE MYOFASCIAL PAIN SYNDROME: ICD-10-CM

## 2022-10-11 DIAGNOSIS — M15.0 PRIMARY GENERALIZED HYPERTROPHIC OSTEOARTHROSIS: ICD-10-CM

## 2022-10-11 DIAGNOSIS — M15.0 PRIMARY GENERALIZED HYPERTROPHIC OSTEOARTHROSIS: Primary | ICD-10-CM

## 2022-10-11 PROCEDURE — 73130 X-RAY EXAM OF HAND: CPT

## 2022-11-09 ENCOUNTER — OFFICE VISIT (OUTPATIENT)
Dept: ORTHOPEDIC SURGERY | Age: 82
End: 2022-11-09
Payer: MEDICARE

## 2022-11-09 VITALS — HEIGHT: 63 IN | WEIGHT: 170 LBS | BODY MASS INDEX: 30.12 KG/M2

## 2022-11-09 DIAGNOSIS — M75.111 INCOMPLETE TEAR OF RIGHT ROTATOR CUFF, UNSPECIFIED WHETHER TRAUMATIC: ICD-10-CM

## 2022-11-09 DIAGNOSIS — M25.511 ACUTE PAIN OF RIGHT SHOULDER: Primary | ICD-10-CM

## 2022-11-09 PROCEDURE — 20610 DRAIN/INJ JOINT/BURSA W/O US: CPT | Performed by: ORTHOPAEDIC SURGERY

## 2022-11-09 PROCEDURE — 1123F ACP DISCUSS/DSCN MKR DOCD: CPT | Performed by: ORTHOPAEDIC SURGERY

## 2022-11-09 PROCEDURE — 99203 OFFICE O/P NEW LOW 30 MIN: CPT | Performed by: ORTHOPAEDIC SURGERY

## 2022-11-09 RX ORDER — AMLODIPINE BESYLATE 2.5 MG/1
2.5 TABLET ORAL DAILY
COMMUNITY
Start: 2022-11-01

## 2022-11-09 RX ORDER — BUPIVACAINE HYDROCHLORIDE 5 MG/ML
3 INJECTION, SOLUTION EPIDURAL; INTRACAUDAL ONCE
Status: COMPLETED | OUTPATIENT
Start: 2022-11-09 | End: 2022-11-09

## 2022-11-09 RX ORDER — BETAMETHASONE SODIUM PHOSPHATE AND BETAMETHASONE ACETATE 3; 3 MG/ML; MG/ML
12 INJECTION, SUSPENSION INTRA-ARTICULAR; INTRALESIONAL; INTRAMUSCULAR; SOFT TISSUE ONCE
Status: COMPLETED | OUTPATIENT
Start: 2022-11-09 | End: 2022-11-09

## 2022-11-09 RX ORDER — DEXAMETHASONE 4 MG/1
TABLET ORAL
COMMUNITY
Start: 2022-11-02

## 2022-11-09 RX ORDER — DOXYCYCLINE 100 MG/1
CAPSULE ORAL
COMMUNITY
Start: 2022-11-02

## 2022-11-09 RX ADMIN — BETAMETHASONE SODIUM PHOSPHATE AND BETAMETHASONE ACETATE 12 MG: 3; 3 INJECTION, SUSPENSION INTRA-ARTICULAR; INTRALESIONAL; INTRAMUSCULAR; SOFT TISSUE at 12:27

## 2022-11-09 RX ADMIN — BUPIVACAINE HYDROCHLORIDE 15 MG: 5 INJECTION, SOLUTION EPIDURAL; INTRACAUDAL at 12:27

## 2022-11-09 NOTE — PROGRESS NOTES
Mary Solis (: 1940) is a [de-identified] y.o. female patient here for evaluation of the following chief complaint(s):  Shoulder Pain (Right shoulder)       ASSESSMENT/PLAN:  Below is the assessment and plan developed based on review of pertinent history, physical exam, labs, studies, and medications. 1. Acute pain of right shoulder  -     XR SHOULDER RT AP/LAT MIN 2 V; Future  -     REFERRAL TO PHYSICAL THERAPY  2. Incomplete tear of right rotator cuff, unspecified whether traumatic  -     REFERRAL TO PHYSICAL THERAPY  -     VA DRAIN/INJECT LARGE JOINT/BURSA  -     betamethasone (CELESTONE) injection 12 mg; 12 mg, IntraBURSal, ONCE, 1 dose, On 22 at 1300  -     bupivacaine (PF) (MARCAINE) 0.5 % (5 mg/mL) injection 15 mg; 15 mg (3 mL), Other, ONCE, 1 dose, On 22 at 56      28-year-old female with many medical comorbidities with likely right rotator cuff tear. We discussed treatment options and both she and her daughter wanted to proceed with conservative management which I think is reasonable considering the medical comorbidities. She wanted to proceed with steroid injection and referral to physical therapy. She can follow-up with me as needed could consider repeat injection if needed. We discussed no MRI since it would not alter our management at this time. Patient verbalized understanding and elected to proceed. All questions were answered to the patient's apparent satisfaction. SUBJECTIVE/OBJECTIVE:  HPI    28-year-old female with right shoulder pain. This began 2 days ago when she felt a pop in the shoulder and had severe pain. Has not noticed any bruising. She has difficulty with moving the arm at this time. Patient reports a sudden onset of symptoms. Duration of problem 2 days 2022.   Symptom Severity 8/10  Symptom Frequency constant        Allergies   Allergen Reactions    Codeine Other (comments)     Jerking sensation    Livalo [Pitavastatin] Diarrhea    Niaspan [Niacin] Myalgia    Statins-Hmg-Coa Reductase Inhibitors Myalgia     Zocor, pravachol, Lipitor, crestor    Welchol [Colesevelam] Other (comments)     Nausea, bloating and malaise    Zetia [Ezetimibe] Myalgia       Current Outpatient Medications   Medication Sig    amLODIPine (NORVASC) 2.5 mg tablet Take 2.5 mg by mouth daily. dexAMETHasone (DECADRON) 4 mg tablet TAKE 1 TABLET BY MOUTH EVERY DAY FOR 3 DAYS    doxycycline (MONODOX) 100 mg capsule TAKE 1 CAPSULE BY MOUTH TWICE DAILY X 10 DAYS WITH LARGE GLASS OF WATER AND DO NOT LIE DOWN FOR 30 MINUTES AFTER TAKING    insulin lispro (HUMALOG) 100 unit/mL kwikpen Inject 8 units before meals + 2 units for every 50 mg/dl above 150 mg/dl--Dose change 08/26/22--updated med list--did not send prescription to the pharmacy    pantoprazole (PROTONIX) 40 mg tablet Take 1 Tablet by mouth daily. famotidine (PEPCID) 20 mg tablet Take 1 Tablet by mouth nightly. insulin glargine U-300 conc (Toujeo SoloStar U-300 Insulin) 300 unit/mL (1.5 mL) inpn pen 34 Units by SubCUTAneous route daily. Indications: type 2 diabetes mellitus    pregabalin (LYRICA) 50 mg capsule Take 1 Capsule by mouth three (3) times daily. Max Daily Amount: 150 mg.    montelukast (SINGULAIR) 10 mg tablet TAKE 1 TABLET BY MOUTH EVERY DAY    metFORMIN ER (GLUCOPHAGE XR) 500 mg tablet Take 2 tabs with dinner--replaces janumet    glucose blood VI test strips (OneTouch Ultra Test) strip Use to check blood sugar three times daily. E11.65    atorvastatin (LIPITOR) 80 mg tablet Take 1 Tablet by mouth daily. azelastine (ASTELIN) 137 mcg (0.1 %) nasal spray USE 2 SPRAYS IN EACH NOSTRIL TWICE DAILY    losartan (COZAAR) 100 mg tablet TAKE 1 TABLET BY MOUTH DAILY    ondansetron (ZOFRAN ODT) 4 mg disintegrating tablet Take 1 Tab by mouth every eight (8) hours as needed for Nausea.     Insulin Needles, Disposable, (PEN NEEDLE) 31 gauge x 3/16\" ndle Use as directed once daily    Blood-Glucose Meter monitoring kit One touch ultra mini glucometer--dx 250.00    Lancets misc Check fsbs bid -250.02    nitroglycerin (NITROSTAT) 0.4 mg SL tablet 1 Tab by SubLINGual route every five (5) minutes as needed for Chest Pain. carvediloL (COREG) 6.25 mg tablet Take  by mouth two (2) times daily (with meals). fenofibrate (LOFIBRA) 54 mg tablet Take  by mouth daily. cyanocobalamin (VITAMIN B12) 500 mcg tablet Take 500 mcg by mouth daily. ranolazine ER (RANEXA) 500 mg SR tablet Take 1 Tab by mouth two (2) times a day. clopidogrel (PLAVIX) 75 mg tablet Take 1 Tab by mouth daily. aspirin 81 mg Tab Take 81 mg by mouth daily. MULTIVITAMINS W-MINERALS/LUT (CENTRUM SILVER PO) Take 1 Tab by mouth daily. colestipoL (Colestid) 1 gram tablet Take 2 Tabs by mouth daily. (Patient taking differently: Take 1 g by mouth daily.)     Current Facility-Administered Medications   Medication    betamethasone (CELESTONE) injection 12 mg    bupivacaine (PF) (MARCAINE) 0.5 % (5 mg/mL) injection 15 mg       Social History     Socioeconomic History    Marital status: SINGLE     Spouse name: Not on file    Number of children: Not on file    Years of education: Not on file    Highest education level: Not on file   Occupational History    Not on file   Tobacco Use    Smoking status: Never    Smokeless tobacco: Never   Vaping Use    Vaping Use: Never used   Substance and Sexual Activity    Alcohol use: No     Alcohol/week: 0.0 standard drinks    Drug use: Yes     Types: Prescription, OTC    Sexual activity: Never   Other Topics Concern    Not on file   Social History Narrative    Lives in [place] with son. . 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 Is busy with [employer] and singing in the choir.      Social Determinants of Health     Financial Resource Strain: Low Risk     Difficulty of Paying Living Expenses: Not hard at all   Food Insecurity: No Food Insecurity    Worried About Running Out of Food in the Last Year: Never true    Ran Out of Food in the Last Year: Never true   Transportation Needs: Not on file   Physical Activity: Not on file   Stress: Not on file   Social Connections: Not on file   Intimate Partner Violence: Not At Risk    Fear of Current or Ex-Partner: No    Emotionally Abused: No    Physically Abused: No    Sexually Abused: No   Housing Stability: Not on file       Past Surgical History:   Procedure Laterality Date    HX BREAST BIOPSY Left     years ago no scar seen    HX CARPAL TUNNEL RELEASE      b/l    HX CHOLECYSTECTOMY  10/21/2020    Dr. Tessa Jefferson  July 2015    L4-L5    HX ORTHOPAEDIC      spinal fusion    LA BREAST SURGERY PROCEDURE UNLISTED  2001    right lumpectomy    LA CARDIAC SURG PROCEDURE UNLIST      cardiac cath; 3 stents placed    VASCULAR SURGERY PROCEDURE UNLIST      right leg vein stripping       Family History   Problem Relation Age of Onset    Diabetes Mother     Heart Disease Mother     Stroke Mother     Breast Cancer Mother 79    Diabetes Brother     Heart Disease Brother     Emphysema Father     Diabetes Daughter             Review of Systems    No flowsheet data found. Vitals:  Ht 5' 3\" (1.6 m)   Wt 170 lb (77.1 kg)   BMI 30.11 kg/m²    Estimated body surface area is 1.85 meters squared as calculated from the following:    Height as of this encounter: 5' 3\" (1.6 m). Weight as of this encounter: 170 lb (77.1 kg). Body mass index is 30.11 kg/m².        Physical Exam    Musculoskeletal Exam:    Right SHOULDER EXAM    ROM:    - FF  150 degrees   - ABD  140 degrees, pain at 90   - ER 80   - IR L1    Strength:    - Supraspinatus 4/5   - Infraspinatus 4/5   - Subscapularis 4/5    Drop arm: Positive    Delia's Test: Positive    Weber Test: Positive    Neer Test: Positive    AC Joint TTP: Negative    Cross Body Adduction Test: Negative    Speeds Test: Negative    Yergason Test: Negative    Bicipital Groove TTP: Negative      Patient fires AIN, PIN and ulnar nerves. Sensation is grossly intact in the median, radial and ulnar distribution. Hand is pink and appears well-perfused. Hand is warm. Skin is intact. Compartments are soft and compressible. Consitutional: Healthy  Skin:   - Edema - none  - Cellulitis - No    Neuro: Numbness or tingling in R/L arm: No    Psych: Affect normal    Cardiovascular: Capillary Refill < 2 seconds in upper extremities    Respiratory: Non-Labored Breathing    ROS:    Constitutional: Denies fever/chills    Respiratory: Denies SOB        Imaging:    XR Results (most recent):  Results from Appointment encounter on 11/09/22    XR SHOULDER RT AP/LAT MIN 2 V    Narrative  Right Shoulder Xray:  Indication: pain  Views: 4 views - AP, Grashey, Axillary, Scap-Y  Interpretation:  -4 views of the right shoulder are reviewed and show normal bone architecture. The humerus is well located within the glenoid. There is mild arthritis of the glenohumeral joint and acromioclavicular joint. There is evidence of high riding of the humeral head, concerning for rotator cuff tear. No evidence of fracture or subluxation. There is no evidence of soft tissue swelling.           Orders Placed This Encounter    XR SHOULDER RT AP/LAT MIN 2 V     Standing Status:   Future     Number of Occurrences:   1     Standing Expiration Date:   11/10/2023    REFERRAL TO PHYSICAL THERAPY     Referral Priority:   Routine     Referral Type:   PT/OT/ST     Referral Reason:   Specialty Services Required     Number of Visits Requested:   1    [place] DRAIN/INJECT LARGE JOINT/BURSA    betamethasone (CELESTONE) injection 12 mg    bupivacaine (PF) (MARCAINE) 0.5 % (5 mg/mL) injection 15 mg        Procedures:    Shoulder Subacromial Injection:    We discussed the possibility of injection of a steroid mixed with a local anesthetic to relieve pain and decrease inflammation of the affected area. The risks of a steroid injection which include but are not limited to cartilage damage, death of nearby bone, joint infection, nerve damage, temporary facial flushing, temporary steroid flare of pain and inflammation in the joint, temporary increase in blood sugar, tendon weakening or rupture, thinning of nearby bone (osteoporosis), thinning of skin and soft tissue around the injection site, and whitening or lightening of the skin around the injection site were reviewed. We specifically advised that patients with diabetes talk to their primary care to ensure they are safe for a steroid injection due to the transient increase in blood sugar associated with the injection. We discussed the chance of increased bleeding and bruising if the patient is on blood thinners or certain dietary supplements that have a blood-thinning effect. We advised patients that have an active infection, history of allergic reactions to steroids or take medications that may prohibit them from receiving a steroid injection to talk to their primary care physician before receiving and injection. We also talked about limiting the number of injections because these potential side effects increase with larger doses and repeated use. After explaining the risks and benefits of the procedure and obtaining informed consent from the patient, the proposed area for injection was confirmed with the patient. After all questions and concerns were addressed, the skin was prepped with alcohol to reduce the chances of infection. The skin was anesthetized with topical ethylene chloride spray. Next, the right shoulder was injected from a posterior lateral approach with 3 cc of of 0.5% bupivacaine and 2 cc of Betamethasone (6mg/mL). Patient tolerated the procedure well without any complications. The needle was removed and disposed of in a sterile container. The patient tolerated the injection well and a band-aid was placed on the skin. The patient was counseled on protecting the injection area, icing for pain relief and looking for signs and symptoms of infection. We requested that the patient contact us if any symptoms persist greater than 48 hours after the injection. An electronic signature was used to authenticate this note.   -- Yue Mullins MD

## 2022-11-16 DIAGNOSIS — I10 ESSENTIAL HYPERTENSION: ICD-10-CM

## 2022-11-16 RX ORDER — LOSARTAN POTASSIUM 100 MG/1
TABLET ORAL
Qty: 90 TABLET | Refills: 3 | Status: SHIPPED | OUTPATIENT
Start: 2022-11-16

## 2022-11-16 RX ORDER — ATORVASTATIN CALCIUM 80 MG/1
80 TABLET, FILM COATED ORAL DAILY
Qty: 90 TABLET | Refills: 3 | Status: SHIPPED | OUTPATIENT
Start: 2022-11-16

## 2022-12-06 RX ORDER — AZELASTINE 1 MG/ML
SPRAY, METERED NASAL
Qty: 1 EACH | Refills: 5 | Status: SHIPPED | OUTPATIENT
Start: 2022-12-06

## 2022-12-20 RX ORDER — INSULIN GLARGINE 300 U/ML
INJECTION, SOLUTION SUBCUTANEOUS
Qty: 4.5 ML | Refills: 2 | Status: SHIPPED | OUTPATIENT
Start: 2022-12-20

## 2023-01-06 ENCOUNTER — OFFICE VISIT (OUTPATIENT)
Dept: INTERNAL MEDICINE CLINIC | Age: 83
End: 2023-01-06
Payer: MEDICARE

## 2023-01-06 VITALS
HEART RATE: 86 BPM | TEMPERATURE: 97.1 F | SYSTOLIC BLOOD PRESSURE: 132 MMHG | HEIGHT: 63 IN | BODY MASS INDEX: 31.08 KG/M2 | OXYGEN SATURATION: 97 % | RESPIRATION RATE: 16 BRPM | WEIGHT: 175.4 LBS | DIASTOLIC BLOOD PRESSURE: 66 MMHG

## 2023-01-06 DIAGNOSIS — R35.0 URINE FREQUENCY: Primary | ICD-10-CM

## 2023-01-06 DIAGNOSIS — E11.21 TYPE 2 DIABETES WITH NEPHROPATHY (HCC): ICD-10-CM

## 2023-01-06 DIAGNOSIS — N18.30 STAGE 3 CHRONIC KIDNEY DISEASE, UNSPECIFIED WHETHER STAGE 3A OR 3B CKD (HCC): ICD-10-CM

## 2023-01-06 NOTE — PROGRESS NOTES
1. Have you been to the ER, urgent care clinic since your last visit? Hospitalized since your last visit? No    2. Have you seen or consulted any other health care providers outside of the [address] [place] [address] since your last visit? Include any pap smears or colon screening.        Seen Dr. Ann Vargas with arthritis specialist

## 2023-01-06 NOTE — PROGRESS NOTES
HISTORY OF PRESENT ILLNESS  Sabi Martinez is a 81 y.o. female. HPI  F/u DM-2 with microalbuminuria and neuropathyhtn hld cad s/p MI and stents DOMINGO on cpap fatty liver -here with her daughter  Fsbs <150-160 usually  Has fu Dr Anastasia Elias next month  C/o urine frequency and itching x 1 week-no dysuria, has not had frequent UTI's  Last OV  Last a1c 7.4 LDL 95  fsbs on lantus 34 units every day and apidra 8 at dinner and metformin-Dr Anastasia Elias and and Dr Barbara Santos  Has fu Dr Anastasia Elias next month for DM-2  Had diarrhea that has resolved--stool studies from GI MD were negative--will get colonoscopy next month. On colestipol from GI MD--s/p [place] 2 yrs ago  Sees Dr Sulaiman Montero for CAD  Saw podiatrist last moth-labs ordered for foot edema -normal uric acid.  Dx with OA of foot  Wants to see RheumMD for OA all over  Wants to get PT for chronic neck pain with limited ROM  C/po increased neuropathy pain in feet at night--took lyrica many years ago which may have helped    Patient Active Problem List    Diagnosis Date Noted    Chronic renal disease, stage III 07/04/2022    History of breast cancer 04/20/2020    Type 2 diabetes with nephropathy ([address] 08/10/2018    Fatty liver 02/23/2018    Iron deficiency anemia 12/01/2017    Osteopenia of left thigh 04/02/2017    Advanced care planning/counseling discussion 12/17/2015    Hiatal hernia 05/15/2012    CAD (coronary artery disease) 05/15/2012    Type 2 diabetes, controlled, with neuropathy ([address] 11/16/2009    Essential hypertension, benign 11/16/2009    Pure hypercholesterolemia 11/16/2009    DJD (degenerative joint disease) 11/16/2009    DOMINGO (obstructive sleep apnea) 11/16/2009     Current Outpatient Medications   Medication Sig Dispense Refill    Toujeo SoloStar U-300 Insulin 300 unit/mL (1.5 mL) inpn pen ADMINISTER 34 UNITS UNDER THE SKIN DAILY FOR DIABETES 4.5 mL 2    azelastine (ASTELIN) 137 mcg (0.1 %) nasal spray USE 2 SPRAYS IN EACH NOSTRIL TWICE DAILY 1 Each 5    atorvastatin (LIPITOR) 80 mg tablet TAKE 1 TABLET BY MOUTH DAILY 90 Tablet 3    losartan (COZAAR) 100 mg tablet TAKE 1 TABLET BY MOUTH DAILY 90 Tablet 3    insulin lispro (HUMALOG) 100 unit/mL kwikpen Inject 8 units before meals + 2 units for every 50 mg/dl above 150 mg/dl--Dose change 08/26/22--updated med list--did not send prescription to the pharmacy 15 mL 1    pantoprazole (PROTONIX) 40 mg tablet Take 1 Tablet by mouth daily. 90 Tablet 3    famotidine (PEPCID) 20 mg tablet Take 1 Tablet by mouth nightly. 90 Tablet 3    pregabalin (LYRICA) 50 mg capsule Take 1 Capsule by mouth three (3) times daily. Max Daily Amount: 150 mg. 90 Capsule 5    montelukast (SINGULAIR) 10 mg tablet TAKE 1 TABLET BY MOUTH EVERY DAY 90 Tablet 3    metFORMIN ER (GLUCOPHAGE XR) 500 mg tablet Take 2 tabs with dinner--replaces janumet 60 Tablet 11    glucose blood VI test strips (OneTouch Ultra Test) strip Use to check blood sugar three times daily. E11.65 300 Strip 11    ondansetron (ZOFRAN ODT) 4 mg disintegrating tablet Take 1 Tab by mouth every eight (8) hours as needed for Nausea. 25 Tab 0    Insulin Needles, Disposable, (PEN NEEDLE) 31 gauge x 3/16\" ndle Use as directed once daily 100 Pen Needle 3    Blood-Glucose Meter monitoring kit One touch ultra mini glucometer--dx 250.00 1 Kit 0    Lancets misc Check fsbs bid -250.02 200 Each 3    nitroglycerin (NITROSTAT) 0.4 mg SL tablet 1 Tab by SubLINGual route every five (5) minutes as needed for Chest Pain. 25 Tab 3    carvediloL (COREG) 6.25 mg tablet Take  by mouth two (2) times daily (with meals). fenofibrate (LOFIBRA) 54 mg tablet Take  by mouth daily. cyanocobalamin (VITAMIN B12) 500 mcg tablet Take 500 mcg by mouth daily. ranolazine ER (RANEXA) 500 mg SR tablet Take 1 Tab by mouth two (2) times a day. 60 Tab 12    clopidogrel (PLAVIX) 75 mg tablet Take 1 Tab by mouth daily. 30 Tab 11    aspirin 81 mg Tab Take 81 mg by mouth daily.       MULTIVITAMINS W-MINERALS/LUT (CENTRUM SILVER PO) Take 1 Tab by mouth daily. amLODIPine (NORVASC) 2.5 mg tablet Take 2.5 mg by mouth daily. dexAMETHasone (DECADRON) 4 mg tablet TAKE 1 TABLET BY MOUTH EVERY DAY FOR 3 DAYS       Allergies   Allergen Reactions    Codeine Other (comments)     Jerking sensation    Livalo [Pitavastatin] Diarrhea    Niaspan [Niacin] Myalgia    Statins-Hmg-Coa Reductase Inhibitors Myalgia     Zocor, pravachol, Lipitor, crestor    Welchol [Colesevelam] Other (comments)     Nausea, bloating and malaise    Zetia [Ezetimibe] Myalgia      Lab Results   Component Value Date/Time    WBC 7.3 01/14/2022 10:28 AM    HGB 11.2 01/14/2022 10:28 AM    HCT 34.0 01/14/2022 10:28 AM    Hematocrit (POC) 33 (L) 04/16/2019 05:17 PM    PLATELET 454 65/50/1090 10:28 AM    MCV 87 01/14/2022 10:28 AM     Lab Results   Component Value Date/Time    Cholesterol, total 136 07/05/2022 11:42 AM    Cholesterol (POC) 195 08/11/2011 10:08 AM    HDL Cholesterol 46 07/05/2022 11:42 AM    LDL, calculated 49.2 07/05/2022 11:42 AM    LDL Cholesterol (POC) 113 08/11/2011 10:08 AM    LDL-C, External 104 05/02/2016 12:00 AM    Triglyceride 204 (H) 07/05/2022 11:42 AM    Triglycerides (POC) 256 08/11/2011 10:08 AM    CHOL/HDL Ratio 3.0 07/05/2022 11:42 AM     Lab Results   Component Value Date/Time    GFR est non-AA 51 (L) 01/14/2022 10:28 AM    GFRNA, POC 54 (L) 04/16/2019 05:17 PM    GFR est AA 59 (L) 01/14/2022 10:28 AM    GFRAA, POC >60 04/16/2019 05:17 PM    Creatinine 1.03 (H) 01/14/2022 10:28 AM    Creatinine (POC) 1.0 04/16/2019 05:17 PM    BUN 23 01/14/2022 10:28 AM    BUN (POC) 18 04/16/2019 05:17 PM    Sodium 139 01/14/2022 10:28 AM    Sodium (POC) 138 04/16/2019 05:17 PM    Potassium 4.9 01/14/2022 10:28 AM    Potassium (POC) 4.2 04/16/2019 05:17 PM    Chloride 102 01/14/2022 10:28 AM    Chloride (POC) 102 04/16/2019 05:17 PM    CO2 24 01/14/2022 10:28 AM    Magnesium 1.5 (L) 01/19/2016 12:42 PM        ROS    Physical Exam  Vitals and nursing note reviewed. Constitutional:       Appearance: Normal appearance. She is well-developed. Comments: Appears stated age   Cardiovascular:      Rate and Rhythm: Normal rate and regular rhythm. Heart sounds: Normal heart sounds. No murmur heard. No friction rub. No gallop. Pulmonary:      Effort: Pulmonary effort is normal. No respiratory distress. Breath sounds: Normal breath sounds. No wheezing. Abdominal:      General: Bowel sounds are normal.      Palpations: Abdomen is soft. Neurological:      Mental Status: She is alert. ASSESSMENT and PLAN    ICD-10-CM ICD-9-CM    1. Urine frequency  R35.0 788.41 URINALYSIS W/ REFLEX CULTURE      URINALYSIS W/ REFLEX CULTURE      2. Type 2 diabetes with nephropathy (HCC)  E11.21 250.40 HEMOGLOBIN A1C WITH EAG     583.81 CBC W/O DIFF      CBC W/O DIFF      HEMOGLOBIN A1C WITH Sue Elias next month  Fair control by [employer]      3.  Stage 3 chronic kidney disease, unspecified whether stage 3a or 3b CKD (HCC)  N18.30 585.3 CBC W/O DIFF      CBC W/O DIFF  Fu nephologist this month-Dr Raquel Roblero   Rtc 6 months wellness

## 2023-01-07 LAB
APPEARANCE UR: CLEAR
BACTERIA URNS QL MICRO: NEGATIVE /HPF
BILIRUB UR QL: NEGATIVE
COLOR UR: ABNORMAL
EPITH CASTS URNS QL MICRO: ABNORMAL /LPF
ERYTHROCYTE [DISTWIDTH] IN BLOOD BY AUTOMATED COUNT: 14.5 % (ref 11.5–14.5)
EST. AVERAGE GLUCOSE BLD GHB EST-MCNC: 154 MG/DL
GLUCOSE UR STRIP.AUTO-MCNC: NEGATIVE MG/DL
HBA1C MFR BLD: 7 % (ref 4–5.6)
HCT VFR BLD AUTO: 36.5 % (ref 35–47)
HGB BLD-MCNC: 11.6 G/DL (ref 11.5–16)
HGB UR QL STRIP: NEGATIVE
HYALINE CASTS URNS QL MICRO: ABNORMAL /LPF (ref 0–5)
KETONES UR QL STRIP.AUTO: NEGATIVE MG/DL
LEUKOCYTE ESTERASE UR QL STRIP.AUTO: NEGATIVE
MCH RBC QN AUTO: 28.6 PG (ref 26–34)
MCHC RBC AUTO-ENTMCNC: 31.8 G/DL (ref 30–36.5)
MCV RBC AUTO: 89.9 FL (ref 80–99)
NITRITE UR QL STRIP.AUTO: NEGATIVE
NRBC # BLD: 0 K/UL (ref 0–0.01)
NRBC BLD-RTO: 0 PER 100 WBC
PH UR STRIP: 6.5 [PH] (ref 5–8)
PLATELET # BLD AUTO: 345 K/UL (ref 150–400)
PMV BLD AUTO: 11.2 FL (ref 8.9–12.9)
PROT UR STRIP-MCNC: 300 MG/DL
RBC # BLD AUTO: 4.06 M/UL (ref 3.8–5.2)
RBC #/AREA URNS HPF: ABNORMAL /HPF (ref 0–5)
SP GR UR REFRACTOMETRY: 1.02 (ref 1–1.03)
UA: UC IF INDICATED,UAUC: ABNORMAL
UROBILINOGEN UR QL STRIP.AUTO: 0.2 EU/DL (ref 0.2–1)
WBC # BLD AUTO: 7 K/UL (ref 3.6–11)
WBC URNS QL MICRO: ABNORMAL /HPF (ref 0–4)

## 2023-01-30 ENCOUNTER — PATIENT MESSAGE (OUTPATIENT)
Dept: ORTHOPEDIC SURGERY | Age: 83
End: 2023-01-30

## 2023-02-01 NOTE — TELEPHONE ENCOUNTER
From: Riki Jacobsen  To: Mallory Humphrey MD  Sent: 1/30/2023 7:44 PM EST  Subject: Lincoln Bautista medication question    I am scheduled for a steroid injection on Feb 10th for a torn rotator cuff. I am currently taking Tylenol ES for the pain and am still unable to sleep or get relief. I was wondering if there is anything a little stronger that could be called in and if there are any other suggestions for pain relief until I can get in for my injection. I am currently doing physical therapy as well. Thanks so much!   Lincoln Bautista

## 2023-02-10 ENCOUNTER — OFFICE VISIT (OUTPATIENT)
Dept: ORTHOPEDIC SURGERY | Age: 83
End: 2023-02-10
Payer: MEDICARE

## 2023-02-10 VITALS — BODY MASS INDEX: 31.36 KG/M2 | HEIGHT: 63 IN | WEIGHT: 177 LBS

## 2023-02-10 DIAGNOSIS — M75.111 INCOMPLETE TEAR OF RIGHT ROTATOR CUFF, UNSPECIFIED WHETHER TRAUMATIC: Primary | ICD-10-CM

## 2023-02-10 RX ORDER — MONTELUKAST SODIUM 4 MG/1
TABLET, CHEWABLE ORAL
COMMUNITY
Start: 2023-01-15

## 2023-02-10 RX ORDER — TRAMADOL HYDROCHLORIDE 50 MG/1
50 TABLET ORAL
Qty: 15 TABLET | Refills: 0 | Status: SHIPPED | OUTPATIENT
Start: 2023-02-10 | End: 2023-02-17

## 2023-02-10 RX ORDER — BETAMETHASONE SODIUM PHOSPHATE AND BETAMETHASONE ACETATE 3; 3 MG/ML; MG/ML
12 INJECTION, SUSPENSION INTRA-ARTICULAR; INTRALESIONAL; INTRAMUSCULAR; SOFT TISSUE ONCE
Status: COMPLETED | OUTPATIENT
Start: 2023-02-10 | End: 2023-02-10

## 2023-02-10 RX ORDER — BUPIVACAINE HYDROCHLORIDE 5 MG/ML
3 INJECTION, SOLUTION EPIDURAL; INTRACAUDAL ONCE
Status: COMPLETED | OUTPATIENT
Start: 2023-02-10 | End: 2023-02-10

## 2023-02-10 RX ADMIN — BUPIVACAINE HYDROCHLORIDE 15 MG: 5 INJECTION, SOLUTION EPIDURAL; INTRACAUDAL at 08:45

## 2023-02-10 RX ADMIN — BETAMETHASONE SODIUM PHOSPHATE AND BETAMETHASONE ACETATE 12 MG: 3; 3 INJECTION, SUSPENSION INTRA-ARTICULAR; INTRALESIONAL; INTRAMUSCULAR; SOFT TISSUE at 08:45

## 2023-02-10 NOTE — PROGRESS NOTES
Barber Green (: 1940) is a 81 y.o. female patient here for evaluation of the following chief complaint(s):  Shoulder Pain (Right shoulder injection )       ASSESSMENT/PLAN:  Below is the assessment and plan developed based on review of pertinent history, physical exam, labs, studies, and medications. 1. Incomplete tear of right rotator cuff, unspecified whether traumatic  -     bupivacaine (PF) (MARCAINE) 0.5 % (5 mg/mL) injection 15 mg; 15 mg (3 mL), Other, ONCE, 1 dose, On Fri 2/10/23 at 0900  -     betamethasone (CELESTONE) injection 12 mg; 12 mg, IntraLESional, ONCE, 1 dose, On Fri 2/10/23 at 0900  -     MO ARTHROCENTESIS ASPIR&/INJ MAJOR JT/BURSA W/O US    81year-old female with likely right rotator cuff tear, worsening pain after PT and injection. She did want to proceed with another injection today which she tolerated well. Additionally working to order an MRI to better evaluate the shoulder as she may want to consider some surgical intervention as her pain is worsening. She will follow-up to discuss those results. She can continue physical therapy for now as long as it is helping which she does feel like it is helping some. Patient verbalized understanding and elected to proceed. All questions were answered to the patient's apparent satisfaction. SUBJECTIVE/OBJECTIVE:  HPI    81-year-old female following up on likely right shoulder rotator cuff tear. She did not want to pursue any kind of surgery after her previous visit so we did an injection and physical therapy. She states the injection really helped but now her pain has been worsening over the last month. She is having a lot of night pain keeping her up at night. She still has very limited range of motion. Patient reports a sudden onset of symptoms. Duration of problem 3 months 2022.   Symptom Severity 8/10  Symptom Frequency constant        Allergies   Allergen Reactions    Codeine Other (comments) Jerking sensation    Livalo [Pitavastatin] Diarrhea    Niaspan [Niacin] Myalgia    Statins-Hmg-Coa Reductase Inhibitors Myalgia     Zocor, pravachol, Lipitor, crestor    Welchol [Colesevelam] Other (comments)     Nausea, bloating and malaise    Zetia [Ezetimibe] Myalgia       Current Outpatient Medications   Medication Sig    colestipoL (COLESTID) 1 gram tablet TAKE 1 TABLET BY MOUTH EVERY DAY AS DIRECTED    Toujeo SoloStar U-300 Insulin 300 unit/mL (1.5 mL) inpn pen ADMINISTER 34 UNITS UNDER THE SKIN DAILY FOR DIABETES    azelastine (ASTELIN) 137 mcg (0.1 %) nasal spray USE 2 SPRAYS IN EACH NOSTRIL TWICE DAILY    atorvastatin (LIPITOR) 80 mg tablet TAKE 1 TABLET BY MOUTH DAILY    losartan (COZAAR) 100 mg tablet TAKE 1 TABLET BY MOUTH DAILY    amLODIPine (NORVASC) 2.5 mg tablet Take 2.5 mg by mouth daily. dexAMETHasone (DECADRON) 4 mg tablet TAKE 1 TABLET BY MOUTH EVERY DAY FOR 3 DAYS    insulin lispro (HUMALOG) 100 unit/mL kwikpen Inject 8 units before meals + 2 units for every 50 mg/dl above 150 mg/dl--Dose change 08/26/22--updated med list--did not send prescription to the pharmacy    pantoprazole (PROTONIX) 40 mg tablet Take 1 Tablet by mouth daily. famotidine (PEPCID) 20 mg tablet Take 1 Tablet by mouth nightly. pregabalin (LYRICA) 50 mg capsule Take 1 Capsule by mouth three (3) times daily. Max Daily Amount: 150 mg.    montelukast (SINGULAIR) 10 mg tablet TAKE 1 TABLET BY MOUTH EVERY DAY    metFORMIN ER (GLUCOPHAGE XR) 500 mg tablet Take 2 tabs with dinner--replaces janumet    glucose blood VI test strips (OneTouch Ultra Test) strip Use to check blood sugar three times daily. E11.65    ondansetron (ZOFRAN ODT) 4 mg disintegrating tablet Take 1 Tab by mouth every eight (8) hours as needed for Nausea.     Insulin Needles, Disposable, (PEN NEEDLE) 31 gauge x 3/16\" ndle Use as directed once daily    Blood-Glucose Meter monitoring kit One touch ultra mini glucometer--dx 250.00    Lancets misc Check fsbs bid -250.02    nitroglycerin (NITROSTAT) 0.4 mg SL tablet 1 Tab by SubLINGual route every five (5) minutes as needed for Chest Pain. carvediloL (COREG) 6.25 mg tablet Take  by mouth two (2) times daily (with meals). fenofibrate (LOFIBRA) 54 mg tablet Take  by mouth daily. cyanocobalamin (VITAMIN B12) 500 mcg tablet Take 500 mcg by mouth daily. ranolazine ER (RANEXA) 500 mg SR tablet Take 1 Tab by mouth two (2) times a day. clopidogrel (PLAVIX) 75 mg tablet Take 1 Tab by mouth daily. aspirin 81 mg Tab Take 81 mg by mouth daily. MULTIVITAMINS W-MINERALS/LUT (CENTRUM SILVER PO) Take 1 Tab by mouth daily. Current Facility-Administered Medications   Medication    bupivacaine (PF) (MARCAINE) 0.5 % (5 mg/mL) injection 15 mg    betamethasone (CELESTONE) injection 12 mg       Social History     Socioeconomic History    Marital status: SINGLE     Spouse name: Not on file    Number of children: Not on file    Years of education: Not on file    Highest education level: Not on file   Occupational History    Not on file   Tobacco Use    Smoking status: Never    Smokeless tobacco: Never   Vaping Use    Vaping Use: Never used   Substance and Sexual Activity    Alcohol use: No     Alcohol/week: 0.0 standard drinks    Drug use: Yes     Types: Prescription, OTC    Sexual activity: Never   Other Topics Concern    Not on file   Social History Narrative    Lives in [place] with son. . 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 Is busy with Religion and singing in the choir.      Social Determinants of Health     Financial Resource Strain: Low Risk     Difficulty of Paying Living Expenses: Not hard at all   Food Insecurity: No Food Insecurity    Worried About Running Out of Food in the Last Year: Never true    Ran Out of Food in the Last Year: Never true   Transportation Needs: Not on file   Physical Activity: Not on file   Stress: Not on file   Social Connections: Not on file   Intimate Partner Violence: Not on file   Housing Stability: Not on file       Past Surgical History:   Procedure Laterality Date    HX BREAST BIOPSY Left     years ago no scar seen    HX CARPAL TUNNEL RELEASE      b/l    HX CHOLECYSTECTOMY  10/21/2020    Dr. Renae Denver HERNIA REPAIR      HX LUMBAR FUSION  July 2015    L4-L5    HX ORTHOPAEDIC      spinal fusion    TX UNLISTED PROCEDURE BREAST  2001    right lumpectomy    TX [place] 46      cardiac cath; 3 stents placed    TX UNLISTED PROCEDURE VASCULAR SURGERY      right leg vein stripping       Family History   Problem Relation Age of Onset    Diabetes Mother     Heart Disease Mother     Stroke Mother     Breast Cancer Mother 79    Diabetes Brother     Heart Disease Brother     Emphysema Father     Diabetes Daughter             Review of Systems    No flowsheet data found. Vitals:  Ht 5' 3\" (1.6 m)   Wt 177 lb (80.3 kg)   BMI 31.35 kg/m²    Estimated body surface area is 1.89 meters squared as calculated from the following:    Height as of this encounter: 5' 3\" (1.6 m). Weight as of this encounter: 177 lb (80.3 kg). Body mass index is 31.35 kg/m². Physical Exam    Musculoskeletal Exam:    Right SHOULDER EXAM    ROM:    - FF 90   - ABD 90, painful   - ER 80   - IR L1    Strength:    - Supraspinatus 4/5   - Infraspinatus 4/5   - Subscapularis 4/5    Drop arm: Positive    Delia's Test: Positive    Weber Test: Positive    Neer Test: Positive    AC Joint TTP: Negative    Cross Body Adduction Test: Negative    Speeds Test: Negative    Yergason Test: Negative    Bicipital Groove TTP: Negative      Patient fires AIN, PIN and ulnar nerves. Sensation is grossly intact in the median, radial and ulnar distribution. Hand is pink and appears well-perfused. Hand is warm. Skin is intact. Compartments are soft and compressible.       Consitutional: Healthy  Skin:   - Edema - none  - Cellulitis - No    Neuro: Numbness or tingling in R/L arm: No    Psych: Affect normal    Cardiovascular: Capillary Refill < 2 seconds in upper extremities    Respiratory: Non-Labored Breathing    ROS:    Constitutional: Denies fever/chills    Respiratory: Denies SOB        Imaging:    XR Results (most recent):  Results from Appointment encounter on 11/09/22    XR SHOULDER RT AP/LAT MIN 2 V    Narrative  Right Shoulder Xray:  Indication: pain  Views: 4 views - AP, Grashey, Axillary, Scap-Y  Interpretation:  -4 views of the right shoulder are reviewed and show normal bone architecture. The humerus is well located within the glenoid. There is mild arthritis of the glenohumeral joint and acromioclavicular joint. There is evidence of high riding of the humeral head, concerning for rotator cuff tear. No evidence of fracture or subluxation. There is no evidence of soft tissue swelling. Orders Placed This Encounter    DC ARTHROCENTESIS ASPIR&/INJ MAJOR JT/BURSA W/O US    bupivacaine (PF) (MARCAINE) 0.5 % (5 mg/mL) injection 15 mg    betamethasone (CELESTONE) injection 12 mg        Procedures:    Shoulder Subacromial Injection:    We discussed the possibility of injection of a steroid mixed with a local anesthetic to relieve pain and decrease inflammation of the affected area. The risks of a steroid injection which include but are not limited to cartilage damage, death of nearby bone, joint infection, nerve damage, temporary facial flushing, temporary steroid flare of pain and inflammation in the joint, temporary increase in blood sugar, tendon weakening or rupture, thinning of nearby bone (osteoporosis), thinning of skin and soft tissue around the injection site, and whitening or lightening of the skin around the injection site were reviewed.      We specifically advised that patients with diabetes talk to their primary care to ensure they are safe for a steroid injection due to the transient increase in blood sugar associated with the injection. We discussed the chance of increased bleeding and bruising if the patient is on blood thinners or certain dietary supplements that have a blood-thinning effect. We advised patients that have an active infection, history of allergic reactions to steroids or take medications that may prohibit them from receiving a steroid injection to talk to their primary care physician before receiving and injection. We also talked about limiting the number of injections because these potential side effects increase with larger doses and repeated use. After explaining the risks and benefits of the procedure and obtaining informed consent from the patient, the proposed area for injection was confirmed with the patient. After all questions and concerns were addressed, the skin was prepped with alcohol to reduce the chances of infection. The skin was anesthetized with topical ethylene chloride spray. Next, the right shoulder was injected from a posterior lateral approach with 3 cc of of 0.5% bupivacaine and 2 cc of Betamethasone (6mg/mL). Patient tolerated the procedure well without any complications. The needle was removed and disposed of in a sterile container. The patient tolerated the injection well and a band-aid was placed on the skin. The patient was counseled on protecting the injection area, icing for pain relief and looking for signs and symptoms of infection. We requested that the patient contact us if any symptoms persist greater than 48 hours after the injection. An electronic signature was used to authenticate this note.   -- Marylene Malkin, MD

## 2023-02-14 RX ORDER — MONTELUKAST SODIUM 10 MG/1
TABLET ORAL
Qty: 90 TABLET | Refills: 3 | Status: SHIPPED | OUTPATIENT
Start: 2023-02-14

## 2023-02-14 NOTE — TELEPHONE ENCOUNTER
PCP: Daisy Colvin MD    Last appt: 1/6/2023  Future Appointments   Date Time Provider Santiago Perez   2/28/2023  1:30 PM Kasey Porras MD [facility] BS AMB   3/23/2023  2:20 PM Klarissa Valle MD [facility] BS AMB   7/19/2023 11:00 AM Daisy Colvin MD [facility] BS AMB       Requested Prescriptions     Pending Prescriptions Disp Refills    montelukast (SINGULAIR) 10 mg tablet 90 Tablet 3     Sig: TAKE 1 TABLET BY MOUTH EVERY DAY

## 2023-02-15 ENCOUNTER — HOSPITAL ENCOUNTER (INPATIENT)
Age: 83
LOS: 2 days | Discharge: HOME | DRG: 247 | End: 2023-02-17
Attending: INTERNAL MEDICINE | Admitting: STUDENT IN AN ORGANIZED HEALTH CARE EDUCATION/TRAINING PROGRAM
Payer: MEDICARE

## 2023-02-15 DIAGNOSIS — R07.9 CHEST PAIN: ICD-10-CM

## 2023-02-15 PROBLEM — I20.0 UNSTABLE ANGINA (HCC): Status: ACTIVE | Noted: 2023-02-15

## 2023-02-15 PROCEDURE — G0378 HOSPITAL OBSERVATION PER HR: HCPCS

## 2023-02-15 PROCEDURE — 65270000046 HC RM TELEMETRY

## 2023-02-15 RX ORDER — DEXTROSE MONOHYDRATE 100 MG/ML
0-250 INJECTION, SOLUTION INTRAVENOUS AS NEEDED
Status: DISCONTINUED | OUTPATIENT
Start: 2023-02-15 | End: 2023-02-17 | Stop reason: HOSPADM

## 2023-02-15 RX ORDER — INSULIN LISPRO 100 [IU]/ML
INJECTION, SOLUTION INTRAVENOUS; SUBCUTANEOUS
Status: DISCONTINUED | OUTPATIENT
Start: 2023-02-16 | End: 2023-02-17 | Stop reason: HOSPADM

## 2023-02-15 RX ORDER — ACETAMINOPHEN 650 MG/1
650 SUPPOSITORY RECTAL
Status: DISCONTINUED | OUTPATIENT
Start: 2023-02-15 | End: 2023-02-17 | Stop reason: HOSPADM

## 2023-02-15 RX ORDER — POLYETHYLENE GLYCOL 3350 17 G/17G
17 POWDER, FOR SOLUTION ORAL DAILY PRN
Status: DISCONTINUED | OUTPATIENT
Start: 2023-02-15 | End: 2023-02-17 | Stop reason: HOSPADM

## 2023-02-15 RX ORDER — SODIUM CHLORIDE 0.9 % (FLUSH) 0.9 %
5-40 SYRINGE (ML) INJECTION AS NEEDED
Status: DISCONTINUED | OUTPATIENT
Start: 2023-02-15 | End: 2023-02-16

## 2023-02-15 RX ORDER — ENOXAPARIN SODIUM 100 MG/ML
40 INJECTION SUBCUTANEOUS DAILY
Status: DISCONTINUED | OUTPATIENT
Start: 2023-02-16 | End: 2023-02-17 | Stop reason: HOSPADM

## 2023-02-15 RX ORDER — SODIUM CHLORIDE 0.9 % (FLUSH) 0.9 %
5-40 SYRINGE (ML) INJECTION EVERY 8 HOURS
Status: DISCONTINUED | OUTPATIENT
Start: 2023-02-16 | End: 2023-02-16

## 2023-02-15 RX ORDER — ONDANSETRON 4 MG/1
4 TABLET, ORALLY DISINTEGRATING ORAL
Status: DISCONTINUED | OUTPATIENT
Start: 2023-02-15 | End: 2023-02-17 | Stop reason: HOSPADM

## 2023-02-15 RX ORDER — ONDANSETRON 2 MG/ML
4 INJECTION INTRAMUSCULAR; INTRAVENOUS
Status: DISCONTINUED | OUTPATIENT
Start: 2023-02-15 | End: 2023-02-17 | Stop reason: HOSPADM

## 2023-02-15 RX ORDER — ACETAMINOPHEN 325 MG/1
650 TABLET ORAL
Status: DISCONTINUED | OUTPATIENT
Start: 2023-02-15 | End: 2023-02-17 | Stop reason: HOSPADM

## 2023-02-15 RX ORDER — IBUPROFEN 200 MG
4 TABLET ORAL AS NEEDED
Status: DISCONTINUED | OUTPATIENT
Start: 2023-02-15 | End: 2023-02-17 | Stop reason: HOSPADM

## 2023-02-15 NOTE — PROGRESS NOTES
1656 Verbal SBAR report received from [id number] [facility] from [facility]    1900 Bedside and Verbal shift change report given to Hany Wallis RN (oncoming nurse) by Chanelle Sharma RN (offgoing nurse). Report included the following information SBAR, Kardex, ED Summary, MAR, Recent Results, and Cardiac Rhythm NSR .

## 2023-02-15 NOTE — Clinical Note
TRANSFER - OUT REPORT:     Verbal report given to: Joni Simon. Report consisted of patient's Situation, Background, Assessment and   Recommendations(SBAR). Opportunity for questions and clarification was provided. Patient transported with a Registered Nurse.

## 2023-02-16 ENCOUNTER — TRANSCRIBE ORDER (OUTPATIENT)
Dept: CARDIAC REHAB | Age: 83
End: 2023-02-16

## 2023-02-16 DIAGNOSIS — Z95.5 STENTED CORONARY ARTERY: Primary | ICD-10-CM

## 2023-02-16 LAB
ACT BLD: 305 SECS (ref 79–138)
ALBUMIN SERPL-MCNC: 3 G/DL (ref 3.5–5)
ALBUMIN/GLOB SERPL: 0.8 (ref 1.1–2.2)
ALP SERPL-CCNC: 62 U/L (ref 45–117)
ALT SERPL-CCNC: 24 U/L (ref 12–78)
ANION GAP SERPL CALC-SCNC: 10 MMOL/L (ref 5–15)
AST SERPL-CCNC: 10 U/L (ref 15–37)
BASOPHILS # BLD: 0 K/UL (ref 0–0.1)
BASOPHILS NFR BLD: 0 % (ref 0–1)
BILIRUB SERPL-MCNC: 0.3 MG/DL (ref 0.2–1)
BUN SERPL-MCNC: 30 MG/DL (ref 6–20)
BUN/CREAT SERPL: 25 (ref 12–20)
CALCIUM SERPL-MCNC: 9 MG/DL (ref 8.5–10.1)
CHLORIDE SERPL-SCNC: 100 MMOL/L (ref 97–108)
CO2 SERPL-SCNC: 23 MMOL/L (ref 21–32)
CREAT SERPL-MCNC: 1.18 MG/DL (ref 0.55–1.02)
DIFFERENTIAL METHOD BLD: ABNORMAL
EOSINOPHIL # BLD: 0.3 K/UL (ref 0–0.4)
EOSINOPHIL NFR BLD: 3 % (ref 0–7)
ERYTHROCYTE [DISTWIDTH] IN BLOOD BY AUTOMATED COUNT: 13.7 % (ref 11.5–14.5)
EST. AVERAGE GLUCOSE BLD GHB EST-MCNC: 157 MG/DL
GLOBULIN SER CALC-MCNC: 3.9 G/DL (ref 2–4)
GLUCOSE BLD STRIP.AUTO-MCNC: 133 MG/DL (ref 65–117)
GLUCOSE BLD STRIP.AUTO-MCNC: 140 MG/DL (ref 65–117)
GLUCOSE BLD STRIP.AUTO-MCNC: 184 MG/DL (ref 65–117)
GLUCOSE BLD STRIP.AUTO-MCNC: 248 MG/DL (ref 65–117)
GLUCOSE SERPL-MCNC: 150 MG/DL (ref 65–100)
HBA1C MFR BLD: 7.1 % (ref 4–5.6)
HCT VFR BLD AUTO: 35.1 % (ref 35–47)
HGB BLD-MCNC: 11.4 G/DL (ref 11.5–16)
IMM GRANULOCYTES # BLD AUTO: 0.1 K/UL (ref 0–0.04)
IMM GRANULOCYTES NFR BLD AUTO: 1 % (ref 0–0.5)
LYMPHOCYTES # BLD: 2 K/UL (ref 0.8–3.5)
LYMPHOCYTES NFR BLD: 17 % (ref 12–49)
MCH RBC QN AUTO: 28.1 PG (ref 26–34)
MCHC RBC AUTO-ENTMCNC: 32.5 G/DL (ref 30–36.5)
MCV RBC AUTO: 86.5 FL (ref 80–99)
MONOCYTES # BLD: 1 K/UL (ref 0–1)
MONOCYTES NFR BLD: 9 % (ref 5–13)
NEUTS SEG # BLD: 8.3 K/UL (ref 1.8–8)
NEUTS SEG NFR BLD: 70 % (ref 32–75)
NRBC # BLD: 0 K/UL (ref 0–0.01)
NRBC BLD-RTO: 0 PER 100 WBC
PLATELET # BLD AUTO: 298 K/UL (ref 150–400)
PMV BLD AUTO: 10.7 FL (ref 8.9–12.9)
POTASSIUM SERPL-SCNC: 4.3 MMOL/L (ref 3.5–5.1)
PROT SERPL-MCNC: 6.9 G/DL (ref 6.4–8.2)
RBC # BLD AUTO: 4.06 M/UL (ref 3.8–5.2)
SERVICE CMNT-IMP: ABNORMAL
SODIUM SERPL-SCNC: 133 MMOL/L (ref 136–145)
TROPONIN I SERPL HS-MCNC: 6 NG/L (ref 0–51)
WBC # BLD AUTO: 11.7 K/UL (ref 3.6–11)

## 2023-02-16 PROCEDURE — 93572 IV DOP VEL&/PRESS C FLO EA: CPT | Performed by: INTERNAL MEDICINE

## 2023-02-16 PROCEDURE — 74011250637 HC RX REV CODE- 250/637: Performed by: INTERNAL MEDICINE

## 2023-02-16 PROCEDURE — 93458 L HRT ARTERY/VENTRICLE ANGIO: CPT | Performed by: INTERNAL MEDICINE

## 2023-02-16 PROCEDURE — 92928 PRQ TCAT PLMT NTRAC ST 1 LES: CPT | Performed by: INTERNAL MEDICINE

## 2023-02-16 PROCEDURE — 74011636637 HC RX REV CODE- 636/637: Performed by: PHYSICIAN ASSISTANT

## 2023-02-16 PROCEDURE — 4A023N7 MEASUREMENT OF CARDIAC SAMPLING AND PRESSURE, LEFT HEART, PERCUTANEOUS APPROACH: ICD-10-PCS | Performed by: INTERNAL MEDICINE

## 2023-02-16 PROCEDURE — C1725 CATH, TRANSLUMIN NON-LASER: HCPCS | Performed by: INTERNAL MEDICINE

## 2023-02-16 PROCEDURE — 84484 ASSAY OF TROPONIN QUANT: CPT

## 2023-02-16 PROCEDURE — 74011000636 HC RX REV CODE- 636: Performed by: INTERNAL MEDICINE

## 2023-02-16 PROCEDURE — C1769 GUIDE WIRE: HCPCS | Performed by: INTERNAL MEDICINE

## 2023-02-16 PROCEDURE — 36415 COLL VENOUS BLD VENIPUNCTURE: CPT

## 2023-02-16 PROCEDURE — 74011000258 HC RX REV CODE- 258: Performed by: INTERNAL MEDICINE

## 2023-02-16 PROCEDURE — 74011000250 HC RX REV CODE- 250: Performed by: INTERNAL MEDICINE

## 2023-02-16 PROCEDURE — 027135Z DILATION OF CORONARY ARTERY, TWO ARTERIES WITH TWO DRUG-ELUTING INTRALUMINAL DEVICES, PERCUTANEOUS APPROACH: ICD-10-PCS | Performed by: INTERNAL MEDICINE

## 2023-02-16 PROCEDURE — 82962 GLUCOSE BLOOD TEST: CPT

## 2023-02-16 PROCEDURE — 74011250637 HC RX REV CODE- 250/637: Performed by: PHYSICIAN ASSISTANT

## 2023-02-16 PROCEDURE — 77030004550 HC CATH ANGI DX PRF MRTM -B: Performed by: INTERNAL MEDICINE

## 2023-02-16 PROCEDURE — 99152 MOD SED SAME PHYS/QHP 5/>YRS: CPT | Performed by: INTERNAL MEDICINE

## 2023-02-16 PROCEDURE — C1887 CATHETER, GUIDING: HCPCS | Performed by: INTERNAL MEDICINE

## 2023-02-16 PROCEDURE — C1874 STENT, COATED/COV W/DEL SYS: HCPCS | Performed by: INTERNAL MEDICINE

## 2023-02-16 PROCEDURE — 65270000046 HC RM TELEMETRY

## 2023-02-16 PROCEDURE — 99153 MOD SED SAME PHYS/QHP EA: CPT | Performed by: INTERNAL MEDICINE

## 2023-02-16 PROCEDURE — C1894 INTRO/SHEATH, NON-LASER: HCPCS | Performed by: INTERNAL MEDICINE

## 2023-02-16 PROCEDURE — 80053 COMPREHEN METABOLIC PANEL: CPT

## 2023-02-16 PROCEDURE — 85025 COMPLETE CBC W/AUTO DIFF WBC: CPT

## 2023-02-16 PROCEDURE — B2111ZZ FLUOROSCOPY OF MULTIPLE CORONARY ARTERIES USING LOW OSMOLAR CONTRAST: ICD-10-PCS | Performed by: INTERNAL MEDICINE

## 2023-02-16 PROCEDURE — 93571 IV DOP VEL&/PRESS C FLO 1ST: CPT | Performed by: INTERNAL MEDICINE

## 2023-02-16 PROCEDURE — 74011250636 HC RX REV CODE- 250/636: Performed by: INTERNAL MEDICINE

## 2023-02-16 PROCEDURE — 85347 COAGULATION TIME ACTIVATED: CPT

## 2023-02-16 PROCEDURE — 74011000250 HC RX REV CODE- 250: Performed by: PHYSICIAN ASSISTANT

## 2023-02-16 PROCEDURE — 83036 HEMOGLOBIN GLYCOSYLATED A1C: CPT

## 2023-02-16 DEVICE — STENT RONYX20015UX RESOLUTE ONYX 2.00X15
Type: IMPLANTABLE DEVICE | Status: FUNCTIONAL
Brand: RESOLUTE ONYX™

## 2023-02-16 RX ORDER — FENTANYL CITRATE 50 UG/ML
INJECTION, SOLUTION INTRAMUSCULAR; INTRAVENOUS AS NEEDED
Status: DISCONTINUED | OUTPATIENT
Start: 2023-02-16 | End: 2023-02-16 | Stop reason: HOSPADM

## 2023-02-16 RX ORDER — LORAZEPAM 0.5 MG/1
0.5 TABLET ORAL
Status: DISCONTINUED | OUTPATIENT
Start: 2023-02-16 | End: 2023-02-17 | Stop reason: HOSPADM

## 2023-02-16 RX ORDER — MONTELUKAST SODIUM 10 MG/1
10 TABLET ORAL
Status: DISCONTINUED | OUTPATIENT
Start: 2023-02-16 | End: 2023-02-17 | Stop reason: HOSPADM

## 2023-02-16 RX ORDER — ATORVASTATIN CALCIUM 40 MG/1
80 TABLET, FILM COATED ORAL DAILY
Status: DISCONTINUED | OUTPATIENT
Start: 2023-02-16 | End: 2023-02-17 | Stop reason: HOSPADM

## 2023-02-16 RX ORDER — CLOPIDOGREL BISULFATE 75 MG/1
75 TABLET ORAL DAILY
Status: DISCONTINUED | OUTPATIENT
Start: 2023-02-16 | End: 2023-02-17 | Stop reason: HOSPADM

## 2023-02-16 RX ORDER — NITROGLYCERIN 0.4 MG/1
0.4 TABLET SUBLINGUAL
Status: DISCONTINUED | OUTPATIENT
Start: 2023-02-16 | End: 2023-02-17 | Stop reason: HOSPADM

## 2023-02-16 RX ORDER — RANOLAZINE 500 MG/1
500 TABLET, EXTENDED RELEASE ORAL ONCE
Status: COMPLETED | OUTPATIENT
Start: 2023-02-16 | End: 2023-02-16

## 2023-02-16 RX ORDER — PREGABALIN 25 MG/1
50 CAPSULE ORAL 3 TIMES DAILY
Status: DISCONTINUED | OUTPATIENT
Start: 2023-02-16 | End: 2023-02-17 | Stop reason: HOSPADM

## 2023-02-16 RX ORDER — LIDOCAINE HYDROCHLORIDE 10 MG/ML
INJECTION, SOLUTION EPIDURAL; INFILTRATION; INTRACAUDAL; PERINEURAL AS NEEDED
Status: DISCONTINUED | OUTPATIENT
Start: 2023-02-16 | End: 2023-02-16 | Stop reason: HOSPADM

## 2023-02-16 RX ORDER — ONDANSETRON 2 MG/ML
4 INJECTION INTRAMUSCULAR; INTRAVENOUS
Status: DISCONTINUED | OUTPATIENT
Start: 2023-02-16 | End: 2023-02-16 | Stop reason: SDUPTHER

## 2023-02-16 RX ORDER — GUAIFENESIN 100 MG/5ML
LIQUID (ML) ORAL AS NEEDED
Status: DISCONTINUED | OUTPATIENT
Start: 2023-02-16 | End: 2023-02-16 | Stop reason: HOSPADM

## 2023-02-16 RX ORDER — GUAIFENESIN 100 MG/5ML
81 LIQUID (ML) ORAL DAILY
Status: DISCONTINUED | OUTPATIENT
Start: 2023-02-16 | End: 2023-02-17 | Stop reason: HOSPADM

## 2023-02-16 RX ORDER — CARVEDILOL 6.25 MG/1
6.25 TABLET ORAL 2 TIMES DAILY WITH MEALS
Status: DISCONTINUED | OUTPATIENT
Start: 2023-02-16 | End: 2023-02-17 | Stop reason: HOSPADM

## 2023-02-16 RX ORDER — ACETAMINOPHEN 325 MG/1
650 TABLET ORAL
Status: DISCONTINUED | OUTPATIENT
Start: 2023-02-16 | End: 2023-02-16 | Stop reason: SDUPTHER

## 2023-02-16 RX ORDER — SODIUM CHLORIDE 0.9 % (FLUSH) 0.9 %
5-40 SYRINGE (ML) INJECTION AS NEEDED
Status: DISCONTINUED | OUTPATIENT
Start: 2023-02-16 | End: 2023-02-17 | Stop reason: HOSPADM

## 2023-02-16 RX ORDER — FENOFIBRATE 54 MG/1
54 TABLET ORAL DAILY
Status: DISCONTINUED | OUTPATIENT
Start: 2023-02-16 | End: 2023-02-17 | Stop reason: HOSPADM

## 2023-02-16 RX ORDER — PANTOPRAZOLE SODIUM 40 MG/1
40 TABLET, DELAYED RELEASE ORAL DAILY
Status: DISCONTINUED | OUTPATIENT
Start: 2023-02-16 | End: 2023-02-17 | Stop reason: HOSPADM

## 2023-02-16 RX ORDER — SODIUM CHLORIDE 0.9 % (FLUSH) 0.9 %
5-40 SYRINGE (ML) INJECTION EVERY 8 HOURS
Status: DISCONTINUED | OUTPATIENT
Start: 2023-02-16 | End: 2023-02-17 | Stop reason: HOSPADM

## 2023-02-16 RX ORDER — TRAMADOL HYDROCHLORIDE 50 MG/1
50 TABLET ORAL
Status: DISCONTINUED | OUTPATIENT
Start: 2023-02-16 | End: 2023-02-17 | Stop reason: HOSPADM

## 2023-02-16 RX ORDER — HEPARIN SODIUM 200 [USP'U]/100ML
INJECTION, SOLUTION INTRAVENOUS
Status: COMPLETED | OUTPATIENT
Start: 2023-02-16 | End: 2023-02-16

## 2023-02-16 RX ORDER — MIDAZOLAM HYDROCHLORIDE 1 MG/ML
INJECTION, SOLUTION INTRAMUSCULAR; INTRAVENOUS AS NEEDED
Status: DISCONTINUED | OUTPATIENT
Start: 2023-02-16 | End: 2023-02-16 | Stop reason: HOSPADM

## 2023-02-16 RX ORDER — FAMOTIDINE 20 MG/1
20 TABLET, FILM COATED ORAL
Status: DISCONTINUED | OUTPATIENT
Start: 2023-02-16 | End: 2023-02-17 | Stop reason: HOSPADM

## 2023-02-16 RX ORDER — NALOXONE HYDROCHLORIDE 0.4 MG/ML
0.4 INJECTION, SOLUTION INTRAMUSCULAR; INTRAVENOUS; SUBCUTANEOUS AS NEEDED
Status: DISCONTINUED | OUTPATIENT
Start: 2023-02-16 | End: 2023-02-17 | Stop reason: HOSPADM

## 2023-02-16 RX ORDER — CARVEDILOL 6.25 MG/1
6.25 TABLET ORAL ONCE
Status: COMPLETED | OUTPATIENT
Start: 2023-02-16 | End: 2023-02-16

## 2023-02-16 RX ORDER — RANOLAZINE 500 MG/1
500 TABLET, EXTENDED RELEASE ORAL 2 TIMES DAILY
Status: DISCONTINUED | OUTPATIENT
Start: 2023-02-16 | End: 2023-02-17 | Stop reason: HOSPADM

## 2023-02-16 RX ORDER — AMLODIPINE BESYLATE 2.5 MG/1
2.5 TABLET ORAL DAILY
Status: DISCONTINUED | OUTPATIENT
Start: 2023-02-16 | End: 2023-02-17 | Stop reason: HOSPADM

## 2023-02-16 RX ORDER — LOSARTAN POTASSIUM 50 MG/1
100 TABLET ORAL DAILY
Status: DISCONTINUED | OUTPATIENT
Start: 2023-02-16 | End: 2023-02-17 | Stop reason: HOSPADM

## 2023-02-16 RX ORDER — MONTELUKAST SODIUM 4 MG/1
4 TABLET, CHEWABLE ORAL DAILY
Status: DISCONTINUED | OUTPATIENT
Start: 2023-02-16 | End: 2023-02-17 | Stop reason: HOSPADM

## 2023-02-16 RX ORDER — LANOLIN ALCOHOL/MO/W.PET/CERES
500 CREAM (GRAM) TOPICAL DAILY
Status: DISCONTINUED | OUTPATIENT
Start: 2023-02-16 | End: 2023-02-17 | Stop reason: HOSPADM

## 2023-02-16 RX ORDER — SODIUM CHLORIDE 9 MG/ML
100 INJECTION, SOLUTION INTRAVENOUS CONTINUOUS
Status: DISPENSED | OUTPATIENT
Start: 2023-02-16 | End: 2023-02-17

## 2023-02-16 RX ORDER — CLOPIDOGREL BISULFATE 75 MG/1
TABLET ORAL AS NEEDED
Status: DISCONTINUED | OUTPATIENT
Start: 2023-02-16 | End: 2023-02-16 | Stop reason: HOSPADM

## 2023-02-16 RX ORDER — NITROGLYCERIN 0.4 MG/1
0.4 TABLET SUBLINGUAL
Status: DISCONTINUED | OUTPATIENT
Start: 2023-02-16 | End: 2023-02-16 | Stop reason: SDUPTHER

## 2023-02-16 RX ORDER — AZELASTINE 1 MG/ML
2 SPRAY, METERED NASAL EVERY 12 HOURS
Status: DISCONTINUED | OUTPATIENT
Start: 2023-02-16 | End: 2023-02-17 | Stop reason: HOSPADM

## 2023-02-16 RX ADMIN — LOSARTAN POTASSIUM 100 MG: 50 TABLET, FILM COATED ORAL at 14:06

## 2023-02-16 RX ADMIN — COLESTIPOL HYDROCHLORIDE 4 G: 1 TABLET, FILM COATED ORAL at 14:08

## 2023-02-16 RX ADMIN — MONTELUKAST 10 MG: 10 TABLET, FILM COATED ORAL at 21:16

## 2023-02-16 RX ADMIN — RANOLAZINE 500 MG: 500 TABLET, FILM COATED, EXTENDED RELEASE ORAL at 21:15

## 2023-02-16 RX ADMIN — SODIUM CHLORIDE 100 ML/HR: 9 INJECTION, SOLUTION INTRAVENOUS at 11:18

## 2023-02-16 RX ADMIN — PREGABALIN 50 MG: 25 CAPSULE ORAL at 14:14

## 2023-02-16 RX ADMIN — RANOLAZINE 500 MG: 500 TABLET, FILM COATED, EXTENDED RELEASE ORAL at 21:19

## 2023-02-16 RX ADMIN — ACETAMINOPHEN 325MG 650 MG: 325 TABLET ORAL at 23:47

## 2023-02-16 RX ADMIN — RANOLAZINE 500 MG: 500 TABLET, FILM COATED, EXTENDED RELEASE ORAL at 14:13

## 2023-02-16 RX ADMIN — MONTELUKAST 10 MG: 10 TABLET, FILM COATED ORAL at 02:18

## 2023-02-16 RX ADMIN — PREGABALIN 50 MG: 25 CAPSULE ORAL at 21:16

## 2023-02-16 RX ADMIN — CARVEDILOL 6.25 MG: 6.25 TABLET, FILM COATED ORAL at 21:16

## 2023-02-16 RX ADMIN — FENOFIBRATE 54 MG: 54 TABLET ORAL at 14:32

## 2023-02-16 RX ADMIN — FAMOTIDINE 20 MG: 20 TABLET, FILM COATED ORAL at 21:16

## 2023-02-16 RX ADMIN — ATORVASTATIN CALCIUM 80 MG: 40 TABLET, FILM COATED ORAL at 14:07

## 2023-02-16 RX ADMIN — SODIUM CHLORIDE, PRESERVATIVE FREE 5 ML: 5 INJECTION INTRAVENOUS at 22:42

## 2023-02-16 RX ADMIN — AMLODIPINE BESYLATE 2.5 MG: 2.5 TABLET ORAL at 14:06

## 2023-02-16 RX ADMIN — AZELASTINE HYDROCHLORIDE 2 SPRAY: 137 SPRAY, METERED NASAL at 21:20

## 2023-02-16 RX ADMIN — CARVEDILOL 6.25 MG: 6.25 TABLET, FILM COATED ORAL at 21:20

## 2023-02-16 RX ADMIN — SODIUM CHLORIDE, PRESERVATIVE FREE 10 ML: 5 INJECTION INTRAVENOUS at 05:46

## 2023-02-16 RX ADMIN — Medication 2 UNITS: at 21:10

## 2023-02-16 RX ADMIN — CARVEDILOL 6.25 MG: 6.25 TABLET, FILM COATED ORAL at 14:06

## 2023-02-16 RX ADMIN — Medication 500 MCG: at 14:06

## 2023-02-16 RX ADMIN — PANTOPRAZOLE SODIUM 40 MG: 40 TABLET, DELAYED RELEASE ORAL at 14:16

## 2023-02-16 RX ADMIN — SODIUM CHLORIDE, PRESERVATIVE FREE 10 ML: 5 INJECTION INTRAVENOUS at 00:15

## 2023-02-16 NOTE — PROGRESS NOTES
History and Physical    Patient: Lennox Rinks MRN: [id number]  SSN: [id number]    YOB: 1940  Age: 81 y.o. Sex: female      Subjective:      Lennox Rinks is a 81 y.o. female, hx MI 2016 with 3-stent placement, GERD, HTN, CKD III, liver disease, presenting as transfer from [address] [place] [address] for cath procedure due to unstable angina. Pt states that she was seen in the ED on Monday, 02/12/23 for dyspnea on exertion and chest tightness. Troponins were normal and EKG without ischemic changes- pain was relieved with nitro. Pt was admitted and a cardiac stress test revealed a defect of approximately 40% of the left ventricle with 10% of that being reversible. EF of 68%. She denies any chest pain/tightness, shortness of breath, orthopnea, dizziness, or palpitations. She reports R-shoulder pain from a rotator cuff tear diagnosed 11/2022- followed by ortho.    Cardiologist Dr. 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 Merline Haley HERNIA REPAIR      HX LUMBAR FUSION  July 2015    L4-L5    HX ORTHOPAEDIC      spinal fusion    CO UNLISTED PROCEDURE BREAST  2001    right lumpectomy    CO UNLISTED PROCEDURE CARDIAC SURGERY      cardiac cath; 3 stents placed    CO UNLISTED PROCEDURE VASCULAR SURGERY      right leg vein stripping      Family History   Problem Relation Age of Onset Diabetes Mother     Heart Disease Mother     Stroke Mother     Breast Cancer Mother 79    Diabetes Brother     Heart Disease Brother     Emphysema Father     Diabetes Daughter      Social History     Tobacco Use    Smoking status: Never    Smokeless tobacco: Never   Substance Use Topics    Alcohol use: No     Alcohol/week: 0.0 standard drinks      Prior to Admission medications    Medication Sig Start Date End Date Taking? Authorizing Provider   montelukast (SINGULAIR) 10 mg tablet TAKE 1 TABLET BY MOUTH EVERY DAY 2/14/23  Yes Vincent Pardo MD   colestipoL (COLESTID) 1 gram tablet TAKE 1 TABLET BY MOUTH EVERY DAY AS DIRECTED 1/15/23  Yes Provider, Historical   traMADoL (ULTRAM) 50 mg tablet Take 1 Tablet by mouth two (2) times daily as needed for Pain for up to 7 days. Max Daily Amount: 100 mg. 2/10/23 2/17/23 Yes MD Chanell Giang U-300 Insulin 300 unit/mL (1.5 mL) inpn pen ADMINISTER 34 UNITS UNDER THE SKIN DAILY FOR DIABETES 12/20/22  Yes Vincent Pardo MD   azelastine (ASTELIN) 137 mcg (0.1 %) nasal spray USE 2 SPRAYS IN EACH NOSTRIL TWICE DAILY 12/6/22  Yes Vincent Pardo MD   atorvastatin (LIPITOR) 80 mg tablet TAKE 1 TABLET BY MOUTH DAILY 11/16/22  Yes Vincent Pardo MD   losartan (COZAAR) 100 mg tablet TAKE 1 TABLET BY MOUTH DAILY 11/16/22  Yes Vincent Pardo MD   amLODIPine (NORVASC) 2.5 mg tablet Take 2.5 mg by mouth daily. 11/1/22  Yes Provider, Historical   insulin lispro (HUMALOG) 100 unit/mL kwikpen Inject 8 units before meals + 2 units for every 50 mg/dl above 150 mg/dl--Dose change 08/26/22--updated med list--did not send prescription to the pharmacy 8/26/22  Yes Moon Kee MD   pantoprazole (PROTONIX) 40 mg tablet Take 1 Tablet by mouth daily. 8/23/22  Yes Vincent Pardo MD   famotidine (PEPCID) 20 mg tablet Take 1 Tablet by mouth nightly. 8/18/22  Yes Vincent Pardo MD   pregabalin (LYRICA) 50 mg capsule Take 1 Capsule by mouth three (3) times daily. Max Daily Amount: 150 mg. 7/5/22  Yes Bonilla Mendez MD   metFORMIN ER (GLUCOPHAGE XR) 500 mg tablet Take 2 tabs with dinner--replaces [employer] 2/22/22  Yes Julisa Koroma MD   glucose blood VI test strips (OneTouch Ultra Test) strip Use to check blood sugar three times daily. E11.65 2/8/22  Yes Bonilla Mendez MD   ondansetron (ZOFRAN ODT) 4 mg disintegrating tablet Take 1 Tab by mouth every eight (8) hours as needed for Nausea. 10/21/20  Yes Nenita León MD   Insulin Needles, Disposable, (PEN NEEDLE) 31 gauge x 3/16\" ndle Use as directed once daily 12/1/17  Yes Bonilla Mendez MD   Blood-Glucose Meter monitoring kit One touch ultra mini glucometer--dx 250.00 12/1/17  Yes Bonilla Mendez MD   Lancets [facility] Check fsbs bid -250.02 12/1/17  Yes Bonilla Mendez MD   nitroglycerin (NITROSTAT) 0.4 mg SL tablet 1 Tab by SubLINGual route every five (5) minutes as needed for Chest Pain. 4/18/17  Yes Bonilla Mendez MD   carvediloL (COREG) 6.25 mg tablet Take  by mouth two (2) times daily (with meals). Yes Provider, Historical   fenofibrate (LOFIBRA) 54 mg tablet Take  by mouth daily. Yes Provider, Historical   cyanocobalamin (VITAMIN B12) 500 mcg tablet Take 500 mcg by mouth daily. Yes Provider, Historical   ranolazine ER (RANEXA) 500 mg SR tablet Take 1 Tab by mouth two (2) times a day. 2/15/16  Yes Teri Magaña MD   clopidogrel (PLAVIX) 75 mg tablet Take 1 Tab by mouth daily. 1/20/16  Yes Jori Fang MD   aspirin 81 mg Tab Take 81 mg by mouth daily. Yes Provider, Historical   MULTIVITAMINS W-MINERALS/LUT (CENTRUM SILVER PO) Take 1 Tab by mouth daily.    Yes Provider, Historical   dexAMETHasone (DECADRON) 4 mg tablet TAKE 1 TABLET BY MOUTH EVERY DAY FOR 3 DAYS  Patient not taking: No sig reported 11/2/22   Provider, Historical        Allergies   Allergen Reactions    Codeine Other (comments)     Jerking sensation    Livalo [Pitavastatin] Diarrhea    Niaspan [Niacin] Myalgia    Statins-Hmg-Coa Reductase Inhibitors Myalgia Zocor, pravachol, Lipitor, crestor    Welchol [Colesevelam] Other (comments)     Nausea, bloating and malaise    Zetia [Ezetimibe] Myalgia       Review of Systems:  Review of Systems   Constitutional:  Negative for chills, diaphoresis, fever, malaise/fatigue and weight loss. Eyes:  Negative for blurred vision, double vision and photophobia. Cardiovascular:  Negative for chest pain, palpitations, orthopnea, claudication and leg swelling. Gastrointestinal:  Negative for abdominal pain, diarrhea, heartburn, nausea and vomiting. Genitourinary:  Negative for dysuria. Neurological:  Negative for dizziness and headaches. Objective:     No results found for this or any previous visit (from the past 24 hour(s)). No orders to display        Vitals:    02/15/23 2000   BP: (!) 117/91   Pulse: 83   Resp: 18   Temp: 98.1 °F (36.7 °C)   SpO2: 97%        Physical Exam:  Physical Exam  Constitutional:       General: She is not in acute distress. Appearance: Normal appearance. She is normal weight. She is not toxic-appearing or diaphoretic. HENT:      Head: Normocephalic. Cardiovascular:      Rate and Rhythm: Normal rate and regular rhythm. Heart sounds: Normal heart sounds. No murmur heard. No friction rub. No gallop. Pulmonary:      Effort: Pulmonary effort is normal. No respiratory distress. Breath sounds: Normal breath sounds. No stridor. No wheezing, rhonchi or rales. Abdominal:      General: Abdomen is flat. There is no distension. Palpations: Abdomen is soft. There is no mass. Tenderness: There is no abdominal tenderness. Musculoskeletal:         General: No swelling. Skin:     General: Skin is warm and dry. Coloration: Skin is not pale. Findings: No erythema or rash. Neurological:      Mental Status: She is alert.         Assessment:     Hospital Problems  Date Reviewed: 2/10/2023            Codes Class Noted POA    Unstable angina ([facility] ICD-10-CM: I20.0  ICD-9-CM: 411.1  2/15/2023 Unknown         Impression:  Unstable Angina  CAD  DM II  HTN  GERD  CKD III  R rotator cuff tear    Plan:   Unstable Angina/CAD-     Consult cardiology for consideration of cardiac cath  Previous cardiology Dr. Palumbo Shows  Continue aspirin NOEL score of 4, 20% risk of mortality  Continue clopidogrel  Continue nitro  Continue amlodipine  Continue atorvastatin  NPO after midnight  Follow cardiology recommendations   Cardiac monitoring  Lipid panel  Echo per Cardiology  Stress test performed outside facility with a defect of 40% of the left ventricle with 10% of that being reversible.   Please refer to care everywhere to assess the report  Troponin in AM  Continue Ranexa    DM II  Insulin sliding scale  Diabetic diet    HTN  Continue amlodipine  Continue losartan    GERD  Continue famotidine  Continue protonix    CKD III- GFR 51, Cr 1.03 on admission (baseline Cr 1.1-1.2)  Continue losartan    R rotator cuff tear  Continue tramadol    DVT prophylaxis: Lovenox  Ulcer prophylaxis: continue protonix  CODE status: full code  Time spent with patient 60 minutes    Signed By: Miladis Stewart PA-C     February 16, 2023

## 2023-02-16 NOTE — PROGRESS NOTES
Problem: Falls - Risk of  Goal: *Absence of Falls  Description: Document [place] Fall Risk and appropriate interventions in the flowsheet.   Outcome: Progressing Towards Goal  Note: Fall Risk Interventions:                                Problem: Patient Education: Go to Patient Education Activity  Goal: Patient/Family Education  Outcome: Progressing Towards Goal     Problem: General Medical Care Plan  Goal: *Vital signs within specified parameters  Outcome: Progressing Towards Goal  Goal: *Labs within defined limits  Outcome: Progressing Towards Goal  Goal: *Absence of infection signs and symptoms  Outcome: Progressing Towards Goal  Goal: *Optimal pain control at patient's stated goal  Outcome: Progressing Towards Goal  Goal: *Skin integrity maintained  Outcome: Progressing Towards Goal  Goal: *Fluid volume balance  Outcome: Progressing Towards Goal  Goal: *Optimize nutritional status  Outcome: Progressing Towards Goal  Goal: *Anxiety reduced or absent  Outcome: Progressing Towards Goal  Goal: *Progressive mobility and function (eg: ADL's)  Outcome: Progressing Towards Goal     Problem: Patient Education: Go to Patient Education Activity  Goal: Patient/Family Education  Outcome: Progressing Towards Goal     Problem: Patient Education: Go to Patient Education Activity  Goal: Patient/Family Education  Outcome: Progressing Towards Goal     Problem: Unstable angina/NSTEMI: Day of Admission/Day 1  Goal: Off Pathway (Use only if patient is Off Pathway)  Outcome: Progressing Towards Goal  Goal: Activity/Safety  Outcome: Progressing Towards Goal  Goal: Consults, if ordered  Outcome: Progressing Towards Goal  Goal: Diagnostic Test/Procedures  Outcome: Progressing Towards Goal  Goal: Nutrition/Diet  Outcome: Progressing Towards Goal  Goal: Discharge Planning  Outcome: Progressing Towards Goal  Goal: Medications  Outcome: Progressing Towards Goal  Goal: Respiratory  Outcome: Progressing Towards Goal  Goal: Treatments/Interventions/Procedures  Outcome: Progressing Towards Goal  Goal: Psychosocial  Outcome: Progressing Towards Goal  Goal: *Hemodynamically stable  Outcome: Progressing Towards Goal  Goal: *Optimal pain control at patient's stated goal  Outcome: Progressing Towards Goal  Goal: *Lungs clear or at baseline  Outcome: Progressing Towards Goal     Problem: Diabetes Self-Management  Goal: *Disease process and treatment process  Description: Define diabetes and identify own type of diabetes; list 3 options for treating diabetes. Outcome: Progressing Towards Goal  Goal: *Incorporating nutritional management into lifestyle  Description: Describe effect of type, amount and timing of food on blood glucose; list 3 methods for planning meals. Outcome: Progressing Towards Goal  Goal: *Incorporating physical activity into lifestyle  Description: State effect of exercise on blood glucose levels. Outcome: Progressing Towards Goal  Goal: *Developing strategies to promote health/change behavior  Description: Define the ABC's of diabetes; identify appropriate screenings, schedule and personal plan for screenings. Outcome: Progressing Towards Goal  Goal: *Using medications safely  Description: State effect of diabetes medications on diabetes; name diabetes medication taking, action and side effects. Outcome: Progressing Towards Goal  Goal: *Monitoring blood glucose, interpreting and using results  Description: Identify recommended blood glucose targets  and personal targets. Outcome: Progressing Towards Goal  Goal: *Prevention, detection, treatment of acute complications  Description: List symptoms of hyper- and hypoglycemia; describe how to treat low blood sugar and actions for lowering  high blood glucose level.   Outcome: Progressing Towards Goal  Goal: *Prevention, detection and treatment of chronic complications  Description: Define the natural course of diabetes and describe the relationship of blood glucose levels to long term complications of diabetes.   Outcome: Progressing Towards Goal  Goal: *Developing strategies to address psychosocial issues  Description: Describe feelings about living with diabetes; identify support needed and support network  Outcome: Progressing Towards Goal  Goal: *Insulin pump training  Outcome: Progressing Towards Goal  Goal: *Sick day guidelines  Outcome: Progressing Towards Goal  Goal: *Patient Specific Goal (EDIT GOAL, INSERT TEXT)  Outcome: Progressing Towards Goal     Problem: Patient Education: Go to Patient Education Activity  Goal: Patient/Family Education  Outcome: Progressing Towards Goal

## 2023-02-16 NOTE — CARDIO/PULMONARY
Cardiopulmonary Rehab:    Chart reviewed. Consult acknowledged. Pt is a 81 y.o. F admitted with Unstable angina ([facility] [I20.0]. S/p cardiac cath 2/16/23, NOLBERTO. Nonsmoker. Pt visited, daughter at the bedside. CAD education folder given to Ana Paula. Educated using teach back method. Reviewed CAD diagnosis definition and purpose of intervention. Discussed risk factors for CAD to include the following: family history, elevated BMI, hyperlipidemia, hypertension, diabetes, stress, and smoking. Discussed Heart Healthy/Low Sodium (2000 mg) diet. Reviewed the importance of medication compliance, the purpose of plavix, and potential side effects. Discussed follow up appointments with cardiologist, signs and symptoms of angina, and what to report to physician after discharge. Emphasized the value of cardiac rehab. Discussed Cardiac Rehab Program format, benefits, and encouraged enrollment to assist with risk modification and management. Pt resides in [place], too far to join. She plans to return to [facility] to resume exercise of cardio machines as she did previously. Declines virtual cardiac rehab at this time. Ana Paula verbalized understanding with questions answered.  Barbara Martinez RN

## 2023-02-16 NOTE — PROGRESS NOTES
Hospitalist Progress Note    NAME: Cherri Carrera   :  1940   MRN:  [id number]     Assessment / Plan:  LIDA:  Barriers:     Chest pain / UA  CAD, hx stents  Ischemic CM  HTN  -HS trop WNL  -continue asa plavix and statin  -continue losartan and norvasc  -continue ranexa  -cardiology consult. NPO for now. CKD3    DM2  -recent A1c 7  -SSI prn    Rotator cuff tear  -tramadol prn    Hx Breast cancer  DOMINGO on CPAP    Code status: Full  Prophylaxis: Lovenox  Recommended Disposition: Home w/Family       Subjective:     Chief Complaint / Reason for Physician Visit  Follow up of CP, DM, HTN  Chart reviewed in detail. Discussed with RN events overnight. Review of Systems:  Symptom Y/N Comments  Symptom Y/N Comments   Fever/Chills    Chest Pain     Poor Appetite    Edema     Cough    Abdominal Pain     Sputum    Joint Pain     SOB/BIRD    Pruritis/Rash     Nausea/vomit    Tolerating PT/OT     Diarrhea    Tolerating Diet     Constipation    Other       Could NOT obtain due to:      PO intake: No data found. Objective:     VITALS:   Last 24hrs VS reviewed since prior progress note. Most recent are:  Patient Vitals for the past 24 hrs:   Temp Pulse Resp BP SpO2   23 0815 98.4 °F (36.9 °C) 85 21 135/72 97 %   23 0230 98.3 °F (36.8 °C) 80 20 131/63 95 %   02/15/23 2310 98.2 °F (36.8 °C) 93 18 (!) 142/93 95 %   02/15/23 2000 98.1 °F (36.7 °C) 83 18 (!) 117/91 97 %       Intake/Output Summary (Last 24 hours) at 2023 4150  Last data filed at 2023 0359  Gross per 24 hour   Intake 0 ml   Output --   Net 0 ml        I had a face to face encounter, and independently examined this patient on 2023, as outlined below:  PHYSICAL EXAM:  General: WD, WN. Alert, cooperative, no acute distress    EENT:  EOMI. Anicteric sclerae. MMM  Resp:  CTA bilaterally, no wheezing or rales.   No accessory muscle use  CV:  Regular  rhythm,  No edema  GI:  Soft, Non distended, Non tender. +Bowel sounds  Neurologic:  Alert and oriented X 3, normal speech,   Psych:   Good insight. Not anxious nor agitated  Skin:  No rashes. No jaundice    Reviewed most current lab test results and cultures  YES  Reviewed most current radiology test results   YES  Review and summation of old records today    NO  Reviewed patient's current orders and MAR    YES  PMH/SH reviewed - no change compared to H&P  ________________________________________________________________________  Care Plan discussed with:    Comments   Patient x    Family      RN     Care Manager     Consultant                        Multidiciplinary team rounds were held today with , nursing, pharmacist and clinical coordinator. Patient's plan of care was discussed; medications were reviewed and discharge planning was addressed. ________________________________________________________________________  Total NON critical care TIME:     Minutes    Total CRITICAL CARE TIME Spent:   Minutes non procedure based      Comments   >50% of visit spent in counseling and coordination of care x     This includes time during multidisciplinary rounds if indicated above   ________________________________________________________________________  Jami Feliciano MD     Procedures: see electronic medical records for all procedures/Xrays and details which were not copied into this note but were reviewed prior to creation of Plan. LABS:  I reviewed today's most current labs and imaging studies.   Pertinent labs include:  Recent Labs     02/16/23 0214   WBC 11.7*   HGB 11.4*   HCT 35.1        Recent Labs     02/16/23 0214   *   K 4.3      CO2 23   *   BUN 30*   CREA 1.18*   CA 9.0   ALB 3.0*   TBILI 0.3   ALT 24

## 2023-02-16 NOTE — CONSULTS
Consult    NAME: Mac Siddiqui   :  1940   MRN:  [id number]     Date/Time:  2023 6:44 AM    Patient PCP: Casey Euceda MD  ________________________________________________________________________     Assessment:     Chest pain/dyspnea  Stress with lateral ischemia    - Cath in  with diffuse disease. Inf STEMI  with diffuse CAD, 100% LCx, three stents to RCA. , on Ranexa 500mg bid, could not tolerate 1000mg bid, ER x2 since MI, negative enzymes, no improvement with sl nitro, stress 2016 with inf/post MI no residual Stress 2020 Ex 3 min 15 sec no ischemia  - ICM with EF 45%, improved Echo 2021 EF 55%, mild MR  - Sinus with inf/lat TWI  - HTN, feels better off of atenolol  - DM [place]  - Dyslipidemia has had issues with statins in past so far ok now on atorvastatin  - CKD Dr. Milton Vargas  - DOMINGO on CPAP  - Anemia s/p endoscopy with unrevealing work up  - [address] 2019  - s/p cholecystectomy 2020  Live with son, daughter nurse, son in law OB/gyn, adls, was going to gym but no longer due to arthritis        Plan:     - Chest pain/dyspnea with negative enzymes  - Euvolemic  - Sinus    - Cath all r/b/a reviewed    - Cont asa, clopidogrel  - Cont coreg  - Cont losarta  - Cont amlodipine  - Cont ranexa  - Cont atorvastatin, fenofibrate          [x]        High complexity decision making was performed        Subjective:   CHIEF COMPLAINT: Chest pain, dyspnea    HISTORY OF PRESENT ILLNESS:       Mac Siddiqui is a 81 y.o. female, hx MI  with 3-stent placement, GERD, HTN, CKD III, liver disease, presenting as transfer from [address] [place] [address] for cath procedure due to unstable angina. Pt states that she was seen in the ED on Monday, 23 for dyspnea on exertion and chest tightness. Troponins were normal and EKG without ischemic changes- pain was relieved with nitro.  Pt was admitted and a cardiac stress test revealed a defect of approximately 40% of the left ventricle with 10% of that being reversible. EF of 68%. She denies any chest pain/tightness, shortness of breath, orthopnea, dizziness, or palpitations. She reports R-shoulder pain from a rotator cuff tear diagnosed 11/2022- followed by ortho. Currently no chest pain  No edema  No palps    We were asked to consult for work up and evaluation of the above problems.      Past Medical History:   Diagnosis Date    Arthritis     Breast cancer ([address] 2002    s/p lumpectomy and XRT    CAD (coronary artery disease) 02/2016    Chronic kidney disease     stage III    GERD (gastroesophageal reflux disease)     with hiatal hernia    Hypercholesterolemia     Hypertension     Liver disease     Long term current use of anticoagulant therapy     Neuropathy in diabetes (HCC)     DOMINGO (obstructive sleep apnea)     on CPAP    Torn rotator cuff     right shoulder      Past Surgical History:   Procedure Laterality Date    HX BREAST BIOPSY Left     years ago no scar seen    HX CARPAL TUNNEL RELEASE      b/l    HX CHOLECYSTECTOMY  10/21/2020    Dr. Tam Ascencio HERNIA REPAIR      HX LUMBAR FUSION  July 2015    L4-L5    HX ORTHOPAEDIC      spinal fusion    WI UNLISTED PROCEDURE BREAST  2001    right lumpectomy    WI UNLISTED PROCEDURE CARDIAC SURGERY      cardiac cath; 3 stents placed    WI UNLISTED PROCEDURE VASCULAR SURGERY      right leg vein stripping     Allergies   Allergen Reactions    Codeine Other (comments)     Jerking sensation    Livalo [Pitavastatin] Diarrhea    Niaspan [Niacin] Myalgia    Statins-Hmg-Coa Reductase Inhibitors Myalgia     Zocor, pravachol, Lipitor, crestor    Welchol [Colesevelam] Other (comments)     Nausea, bloating and malaise    Zetia [Ezetimibe] Myalgia      Meds:  See below  Social History     Tobacco Use    Smoking status: Never    Smokeless tobacco: Never   Substance Use Topics    Alcohol use: No     Alcohol/week: 0.0 standard drinks      Family History   Problem Relation Age of Onset    Diabetes Mother     Heart Disease Mother Stroke Mother     Breast Cancer Mother 79    Diabetes Brother     Heart Disease Brother     Emphysema Father     Diabetes Daughter        REVIEW OF SYSTEMS:     []         Unable to obtain  ROS due to ---   [x]         Total of 12 systems reviewed as follows: Total of 12 systems reviewed as follows:       POSITIVE= Bold text  Negative = normal text  General:  fever, chills, sweats, generalized weakness, weight loss/gain,      loss of appetite   Eyes:    blurred vision, eye pain, loss of vision, double vision  ENT:    rhinorrhea, pharyngitis   Respiratory:   cough, sputum production, SOB, BIRD, wheezing, pleuritic pain   Cardiology:   chest pain, palpitations, orthopnea, PND, edema, syncope   Gastrointestinal:  abdominal pain , N/V, diarrhea, dysphagia, constipation, bleeding   Genitourinary:  frequency, urgency, dysuria, hematuria, incontinence   Muskuloskeletal :  arthralgia, myalgia, back pain  Hematology:  easy bruising, nose or gum bleeding, lymphadenopathy   Dermatological: rash, ulceration, pruritis, color change / jaundice  Endocrine:   hot flashes or polydipsia   Neurological:  headache, dizziness, confusion, focal weakness, paresthesia,     Speech difficulties, memory loss, gait difficulty  Psychological: Feelings of anxiety, depression, agitation    Objective:      Physical Exam:    Last 24hrs VS reviewed since prior progress note. Most recent are:    Visit Vitals  /63   Pulse 80   Temp 98.3 °F (36.8 °C)   Resp 20   SpO2 95%       Intake/Output Summary (Last 24 hours) at 2/16/2023 0644  Last data filed at 2/16/2023 0359  Gross per 24 hour   Intake 0 ml   Output --   Net 0 ml        General Appearance: Well developed, well nourished, alert & oriented x 3,    no acute distress. Ears/Nose/Mouth/Throat: Pupils equal and round, Hearing grossly normal.  Neck: Supple. JVP within normal limits. Carotids good upstrokes, with no bruit. Chest: Lungs clear to auscultation bilaterally.   Cardiovascular: Regular rate and rhythm, S1S2 normal, no murmur, rubs, gallops. Abdomen: Soft, non-tender, bowel sounds are active. No organomegaly. Extremities: No edema bilaterally. Femoral pulses +2, Distal Pulses +1. Skin: Warm and dry. Neuro: CN II-XII grossly intact, Strength and sensation grossly intact. Data:      Prior to Admission medications    Medication Sig Start Date End Date Taking? Authorizing Provider   montelukast (SINGULAIR) 10 mg tablet TAKE 1 TABLET BY MOUTH EVERY DAY 2/14/23  Yes Jane Chong MD   colestipoL (COLESTID) 1 gram tablet TAKE 1 TABLET BY MOUTH EVERY DAY AS DIRECTED 1/15/23  Yes Provider, Historical   traMADoL (ULTRAM) 50 mg tablet Take 1 Tablet by mouth two (2) times daily as needed for Pain for up to 7 days. Max Daily Amount: 100 mg. 2/10/23 2/17/23 Yes MD Chanell Vee U-300 Insulin 300 unit/mL (1.5 mL) inpn pen ADMINISTER 34 UNITS UNDER THE SKIN DAILY FOR DIABETES 12/20/22  Yes Jane Chong MD   azelastine (ASTELIN) 137 mcg (0.1 %) nasal spray USE 2 SPRAYS IN EACH NOSTRIL TWICE DAILY 12/6/22  Yes Jane Chong MD   atorvastatin (LIPITOR) 80 mg tablet TAKE 1 TABLET BY MOUTH DAILY 11/16/22  Yes Jane Chong MD   losartan (COZAAR) 100 mg tablet TAKE 1 TABLET BY MOUTH DAILY 11/16/22  Yes Jane Chong MD   amLODIPine (NORVASC) 2.5 mg tablet Take 2.5 mg by mouth daily. 11/1/22  Yes Provider, Historical   insulin lispro (HUMALOG) 100 unit/mL kwikpen Inject 8 units before meals + 2 units for every 50 mg/dl above 150 mg/dl--Dose change 08/26/22--updated med list--did not send prescription to the pharmacy 8/26/22  Yes Guy Mansfield MD   pantoprazole (PROTONIX) 40 mg tablet Take 1 Tablet by mouth daily. 8/23/22  Yes Jane Chong MD   famotidine (PEPCID) 20 mg tablet Take 1 Tablet by mouth nightly. 8/18/22  Yes Jane Chong MD   pregabalin (LYRICA) 50 mg capsule Take 1 Capsule by mouth three (3) times daily.  Max Daily Amount: 150 mg. 7/5/22  Yes Jane Chong MD metFORMIN ER (GLUCOPHAGE XR) 500 mg tablet Take 2 tabs with dinner--replaces janumet 2/22/22  Yes Guy Mansfield MD   glucose blood VI test strips (OneTouch Ultra Test) strip Use to check blood sugar three times daily. E11.65 2/8/22  Yes Jane Chong MD   ondansetron (ZOFRAN ODT) 4 mg disintegrating tablet Take 1 Tab by mouth every eight (8) hours as needed for Nausea. 10/21/20  Yes Verlyn Leventhal, MD   Insulin Needles, Disposable, (PEN NEEDLE) 31 gauge x 3/16\" ndle Use as directed once daily 12/1/17  Yes Jane Chong MD   Blood-Glucose Meter monitoring kit One touch ultra mini glucometer--dx 250.00 12/1/17  Yes Jane Chong MD   Lancets misc Check fsbs bid -250.02 12/1/17  Yes Jane Chong MD   nitroglycerin (NITROSTAT) 0.4 mg SL tablet 1 Tab by SubLINGual route every five (5) minutes as needed for Chest Pain. 4/18/17  Yes Jane Chong MD   carvediloL (COREG) 6.25 mg tablet Take  by mouth two (2) times daily (with meals). Yes Provider, Historical   fenofibrate (LOFIBRA) 54 mg tablet Take  by mouth daily. Yes Provider, Historical   cyanocobalamin (VITAMIN B12) 500 mcg tablet Take 500 mcg by mouth daily. Yes Provider, Historical   ranolazine ER (RANEXA) 500 mg SR tablet Take 1 Tab by mouth two (2) times a day. 2/15/16  Yes Keshia Vines MD   clopidogrel (PLAVIX) 75 mg tablet Take 1 Tab by mouth daily. 1/20/16  Yes Chiquis Ceballos MD   aspirin 81 mg Tab Take 81 mg by mouth daily. Yes Provider, Historical   MULTIVITAMINS W-MINERALS/LUT (CENTRUM SILVER PO) Take 1 Tab by mouth daily.    Yes Provider, Historical   dexAMETHasone (DECADRON) 4 mg tablet TAKE 1 TABLET BY MOUTH EVERY DAY FOR 3 DAYS  Patient not taking: No sig reported 11/2/22   Provider, Historical       Recent Results (from the past 24 hour(s))   CBC WITH AUTOMATED DIFF    Collection Time: 02/16/23  2:14 AM   Result Value Ref Range    WBC 11.7 (H) 3.6 - 11.0 K/uL    RBC 4.06 3.80 - 5.20 M/uL    HGB 11.4 (L) 11.5 - 16.0 g/dL HCT 35.1 35.0 - 47.0 %    MCV 86.5 80.0 - 99.0 FL    MCH 28.1 26.0 - 34.0 PG    MCHC 32.5 30.0 - 36.5 g/dL    RDW 13.7 11.5 - 14.5 %    PLATELET 036 470 - 839 K/uL    MPV 10.7 8.9 - 12.9 FL    NRBC 0.0 0  WBC    ABSOLUTE NRBC 0.00 0.00 - 0.01 K/uL    NEUTROPHILS 70 32 - 75 %    LYMPHOCYTES 17 12 - 49 %    MONOCYTES 9 5 - 13 %    EOSINOPHILS 3 0 - 7 %    BASOPHILS 0 0 - 1 %    IMMATURE GRANULOCYTES 1 (H) 0.0 - 0.5 %    ABS. NEUTROPHILS 8.3 (H) 1.8 - 8.0 K/UL    ABS. LYMPHOCYTES 2.0 0.8 - 3.5 K/UL    ABS. MONOCYTES 1.0 0.0 - 1.0 K/UL    ABS. EOSINOPHILS 0.3 0.0 - 0.4 K/UL    ABS. BASOPHILS 0.0 0.0 - 0.1 K/UL    ABS. IMM. GRANS. 0.1 (H) 0.00 - 0.04 K/UL    DF AUTOMATED     METABOLIC PANEL, COMPREHENSIVE    Collection Time: 02/16/23  2:14 AM   Result Value Ref Range    Sodium 133 (L) 136 - 145 mmol/L    Potassium 4.3 3.5 - 5.1 mmol/L    Chloride 100 97 - 108 mmol/L    CO2 23 21 - 32 mmol/L    Anion gap 10 5 - 15 mmol/L    Glucose 150 (H) 65 - 100 mg/dL    BUN 30 (H) 6 - 20 MG/DL    Creatinine 1.18 (H) 0.55 - 1.02 MG/DL    BUN/Creatinine ratio 25 (H) 12 - 20      eGFR 46 (L) >60 ml/min/1.73m2    Calcium 9.0 8.5 - 10.1 MG/DL    Bilirubin, total 0.3 0.2 - 1.0 MG/DL    ALT (SGPT) 24 12 - 78 U/L    AST (SGOT) 10 (L) 15 - 37 U/L    Alk. phosphatase 62 45 - 117 U/L    Protein, total 6.9 6.4 - 8.2 g/dL    Albumin 3.0 (L) 3.5 - 5.0 g/dL    Globulin 3.9 2.0 - 4.0 g/dL    A-G Ratio 0.8 (L) 1.1 - 2.2     TROPONIN-HIGH SENSITIVITY    Collection Time: 02/16/23  2:14 AM   Result Value Ref Range    Troponin-High Sensitivity 6 0 - 51 ng/L      Sonia Williamson  [de-identified]year old F (1940)  Account #: [id number]  PRIMARY CARE PHYSICIAN:  Marivel Causey MD  CARDIOLOGIST:  Miladis Delgadillo MD    REASON FOR VISIT:  This [de-identified]year old female presents for CAD and Cardiomyopathy. HISTORY OF PRESENT ILLNESS:   - Cath in 2014 with diffuse disease. Inf STEMI 2016 with diffuse CAD, 100% LCx, three stents to RCA. , on Ranexa 500mg bid, could not tolerate 1000mg bid, ER x2 since MI, negative enzymes, no improvement with sl nitro, stress 5/2016 with inf/post MI no residual Stress 2/2020 Ex 3 min 15 sec no ischemia  - ICM with EF 45%, improved Echo 9/2021 EF 55%, mild MR  - Sinus with inf/lat TWI  - HTN, feels better off of atenolol  - DM [facility]  - Dyslipidemia has had issues with statins in past so far ok now on atorvastatin  - CKD Dr. Yary Russo  - DOMINGO on CPAP  - Anemia s/p endoscopy with unrevealing work up  - [address] 4[address]2019  - s/p cholecystectomy 11/2020  Live with son, daughter nurse, son in law OB/gyn, adls, was going to gym but no longer due to arthritis    Six month follow up. No chest pain  Notes more dyspnea  No edema     No chest discomfort suggestive of ischemia. Denies orthopnea, PND, or edema. No palpitations, syncope, near syncope. No other complaints. CARDIAC HISTORY  OTHER:  1 Coronary atherosclerosis of native coronary artery - 1/1/2008   2 Benign essential hypertension - 1/1/2008   3 Mixed hyperlipidemia - 1/1/2008   4 Angina pectoris - 1/1/2008     RISK FACTORS:  1 Diabetes   2 Hypertension   3 Dyslipidemia   4 Family History of CAD [Less than 61years of age]       CARDIOVASCULAR PROCEDURES  Procedure Date Results   Cath 05/02/2012 . Diffuse mild-to-moderate coronary atherosclerosis. 2. Normal left ventricular systolic function. Ejection fraction estimated  at 65%. 3. Noncritical disease in the circumflex as measured by pressure wire  fractional flow reserve. Echo 09/28/2021 EF range 55%-60%, The left ventricular ejection fraction was estimated to be 55%. Normal left ventricular function. Mild mitral regurgitation. Normal pulmonary artery pressures. Echo 07/26/2019 EF range 65%-70%, The left ventricular ejection fraction was estimated to be 65%. Normal left ventricular function. No significant valvular disease. Normal pulmonary artery pressures.    Echo 07/12/2017 EF: .60, The left ventricular ejection fraction was estimated to be 60%.  Normal left ventricular function. No aortic stenosis. Mild mitral regurgitation. Normal pulmonary artery pressures. EKG 2022 Sinus Rhythm, HR 85 NT   MPI 2020 EF 0.75 (75%), No ischemia or infarction on quantitative perfusion imaging. ACTIVE ALLERGIES:  Ingredient Reaction Comment   CODEINE  Codeine       PROBLEM LIST:  Problem Description Chronic   Diabetes mellitus Y   Mixed hyperlipidemia Y   Benign essential hypertension Y   Angina pectoris Y   Coronary atherosclerosis Y       PAST MEDICAL/SURGICAL HISTORY  (Detailed)    Disease/disorder Onset Date Surgical History Date Comments     Lumpectomy       Coronary Stent Placement     Cancer, breast 2002      Coronary Artery Disease       Diabetes       Diabetic Neuropathy       GERD       Hiatal Hernia       Hyperlipidemia       Osteoarthritis       Sleep Apnea, CPAP         OBSTETRIC HISTORY:  Not currently pregnant. Family History  (Detailed)  Relationship Family Member Name  Age at Death Condition Onset Age Cause of Death   Brother  N  Congestive Heart Failure  N   Brother  Y  Myocardial Infarction  Y   Father  N  COPD  N   Mother  N  Myocardial Infarction  N   Mother  N  Coronary Artery Disease  N   Mother  N  Congestive Heart Failure  N     SOCIAL HISTORY  (Detailed)  Tobacco use reviewed. Preferred language is [place]. EDUCATION/EMPLOYMENT/OCCUPATION  Employment History Status Retired Restrictions     retired       retired       retired       retired       retired       MARITAL STATUS/FAMILY/SOCIAL SUPPORT  Currently . 0 son(s). 0 daughter(s). Smoking status: Never smoker. ALCOHOL  There is no history of alcohol use. CAFFEINE  The patient does not use caffeine. Shyla Simpson REVIEW OF SYMPTOMS:    CONST - Negative for weight gain, weight loss, fever. EYES - Negative for visual changes. ENT - Negative for hearing loss. RESP - Negative for snoring, hemoptysis, dyspnea.   CARD - Negative for chest pain, diaphoresis, orthopnea, palpitation, syncope, PND.  VASC - Negative for claudication, edema. GI - Negative for nausea, reflux, bleeding.  - Negative for hematuria, nocturia. REPROD - Negative for Hx of OCP.  ENDO - Negative for goiter, tremors. NEURO - Negative for dizziness, memory loss, seizures. PSYCH - Negative for depression, hallucinations. DERM - Negative for rash, skin sores. M/S - Negative for joint pain, myalgia. HEMAT - Negative for acute anemia, thrombocytopenia. VITAL SIGNS  Time BP mm/Hg Pulse /min Resp /min Temp F Ht ft Ht in Ht cm Wt lb Wt kg BMI kg/m2 BSA m2 O2 Sat%   4:03 /60 84   5.0 4.00 162.56 175.00 79.379 30.04  98       PHYSICAL EXAM:  Exam Findings Details   Const Neg Level of Distress - Awake / Alert. Nourishment - Well Nourished. Appearance - Well Developed. Eyes Neg Lids/External - Bilateral Normal.  Conjunctiva - Bilateral Normal.   NMT Neg Oral Mucosa - Moist, No Cyanosis, No Pallor. Neck Neg JVP - Less Than 8. Resp Neg Respirations - Nonlabored. Breath Sounds - Clear Throughout. Rales - Absent. Wheezes - Absent. Rhonchi - Absent. Cardiac Neg Rhythm - Regular. Palpation - PMI Normal.  Heart Sounds - S1 Normal, S2 Normal, No S3, No S4.   Cardiac Pos Murmurs - II/VI murmur. M/S Neg Gait - Normal.  Able to Exercise - Yes. EXT Neg Clubbing - Absent. Lower Extremity Edema - Absent. Skin Neg Venous Stasis Ulcer - Absent. Psych Neg Orientation - Oriented to Time, Person, Place. IMPRESSION AND PLAN  01. Atherosclerotic heart disease of native coronary artery with other forms of angina pectoris (I25.118): Inferior STEMI, s/p three NOLBERTO stents to RCA, diffuse CAD including occluded distal lCx. Stable class II symptoms.   ECG done No chest pain  Stable dyspnea  Sinus    Decreased functional capacity due to arthritis, she is trying to see  a rheumatologist.   Discussed follow up stress test, she wishes to hold of for now as the weather changes    Cont asa, plavix  Cont coreg, amlodi, losartan  Cont ranexa  Cont atorvastatin. 02. Ischemic cardiomyopathy (I25.5):  EF previously 45%, improved to 55%   03. Essential (primary) hypertension (I10): Adequately controlled. We will continue the current therapy. 04. Pure hypercholesterolemia, unspecified (E78.00): Tolerating atorvastatin. Lipids followed by Dr. Lexx Colin.   05. Type 2 diabetes mellitus without complications (W49.7):  Managed by: PCP. 06. Long term (current) use of aspirin (Z79.82): This condition is stable. 07. Long term (current) use of antithrombotics/antiplatelets (E70.04): This condition is stable. 08. Body mass index [BMI] 28.0-28.9, adult (P58.81)     ORDERS:  1 ECG done    2 Return office visit with Edgar Reyna MD in 6 Months. FINAL MEDICATION LIST  Medication Sig Desc Non-VCS   AMLODIPINE BESYLATE 2.5MG TABLETS TAKE 1 TABLET BY MOUTH ONCE DAILY N   Apidra SoloStar U-100 Insulin 100 unit/mL subcutaneous pen inject by subcutaneous route per prescriber's instructions. Insulin dosing requires individualization.  Y   aspirin 81 mg tablet,delayed release take 1 tablet by oral route  every day N   atorvastatin 80 mg tablet take 1 tablet by oral route  every day Y   azelastine 137 mcg (0.1 %) nasal spray aerosol spray 2 spray by intranasal route 2 times every day in each nostril Y   CARVEDILOL 6.25MG TABLETS TAKE 1 TABLET BY ORAL ROUTE 2 TIMES EVERY DAY WITH FOOD N   Centrum Silver 400 mcg-250 mcg chewable tablet  Y   CLOPIDOGREL 75MG TABLETS TAKE 1 TABLET BY MOUTH ONCE DAILY N   colestipol 1 gram tablet take 2 tablet by oral route  every day Y   FENOFIBRATE 54MG TABLETS TAKE 1 TABLET BY MOUTH ONCE DAILY N   losartan 100 mg tablet take 1 tablet by oral route  every day N   Lyrica 50 mg capsule take 1 capsule by oral route 3 times every day Y   metformin 500 mg tablet take 1 tablet by oral route 2 times every day with morning and evening meals Y   montelukast 10 mg tablet take 1 tablet by oral route  every day in the evening N   nitroglycerin 0.4 mg sublingual tablet place 1 tablet (0.4MG)  by sublingual route as directed N   ondansetron HCl 4 mg tablet take 2 tablet by oral route  every 8 hours for 2 days as needed Y   pantoprazole 40 mg tablet,delayed release take 1 tablet by oral route  every day N   Pepcid 20 mg tablet take 1 tablet by oral route  every day Y   RANOLAZINE 500MG ER TABLETS TAKE 1 TABLET BY MOUTH TWICE DAILY N   Chanell Alex U-300 Insulin 300 unit/mL (1.5 mL) subcutaneous pen inject by subcutaneous route as per insulin protocol Y   Vitamin B-12 500 mcg tablet 1 po qd Y

## 2023-02-16 NOTE — PROGRESS NOTES
1041 Pt arrived from [facility], right groin site clean dry and intact. Right groin closed with angioseal. Bed rest to end at 1445. Pt's daughters at bedside. 1500 Pt amb to the bathroom, voided and had a BM. Pt returned to recliner. 1910 Pt returned to bed    1915 Bedside shift change report given to Ekaterina Anguiano RN and Myranda Whitfield RN (oncoming nurse) by Jhonathan Melgoza RN (offgoing nurse). Report included the following information SBAR, Procedure Summary, MAR, and Cardiac Rhythm   .

## 2023-02-16 NOTE — H&P
History and Physical    Patient: Eliud Coffman MRN: [id number]  SSN: [id number]    YOB: 1940  Age: 81 y.o. Sex: female      Subjective:      Eliud Coffman is a 81 y.o. female, hx MI 2016 with 3-stent placement, GERD, HTN, CKD III, liver disease, presenting as transfer from [address] [place] [address] for cath procedure due to unstable angina. Pt states that she was seen in the ED on Monday, 02/12/23 for dyspnea on exertion and chest tightness. Troponins were normal and EKG without ischemic changes- pain was relieved with nitro. Pt was admitted and a cardiac stress test revealed a defect of approximately 40% of the left ventricle with 10% of that being reversible. EF of 68%. She denies any chest pain/tightness, shortness of breath, orthopnea, dizziness, or palpitations. She reports R-shoulder pain from a rotator cuff tear diagnosed 11/2022- followed by ortho.    Cardiologist Dr. 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 Du Grier HERNIA REPAIR      HX LUMBAR FUSION  July 2015    L4-L5    HX ORTHOPAEDIC      spinal fusion    [place] UNLISTED PROCEDURE BREAST  2001    right lumpectomy    MN UNLISTED PROCEDURE CARDIAC SURGERY      cardiac cath; 3 stents placed    [place] UNLISTED PROCEDURE VASCULAR SURGERY      right leg vein stripping      Family History   Problem Relation Age of Onset Diabetes Mother     Heart Disease Mother     Stroke Mother     Breast Cancer Mother 79    Diabetes Brother     Heart Disease Brother     Emphysema Father     Diabetes Daughter      Social History     Tobacco Use    Smoking status: Never    Smokeless tobacco: Never   Substance Use Topics    Alcohol use: No     Alcohol/week: 0.0 standard drinks      Prior to Admission medications    Medication Sig Start Date End Date Taking? Authorizing Provider   montelukast (SINGULAIR) 10 mg tablet TAKE 1 TABLET BY MOUTH EVERY DAY 2/14/23  Yes Adama Sandoval MD   colestipoL (COLESTID) 1 gram tablet TAKE 1 TABLET BY MOUTH EVERY DAY AS DIRECTED 1/15/23  Yes Provider, Historical   traMADoL (ULTRAM) 50 mg tablet Take 1 Tablet by mouth two (2) times daily as needed for Pain for up to 7 days. Max Daily Amount: 100 mg. 2/10/23 2/17/23 Yes MD Chanell Mcgrath U-300 Insulin 300 unit/mL (1.5 mL) inpn pen ADMINISTER 34 UNITS UNDER THE SKIN DAILY FOR DIABETES 12/20/22  Yes Adama Sandoval MD   azelastine (ASTELIN) 137 mcg (0.1 %) nasal spray USE 2 SPRAYS IN EACH NOSTRIL TWICE DAILY 12/6/22  Yes Adama Sandoval MD   atorvastatin (LIPITOR) 80 mg tablet TAKE 1 TABLET BY MOUTH DAILY 11/16/22  Yes Adama Sandoval MD   losartan (COZAAR) 100 mg tablet TAKE 1 TABLET BY MOUTH DAILY 11/16/22  Yes Adama Sandoval MD   amLODIPine (NORVASC) 2.5 mg tablet Take 2.5 mg by mouth daily. 11/1/22  Yes Provider, Historical   insulin lispro (HUMALOG) 100 unit/mL kwikpen Inject 8 units before meals + 2 units for every 50 mg/dl above 150 mg/dl--Dose change 08/26/22--updated med list--did not send prescription to the pharmacy 8/26/22  Yes Monique Urbano MD   pantoprazole (PROTONIX) 40 mg tablet Take 1 Tablet by mouth daily. 8/23/22  Yes Adama Sandoval MD   famotidine (PEPCID) 20 mg tablet Take 1 Tablet by mouth nightly. 8/18/22  Yes Adama Sandoval MD   pregabalin (LYRICA) 50 mg capsule Take 1 Capsule by mouth three (3) times daily. Max Daily Amount: 150 mg. 7/5/22  Yes Josefina Montero MD   metFORMIN ER (GLUCOPHAGE XR) 500 mg tablet Take 2 tabs with dinner--replaces janumet 2/22/22  Yes Renetta Tran MD   glucose blood VI test strips (OneTouch Ultra Test) strip Use to check blood sugar three times daily. E11.65 2/8/22  Yes Josefina Montero MD   ondansetron (ZOFRAN ODT) 4 mg disintegrating tablet Take 1 Tab by mouth every eight (8) hours as needed for Nausea. 10/21/20  Yes Manda Cordoba MD   Insulin Needles, Disposable, (PEN NEEDLE) 31 gauge x 3/16\" ndle Use as directed once daily 12/1/17  Yes Josefina Montero MD   Blood-Glucose Meter monitoring kit One touch ultra mini glucometer--dx 250.00 12/1/17  Yes Josefina Montero MD   Lancets [facility] Check fsbs bid -250.02 12/1/17  Yes Josefina Montero MD   nitroglycerin (NITROSTAT) 0.4 mg SL tablet 1 Tab by SubLINGual route every five (5) minutes as needed for Chest Pain. 4/18/17  Yes Josefina Monetro MD   carvediloL (COREG) 6.25 mg tablet Take  by mouth two (2) times daily (with meals). Yes Provider, Historical   fenofibrate (LOFIBRA) 54 mg tablet Take  by mouth daily. Yes Provider, Historical   cyanocobalamin (VITAMIN B12) 500 mcg tablet Take 500 mcg by mouth daily. Yes Provider, Historical   ranolazine ER (RANEXA) 500 mg SR tablet Take 1 Tab by mouth two (2) times a day. 2/15/16  Yes Janusz Figueroa MD   clopidogrel (PLAVIX) 75 mg tablet Take 1 Tab by mouth daily. 1/20/16  Yes Raymon Kumar MD   aspirin 81 mg Tab Take 81 mg by mouth daily. Yes Provider, Historical   MULTIVITAMINS W-MINERALS/LUT (CENTRUM SILVER PO) Take 1 Tab by mouth daily.    Yes Provider, Historical   dexAMETHasone (DECADRON) 4 mg tablet TAKE 1 TABLET BY MOUTH EVERY DAY FOR 3 DAYS  Patient not taking: No sig reported 11/2/22   Provider, Historical        Allergies   Allergen Reactions    Codeine Other (comments)     Jerking sensation    Livalo [Pitavastatin] Diarrhea    Niaspan [Niacin] Myalgia    Statins-Hmg-Coa Reductase Inhibitors Myalgia Zocor, pravachol, Lipitor, crestor    Welchol [Colesevelam] Other (comments)     Nausea, bloating and malaise    Zetia [Ezetimibe] Myalgia       Review of Systems:  Review of Systems   Constitutional:  Negative for malaise/fatigue. Respiratory:  Negative for cough and shortness of breath. BIRD   Cardiovascular:  Negative for chest pain and leg swelling. Gastrointestinal:  Negative for abdominal pain, nausea and vomiting. Genitourinary: Negative. Musculoskeletal: Negative. Neurological: Negative. Psychiatric/Behavioral: Negative. Objective:     Recent Results (from the past 24 hour(s))   CBC WITH AUTOMATED DIFF    Collection Time: 02/16/23  2:14 AM   Result Value Ref Range    WBC 11.7 (H) 3.6 - 11.0 K/uL    RBC 4.06 3.80 - 5.20 M/uL    HGB 11.4 (L) 11.5 - 16.0 g/dL    HCT 35.1 35.0 - 47.0 %    MCV 86.5 80.0 - 99.0 FL    MCH 28.1 26.0 - 34.0 PG    MCHC 32.5 30.0 - 36.5 g/dL    RDW 13.7 11.5 - 14.5 %    PLATELET 663 360 - 637 K/uL    MPV 10.7 8.9 - 12.9 FL    NRBC 0.0 0  WBC    ABSOLUTE NRBC 0.00 0.00 - 0.01 K/uL    NEUTROPHILS 70 32 - 75 %    LYMPHOCYTES 17 12 - 49 %    MONOCYTES 9 5 - 13 %    EOSINOPHILS 3 0 - 7 %    BASOPHILS 0 0 - 1 %    IMMATURE GRANULOCYTES 1 (H) 0.0 - 0.5 %    ABS. NEUTROPHILS 8.3 (H) 1.8 - 8.0 K/UL    ABS. LYMPHOCYTES 2.0 0.8 - 3.5 K/UL    ABS. MONOCYTES 1.0 0.0 - 1.0 K/UL    ABS. EOSINOPHILS 0.3 0.0 - 0.4 K/UL    ABS. BASOPHILS 0.0 0.0 - 0.1 K/UL    ABS. IMM. GRANS. 0.1 (H) 0.00 - 0.04 K/UL    DF AUTOMATED          No orders to display        Vitals:    02/15/23 2000 02/15/23 2310 02/16/23 0230   BP: (!) 117/91 (!) 142/93 131/63   Pulse: 83 93 80   Resp: 18 18 20   Temp: 98.1 °F (36.7 °C) 98.2 °F (36.8 °C) 98.3 °F (36.8 °C)   SpO2: 97% 95% 95%        Physical Exam:  Physical Exam  Vitals reviewed. HENT:      Head: Normocephalic and atraumatic. Mouth/Throat:      Pharynx: Oropharynx is clear.    Eyes:      Conjunctiva/sclera: Conjunctivae normal. Cardiovascular:      Rate and Rhythm: Normal rate and regular rhythm. Heart sounds: Normal heart sounds. Pulmonary:      Effort: Pulmonary effort is normal.      Breath sounds: Normal breath sounds. Abdominal:      General: Abdomen is flat. Bowel sounds are normal.      Palpations: Abdomen is soft. Musculoskeletal:         General: Normal range of motion. Cervical back: Normal range of motion and neck supple. Skin:     General: Skin is warm and dry. Neurological:      General: No focal deficit present. Mental Status: She is alert and oriented to person, place, and time. Mental status is at baseline. Psychiatric:         Mood and Affect: Mood normal.        Assessment:     Hospital Problems  Date Reviewed: 2/10/2023            Codes Class Noted POA    Un[facility] ICD-10-CM: I20.0  ICD-9-CM: 411.1  2/15/2023 Unknown           Plan:     Unstable Angina/CAD-      Consult cardiology for consideration of cardiac cath  Previous cardiology Dr. Lili Quiros  Continue aspirin NOEL score of 4, 20% risk of mortality  Continue clopidogrel  Continue nitro  Continue amlodipine  Continue atorvastatin  NPO after midnight  Follow cardiology recommendations   Cardiac monitoring  Lipid panel  Echo per Cardiology  Stress test performed outside facility with a defect of 40% of the left ventricle with 10% of that being reversible.   Please refer to care everywhere to assess the report  Troponin in AM  Continue Ranexa     DM II  Insulin sliding scale  Diabetic diet     HTN  Continue amlodipine  Continue losartan     GERD  Continue famotidine  Continue protonix     CKD III- GFR 51, Cr 1.03 on admission (baseline Cr 1.1-1.2)  Continue losartan     R rotator cuff tear  Continue tramadol     DVT prophylaxis: Lovenox  Ulcer prophylaxis: continue protonix  CODE status: full code  Time spent with patient 60 minutes    Signed By: Phuong Zaragoza PA-C     February 16, 2023

## 2023-02-16 NOTE — PROGRESS NOTES
Progress Note      2/16/2023 10:38 AM  NAME: Eliud Coffman   MRN:  [id number]   Admit Diagnosis: Unstable angina ([facility] [address] [I20.0]                Assessment:       Chest pain/dyspnea  Stress with lateral ischemia     - Cath in 2014 with diffuse disease. Inf STEMI 2016 with diffuse CAD, 100% LCx, three stents to RCA. , on Ranexa 500mg bid, could not tolerate 1000mg bid, ER x2 since MI, negative enzymes, no improvement with sl nitro, stress 5/2016 with inf/post MI no residual Stress 2/2020 Ex 3 min 15 sec no ischemia  - ICM with EF 45%, improved Echo 9/2021 EF 55%, mild MR  - Sinus with inf/lat TWI  - HTN, feels better off of atenolol  - DM Tomma Cargo  - Dyslipidemia has had issues with statins in past so far ok now on atorvastatin  - CKD Dr. Homa Restrepo  - DOMINGO on CPAP  - Anemia s/p endoscopy with unrevealing work up  - [address] 4/2019  - s/p cholecystectomy 11/2020  Live with son, daughter nurse, son in law OB/gyn, adls, was going to gym but no longer due to arthritis                     Plan:        - Chest pain/dyspnea with negative enzymes  - Euvolemic  - Sinus     - Cath with negative DFR of LM. PCI of small OM2 with NOLBERTO. Patent RCA stents. Normal EF.     - Cont asa, clopidogrel  - Cont coreg  - Cont losarta  - Cont amlodipine  - Cont ranexa  - Cont atorvastatin, fenofibrate     Goal home Friday AM.          [x]        High complexity decision making was performed    We discussed the expected course, resolution and complications of the diagnoses in detail. Medication risk, benefits, costs, interactions, and alternatives were discussed as indicated. I advised him to contact the office if his condition worsens, changes or fails to improve as anticipated. Patient expressed understanding with the diagnoses  and plan. Subjective:     Eliud Coffman denies chest pain, dyspnea. Discussed with RN events overnight.      Review of Systems:    Symptom Y/N Comments  Symptom Y/N Comments   Fever/Chills N   Chest Pain N Poor Appetite N   Edema N    Cough N   Abdominal Pain N    Sputum N   Joint Pain N    SOB/BIRD N   Pruritis/Rash N    Nausea/vomit N   Tolerating PT/OT Y    Diarrhea N   Tolerating Diet Y    Constipation N   Other       Could NOT obtain due to:      Objective:      Physical Exam:    Last 24hrs VS reviewed since prior progress note. Most recent are:    Visit Vitals  /72   Pulse 85   Temp 98.4 °F (36.9 °C)   Resp 21   SpO2 97%       Intake/Output Summary (Last 24 hours) at 2/16/2023 1038  Last data filed at 2/16/2023 0359  Gross per 24 hour   Intake 0 ml   Output --   Net 0 ml        General Appearance: Well developed, well nourished, alert & oriented x 3,    no acute distress. Ears/Nose/Mouth/Throat: Hearing grossly normal.  Neck: Supple. Chest: Lungs clear to auscultation bilaterally. Cardiovascular: Regular rate and rhythm, S1S2 normal, no murmur. Abdomen: Soft, non-tender, bowel sounds are active. Extremities: No edema bilaterally. Skin: Warm and dry. PMH/SH reviewed - no change compared to H&P    Data Review    Telemetry: normal sinus rhythm     Lab Data Personally Reviewed:    Recent Labs     02/16/23 0214   WBC 11.7*   HGB 11.4*   HCT 35.1        No results for input(s): INR, PTP, APTT, INREXT in the last 72 hours. Recent Labs     02/16/23 0214   *   K 4.3      CO2 23   BUN 30*   CREA 1.18*   *   CA 9.0     No results for input(s): CPK, CKNDX, TROIQ in the last 72 hours. No lab exists for component: CPKMB  Lab Results   Component Value Date/Time    Cholesterol, total 136 07/05/2022 11:42 AM    HDL Cholesterol 46 07/05/2022 11:42 AM    LDL, calculated 49.2 07/05/2022 11:42 AM    Triglyceride 204 (H) 07/05/2022 11:42 AM    CHOL/HDL Ratio 3.0 07/05/2022 11:42 AM       Recent Labs     02/16/23 0214   AP 62   TP 6.9   ALB 3.0*   GLOB 3.9     No results for input(s): PH, PCO2, PO2 in the last 72 hours.     Medications Personally Reviewed:    Current Facility-Administered Medications   Medication Dose Route Frequency    amLODIPine (NORVASC) tablet 2.5 mg  2.5 mg Oral DAILY    aspirin chewable tablet 81 mg  81 mg Oral DAILY    atorvastatin (LIPITOR) tablet 80 mg  80 mg Oral DAILY    azelastine (ASTELIN) 137mcg/spray nasal spray  2 Spray Both Nostrils Q12H    clopidogreL (PLAVIX) tablet 75 mg  75 mg Oral DAILY    colestipoL (COLESTID) tablet 4 g  4 g Oral DAILY    cyanocobalamin (VITAMIN B12) tablet 500 mcg  500 mcg Oral DAILY    famotidine (PEPCID) tablet 20 mg  20 mg Oral QHS    fenofibrate (LOFIBRA) tablet 54 mg  54 mg Oral DAILY    losartan (COZAAR) tablet 100 mg  100 mg Oral DAILY    montelukast (SINGULAIR) tablet 10 mg  10 mg Oral QHS    nitroglycerin (NITROSTAT) tablet 0.4 mg  0.4 mg SubLINGual Q5MIN PRN    pantoprazole (PROTONIX) tablet 40 mg  40 mg Oral DAILY    pregabalin (LYRICA) capsule 50 mg  50 mg Oral TID    ranolazine ER (RANEXA) tablet 500 mg  500 mg Oral BID    traMADoL (ULTRAM) tablet 50 mg  50 mg Oral BID PRN    carvediloL (COREG) tablet 6.25 mg  6.25 mg Oral BID WITH MEALS    glucose chewable tablet 16 g  4 Tablet Oral PRN    glucagon (GLUCAGEN) injection 1 mg  1 mg IntraMUSCular PRN    sodium chloride (NS) flush 5-40 mL  5-40 mL IntraVENous Q8H    sodium chloride (NS) flush 5-40 mL  5-40 mL IntraVENous PRN    acetaminophen (TYLENOL) tablet 650 mg  650 mg Oral Q6H PRN    Or    acetaminophen (TYLENOL) suppository 650 mg  650 mg Rectal Q6H PRN    polyethylene glycol (MIRALAX) packet 17 g  17 g Oral DAILY PRN    ondansetron (ZOFRAN ODT) tablet 4 mg  4 mg Oral Q8H PRN    Or    ondansetron (ZOFRAN) injection 4 mg  4 mg IntraVENous Q6H PRN    enoxaparin (LOVENOX) injection 40 mg  40 mg SubCUTAneous DAILY    dextrose 10% infusion 0-250 mL  0-250 mL IntraVENous PRN    insulin lispro (HUMALOG) injection   SubCUTAneous AC&HS         Annette Thompson MD

## 2023-02-16 NOTE — PROGRESS NOTES
End of Shift Note    Bedside shift change report given to Pati Felizer (oncoming nurse) by Jose Monsalve RN (offgoing nurse). Report included the following information SBAR, Kardex, MAR, and Recent Results    Shift worked:  7p-7a     Shift summary and any significant changes:     Pt slept most of the night. Pt NPO after midnight. AM labs drawn. Admission database complete.      Concerns for physician to address:       Zone phone for oncoming shift:          Activity:  Activity Level: Up ad henok  Number times ambulated in hallways past shift: 0  Number of times OOB to chair past shift: 0    Cardiac:   Cardiac Monitoring: Yes      Cardiac Rhythm: Sinus Rhythm    Access:  Current line(s): PIV     Genitourinary:   Urinary status: voiding    Respiratory:   O2 Device: None (Room air)  Chronic home O2 use?: NO  Incentive spirometer at bedside: NO       GI:  Last Bowel Movement Date:  (PTA)  Current diet:  DIET NPO  Passing flatus: YES  Tolerating current diet: YES       Pain Management:   Patient states pain is manageable on current regimen: YES    Skin:  Xavier Score: 20  Interventions: increase time out of bed and PT/OT consult    Patient Safety:  Fall Score:    Interventions: bed/chair alarm, assistive device (walker, cane, etc), gripper socks, pt to call before getting OOB, and stay with me (per policy)       Length of Stay:  Expected LOS: - - -  Actual LOS: 1      Jose Monsalve RN

## 2023-02-17 VITALS
RESPIRATION RATE: 16 BRPM | SYSTOLIC BLOOD PRESSURE: 133 MMHG | DIASTOLIC BLOOD PRESSURE: 57 MMHG | OXYGEN SATURATION: 95 % | TEMPERATURE: 98.7 F | HEART RATE: 86 BPM

## 2023-02-17 LAB
ALBUMIN SERPL-MCNC: 2.7 G/DL (ref 3.5–5)
ALBUMIN/GLOB SERPL: 0.7 (ref 1.1–2.2)
ALP SERPL-CCNC: 61 U/L (ref 45–117)
ALT SERPL-CCNC: 26 U/L (ref 12–78)
ANION GAP SERPL CALC-SCNC: 9 MMOL/L (ref 5–15)
AST SERPL-CCNC: 28 U/L (ref 15–37)
BASOPHILS # BLD: 0 K/UL (ref 0–0.1)
BASOPHILS NFR BLD: 1 % (ref 0–1)
BILIRUB SERPL-MCNC: 0.5 MG/DL (ref 0.2–1)
BUN SERPL-MCNC: 23 MG/DL (ref 6–20)
BUN/CREAT SERPL: 24 (ref 12–20)
CALCIUM SERPL-MCNC: 8.6 MG/DL (ref 8.5–10.1)
CHLORIDE SERPL-SCNC: 104 MMOL/L (ref 97–108)
CO2 SERPL-SCNC: 23 MMOL/L (ref 21–32)
CREAT SERPL-MCNC: 0.94 MG/DL (ref 0.55–1.02)
DIFFERENTIAL METHOD BLD: ABNORMAL
EOSINOPHIL # BLD: 0.2 K/UL (ref 0–0.4)
EOSINOPHIL NFR BLD: 2 % (ref 0–7)
ERYTHROCYTE [DISTWIDTH] IN BLOOD BY AUTOMATED COUNT: 13.7 % (ref 11.5–14.5)
GLOBULIN SER CALC-MCNC: 3.7 G/DL (ref 2–4)
GLUCOSE BLD STRIP.AUTO-MCNC: 155 MG/DL (ref 65–117)
GLUCOSE SERPL-MCNC: 166 MG/DL (ref 65–100)
HCT VFR BLD AUTO: 32.3 % (ref 35–47)
HGB BLD-MCNC: 10.8 G/DL (ref 11.5–16)
IMM GRANULOCYTES # BLD AUTO: 0.1 K/UL (ref 0–0.04)
IMM GRANULOCYTES NFR BLD AUTO: 1 % (ref 0–0.5)
LYMPHOCYTES # BLD: 1.5 K/UL (ref 0.8–3.5)
LYMPHOCYTES NFR BLD: 17 % (ref 12–49)
MAGNESIUM SERPL-MCNC: 2 MG/DL (ref 1.6–2.4)
MCH RBC QN AUTO: 28.7 PG (ref 26–34)
MCHC RBC AUTO-ENTMCNC: 33.4 G/DL (ref 30–36.5)
MCV RBC AUTO: 85.9 FL (ref 80–99)
MONOCYTES # BLD: 1 K/UL (ref 0–1)
MONOCYTES NFR BLD: 11 % (ref 5–13)
NEUTS SEG # BLD: 6.1 K/UL (ref 1.8–8)
NEUTS SEG NFR BLD: 68 % (ref 32–75)
NRBC # BLD: 0 K/UL (ref 0–0.01)
NRBC BLD-RTO: 0 PER 100 WBC
PLATELET # BLD AUTO: 253 K/UL (ref 150–400)
PMV BLD AUTO: 10.7 FL (ref 8.9–12.9)
POTASSIUM SERPL-SCNC: 4.2 MMOL/L (ref 3.5–5.1)
PROT SERPL-MCNC: 6.4 G/DL (ref 6.4–8.2)
RBC # BLD AUTO: 3.76 M/UL (ref 3.8–5.2)
SERVICE CMNT-IMP: ABNORMAL
SODIUM SERPL-SCNC: 136 MMOL/L (ref 136–145)
WBC # BLD AUTO: 8.8 K/UL (ref 3.6–11)

## 2023-02-17 PROCEDURE — 74011250637 HC RX REV CODE- 250/637: Performed by: INTERNAL MEDICINE

## 2023-02-17 PROCEDURE — 80053 COMPREHEN METABOLIC PANEL: CPT

## 2023-02-17 PROCEDURE — 82962 GLUCOSE BLOOD TEST: CPT

## 2023-02-17 PROCEDURE — 74011000250 HC RX REV CODE- 250: Performed by: INTERNAL MEDICINE

## 2023-02-17 PROCEDURE — 83735 ASSAY OF MAGNESIUM: CPT

## 2023-02-17 PROCEDURE — 36415 COLL VENOUS BLD VENIPUNCTURE: CPT

## 2023-02-17 PROCEDURE — 74011250637 HC RX REV CODE- 250/637: Performed by: PHYSICIAN ASSISTANT

## 2023-02-17 PROCEDURE — 93005 ELECTROCARDIOGRAM TRACING: CPT

## 2023-02-17 PROCEDURE — 74011636637 HC RX REV CODE- 636/637: Performed by: PHYSICIAN ASSISTANT

## 2023-02-17 PROCEDURE — 85025 COMPLETE CBC W/AUTO DIFF WBC: CPT

## 2023-02-17 RX ADMIN — AZELASTINE HYDROCHLORIDE 2 SPRAY: 137 SPRAY, METERED NASAL at 08:39

## 2023-02-17 RX ADMIN — COLESTIPOL HYDROCHLORIDE 4 G: 1 TABLET, FILM COATED ORAL at 08:36

## 2023-02-17 RX ADMIN — PANTOPRAZOLE SODIUM 40 MG: 40 TABLET, DELAYED RELEASE ORAL at 08:39

## 2023-02-17 RX ADMIN — FENOFIBRATE 54 MG: 54 TABLET ORAL at 08:44

## 2023-02-17 RX ADMIN — Medication 500 MCG: at 08:37

## 2023-02-17 RX ADMIN — PREGABALIN 50 MG: 25 CAPSULE ORAL at 08:36

## 2023-02-17 RX ADMIN — Medication 3 UNITS: at 08:44

## 2023-02-17 RX ADMIN — LOSARTAN POTASSIUM 100 MG: 50 TABLET, FILM COATED ORAL at 08:36

## 2023-02-17 RX ADMIN — CLOPIDOGREL BISULFATE 75 MG: 75 TABLET ORAL at 08:36

## 2023-02-17 RX ADMIN — ASPIRIN 81 MG: 81 TABLET, CHEWABLE ORAL at 08:36

## 2023-02-17 RX ADMIN — RANOLAZINE 500 MG: 500 TABLET, FILM COATED, EXTENDED RELEASE ORAL at 08:36

## 2023-02-17 RX ADMIN — ATORVASTATIN CALCIUM 80 MG: 40 TABLET, FILM COATED ORAL at 08:36

## 2023-02-17 RX ADMIN — SODIUM CHLORIDE, PRESERVATIVE FREE 5 ML: 5 INJECTION INTRAVENOUS at 05:40

## 2023-02-17 RX ADMIN — CARVEDILOL 6.25 MG: 6.25 TABLET, FILM COATED ORAL at 08:36

## 2023-02-17 RX ADMIN — AMLODIPINE BESYLATE 2.5 MG: 2.5 TABLET ORAL at 08:36

## 2023-02-17 NOTE — DISCHARGE SUMMARY
Admit date: 2/15/2023   Admitting Provider: Johnny Colorado DO    Discharge date: 2/17/2023  Discharging Provider: BRIANA Izaguirre      * Admission Diagnoses: Unstable angina [facility]) [I20.0]    * Discharge Diagnoses:    Hospital Problems as of 2/17/2023 Date Reviewed: 2/10/2023            Codes Class Noted - Resolved POA    Unstable angina ([facility] [address] ICD-10-CM: I20.0  ICD-9-CM: 411.1  2/15/2023 - Present Unknown           * Hospital Course:     Elizabeth Barahona is a 81-year-old female with PMH of hypertension, CAD with 3 stents, CKD III, and liver disease who presented as a transfer from [facility] for cath procedure due to unstable angina. Patient was admitted 2/12 and cardiac stress test revealed EF 68% with a defect of approximately 40% of the left ventricle with 10% of that being reversible. In ED vitals stable. Initial labs significant for WBC of 11.7, creatinine 1.18, glucose of 150. Patient started on heparin drip. Cardiology consulted. Cardiac cath performed on 2/16 showed DFE of LM, PCI of small OM2 with NOLBERTO and normal EF. Patient to continue DAPT (aspirin and clopidogrel) and PTA medications. Patient to follow-up with PCP and cardiology within 2-3 weeks from discharge. All questions answered at time of encounter. * Procedures:   Procedure(s):  LEFT HEART CATH / CORONARY ANGIOGRAPHY  FRACTIONAL FLOW RESERVE  INSERT STENT NOLBERTO CORONARY  LEFT VENTRICULOGRAPHY      Consults:   cardiology    Significant Diagnostic Studies: As discussed in hospital course    Discharge Exam:  Visit Vitals  BP (!) 133/57   Pulse 86   Temp 98.7 °F (37.1 °C)   Resp 16   SpO2 95%       PHYSICAL EXAM:  Constitutional: Alert in no acute distress. Obese. HEENT: Sclerae anicteric, The neck is supple. Cardiovascular: Regular rate and rhythm. No murmurs, gallops, or rubs. Bridget Freud Respiratory: Clear breath sounds with no wheezes, rales, or rhonchi. GI: Abdomen nondistended, soft, and nontender. Normal active bowel sounds. Rectal: Deferred   Musculoskeletal: No pitting edema of the lower legs. Extremities have good range of motion. Varicose veins BLE, non-tender. Neurological:  Patient is alert and oriented. Cranial nerves II-XII intact  Psychiatric: Mood appears appropriate with judgement intact. Lymphatic: No cervical or supraclavicular adenopathy. Skin: No rashes or breakdown of the skin      * Discharge Condition: improved  * Disposition: Home    Discharge Medications:  Current Discharge Medication List        CONTINUE these medications which have NOT CHANGED    Details   montelukast (SINGULAIR) 10 mg tablet TAKE 1 TABLET BY MOUTH EVERY DAY  Qty: 90 Tablet, Refills: 3      colestipoL (COLESTID) 1 gram tablet TAKE 1 TABLET BY MOUTH EVERY DAY AS DIRECTED      traMADoL (ULTRAM) 50 mg tablet Take 1 Tablet by mouth two (2) times daily as needed for Pain for up to 7 days. Max Daily Amount: 100 mg. Qty: 15 Tablet, Refills: 0    Associated Diagnoses: Incomplete tear of right rotator cuff, unspecified whether traumatic      [employer] U-300 Insulin 300 unit/mL (1.5 mL) inpn pen ADMINISTER 34 UNITS UNDER THE SKIN DAILY FOR DIABETES  Qty: 4.5 mL, Refills: 2      azelastine (ASTELIN) 137 mcg (0.1 %) nasal spray USE 2 SPRAYS IN EACH NOSTRIL TWICE DAILY  Qty: 1 Each, Refills: 5      atorvastatin (LIPITOR) 80 mg tablet TAKE 1 TABLET BY MOUTH DAILY  Qty: 90 Tablet, Refills: 3      losartan (COZAAR) 100 mg tablet TAKE 1 TABLET BY MOUTH DAILY  Qty: 90 Tablet, Refills: 3    Associated Diagnoses: Essential hypertension      amLODIPine (NORVASC) 2.5 mg tablet Take 2.5 mg by mouth daily. insulin lispro (HUMALOG) 100 unit/mL kwikpen Inject 8 units before meals + 2 units for every 50 mg/dl above 150 mg/dl--Dose change 08/26/22--updated med list--did not send prescription to the pharmacy  Qty: 15 mL, Refills: 1      pantoprazole (PROTONIX) 40 mg tablet Take 1 Tablet by mouth daily.   Qty: 90 Tablet, Refills: 3    Associated Diagnoses: Gastroesophageal reflux disease without esophagitis      famotidine (PEPCID) 20 mg tablet Take 1 Tablet by mouth nightly. Qty: 90 Tablet, Refills: 3    Associated Diagnoses: Gastroesophageal reflux disease without esophagitis      pregabalin (LYRICA) 50 mg capsule Take 1 Capsule by mouth three (3) times daily. Max Daily Amount: 150 mg.  Qty: 90 Capsule, Refills: 5    Associated Diagnoses: Diabetic polyneuropathy associated with type 2 diabetes mellitus (HCC)      metFORMIN ER (GLUCOPHAGE XR) 500 mg tablet Take 2 tabs with dinner--replaces janumet  Qty: 60 Tablet, Refills: 11      glucose blood VI test strips (OneTouch Ultra Test) strip Use to check blood sugar three times daily. E11.65  Qty: 300 Strip, Refills: 11      ondansetron (ZOFRAN ODT) 4 mg disintegrating tablet Take 1 Tab by mouth every eight (8) hours as needed for Nausea. Qty: 25 Tab, Refills: 0      Insulin Needles, Disposable, (PEN NEEDLE) 31 gauge x 3/16\" ndle Use as directed once daily  Qty: 100 Pen Needle, Refills: 3      Blood-Glucose Meter monitoring kit One touch ultra mini glucometer--dx 250.00  Qty: 1 Kit, Refills: 0      Lancets misc Check fsbs bid -250.02  Qty: 200 Each, Refills: 3      nitroglycerin (NITROSTAT) 0.4 mg SL tablet 1 Tab by SubLINGual route every five (5) minutes as needed for Chest Pain. Qty: 25 Tab, Refills: 3      carvediloL (COREG) 6.25 mg tablet Take  by mouth two (2) times daily (with meals). fenofibrate (LOFIBRA) 54 mg tablet Take  by mouth daily. cyanocobalamin (VITAMIN B12) 500 mcg tablet Take 500 mcg by mouth daily. ranolazine ER (RANEXA) 500 mg SR tablet Take 1 Tab by mouth two (2) times a day. Qty: 60 Tab, Refills: 12      clopidogrel (PLAVIX) 75 mg tablet Take 1 Tab by mouth daily. Qty: 30 Tab, Refills: 11      aspirin 81 mg Tab Take 81 mg by mouth daily. MULTIVITAMINS W-MINERALS/LUT (CENTRUM SILVER PO) Take 1 Tab by mouth daily.            STOP taking these medications       dexAMETHasone (DECADRON) 4 mg tablet Comments:   Reason for Stopping:               * Follow-up Care/Patient Instructions:   Activity: Activity as tolerated  Diet: Cardiac and Diabetic Diet  Wound Care: None needed    Follow-up Information       Follow up With Specialties Details Why Contact Info    Norbert Pritchett MD Internal Medicine Physician Schedule an appointment as soon as possible for a visit in 2 week(s) Post-hospitalization follow-up [address] [facility]  [address] [facility]      [facility] [address] [facility] Vascular Surgery, Interventional Cardiology Physician, Cardiovascular Disease Physician Schedule an appointment as soon as possible for a visit in 2 week(s) Post-hospitalization follow-up [address]  [id number]  Terri Fowler [phone]              Discharge summary greater than 35 minutes spent with the patient performing discharge instructions, medication review and physical exam    Signed:  BRIANA Dominguez  2/17/2023  7:40 AM    Time spent: 30 minutes

## 2023-02-17 NOTE — PROGRESS NOTES
[id number] Received bedside shift report from night shift RN. Pt resting in bed, no complaints. VSS. Ambulating in room and hallway. OK for discharge per cards and hospitalist.     Discharge instructions discussed with patient. All questions answered. Patient verbalized understanding. Cath site CDI, no hematoma. Care instructions and restrictions reviewed. Patient instructed to make follow up appts per discharge instructions. Patient signed discharge instructions after reviewing them and duplicate copy placed in chart. Belongings gathered and accounted for. Telemetry monitor and IV removed. Tolerating ambulation in room and hallway. Denies CP, SOB, or dizziness. 1230 Escorted out via w/c, discharged home with family in a private vehicle.

## 2023-02-17 NOTE — PROGRESS NOTES
Progress Note      2/17/2023 10:38 AM  NAME: Em Pederson   MRN:  [id number]   Admit Diagnosis: Unstable angina ([facility] [address] [I20.0]                Assessment:       Chest pain/dyspnea  Stress with lateral ischemia     - Cath in 2014 with diffuse disease. Inf STEMI 2016 with diffuse CAD, 100% LCx, three stents to RCA. , on Ranexa 500mg bid, could not tolerate 1000mg bid, ER x2 since MI, negative enzymes, no improvement with sl nitro, stress 5/2016 with inf/post MI no residual Stress 2/2020 Ex 3 min 15 sec no ischemia  - ICM with EF 45%, improved Echo 9/2021 EF 55%, mild MR  - Sinus with inf/lat TWI  - HTN, feels better off of atenolol  - DM Vicente Ma  - Dyslipidemia has had issues with statins in past so far ok now on atorvastatin  - CKD Dr. Corrie Orozco  - DOMINGO on CPAP  - Anemia s/p endoscopy with unrevealing work up  - [address] 4/2019  - s/p cholecystectomy 11/2020  Live with son, daughter nurse, son in law OB/gyn, adls, was going to gym but no longer due to arthritis                     Plan:        - Chest pain/dyspnea with negative enzymes  - Euvolemic  - Sinus     - Cath with negative DFR of LM. PCI of small OM2 with NOLBERTO. Patent RCA stents. Normal EF.     - Cont asa, clopidogrel  - Cont coreg  - Cont losarta  - Cont amlodipine  - Cont ranexa  - Cont atorvastatin, fenofibrate    Home today, no change in meds. [x]        High complexity decision making was performed    We discussed the expected course, resolution and complications of the diagnoses in detail. Medication risk, benefits, costs, interactions, and alternatives were discussed as indicated. I advised him to contact the office if his condition worsens, changes or fails to improve as anticipated. Patient expressed understanding with the diagnoses  and plan. Subjective:     Em Pederson denies chest pain, dyspnea. Discussed with RN events overnight.      Review of Systems:    Symptom Y/N Comments  Symptom Y/N Comments   Fever/Chills N   Chest Pain N    Poor Appetite N   Edema N    Cough N   Abdominal Pain N    Sputum N   Joint Pain N    SOB/BIRD N   Pruritis/Rash N    Nausea/vomit N   Tolerating PT/OT Y    Diarrhea N   Tolerating Diet Y    Constipation N   Other       Could NOT obtain due to:      Objective:      Physical Exam:    Last 24hrs VS reviewed since prior progress note. Most recent are:    Visit Vitals  /75   Pulse 75   Temp 98.6 °F (37 °C)   Resp 16   SpO2 93%       Intake/Output Summary (Last 24 hours) at 2/17/2023 5317  Last data filed at 2/16/2023 1700  Gross per 24 hour   Intake 600 ml   Output 700 ml   Net -100 ml          General Appearance: Well developed, well nourished, alert & oriented x 3,    no acute distress. Ears/Nose/Mouth/Throat: Hearing grossly normal.  Neck: Supple. Chest: Lungs clear to auscultation bilaterally. Cardiovascular: Regular rate and rhythm, S1S2 normal, no murmur. Abdomen: Soft, non-tender, bowel sounds are active. Extremities: No edema bilaterally. Skin: Warm and dry. PMH/ reviewed - no change compared to H&P    Data Review    Telemetry: normal sinus rhythm     Lab Data Personally Reviewed:    Recent Labs     02/17/23  0348 02/16/23 0214   WBC 8.8 11.7*   HGB 10.8* 11.4*   HCT 32.3* 35.1    298       No results for input(s): INR, PTP, APTT, INREXT, INREXT in the last 72 hours. Recent Labs     02/17/23  0348 02/16/23 0214    133*   K 4.2 4.3    100   CO2 23 23   BUN 23* 30*   CREA 0.94 1.18*   * 150*   CA 8.6 9.0   MG 2.0  --        No results for input(s): CPK, CKNDX, TROIQ in the last 72 hours.     No lab exists for component: CPKMB  Lab Results   Component Value Date/Time    Cholesterol, total 136 07/05/2022 11:42 AM    HDL Cholesterol 46 07/05/2022 11:42 AM    LDL, calculated 49.2 07/05/2022 11:42 AM    Triglyceride 204 (H) 07/05/2022 11:42 AM    CHOL/HDL Ratio 3.0 07/05/2022 11:42 AM       Recent Labs     02/17/23  0348 02/16/23  0214   AP 61 62   TP 6.4 6.9   ALB 2. 7* 3.0*   GLOB 3.7 3.9       No results for input(s): PH, PCO2, PO2 in the last 72 hours.     Medications Personally Reviewed:    Current Facility-Administered Medications   Medication Dose Route Frequency    amLODIPine (NORVASC) tablet 2.5 mg  2.5 mg Oral DAILY    aspirin chewable tablet 81 mg  81 mg Oral DAILY    atorvastatin (LIPITOR) tablet 80 mg  80 mg Oral DAILY    azelastine (ASTELIN) 137mcg/spray nasal spray  2 Spray Both Nostrils Q12H    clopidogreL (PLAVIX) tablet 75 mg  75 mg Oral DAILY    colestipoL (COLESTID) tablet 4 g  4 g Oral DAILY    cyanocobalamin (VITAMIN B12) tablet 500 mcg  500 mcg Oral DAILY    famotidine (PEPCID) tablet 20 mg  20 mg Oral QHS    fenofibrate (LOFIBRA) tablet 54 mg  54 mg Oral DAILY    losartan (COZAAR) tablet 100 mg  100 mg Oral DAILY    montelukast (SINGULAIR) tablet 10 mg  10 mg Oral QHS    nitroglycerin (NITROSTAT) tablet 0.4 mg  0.4 mg SubLINGual Q5MIN PRN    pantoprazole (PROTONIX) tablet 40 mg  40 mg Oral DAILY    pregabalin (LYRICA) capsule 50 mg  50 mg Oral TID    ranolazine ER (RANEXA) tablet 500 mg  500 mg Oral BID    traMADoL (ULTRAM) tablet 50 mg  50 mg Oral BID PRN    carvediloL (COREG) tablet 6.25 mg  6.25 mg Oral BID WITH MEALS    sodium chloride (NS) flush 5-40 mL  5-40 mL IntraVENous Q8H    sodium chloride (NS) flush 5-40 mL  5-40 mL IntraVENous PRN    naloxone (NARCAN) injection 0.4 mg  0.4 mg IntraVENous PRN    LORazepam (ATIVAN) tablet 0.5 mg  0.5 mg Oral Q6H PRN    glucose chewable tablet 16 g  4 Tablet Oral PRN    glucagon (GLUCAGEN) injection 1 mg  1 mg IntraMUSCular PRN    acetaminophen (TYLENOL) tablet 650 mg  650 mg Oral Q6H PRN    Or    acetaminophen (TYLENOL) suppository 650 mg  650 mg Rectal Q6H PRN    polyethylene glycol (MIRALAX) packet 17 g  17 g Oral DAILY PRN    ondansetron (ZOFRAN ODT) tablet 4 mg  4 mg Oral Q8H PRN    Or    ondansetron (ZOFRAN) injection 4 mg  4 mg IntraVENous Q6H PRN    enoxaparin (LOVENOX) injection 40 mg  40 mg SubCUTAneous DAILY    dextrose 10% infusion 0-250 mL  0-250 mL IntraVENous PRN    insulin lispro (HUMALOG) injection   SubCUTAneous AC&HS         Tana Singleton MD

## 2023-02-17 NOTE — PROGRESS NOTES
Problem: Falls - Risk of  Goal: *Absence of Falls  Description: Document Delroy Potts Fall Risk and appropriate interventions in the flowsheet.   2/16/2023 2259 by Ozie Beat  Outcome: Progressing Towards Goal  Note: Fall Risk Interventions:  Mobility Interventions: Patient to call before getting OOB                          2/16/2023 2259 by Ozie Beat  Outcome: Progressing Towards Goal  Note: Fall Risk Interventions:  Mobility Interventions: Patient to call before getting OOB                             Problem: Patient Education: Go to Patient Education Activity  Goal: Patient/Family Education  2/16/2023 2259 by Ozie Beat  Outcome: Progressing Towards Goal  2/16/2023 2259 by Ozie Beat  Outcome: Progressing Towards Goal     Problem: General Medical Care Plan  Goal: *Vital signs within specified parameters  2/16/2023 2259 by Ozie Beat  Outcome: Progressing Towards Goal  2/16/2023 2259 by Ozie Beat  Outcome: Progressing Towards Goal  Goal: *Labs within defined limits  2/16/2023 2259 by Ozie Beat  Outcome: Progressing Towards Goal  2/16/2023 2259 by Ozie Beat  Outcome: Progressing Towards Goal  Goal: *Absence of infection signs and symptoms  2/16/2023 2259 by Ozie Beat  Outcome: Progressing Towards Goal  2/16/2023 2259 by Ozie Beat  Outcome: Progressing Towards Goal  Goal: *Optimal pain control at patient's stated goal  2/16/2023 2259 by Ozie Beat  Outcome: Progressing Towards Goal  2/16/2023 2259 by Ozie Beat  Outcome: Progressing Towards Goal  Goal: *Skin integrity maintained  2/16/2023 2259 by Ozie Beat  Outcome: Progressing Towards Goal  2/16/2023 2259 by Ozie Beat  Outcome: Progressing Towards Goal  Goal: *Fluid volume balance  2/16/2023 2259 by Ozie Beat  Outcome: Progressing Towards Goal  2/16/2023 2259 by Ozie Beat  Outcome: Progressing Towards Goal  Goal: *Optimize nutritional status  2/16/2023 2259 by Fidel Crowder Maryam Foster  Outcome: Progressing Towards Goal  2/16/2023 2259 by Jenita Ax  Outcome: Progressing Towards Goal  Goal: *Anxiety reduced or absent  2/16/2023 2259 by Jenita Ax  Outcome: Progressing Towards Goal  2/16/2023 2259 by Jenita Ax  Outcome: Progressing Towards Goal  Goal: *Progressive mobility and function (eg: ADL's)  2/16/2023 2259 by Jenita Ax  Outcome: Progressing Towards Goal  2/16/2023 2259 by Jenita Ax  Outcome: Progressing Towards Goal     Problem: Patient Education: Go to Patient Education Activity  Goal: Patient/Family Education  2/16/2023 2259 by Jenita Ax  Outcome: Progressing Towards Goal  2/16/2023 2259 by Jenita Ax  Outcome: Progressing Towards Goal     Problem: Patient Education: Go to Patient Education Activity  Goal: Patient/Family Education  2/16/2023 2259 by Jenita Ax  Outcome: Progressing Towards Goal  2/16/2023 2259 by Jenita Ax  Outcome: Progressing Towards Goal     Problem: Unstable angina/NSTEMI: Day of Admission/Day 1  Goal: Off Pathway (Use only if patient is Off Pathway)  2/16/2023 2259 by Jenita Ax  Outcome: Progressing Towards Goal  2/16/2023 2259 by Jenita Ax  Outcome: Progressing Towards Goal  Goal: Activity/Safety  2/16/2023 2259 by Jenita Ax  Outcome: Progressing Towards Goal  2/16/2023 2259 by Jenita Ax  Outcome: Progressing Towards Goal  Goal: Consults, if ordered  2/16/2023 2259 by Jenita Ax  Outcome: Progressing Towards Goal  2/16/2023 2259 by Jenita Ax  Outcome: Progressing Towards Goal  Goal: Diagnostic Test/Procedures  2/16/2023 2259 by Jenita Ax  Outcome: Progressing Towards Goal  2/16/2023 2259 by Jenita Ax  Outcome: Progressing Towards Goal  Goal: Nutrition/Diet  2/16/2023 2259 by Jenita Ax  Outcome: Progressing Towards Goal  2/16/2023 2259 by Jenita Ax  Outcome: Progressing Towards Goal  Goal: Discharge Planning  2/16/2023 2259 by Jenita Ax  Outcome: Progressing Towards Goal  2/16/2023 2259 by Eleanor Odor  Outcome: Progressing Towards Goal  Goal: Medications  2/16/2023 2259 by Eleanor Odor  Outcome: Progressing Towards Goal  2/16/2023 2259 by Eleanor Odor  Outcome: Progressing Towards Goal  Goal: Respiratory  2/16/2023 2259 by Eleanor Odor  Outcome: Progressing Towards Goal  2/16/2023 2259 by Eleanor Odor  Outcome: Progressing Towards Goal  Goal: Treatments/Interventions/Procedures  2/16/2023 2259 by Eleanor Odor  Outcome: Progressing Towards Goal  2/16/2023 2259 by Eleanor Odor  Outcome: Progressing Towards Goal  Goal: Psychosocial  2/16/2023 2259 by Eleanor Odor  Outcome: Progressing Towards Goal  2/16/2023 2259 by Eleanor Odor  Outcome: Progressing Towards Goal  Goal: *Hemodynamically stable  2/16/2023 2259 by Eleanor Odor  Outcome: Progressing Towards Goal  2/16/2023 2259 by Eleanor Odor  Outcome: Progressing Towards Goal  Goal: *Optimal pain control at patient's stated goal  2/16/2023 2259 by Eleanor Odor  Outcome: Progressing Towards Goal  2/16/2023 2259 by Eleanor Odor  Outcome: Progressing Towards Goal  Goal: *Lungs clear or at baseline  2/16/2023 2259 by Eleanor Odor  Outcome: Progressing Towards Goal  2/16/2023 2259 by Eleanor Odor  Outcome: Progressing Towards Goal     Problem: Unstable angina/NSTEMI: Day 2  Goal: Off Pathway (Use only if patient is Off Pathway)  2/16/2023 2259 by Eleanor Odor  Outcome: Progressing Towards Goal  2/16/2023 2259 by Eleanor Odor  Outcome: Progressing Towards Goal  Goal: Activity/Safety  2/16/2023 2259 by Eleanor Odor  Outcome: Progressing Towards Goal  2/16/2023 2259 by Eleanor Odor  Outcome: Progressing Towards Goal  Goal: Consults, if ordered  2/16/2023 2259 by Eleanor Odor  Outcome: Progressing Towards Goal  2/16/2023 2259 by Eleanor Odor  Outcome: Progressing Towards Goal  Goal: Diagnostic Test/Procedures  2/16/2023 2259 by Eleanor Odor  Outcome: Progressing Towards Goal  2/16/2023 2259 by Almon Delano  Outcome: Progressing Towards Goal  Goal: Nutrition/Diet  2/16/2023 2259 by Almon Delano  Outcome: Progressing Towards Goal  2/16/2023 2259 by Almon Delano  Outcome: Progressing Towards Goal  Goal: Discharge Planning  2/16/2023 2259 by Almon Delano  Outcome: Progressing Towards Goal  2/16/2023 2259 by Almon Delano  Outcome: Progressing Towards Goal  Goal: Medications  2/16/2023 2259 by Almon Delano  Outcome: Progressing Towards Goal  2/16/2023 2259 by Almon Delano  Outcome: Progressing Towards Goal  Goal: Respiratory  2/16/2023 2259 by Almon Delano  Outcome: Progressing Towards Goal  2/16/2023 2259 by Almon Delano  Outcome: Progressing Towards Goal  Goal: Treatments/Interventions/Procedures  2/16/2023 2259 by Almon Delano  Outcome: Progressing Towards Goal  2/16/2023 2259 by Almon Delano  Outcome: Progressing Towards Goal  Goal: Psychosocial  2/16/2023 2259 by Almon Delano  Outcome: Progressing Towards Goal  2/16/2023 2259 by Almon Delano  Outcome: Progressing Towards Goal  Goal: *Hemodynamically stable  2/16/2023 2259 by Almon Delano  Outcome: Progressing Towards Goal  2/16/2023 2259 by Almon Delano  Outcome: Progressing Towards Goal  Goal: *Optimal pain control at patient's stated goal  2/16/2023 2259 by Almon Delano  Outcome: Progressing Towards Goal  2/16/2023 2259 by Almon Delano  Outcome: Progressing Towards Goal  Goal: *Lungs clear or at baseline  2/16/2023 2259 by Almon Delano  Outcome: Progressing Towards Goal  2/16/2023 2259 by Almon Delano  Outcome: Progressing Towards Goal     Problem: Unstable Angina/NSTEMI: Discharge Outcomes  Goal: *Hemodynamically stable  2/16/2023 2259 by Almon Delano  Outcome: Progressing Towards Goal  2/16/2023 2259 by Almon Delano  Outcome: Progressing Towards Goal  Goal: *Stable cardiac rhythm  2/16/2023 2259 by Almon Delano  Outcome: Progressing Towards Goal  2/16/2023 2259 by Richard Corado  Outcome: Progressing Towards Goal  Goal: *Lungs clear or at baseline  2/16/2023 2259 by Richard Corado  Outcome: Progressing Towards Goal  2/16/2023 2259 by Richard Corado  Outcome: Progressing Towards Goal  Goal: *Optimal pain control at patient's stated goal  2/16/2023 2259 by Richard Corado  Outcome: Progressing Towards Goal  2/16/2023 2259 by Richard Corado  Outcome: Progressing Towards Goal  Goal: *Identifies cardiac risk factors  2/16/2023 2259 by Richard Corado  Outcome: Progressing Towards Goal  2/16/2023 2259 by Richard Corado  Outcome: Progressing Towards Goal  Goal: *Verbalizes home exercise program, activity guidelines, cardiac precautions  2/16/2023 2259 by Richard Corado  Outcome: Progressing Towards Goal  2/16/2023 2259 by Richard Corado  Outcome: Progressing Towards Goal  Goal: *Verbalizes understanding and describes prescribed diet  2/16/2023 2259 by Richard Corado  Outcome: Progressing Towards Goal  2/16/2023 2259 by Richard Corado  Outcome: Progressing Towards Goal  Goal: *Verbalizes name, dosage, time, side effects, and number of days to continue medications  2/16/2023 2259 by Richard Corado  Outcome: Progressing Towards Goal  2/16/2023 2259 by Richard Corado  Outcome: Progressing Towards Goal  Goal: *Anxiety reduced or absent  2/16/2023 2259 by Richard Corado  Outcome: Progressing Towards Goal  2/16/2023 2259 by Richard Corado  Outcome: Progressing Towards Goal  Goal: *Understands and describes signs and symptoms to report to providers(Stroke Metric)  2/16/2023 2259 by Richard Corado  Outcome: Progressing Towards Goal  2/16/2023 2259 by Richard Corado  Outcome: Progressing Towards Goal  Goal: *Describes follow-up/return visits to physicians  2/16/2023 2259 by Richard Corado  Outcome: Progressing Towards Goal  2/16/2023 2259 by Richard Corado  Outcome: Progressing Towards Goal  Goal: *Describes available resources and support systems  2/16/2023 2259 by Oliverio Garcia  Outcome: Progressing Towards Goal  2/16/2023 2259 by Oliverio Garcia  Outcome: Progressing Towards Goal  Goal: *Influenza immunization  2/16/2023 2259 by Oliverio Garcia  Outcome: Progressing Towards Goal  2/16/2023 2259 by Oliverio Garcia  Outcome: Progressing Towards Goal  Goal: *Pneumococcal immunization  2/16/2023 2259 by Oliverio Garcia  Outcome: Progressing Towards Goal  2/16/2023 2259 by Oliverio Garcia  Outcome: Progressing Towards Goal  Goal: *Describes smoking cessation resources  2/16/2023 2259 by Oliverio Garcia  Outcome: Progressing Towards Goal  2/16/2023 2259 by Oliverio Garcia  Outcome: Progressing Towards Goal     Problem: Diabetes Self-Management  Goal: *Disease process and treatment process  Description: Define diabetes and identify own type of diabetes; list 3 options for treating diabetes. 2/16/2023 2259 by Oliverio Garcia  Outcome: Progressing Towards Goal  2/16/2023 2259 by Oliverio Garcia  Outcome: Progressing Towards Goal  Goal: *Incorporating nutritional management into lifestyle  Description: Describe effect of type, amount and timing of food on blood glucose; list 3 methods for planning meals. 2/16/2023 2259 by Oliverio Garcia  Outcome: Progressing Towards Goal  2/16/2023 2259 by Oliverio Garcia  Outcome: Progressing Towards Goal  Goal: *Incorporating physical activity into lifestyle  Description: State effect of exercise on blood glucose levels. 2/16/2023 2259 by Oliverio Garcia  Outcome: Progressing Towards Goal  2/16/2023 2259 by Oliverio Garcai  Outcome: Progressing Towards Goal  Goal: *Developing strategies to promote health/change behavior  Description: Define the ABC's of diabetes; identify appropriate screenings, schedule and personal plan for screenings.   2/16/2023 2259 by Oliverio Garcia  Outcome: Progressing Towards Goal  2/16/2023 2259 by Oliverio Garcia  Outcome: Progressing Towards Goal  Goal: *Using medications safely  Description: State effect of diabetes medications on diabetes; name diabetes medication taking, action and side effects. 2/16/2023 2259 by Antonieta Gould  Outcome: Progressing Towards Goal  2/16/2023 2259 by Antonieta Gould  Outcome: Progressing Towards Goal  Goal: *Monitoring blood glucose, interpreting and using results  Description: Identify recommended blood glucose targets  and personal targets. 2/16/2023 2259 by Antonieta Gould  Outcome: Progressing Towards Goal  2/16/2023 2259 by Antonieta Gould  Outcome: Progressing Towards Goal  Goal: *Prevention, detection, treatment of acute complications  Description: List symptoms of hyper- and hypoglycemia; describe how to treat low blood sugar and actions for lowering  high blood glucose level. 2/16/2023 2259 by Antonieta Gould  Outcome: Progressing Towards Goal  2/16/2023 2259 by Antonieta Gould  Outcome: Progressing Towards Goal  Goal: *Prevention, detection and treatment of chronic complications  Description: Define the natural course of diabetes and describe the relationship of blood glucose levels to long term complications of diabetes.   2/16/2023 2259 by Antonieta Gould  Outcome: Progressing Towards Goal  2/16/2023 2259 by Antonieta Gould  Outcome: Progressing Towards Goal  Goal: *Developing strategies to address psychosocial issues  Description: Describe feelings about living with diabetes; identify support needed and support network  2/16/2023 2259 by Antonieta Gould  Outcome: Progressing Towards Goal  2/16/2023 2259 by Antonieta Gould  Outcome: Progressing Towards Goal  Goal: *Insulin pump training  2/16/2023 2259 by Antonieta Gould  Outcome: Progressing Towards Goal  2/16/2023 2259 by Antonieta Gould  Outcome: Progressing Towards Goal  Goal: *Sick day guidelines  2/16/2023 2259 by Antonieta Gould  Outcome: Progressing Towards Goal  2/16/2023 2259 by Antonieta Gould  Outcome: Progressing Towards Goal  Goal: *Patient Specific Goal (EDIT GOAL, INSERT TEXT)  2/16/2023 2259 by Luli Semen  Outcome: Progressing Towards Goal  2/16/2023 2259 by Luli Semen  Outcome: Progressing Towards Goal

## 2023-02-17 NOTE — PROGRESS NOTES
Transition of Care Plan:    RUR: 8% - \"low risk\"  LOS: 2 days   Disposition: Home with follow up apts  Follow up appointments: PCP & specialist as indicated  DME needed: No current DME needs identified for d/c  Transportation at Discharge: Pt's daughter will provide transportation at d/c  Olinda or means to access home: Pt has access to her home       IM Medicare Letter: 1st IM not given by patient registration; 1st IM will need to be provided prior to d/c  Is patient a  and connected with the  [facility] [place] ? No                 Caregiver Contact: Pt's daughter Terry Lerma: [phone])  Discharge Caregiver contacted prior to discharge? Per request  Care Conference needed?: No, LIDA 23    Initial note: CM acknowledged d/c. Chart reviewed. Pt will d/c home with follow up apts. CM specialist informed of need for PCP follow up apt in anticipation for d/c. CM contacted pt via bedside phone, introduced role, and confirmed demographic information is up-to-date in the chart. Address detailed in chart is a PO box for mailing purposes; physical address confirmed as: [address] [place] [address] [address] [facility]. Pt's son lives with her in the home; 2 levels, 1 stair leading to entrance. Pt identified her daughter's Terry Lerma: [phone], Vishal Greene: [phone]) as emergency contacts. Preferred pharmacy reported as [facility] ([phone]). CM confirmed pt is agreeable to the d/c plan; no additional questions/concerns identified. Pt reported her daughter will provide transportation at the time of d/c. Full assessment detailed below:    Reason for Admission: Unstable Angina/chest pain                   RUR Score: 8% - \"low risk\"                   Plan for utilizing home health: No PT/OT consults in place currently. Pt reported being independent with ADL's/IADL's at baseline. Pt reported DME use in the form of a CPAP machine; pt reported no use of mobility aids to ambulate.  Pt declined prior hx of utilizing HH/SNF/IPR interventions          PCP: First and Last name:  Daja Terrell MD   Name of Practice: [facility]   Are you a current patient: Yes/No: Yes   Approximate date of last visit: 1-2 months ago   Can you participate in a virtual visit with your PCP: In-person visits preferred                    Current Advanced Directive/Advance Care Plan: Full Code - chart details AMD is on file, however, upon further review CM did not identify identify copy. CM inquired if pt has completed AMD in the past; pt confirmed. Pt reported her daughter should have a copy of AMD; CM requested copy for chart    Healthcare Decision Maker:              Primary Decision Maker: Matt Williamashlee - Daughter - [phone]    Secondary Decision Maker: Traci Arango  Daughter - [phone]                  Care Management Interventions  PCP Verified by CM: Yes  Palliative Care Criteria Met (RRAT>21 & CHF Dx)?: No  Mode of Transport at Discharge:  Other (see comment) (Pt's daughter will provide transportation at d/c)  Transition of Care Consult (CM Consult): Discharge Planning  Discharge Durable Medical Equipment: No  Physical Therapy Consult: No  Occupational Therapy Consult: No  Speech Therapy Consult: No  Support Systems: Child(jimbo) (Pt's son lives with her in a single story home with 1 LESTER)  Confirm Follow Up Transport: Family  [employer] Information Provided?: No  Discharge Location  Patient Expects to be Discharged to[de-identified] Home with family assistance (f/u apts)    HARDEEP Pearson  CM Team [address] [facility] [address] [id number] [place] [facility]  [phone]

## 2023-02-18 LAB
ATRIAL RATE: 76 BPM
CALCULATED P AXIS, ECG09: 36 DEGREES
CALCULATED R AXIS, ECG10: -14 DEGREES
CALCULATED T AXIS, ECG11: 33 DEGREES
DIAGNOSIS, 93000: NORMAL
P-R INTERVAL, ECG05: 154 MS
Q-T INTERVAL, ECG07: 412 MS
QRS DURATION, ECG06: 90 MS
QTC CALCULATION (BEZET), ECG08: 463 MS
VENTRICULAR RATE, ECG03: 76 BPM

## 2023-02-20 ENCOUNTER — PATIENT OUTREACH (OUTPATIENT)
Dept: CASE MANAGEMENT | Age: 83
End: 2023-02-20

## 2023-02-20 NOTE — PROGRESS NOTES
Care Transitions Initial Call    Call within 2 business days of discharge: Yes     Patient Current Location: [place]    Patient: Diana Wall Patient : 1940 MRN: [id number]    Last Discharge  Street       Date Complaint Diagnosis Description Type Department Provider    2/15/23  Chest pain Admission (Discharged) Aaliyah Miller MD            Was this an external facility discharge? No   Challenges to be reviewed by the provider   Additional needs identified to be addressed with provider: yes  advance care planning- Does not have an ACP on file -needs to bring in to be scanned into chart     23 11:22 23 02:14   Hemoglobin A1c, (calculated) 7.0 (H) 7.1 (H)   (H): Data is abnormally high         Method of communication with provider : staff message    Discussed COVID-19 related testing which was not done at this time. Test results were not done. Patient informed of results, if available? no     Advance Care Planning:   Does patient have an Advance Directive: not on file; education provided. Needs to bring in to be scanned into chart    Inpatient Readmission Risk score: Unplanned Readmit Risk Score: 8.1    Was this a readmission? no   Patient stated reason for the admission: C/P    Patients top risk factors for readmission: medical condition-USA, HTN, CKD, anemia    Interventions to address risk factors: Scheduled appointment with PCP-Dr Sorensen Degree 23 at 11:30 am , Scheduled appointment with Specialist-Dr Camargo-patient is to call and make an appt. , and Assessment and support for treatment adherence and medication management-USA, HTN, CKD, anemia      Care Transition Nurse (CTN) contacted the patient by telephone to perform post hospital discharge assessment. Verified name and  with patient as identifiers. Provided introduction to self, and explanation of the CTN role.      CTN reviewed discharge instructions, medical action plan and red flags with patient who verbalized understanding. Were discharge instructions available to patient? yes. Reviewed appropriate site of care based on symptoms and resources available to patient including: PCP and Specialist. Patient given an opportunity to ask questions and does not have any further questions or concerns at this time. The patient agrees to contact the PCP office for questions related to their healthcare. Medication reconciliation was performed with patient, who verbalizes understanding of administration of home medications. Referral to Pharm D needed: no     Home Health/Outpatient orders at discharge: none    Durable Medical Equipment ordered at discharge: None  Was patient discharged with a pulse oximeter? no    Discussed follow-up appointments. If no appointment was previously scheduled, appointment scheduling offered: yes. Is follow up appointment scheduled within 7 days of discharge? yes. [address] follow up appointment(s):   Future Appointments   Date Time Provider Santiago Perez   2/24/2023 11:30 AM Matteo Castaneda MD [facility] BS AMB   2/28/2023  1:30 PM Valorie Loya MD [facility] [address] BS AMB   3/23/2023  2:20 PM Chris Landeros MD [facility] BS AMB   7/19/2023 11:00 AM Matteo Castaneda MD [facility] BS AMB     Non-[facility] follow up appointment(s): Dr Zhang Rang for follow-up call in 5-7 days based on severity of symptoms and risk factors. CTN provided contact information for future needs. Goals Addressed                   This Visit's Progress     Attends follow-up appointments as directed. 02/20/23   Future Appointments   Date Time Provider   2/24/2023 11:30 AM Matteo Castaneda MD   2/28/2023  1:30 PM Valorie Loya MD   3/23/2023  2:20 PM Chris Landeros MD   Patient needs to call and make an appt with Cards -Dr Milan Asher. Monitor status of these appt on next call. Malik Patricio MSN, RN, CCM / Care Transition Nurse / [phone]        Prevent complications post hospitalization.         02/20/23   Patient denies any C/P or SOB, (R) groin denies any bruising, pain or swelling. Glucose this Am was 105, patient is eating and drinking well, up without assist devise. Reviewed how to take NTG, new bottle from 2 months ago, don't drive self to ED if you take 3 NTG. Patient states she has an ACP and it is in her daughter safe, asked that she find it and bring to PCP appt on 2/24 to be scanned into chart. Monitor for C/P, SOB, glucose level, take any NTG and did she take ACP to be scanned into chart?      Priscilla Browning MSN, RN, CCM / Care Transition Nurse / [phone]

## 2023-02-22 DIAGNOSIS — E11.42 DIABETIC POLYNEUROPATHY ASSOCIATED WITH TYPE 2 DIABETES MELLITUS (HCC): ICD-10-CM

## 2023-02-22 RX ORDER — PREGABALIN 50 MG/1
50 CAPSULE ORAL 3 TIMES DAILY
Qty: 90 CAPSULE | Refills: 5 | Status: SHIPPED | OUTPATIENT
Start: 2023-02-22

## 2023-02-22 NOTE — TELEPHONE ENCOUNTER
PCP: Clif Lewis MD    Last appt: 1/6/2023  Future Appointments   Date Time Provider Santiago Perez   2/24/2023 11:30 AM Clfi Lewis MD [facility] BS AMB   2/28/2023  1:30 PM Ezio Perez MD [facility] [address] BS AMB   3/23/2023  2:20 PM Gunnar Albrecht MD [facility] BS AMB   7/19/2023 11:00 AM Clif Lewis MD [facility] BS AMB       Requested Prescriptions     Pending Prescriptions Disp Refills    pregabalin (LYRICA) 50 mg capsule 90 Capsule 5     Sig: Take 1 Capsule by mouth three (3) times daily. Max Daily Amount: 150 mg.

## 2023-02-24 ENCOUNTER — VIRTUAL VISIT (OUTPATIENT)
Dept: INTERNAL MEDICINE CLINIC | Age: 83
End: 2023-02-24
Payer: MEDICARE

## 2023-02-24 DIAGNOSIS — E78.5 HYPERLIPIDEMIA, UNSPECIFIED HYPERLIPIDEMIA TYPE: ICD-10-CM

## 2023-02-24 DIAGNOSIS — I25.10 CORONARY ARTERY DISEASE INVOLVING NATIVE CORONARY ARTERY OF NATIVE HEART, UNSPECIFIED WHETHER ANGINA PRESENT: Primary | ICD-10-CM

## 2023-02-24 DIAGNOSIS — Z79.4 TYPE 2 DIABETES MELLITUS WITH DIABETIC NEUROPATHY, WITH LONG-TERM CURRENT USE OF INSULIN (HCC): ICD-10-CM

## 2023-02-24 DIAGNOSIS — E11.40 TYPE 2 DIABETES MELLITUS WITH DIABETIC NEUROPATHY, WITH LONG-TERM CURRENT USE OF INSULIN (HCC): ICD-10-CM

## 2023-02-24 NOTE — PROGRESS NOTES
HISTORY OF PRESENT ILLNESS  Pat Sood is a 81 y.o. female. HPI  Pat Sood, was evaluated through a synchronous (real-time) audio-video encounter. The patient (or guardian if applicable) is aware that this is a billable service, which includes applicable co-pays. This Virtual Visit was conducted with patient's (and/or legal guardian's) consent. The visit was conducted pursuant to the emergency declaration under the [facility], [address] [place] [address] authority and the [employer] Act. Patient identification was verified, and a caregiver was present when appropriate. The patient was located at: Home: [address] [place] [id number]  The provider was located at: [facility] [place] [address]      --Rola Lama MD on 2/24/2023 at 11:45 AM    An electronic signature was used to authenticate this note.    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 with microalbuminuria and neuropathyhtn hld cad s/p MI and stents DOMINGO on cpap fatty liver -here with her daughter    Here for ORLANDO HERNANDEZ  Admitted 2/15-2/17 for [place] transfer from [address] [place] [address] at [facility]  No MI  S/p cath with Lauren Arms Dr Rosa Haas  No new medicines  No chest pain or angina per pt  No f/c dysuria  No groin swelling at cath site  A1c 7.1  Last LDL 49      Admit date: 2/15/2023   Admitting Provider: Monica Boudreaux DO     Discharge date: 2/17/2023  Discharging Provider: BRIANA Aranda        * Admission Diagnoses: Unstable angina ([facility] [I20.0]     * Discharge Diagnoses:    Hospital Problems as of 2/17/2023 Date Reviewed: 2/10/2023              Codes Class Noted - Resolved POA     Unstable angina ([address] ICD-10-CM: I20.0  ICD-9-CM: 411.1   2/15/2023 - Present Unknown               * Hospital Course:      Wagner Mckeon is a 81-year-old female with PMH of hypertension, CAD with 3 stents, CKD III, and liver disease who presented as a transfer from [facility] for cath procedure due to unstable angina. Patient was admitted 2/12 and cardiac stress test revealed EF 68% with a defect of approximately 40% of the left ventricle with 10% of that being reversible. In ED vitals stable. Initial labs significant for WBC of 11.7, creatinine 1.18, glucose of 150. Patient started on heparin drip. Cardiology consulted. Cardiac cath performed on 2/16 showed DFE of LM, PCI of small OM2 with NOLBERTO and normal EF. Patient to continue DAPT (aspirin and clopidogrel) and PTA medications. Patient to follow-up with PCP and cardiology within 2-3 weeks from discharge. All questions answered at time of encounter. * Procedures:   Procedure(s):  LEFT HEART CATH / CORONARY ANGIOGRAPHY  FRACTIONAL FLOW RESERVE  INSERT STENT NOLBERTO CORONARY  LEFT VENTRICULOGRAPHY        Consults:   cardiology     Significant Diagnostic Studies: As discussed in hospital course     Discharge Exam:  Visit Vitals  BP (!) 133/57   Pulse 86   Temp 98.7 °F (37.1 °C)   Resp 16   SpO2 95%         PHYSICAL EXAM:  Constitutional: Alert in no acute distress. Obese. HEENT: Sclerae anicteric, The neck is supple. Cardiovascular: Regular rate and rhythm. No murmurs, gallops, or rubs. Zebedee Martyr Respiratory: Clear breath sounds with no wheezes, rales, or rhonchi. GI: Abdomen nondistended, soft, and nontender. Normal active bowel sounds. Rectal: Deferred   Musculoskeletal: No pitting edema of the lower legs. Extremities have good range of motion. Varicose veins BLE, non-tender. Neurological:  Patient is alert and oriented. Cranial nerves II-XII intact  Psychiatric: Mood appears appropriate with judgement intact. Lymphatic: No cervical or supraclavicular adenopathy.    Skin: No rashes or breakdown of the skin        * Discharge Condition: improved  * Disposition: Home     Discharge Medications:  Current Discharge Medication List               CONTINUE these medications which have NOT CHANGED     Details montelukast (SINGULAIR) 10 mg tablet TAKE 1 TABLET BY MOUTH EVERY DAY  Qty: 90 Tablet, Refills: 3       colestipoL (COLESTID) 1 gram tablet TAKE 1 TABLET BY MOUTH EVERY DAY AS DIRECTED       traMADoL (ULTRAM) 50 mg tablet Take 1 Tablet by mouth two (2) times daily as needed for Pain for up to 7 days. Max Daily Amount: 100 mg. Qty: 15 Tablet, Refills: 0     Associated Diagnoses: Incomplete tear of right rotator cuff, unspecified whether traumatic       Toujeo SoloStar U-300 Insulin 300 unit/mL (1.5 mL) inpn pen ADMINISTER 34 UNITS UNDER THE SKIN DAILY FOR DIABETES  Qty: 4.5 mL, Refills: 2       azelastine (ASTELIN) 137 mcg (0.1 %) nasal spray USE 2 SPRAYS IN EACH NOSTRIL TWICE DAILY  Qty: 1 Each, Refills: 5       atorvastatin (LIPITOR) 80 mg tablet TAKE 1 TABLET BY MOUTH DAILY  Qty: 90 Tablet, Refills: 3       losartan (COZAAR) 100 mg tablet TAKE 1 TABLET BY MOUTH DAILY  Qty: 90 Tablet, Refills: 3     Associated Diagnoses: Essential hypertension       amLODIPine (NORVASC) 2.5 mg tablet Take 2.5 mg by mouth daily. insulin lispro (HUMALOG) 100 unit/mL kwikpen Inject 8 units before meals + 2 units for every 50 mg/dl above 150 mg/dl--Dose change 08/26/22--updated med list--did not send prescription to the pharmacy  Qty: 15 mL, Refills: 1       pantoprazole (PROTONIX) 40 mg tablet Take 1 Tablet by mouth daily. Qty: 90 Tablet, Refills: 3     Associated Diagnoses: Gastroesophageal reflux disease without esophagitis       famotidine (PEPCID) 20 mg tablet Take 1 Tablet by mouth nightly. Qty: 90 Tablet, Refills: 3     Associated Diagnoses: Gastroesophageal reflux disease without esophagitis       pregabalin (LYRICA) 50 mg capsule Take 1 Capsule by mouth three (3) times daily.  Max Daily Amount: 150 mg.  Qty: 90 Capsule, Refills: 5     Associated Diagnoses: Diabetic polyneuropathy associated with type 2 diabetes mellitus (HCC)       metFORMIN ER (GLUCOPHAGE XR) 500 mg tablet Take 2 tabs with dinner--replaces janumet  Qty: 60 Tablet, Refills: 11       glucose blood VI test strips (OneTouch Ultra Test) strip Use to check blood sugar three times daily. E11.65  Qty: 300 Strip, Refills: 11       ondansetron (ZOFRAN ODT) 4 mg disintegrating tablet Take 1 Tab by mouth every eight (8) hours as needed for Nausea. Qty: 25 Tab, Refills: 0       Insulin Needles, Disposable, (PEN NEEDLE) 31 gauge x 3/16\" ndle Use as directed once daily  Qty: 100 Pen Needle, Refills: 3       Blood-Glucose Meter monitoring kit One touch ultra mini glucometer--dx 250.00  Qty: 1 Kit, Refills: 0       Lancets misc Check fsbs bid -250.02  Qty: 200 Each, Refills: 3       nitroglycerin (NITROSTAT) 0.4 mg SL tablet 1 Tab by SubLINGual route every five (5) minutes as needed for Chest Pain. Qty: 25 Tab, Refills: 3       carvediloL (COREG) 6.25 mg tablet Take  by mouth two (2) times daily (with meals). fenofibrate (LOFIBRA) 54 mg tablet Take  by mouth daily. cyanocobalamin (VITAMIN B12) 500 mcg tablet Take 500 mcg by mouth daily. ranolazine ER (RANEXA) 500 mg SR tablet Take 1 Tab by mouth two (2) times a day. Qty: 60 Tab, Refills: 12       clopidogrel (PLAVIX) 75 mg tablet Take 1 Tab by mouth daily. Qty: 30 Tab, Refills: 11       aspirin 81 mg Tab Take 81 mg by mouth daily. MULTIVITAMINS W-MINERALS/LUT (CENTRUM SILVER PO) Take 1 Tab by mouth daily. STOP taking these medications         dexAMETHasone (DECADRON) 4 mg tablet Comments:   Reason for Stopping:                    * Follow-up Care/Patient Instructions:   Activity: Activity as tolerated  Diet: Cardiac and Diabetic Diet  Wound Care: None needed     Follow-up Information         Follow up With Specialties Details Why Contact Info     Kathreen Ganser, MD Internal Medicine Physician Schedule an appointment as soon as possible for a visit in 2 week(s) Post-hospitalization follow-up [address] [place] [address] [address]  [facility]  [phone] Tara Barajas  [facility] Vascular Surgery, Interventional Cardiology Physician, Cardiovascular Disease Physician Schedule an appointment as soon as possible for a visit in 2 week(s) Post-hospitalization follow-up [address]  [id number]  [address] [phone]                   Discharge summary greater than 35 minutes spent with the patient performing discharge instructions, medication review and physical exam     Signed:  BRIANA Pastor  2/17/2023  7:40 AM     Time spent: 30 minutes         Cosigned by: Alexandre Valderrama MD at 02/17/23 1550   Electronically signed by Africa Pendleton, [address] at 02/17/23 1046   Electronically signed by Alexandre Valderrama MD at 02/17/23 1550  Last OV  Fsbs <150-160 usually  Has fu Dr Ezio Martin next month  C/o urine frequency and itching x 1 week-no dysuria, has not had frequent UTI's    Patient Active Problem List    Diagnosis Date Noted    Unstable angina ([facility] [address] 02/15/2023    Chronic renal disease, stage III 07/04/2022    History of breast cancer 04/20/2020    Type 2 diabetes with nephropathy ([facility] [address] 08/10/2018    Fatty liver 02/23/2018    Iron deficiency anemia 12/01/2017    Osteopenia of left thigh 04/02/2017    Advanced care planning/counseling discussion 12/17/2015    Hiatal hernia 05/15/2012    CAD (coronary artery disease) 05/15/2012    Type 2 diabetes, controlled, with neuropathy ([facility] [address] 11/16/2009    Essential hypertension, benign 11/16/2009    Pure hypercholesterolemia 11/16/2009    DJD (degenerative joint disease) 11/16/2009    DOMINGO (obstructive sleep apnea) 11/16/2009     Current Outpatient Medications   Medication Sig Dispense Refill    pregabalin (LYRICA) 50 mg capsule Take 1 Capsule by mouth three (3) times daily.  Max Daily Amount: 150 mg. 90 Capsule 5    montelukast (SINGULAIR) 10 mg tablet TAKE 1 TABLET BY MOUTH EVERY DAY 90 Tablet 3    colestipoL (COLESTID) 1 gram tablet TAKE 1 TABLET BY MOUTH EVERY DAY AS DIRECTED      Toujeo SoloStar U-300 Insulin 300 unit/mL (1.5 mL) inpn pen ADMINISTER 34 UNITS UNDER THE SKIN DAILY FOR DIABETES 4.5 mL 2    azelastine (ASTELIN) 137 mcg (0.1 %) nasal spray USE 2 SPRAYS IN EACH NOSTRIL TWICE DAILY 1 Each 5    atorvastatin (LIPITOR) 80 mg tablet TAKE 1 TABLET BY MOUTH DAILY 90 Tablet 3    losartan (COZAAR) 100 mg tablet TAKE 1 TABLET BY MOUTH DAILY 90 Tablet 3    amLODIPine (NORVASC) 2.5 mg tablet Take 2.5 mg by mouth daily. insulin lispro (HUMALOG) 100 unit/mL kwikpen Inject 8 units before meals + 2 units for every 50 mg/dl above 150 mg/dl--Dose change 08/26/22--updated med list--did not send prescription to the pharmacy 15 mL 1    pantoprazole (PROTONIX) 40 mg tablet Take 1 Tablet by mouth daily. 90 Tablet 3    famotidine (PEPCID) 20 mg tablet Take 1 Tablet by mouth nightly. 90 Tablet 3    metFORMIN ER (GLUCOPHAGE XR) 500 mg tablet Take 2 tabs with dinner--replaces janumet 60 Tablet 11    glucose blood VI test strips (OneTouch Ultra Test) strip Use to check blood sugar three times daily. E11.65 300 Strip 11    ondansetron (ZOFRAN ODT) 4 mg disintegrating tablet Take 1 Tab by mouth every eight (8) hours as needed for Nausea. 25 Tab 0    Insulin Needles, Disposable, (PEN NEEDLE) 31 gauge x 3/16\" ndle Use as directed once daily 100 Pen Needle 3    Blood-Glucose Meter monitoring kit One touch ultra mini glucometer--dx 250.00 1 Kit 0    Lancets misc Check fsbs bid -250.02 200 Each 3    nitroglycerin (NITROSTAT) 0.4 mg SL tablet 1 Tab by SubLINGual route every five (5) minutes as needed for Chest Pain. 25 Tab 3    carvediloL (COREG) 6.25 mg tablet Take  by mouth two (2) times daily (with meals). fenofibrate (LOFIBRA) 54 mg tablet Take  by mouth daily. cyanocobalamin (VITAMIN B12) 500 mcg tablet Take 500 mcg by mouth daily. ranolazine ER (RANEXA) 500 mg SR tablet Take 1 Tab by mouth two (2) times a day. 60 Tab 12    clopidogrel (PLAVIX) 75 mg tablet Take 1 Tab by mouth daily.  30 Tab 11    aspirin 81 mg Tab Take 81 mg by mouth daily. MULTIVITAMINS W-MINERALS/LUT (CENTRUM SILVER PO) Take 1 Tab by mouth daily.        Allergies   Allergen Reactions    Codeine Other (comments)     Jerking sensation    Livalo [Pitavastatin] Diarrhea    Niaspan [Niacin] Myalgia    Statins-Hmg-Coa Reductase Inhibitors Myalgia     Zocor, pravachol, Lipitor, crestor    Welchol [Colesevelam] Other (comments)     Nausea, bloating and malaise    Zetia [Ezetimibe] Myalgia      Lab Results   Component Value Date/Time    WBC 8.8 02/17/2023 03:48 AM    HGB 10.8 (L) 02/17/2023 03:48 AM    HCT 32.3 (L) 02/17/2023 03:48 AM    Hematocrit (POC) 33 (L) 04/16/2019 05:17 PM    PLATELET 862 42/65/6720 03:48 AM    MCV 85.9 02/17/2023 03:48 AM     Lab Results   Component Value Date/Time    Cholesterol, total 136 07/05/2022 11:42 AM    Cholesterol (POC) 195 08/11/2011 10:08 AM    HDL Cholesterol 46 07/05/2022 11:42 AM    LDL, calculated 49.2 07/05/2022 11:42 AM    LDL Cholesterol (POC) 113 08/11/2011 10:08 AM    LDL-C, External 104 05/02/2016 12:00 AM    Triglyceride 204 (H) 07/05/2022 11:42 AM    Triglycerides (POC) 256 08/11/2011 10:08 AM    CHOL/HDL Ratio 3.0 07/05/2022 11:42 AM     Lab Results   Component Value Date/Time    GFR est non-AA 51 (L) 01/14/2022 10:28 AM    GFRNA, POC 54 (L) 04/16/2019 05:17 PM    GFR est AA 59 (L) 01/14/2022 10:28 AM    GFRAA, POC >60 04/16/2019 05:17 PM    Creatinine 0.94 02/17/2023 03:48 AM    Creatinine (POC) 1.0 04/16/2019 05:17 PM    BUN 23 (H) 02/17/2023 03:48 AM    BUN (POC) 18 04/16/2019 05:17 PM    Sodium 136 02/17/2023 03:48 AM    Sodium (POC) 138 04/16/2019 05:17 PM    Potassium 4.2 02/17/2023 03:48 AM    Potassium (POC) 4.2 04/16/2019 05:17 PM    Chloride 104 02/17/2023 03:48 AM    Chloride (POC) 102 04/16/2019 05:17 PM    CO2 23 02/17/2023 03:48 AM    Magnesium 2.0 02/17/2023 03:48 AM     Lab Results   Component Value Date/Time    TSH 1.27 07/05/2022 11:42 AM      Lab Results   Component Value Date/Time    Glucose 166 (H) 02/17/2023 03:48 AM    Glucose (POC) 155 (H) 02/17/2023 06:31 AM         ROS    Physical Exam  Constitutional:       Appearance: Normal appearance. Pulmonary:      Breath sounds: Normal breath sounds. Neurological:      Mental Status: She is alert. ASSESSMENT and PLAN    ICD-10-CM ICD-9-CM    1. Coronary artery disease involving native coronary artery of native heart, unspecified whether angina present  I25.10 414.01 No cp /angina  Continue medicines and fu CARD MD 1-2 weeks      2. Type 2 diabetes mellitus with diabetic neuropathy, with long-term current use of insulin (HCC)  E11.40 250.60 Fu Dr Diannah Fothergill    Z79.4 357.2      V58.67       3.  Hyperlipidemia, unspecified hyperlipidemia type  E78.5 272.4 LDL at goal   Fu as scheduled

## 2023-02-24 NOTE — PROGRESS NOTES
1. Have you been to the ER, urgent care clinic since your last visit? Hospitalized since your last visit? 2/15/2022- unstable angina/stent placed      2. Have you seen or consulted any other health care providers outside of the [address] [place] [address] since your last visit? Include any pap smears or colon screening.  No

## 2023-02-28 ENCOUNTER — OFFICE VISIT (OUTPATIENT)
Dept: ENDOCRINOLOGY | Age: 83
End: 2023-02-28
Payer: MEDICARE

## 2023-02-28 ENCOUNTER — PATIENT OUTREACH (OUTPATIENT)
Dept: CASE MANAGEMENT | Age: 83
End: 2023-02-28

## 2023-02-28 VITALS
DIASTOLIC BLOOD PRESSURE: 74 MMHG | HEART RATE: 86 BPM | SYSTOLIC BLOOD PRESSURE: 133 MMHG | WEIGHT: 173.8 LBS | BODY MASS INDEX: 30.79 KG/M2 | HEIGHT: 63 IN

## 2023-02-28 DIAGNOSIS — I10 ESSENTIAL HYPERTENSION, BENIGN: ICD-10-CM

## 2023-02-28 DIAGNOSIS — E11.21 TYPE 2 DIABETES WITH NEPHROPATHY (HCC): Primary | ICD-10-CM

## 2023-02-28 DIAGNOSIS — E78.00 PURE HYPERCHOLESTEROLEMIA: ICD-10-CM

## 2023-02-28 PROCEDURE — 1101F PT FALLS ASSESS-DOCD LE1/YR: CPT | Performed by: INTERNAL MEDICINE

## 2023-02-28 PROCEDURE — G8399 PT W/DXA RESULTS DOCUMENT: HCPCS | Performed by: INTERNAL MEDICINE

## 2023-02-28 PROCEDURE — G8432 DEP SCR NOT DOC, RNG: HCPCS | Performed by: INTERNAL MEDICINE

## 2023-02-28 PROCEDURE — 3078F DIAST BP <80 MM HG: CPT | Performed by: INTERNAL MEDICINE

## 2023-02-28 PROCEDURE — G8427 DOCREV CUR MEDS BY ELIG CLIN: HCPCS | Performed by: INTERNAL MEDICINE

## 2023-02-28 PROCEDURE — 3075F SYST BP GE 130 - 139MM HG: CPT | Performed by: INTERNAL MEDICINE

## 2023-02-28 PROCEDURE — 1111F DSCHRG MED/CURRENT MED MERGE: CPT | Performed by: INTERNAL MEDICINE

## 2023-02-28 PROCEDURE — G8536 NO DOC ELDER MAL SCRN: HCPCS | Performed by: INTERNAL MEDICINE

## 2023-02-28 PROCEDURE — G8417 CALC BMI ABV UP PARAM F/U: HCPCS | Performed by: INTERNAL MEDICINE

## 2023-02-28 PROCEDURE — 99214 OFFICE O/P EST MOD 30 MIN: CPT | Performed by: INTERNAL MEDICINE

## 2023-02-28 PROCEDURE — 3051F HG A1C>EQUAL 7.0%<8.0%: CPT | Performed by: INTERNAL MEDICINE

## 2023-02-28 PROCEDURE — 1123F ACP DISCUSS/DSCN MKR DOCD: CPT | Performed by: INTERNAL MEDICINE

## 2023-02-28 PROCEDURE — 1090F PRES/ABSN URINE INCON ASSESS: CPT | Performed by: INTERNAL MEDICINE

## 2023-02-28 RX ORDER — INSULIN LISPRO 100 [IU]/ML
INJECTION, SOLUTION INTRAVENOUS; SUBCUTANEOUS
Qty: 15 ML | Refills: 1
Start: 2023-02-28

## 2023-02-28 NOTE — PROGRESS NOTES
Chief Complaint   Patient presents with    Diabetes     PCP and pharmacy confirmed      History of Present Illness: Selin Orozco is a 81 y.o. female here for follow up of diabetes. Weight down 2 lbs since last visit in 8/22. Was just admitted earlier this month and had a stent placed and her breathing and chest pressure has been better and no changes were made to her meds. Has been adjusting her dose of toujeo at bedtime based on her readings and if under 200 will take 32 units and if over 200 will take 34 units. Has taken a few doses of 36 units if her sugar is well above 200. Did have 2 steroid injections in her shoulder and one of the injections was 5 days before her hospital stay this month and did see readings over 300 after the injection. She has been taking 5 units of humalog before meals but if her pre-meal reading is 150-200, will take 8 units and this has worked better as when she was taking my scale, her blood sugars were dropping too much. Her fasting sugars are mostly staying 100-150 and most bedtime readings are under 200. Compliant with BP and lipid regimen. Current Outpatient Medications   Medication Sig    insulin lispro (HUMALOG) 100 unit/mL kwikpen Inject 5 units before meals (8 units if bs > 150)--Dose change 02/28/23--updated med list--did not send prescription to the pharmacy    pregabalin (LYRICA) 50 mg capsule Take 1 Capsule by mouth three (3) times daily.  Max Daily Amount: 150 mg.    montelukast (SINGULAIR) 10 mg tablet TAKE 1 TABLET BY MOUTH EVERY DAY    colestipoL (COLESTID) 1 gram tablet TAKE 1 TABLET BY MOUTH EVERY DAY AS DIRECTED    Toujeo SoloStar U-300 Insulin 300 unit/mL (1.5 mL) inpn pen ADMINISTER 34 UNITS UNDER THE SKIN DAILY FOR DIABETES    azelastine (ASTELIN) 137 mcg (0.1 %) nasal spray USE 2 SPRAYS IN EACH NOSTRIL TWICE DAILY    atorvastatin (LIPITOR) 80 mg tablet TAKE 1 TABLET BY MOUTH DAILY    losartan (COZAAR) 100 mg tablet TAKE 1 TABLET BY MOUTH DAILY amLODIPine (NORVASC) 2.5 mg tablet Take 2.5 mg by mouth daily. pantoprazole (PROTONIX) 40 mg tablet Take 1 Tablet by mouth daily. famotidine (PEPCID) 20 mg tablet Take 1 Tablet by mouth nightly. metFORMIN ER (GLUCOPHAGE XR) 500 mg tablet Take 2 tabs with dinner--replaces janumet    glucose blood VI test strips (OneTouch Ultra Test) strip Use to check blood sugar three times daily. E11.65    ondansetron (ZOFRAN ODT) 4 mg disintegrating tablet Take 1 Tab by mouth every eight (8) hours as needed for Nausea. Insulin Needles, Disposable, (PEN NEEDLE) 31 gauge x 3/16\" ndle Use as directed once daily    Blood-Glucose Meter monitoring kit One touch ultra mini glucometer--dx 250.00    Lancets misc Check fsbs bid -250.02    nitroglycerin (NITROSTAT) 0.4 mg SL tablet 1 Tab by SubLINGual route every five (5) minutes as needed for Chest Pain. carvediloL (COREG) 6.25 mg tablet Take  by mouth two (2) times daily (with meals). fenofibrate (LOFIBRA) 54 mg tablet Take  by mouth daily. cyanocobalamin (VITAMIN B12) 500 mcg tablet Take 500 mcg by mouth daily. ranolazine ER (RANEXA) 500 mg SR tablet Take 1 Tab by mouth two (2) times a day. clopidogrel (PLAVIX) 75 mg tablet Take 1 Tab by mouth daily. aspirin 81 mg Tab Take 81 mg by mouth daily. MULTIVITAMINS W-MINERALS/LUT (CENTRUM SILVER PO) Take 1 Tab by mouth daily. No current facility-administered medications for this visit. Allergies   Allergen Reactions    Codeine Other (comments)     Jerking sensation    Livalo [Pitavastatin] Diarrhea    Niaspan [Niacin] Myalgia    Statins-Hmg-Coa Reductase Inhibitors Myalgia     Zocor, pravachol, Lipitor, crestor    Welchol [Colesevelam] Other (comments)     Nausea, bloating and malaise    Zetia [Ezetimibe] Myalgia     Review of Systems: PER HPI    Physical Examination:  Blood pressure 133/74, pulse 86, height 5' 3\" (1.6 m), weight 173 lb 12.8 oz (78.8 kg).   General: pleasant, no distress, good eye contact Neck: no carotid bruits  Cardiovascular: regular, normal rate, nl s1 and s2, no m/r/g,   Respiratory: clear bilaterally  Integumentary: no edema,   Psychiatric: normal mood and affect    Data Reviewed:   Component      Latest Ref Rng & Units 2/17/2023 2/16/2023 1/6/2023           3:48 AM  2:14 AM 11:22 AM   Sodium      136 - 145 mmol/L 136     Potassium      3.5 - 5.1 mmol/L 4.2     Chloride      97 - 108 mmol/L 104     CO2      21 - 32 mmol/L 23     Anion gap      5 - 15 mmol/L 9     Glucose      65 - 100 mg/dL 166 (H)     BUN      6 - 20 MG/DL 23 (H)     Creatinine      0.55 - 1.02 MG/DL 0.94     BUN/Creatinine ratio      12 - 20   24 (H)     eGFR      >60 ml/min/1.73m2 >60     Calcium      8.5 - 10.1 MG/DL 8.6     Bilirubin, total      0.2 - 1.0 MG/DL 0.5     ALT      12 - 78 U/L 26     AST      15 - 37 U/L 28     Alk. phosphatase      45 - 117 U/L 61     Protein, total      6.4 - 8.2 g/dL 6.4     Albumin      3.5 - 5.0 g/dL 2.7 (L)     Globulin      2.0 - 4.0 g/dL 3.7     A-G Ratio      1.1 - 2.2   0.7 (L)     Hemoglobin A1c, (calculated)      4.0 - 5.6 %  7.1 (H) 7.0 (H)   Est. average glucose      mg/dL  157 154       Assessment/Plan:     1. 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, controlled with nephropathy: her most recent Hgb A1c was 7.1% in 2/23 up from 7% in 1/23 down from 7.9% in 7/22 up from 7.4% in 1/22 down from 7.5% in 6/21 up from 7% in 12/20 (all values between 10/16 and 12/20 were under 7%) up from 6.3% in 4/16 up from 5.8% in 10/15 down from 6.3% in 4/15 down from 6.7% in 10/14 up from 6.6% in 3/14 down from 6.7% in 9/13 up from 6.4% in May 2013 stable from Feb 2013 down from 6.5% in October up from 6.3% in June down from 6.9% in March down from 7.6% in December 2011 down from 8.5% drawn at the [facility] in October stable from 8.4% in August.  Her A1c is at goal of 7.5-8% or less given her age and CAD so did not make and changes.   - cont toujeo 32 units at bedtime (34 units if bedtime sugar > 200)  - take humalog 5 units before meals (8 units if pre-meal reading > 150)  - cont metformin  mg 2 tabs with lunch  - check bs 3 times daily  - foot exam done 7/22 by PCP  - lexii MCKAY 1/21  - microalbumin nl 12/11, up to 35 in 2/13 and 39 in 5/13 and 48 in 3/14 and 150 in 10/14 (but just had a steroid injection 4 days before) and down to 126 in 4/15 (but had a steroid injection the week before), down to 84 in 4/16, up to 628 in 6/21       2. Essential hypertension, benign (401.1) her BP was at goal < 140/90  - cont current regimen       3. Pure hypercholesterolemia (272.0) Given DM and CAD, Goal LDL < 70, non-HDL < 100, and TG < 150.  and non- in 5/13 on zetia alone. Couldn't tolerate welchol or livalo.  and non- in 9/13. Stopped zetia in 9/13 due to myalgias and  and non- in 3/14 and 127 and 189 in 9/14 and 178 in 4/15 and 155 in 10/15. Put on prava 10 after MI in 1/16 and down to 103 in 4/16 and Dr. Jared Mayberry increased this to 40 mg daily but then changed her to lipitor 40 mg and added lofibra 54 mg and LDL 68 in 7/16. LDL 95 in 1/22 so lipitor increased to 80 mg daily and down to 49 in 8/22.  - cont atorvastatin 80 mg daily  - cont lofibra 54 mg daily            Patient Instructions   1) In the future if you need another steroid injection, plan on taking 38 units the night of the injection and see if your sugar is staying under 200 the next night and if so, take another dose of 38 units but if over 200, plan on taking 40 units the 2nd night and the 3rd night you should be able to go back to 32-34 units. 2) Your blood pressure looks good. 3) Your Hemoglobin A1c (3 month test of blood sugar) dropped from 7.9% in the summer to 7-7.1% the past 2 checks. Keep up the good work. Follow-up and Dispositions    Return in about 6 months (around 8/28/2023).                Copy sent to:  Charmaine Walls MD as Referring Provider (Internal Medicine)  Monik Barrera MD ([facility] V[facility])  Lamin Ngo MD (Gastroenterology)

## 2023-02-28 NOTE — PROGRESS NOTES
Care Transitions Follow Up Call    Patient Current Location: [place]    Challenges to be reviewed by the provider   Additional needs identified to be addressed with provider: no  none           Method of communication with provider : none    Care Transition Nurse (CTN) contacted the patient by telephone to follow up. Verified name and  with patient as identifiers. Addressed changes since last contact: none  Follow up appointment completed? yes. Was follow up appointment scheduled within 7 days of discharge? yes. Advance Care Planning:   Does patient have an Advance Directive:   Needs to bring in ACP to be scanned into chart    CTN reviewed discharge instructions, medical action plan and red flags with patient and discussed any barriers to care and/or understanding of plan of care after discharge. Discussed appropriate site of care based on symptoms and resources available to patient including: PCP and Specialist. The patient agrees to contact the PCP office for questions related to their healthcare. [facility] follow up appointment(s):   Future Appointments   Date Time Provider Santiago Perez   2023  1:30 PM Jony Mac MD [facility] [address] BS AMB   3/23/2023  2:20 PM [facility]   2023 11:00 AM Stevie Yung MD [facility] BS AMB     Non-[facility] follow up appointment(s): Dr Cynthea Runner -\"2 weeks\" per patient    CTN provided contact information for future needs. Plan for follow-up call in 5-7 days based on severity of symptoms and risk factors. Goals Addressed                   This Visit's Progress     Attends follow-up appointments as directed. On track     23   Future Appointments   Date Time Provider   2023 11:30 AM Stevie Yung MD   2023  1:30 PM Jony Mac MD   3/23/2023  2:20 PM Yosi Chacko MD   Patient needs to call and make an appt with Cards -Dr Anjali Montelongo. Monitor status of these appt on next call.   Bryan Moser MSN, RN, CCM / Care Transition Nurse / [phone]     02/28/23   Patient did her appt with Dr ePggy Mariscal as scheduled, has an appt with Dr Shelbi Reynolds this afternoon. Patient states she has an appt with Dr Fredrick Schulz in 2 weeks. Monitor status of Cards appt on next call. Renee GLEZ, RN, [facility] / Care Transition Nurse / [phone]        Prevent complications post hospitalization. On track     02/20/23   Patient denies any C/P or SOB, (R) groin denies any bruising, pain or swelling. Glucose this Am was 105, patient is eating and drinking well, up without assist devise. Reviewed how to take NTG, new bottle from 2 months ago, don't drive self to ED if you take 3 NTG. Patient states she has an ACP and it is in her daughter safe, asked that she find it and bring to PCP appt on 2/24 to be scanned into chart. Monitor for C/P, SOB, glucose level, take any NTG and did she take ACP to be scanned into chart? Renee GLEZ, RN, [facility] / Care Transition Nurse / [phone]     02/28/23   Called and spoke to patient, denies any further C/P, or SOB. Does have some sinus drainage from allergies. Groin site all healed. Glucose this am was 115, has not had to use any NTG since last call. Patient daughter has ACP in her safe and she is taking patient to Dr Shelbi Reynolds appt. Encouraged to get out of safe and take to be scanned at Dr Alona Tee office while at appt today. Monitor SOB, cough, sinus drainage, activity tolerance and glucose on next call.   Renee GLEZ, RN, [facility] / Care Transition Nurse / [phone]

## 2023-02-28 NOTE — PATIENT INSTRUCTIONS
1) In the future if you need another steroid injection, plan on taking 38 units the night of the injection and see if your sugar is staying under 200 the next night and if so, take another dose of 38 units but if over 200, plan on taking 40 units the 2nd night and the 3rd night you should be able to go back to 32-34 units. 2) Your blood pressure looks good. 3) Your Hemoglobin A1c (3 month test of blood sugar) dropped from 7.9% in the summer to 7-7.1% the past 2 checks. Keep up the good work.

## 2023-03-10 ENCOUNTER — PATIENT OUTREACH (OUTPATIENT)
Dept: CASE MANAGEMENT | Age: 83
End: 2023-03-10

## 2023-03-10 NOTE — PROGRESS NOTES
Care Transitions Follow Up Call    Patient Current Location: [place]    Challenges to be reviewed by the provider   Additional needs identified to be addressed with provider: no  none         Method of communication with provider : none    Care Transition Nurse (CTN) contacted the patient by telephone to follow up. Verified name and  with patient as identifiers. Addressed changes since last contact: none  Follow up appointment completed? yes. Was follow up appointment scheduled within 7 days of discharge? no.     Advance Care Planning:   Does patient have an Advance Directive:   Needs to bring in to be scanned into chart    CTN reviewed discharge instructions, medical action plan and red flags with patient and discussed any barriers to care and/or understanding of plan of care after discharge. Discussed appropriate site of care based on symptoms and resources available to patient including: PCP and Specialist. The patient agrees to contact the PCP office for questions related to their healthcare. [address] follow up appointment(s):   Future Appointments   Date Time Provider Santiago Perez   3/23/2023  2:20 PM Marko Quintero MD [facility] BS AMB   2023 11:00 AM Sg Land MD [facility] BS AMB   2023  2:10 PM Janet Oliva MD [facility] [address] BS AMB     Non-[facility] follow up appointment(s): Cards- Dr Almaz Osorio    CTN provided contact information for future needs. Plan for follow-up call in 10-14 days based on severity of symptoms and risk factors. Goals Addressed                   This Visit's Progress     Attends follow-up appointments as directed. On track     23   Future Appointments   Date Time Provider   2023 11:30 AM Sg Land MD   2023  1:30 PM Janet Oliva MD   3/23/2023  2:20 PM Marko Quintero MD   Patient needs to call and make an appt with [facility] -Dr Almaz Osorio. Monitor status of these appt on next call.   Reid Kang MSN, RN, CCM / Care Transition Nurse / [phone]     02/28/23   Patient did her appt with Dr Lidia Villafana as scheduled, has an appt with Dr Herman Bethea this afternoon. Patient states she has an appt with Dr Alba Anderson in 2 weeks. Monitor status of Cards appt on next call. Kathi GLEZ, RN, CCM / Care Transition Nurse / [phone]     03/10/23   Patient did attend her appt with Dr Herman Bethea as scheduled. Patient states she has an appt with Dr Alba Anderson in the next 2 weeks, called office and verified appt is 3/21 at 11:30 am.      .  Patient [facility] appt with [facility] on 3[facility]/23. Monitor status of Card and Sleep appt on next call. Kathi GLEZ, RN, CCM / Care Transition Nurse / [phone]        Prevent complications post hospitalization. On track     02/20/23   Patient denies any C/P or SOB, (R) groin denies any bruising, pain or swelling. Glucose this Am was 105, patient is eating and drinking well, up without assist devise. Reviewed how to take NTG, new bottle from 2 months ago, don't drive self to ED if you take 3 NTG. Patient states she has an ACP and it is in her daughter safe, asked that she find it and bring to PCP appt on 2/24 to be scanned into chart. Monitor for C/P, SOB, glucose level, take any NTG and did she take ACP to be scanned into chart? Kathi GLEZ, RN, CCM / Care Transition Nurse / [phone]     02/28/23   Called and spoke to patient, denies any further C/P, or SOB. Does have some sinus drainage from allergies. Groin site all healed. Glucose this am was 115, has not had to use any NTG since last call. Patient daughter has ACP in her safe and she is taking patient to Dr Herman Bethea appt. Encouraged to get out of safe and take to be scanned at Dr Marylee Kim office while at appt today. Monitor SOB, cough, sinus drainage, activity tolerance and glucose on next call.   Kathi GLEZ, RN, CCM / Care Transition Nurse / [phone]       03/10/23   Patient states that she does have SOB on exertion but that is better. Does have a occasional cough with yellow mucus, denies C/P, glucose is 115. Did not take her ACP to Dr Prince Hicks appt as planned. Continued neck and shoulder pain, using heating pad for this. Monitor SOB, cough, C/P, glucose, neck and shoulder pain, ACP status.   Eileen Hodge MSN, RN, CCM / Care Transition Nurse / [phone]

## 2023-03-22 ENCOUNTER — PATIENT OUTREACH (OUTPATIENT)
Dept: CASE MANAGEMENT | Age: 83
End: 2023-03-22

## 2023-03-22 NOTE — PROGRESS NOTES
Patient has graduated from the Transitions of Care Coordination  program on 3/23/23. Patient/family has the ability to self-manage at this time Care management goals have been completed. Patient was not referred to the [facility] team for further management. Goals Addressed                   This Visit's Progress     COMPLETED: Attends follow-up appointments as directed. 02/20/23   Future Appointments   Date Time Provider   2/24/2023 11:30 AM Madan Zambrano MD   2/28/2023  1:30 PM Lui Banerjee MD   3/23/2023  2:20 PM Rajni Geiger MD   Patient needs to call and make an appt with [facility] -Dr Faye Alcala. Monitor status of these appt on next call. Parminder GLEZ, RN, CCM / Care Transition Nurse / [phone]     02/28/23   Patient did her appt with Dr Dilcia Steele as scheduled, has an appt with Dr Zahida Lara this afternoon. Patient states she has an appt with Dr Faye Alcala in 2 weeks. Monitor status of Cards appt on next call. Parminder GLEZ, RN, CCM / Care Transition Nurse / [phone]     03/10/23   Patient did attend her appt with Dr Zahida Lara as scheduled. Patient states she has an appt with Dr Faye Alcala in the next 2 weeks, called office and verified appt is 3/21 at 11:30 am.      .  Patient has an appt with [facility] on 3/23/23. Monitor status of Card and Sleep appt on next call. Parminder GLEZ, RN, CCM / Care Transition Nurse / [phone]     03/22/23   Patient did attend her appt with Dr Faye Alcala as scheduled. Has an appt with Eye Dr this afternoon. Her glucose has been running good-forgot #. Continues to have neck and shoulder pain and cough that she R/T allergies. Parminder GLEZ, RN, CCM / Care Transition Nurse / [phone]        COMPLETED: Prevent complications post hospitalization. 02/20/23   Patient denies any C/P or SOB, (R) groin denies any bruising, pain or swelling. Glucose this Am was 105, patient is eating and drinking well, up without assist devise.   Reviewed how to take NTG, new bottle from 2 months ago, don't drive self to ED if you take 3 NTG. Patient states she has an ACP and it is in her daughter safe, asked that she find it and bring to PCP appt on 2/24 to be scanned into chart. Monitor for C/P, SOB, glucose level, take any NTG and did she take ACP to be scanned into chart? Beto GLEZ, RN, CCM / Care Transition Nurse / [phone]     02/28/23   Called and spoke to patient, denies any further C/P, or SOB. Does have some sinus drainage from allergies. Groin site all healed. Glucose this am was 115, has not had to use any NTG since last call. Patient daughter has ACP in her safe and she is taking patient to Dr Janel Mart appt. Encouraged to get out of safe and take to be scanned at Dr Amalia Quiles office while at appt today. Monitor SOB, cough, sinus drainage, activity tolerance and glucose on next call. Beto GLEZ, RN, CCM / Care Transition Nurse / [phone]       03/10/23   Patient states that she does have SOB on exertion but that is better. Does have a occasional cough with yellow mucus, denies C/P, glucose is 115. Did not take her ACP to Dr Janel Mart appt as planned. Continued neck and shoulder pain, using heating pad for this. Monitor SOB, cough, C/P, glucose, neck and shoulder pain, ACP status. Beto GLEZ, RN, CCM / Care Transition Nurse / [phone]     03/22/23   Patient states her daughter has made copies of ACP just needs to take to Dr Jose Schmitz office. Beto GLEZ, RN, CCM / Care Transition Nurse / [phone]                  Patient has Care Transition Nurse's contact information for any further questions, concerns, or needs.   Patients upcoming visits:    Future Appointments   Date Time Provider Santiago Perez   7/19/2023 11:00 AM Codie Ag [facility] BS AMB   9/5/2023  2:10 PM China Lozano MD [facility] [address] BS AMB

## 2023-05-10 RX ORDER — INSULIN GLARGINE 300 U/ML
INJECTION, SOLUTION SUBCUTANEOUS
Qty: 1.5 ML | Refills: 5 | Status: SHIPPED | OUTPATIENT
Start: 2023-05-10

## 2023-05-19 ENCOUNTER — HOSPITAL ENCOUNTER (OUTPATIENT)
Facility: HOSPITAL | Age: 83
End: 2023-05-19
Payer: MEDICARE

## 2023-05-19 ENCOUNTER — TELEMEDICINE (OUTPATIENT)
Age: 83
End: 2023-05-19
Payer: MEDICARE

## 2023-05-19 DIAGNOSIS — M79.604 RIGHT LEG PAIN: ICD-10-CM

## 2023-05-19 DIAGNOSIS — R60.0 LEG EDEMA, RIGHT: ICD-10-CM

## 2023-05-19 DIAGNOSIS — M79.604 RIGHT LEG PAIN: Primary | ICD-10-CM

## 2023-05-19 PROCEDURE — G8399 PT W/DXA RESULTS DOCUMENT: HCPCS | Performed by: INTERNAL MEDICINE

## 2023-05-19 PROCEDURE — 99213 OFFICE O/P EST LOW 20 MIN: CPT | Performed by: INTERNAL MEDICINE

## 2023-05-19 PROCEDURE — 1123F ACP DISCUSS/DSCN MKR DOCD: CPT | Performed by: INTERNAL MEDICINE

## 2023-05-19 PROCEDURE — G8427 DOCREV CUR MEDS BY ELIG CLIN: HCPCS | Performed by: INTERNAL MEDICINE

## 2023-05-19 PROCEDURE — 1090F PRES/ABSN URINE INCON ASSESS: CPT | Performed by: INTERNAL MEDICINE

## 2023-05-19 PROCEDURE — 93971 EXTREMITY STUDY: CPT

## 2023-05-19 SDOH — ECONOMIC STABILITY: FOOD INSECURITY: WITHIN THE PAST 12 MONTHS, YOU WORRIED THAT YOUR FOOD WOULD RUN OUT BEFORE YOU GOT MONEY TO BUY MORE.: NEVER TRUE

## 2023-05-19 SDOH — ECONOMIC STABILITY: HOUSING INSECURITY
IN THE LAST 12 MONTHS, WAS THERE A TIME WHEN YOU DID NOT HAVE A STEADY PLACE TO SLEEP OR SLEPT IN A SHELTER (INCLUDING NOW)?: NO

## 2023-05-19 SDOH — ECONOMIC STABILITY: FOOD INSECURITY: WITHIN THE PAST 12 MONTHS, THE FOOD YOU BOUGHT JUST DIDN'T LAST AND YOU DIDN'T HAVE MONEY TO GET MORE.: NEVER TRUE

## 2023-05-19 SDOH — ECONOMIC STABILITY: INCOME INSECURITY: HOW HARD IS IT FOR YOU TO PAY FOR THE VERY BASICS LIKE FOOD, HOUSING, MEDICAL CARE, AND HEATING?: NOT HARD AT ALL

## 2023-05-19 ASSESSMENT — PATIENT HEALTH QUESTIONNAIRE - PHQ9
SUM OF ALL RESPONSES TO PHQ QUESTIONS 1-9: 0
SUM OF ALL RESPONSES TO PHQ QUESTIONS 1-9: 0
1. LITTLE INTEREST OR PLEASURE IN DOING THINGS: 0
SUM OF ALL RESPONSES TO PHQ QUESTIONS 1-9: 0
SUM OF ALL RESPONSES TO PHQ9 QUESTIONS 1 & 2: 0
SUM OF ALL RESPONSES TO PHQ QUESTIONS 1-9: 0
2. FEELING DOWN, DEPRESSED OR HOPELESS: 0

## 2023-05-19 NOTE — PROGRESS NOTES
1. \"Have you been to the ER, urgent care clinic since your last visit? Hospitalized since your last visit? \" No    2. \"Have you seen or consulted any other health care providers outside of the [address] [place] [address] since your last visit? \" No     3. For patients aged 39-70: Has the patient had a colonoscopy / FIT/ Cologuard? No      If the patient is female:    4. For patients aged 41-77: Has the patient had a mammogram within the past 2 years? No      5. For patients aged 21-65: Has the patient had a pap smear?   No
Myalgia     Zocor, pravachol, Lipitor, crestor          BMP:   Lab Results   Component Value Date/Time     02/17/2023 03:48 AM    K 4.2 02/17/2023 03:48 AM     02/17/2023 03:48 AM    CO2 23 02/17/2023 03:48 AM    BUN 23 02/17/2023 03:48 AM    CREATININE 0.94 02/17/2023 03:48 AM    GLUCOSE 166 02/17/2023 03:48 AM    CALCIUM 8.6 02/17/2023 03:48 AM      CBC:   Lab Results   Component Value Date/Time    WBC 8.8 02/17/2023 03:48 AM    RBC 3.76 02/17/2023 03:48 AM    HGB 10.8 02/17/2023 03:48 AM    HCT 32.3 02/17/2023 03:48 AM    MCV 85.9 02/17/2023 03:48 AM    MCH 28.7 02/17/2023 03:48 AM    MCHC 33.4 02/17/2023 03:48 AM    RDW 13.7 02/17/2023 03:48 AM     02/17/2023 03:48 AM    MPV 10.7 02/17/2023 03:48 AM      Lipids   Lab Results   Component Value Date/Time    CHOL 136 07/05/2022 11:42 AM    TRIG 204 07/05/2022 11:42 AM    LDLCALC 49.2 07/05/2022 11:42 AM    LABVLDL 40.8 07/05/2022 11:42 AM    CHOLHDLRATIO 3.0 07/05/2022 11:42 AM     Hemoglobin A1C:   Hemoglobin A1C   Date Value Ref Range Status   02/16/2023 7.1 (H) 4.0 - 5.6 % Final     Comment:     NEW METHOD  PLEASE NOTE NEW REFERENCE RANGE  (NOTE)  HbA1C Interpretive Ranges  <5.7              Normal  5.7 - 6.4         Consider Prediabetes  >6.5              Consider Diabetes          Review of Systems     Physical Exam  Constitutional:       Appearance: Normal appearance. Musculoskeletal:      Right lower leg: No edema. Left lower leg: No edema. Neurological:      Mental Status: She is alert. ASSESSMENT and PLAN     Mylene Kenney was seen today for leg pain.     Diagnoses and all orders for this visit:    Right leg pain  -     Vascular duplex lower extremity venous right; Future   Stat venous duplex today-r/o dvt   Elevate leg for now   Otc nsaids or tylenol prn  Leg edema, right  -     Vascular duplex lower extremity venous right; Future       Edelmira Arias MD

## 2023-06-22 DIAGNOSIS — J32.9 SINUSITIS, UNSPECIFIED CHRONICITY, UNSPECIFIED LOCATION: Primary | ICD-10-CM

## 2023-06-22 RX ORDER — AMOXICILLIN 500 MG/1
500 CAPSULE ORAL 3 TIMES DAILY
Qty: 21 CAPSULE | Refills: 0 | Status: SHIPPED | OUTPATIENT
Start: 2023-06-22 | End: 2023-06-29

## 2023-07-19 ENCOUNTER — OFFICE VISIT (OUTPATIENT)
Age: 83
End: 2023-07-19
Payer: MEDICARE

## 2023-07-19 VITALS
RESPIRATION RATE: 16 BRPM | BODY MASS INDEX: 30.75 KG/M2 | DIASTOLIC BLOOD PRESSURE: 66 MMHG | OXYGEN SATURATION: 98 % | SYSTOLIC BLOOD PRESSURE: 122 MMHG | TEMPERATURE: 97.2 F | WEIGHT: 173.6 LBS | HEART RATE: 78 BPM

## 2023-07-19 DIAGNOSIS — I25.10 CORONARY ARTERY DISEASE INVOLVING NATIVE CORONARY ARTERY OF NATIVE HEART WITHOUT ANGINA PECTORIS: ICD-10-CM

## 2023-07-19 DIAGNOSIS — Z00.00 MEDICARE ANNUAL WELLNESS VISIT, SUBSEQUENT: ICD-10-CM

## 2023-07-19 DIAGNOSIS — M19.90 OSTEOARTHRITIS, UNSPECIFIED OSTEOARTHRITIS TYPE, UNSPECIFIED SITE: ICD-10-CM

## 2023-07-19 DIAGNOSIS — E78.5 HYPERLIPIDEMIA, UNSPECIFIED HYPERLIPIDEMIA TYPE: ICD-10-CM

## 2023-07-19 DIAGNOSIS — N18.30 STAGE 3 CHRONIC KIDNEY DISEASE, UNSPECIFIED WHETHER STAGE 3A OR 3B CKD (HCC): ICD-10-CM

## 2023-07-19 DIAGNOSIS — E53.8 VITAMIN B12 DEFICIENCY: Primary | ICD-10-CM

## 2023-07-19 PROCEDURE — 3074F SYST BP LT 130 MM HG: CPT | Performed by: INTERNAL MEDICINE

## 2023-07-19 PROCEDURE — 1123F ACP DISCUSS/DSCN MKR DOCD: CPT | Performed by: INTERNAL MEDICINE

## 2023-07-19 PROCEDURE — G0439 PPPS, SUBSEQ VISIT: HCPCS | Performed by: INTERNAL MEDICINE

## 2023-07-19 PROCEDURE — 99213 OFFICE O/P EST LOW 20 MIN: CPT | Performed by: INTERNAL MEDICINE

## 2023-07-19 PROCEDURE — 3078F DIAST BP <80 MM HG: CPT | Performed by: INTERNAL MEDICINE

## 2023-07-19 SDOH — ECONOMIC STABILITY: FOOD INSECURITY: WITHIN THE PAST 12 MONTHS, THE FOOD YOU BOUGHT JUST DIDN'T LAST AND YOU DIDN'T HAVE MONEY TO GET MORE.: NEVER TRUE

## 2023-07-19 SDOH — ECONOMIC STABILITY: FOOD INSECURITY: WITHIN THE PAST 12 MONTHS, YOU WORRIED THAT YOUR FOOD WOULD RUN OUT BEFORE YOU GOT MONEY TO BUY MORE.: NEVER TRUE

## 2023-07-19 SDOH — ECONOMIC STABILITY: INCOME INSECURITY: HOW HARD IS IT FOR YOU TO PAY FOR THE VERY BASICS LIKE FOOD, HOUSING, MEDICAL CARE, AND HEATING?: NOT HARD AT ALL

## 2023-07-19 ASSESSMENT — PATIENT HEALTH QUESTIONNAIRE - PHQ9
SUM OF ALL RESPONSES TO PHQ QUESTIONS 1-9: 0
SUM OF ALL RESPONSES TO PHQ QUESTIONS 1-9: 0
2. FEELING DOWN, DEPRESSED OR HOPELESS: 0
1. LITTLE INTEREST OR PLEASURE IN DOING THINGS: 0
SUM OF ALL RESPONSES TO PHQ9 QUESTIONS 1 & 2: 0
SUM OF ALL RESPONSES TO PHQ QUESTIONS 1-9: 0
SUM OF ALL RESPONSES TO PHQ QUESTIONS 1-9: 0

## 2023-07-19 ASSESSMENT — LIFESTYLE VARIABLES
HOW OFTEN DO YOU HAVE A DRINK CONTAINING ALCOHOL: NEVER
HOW MANY STANDARD DRINKS CONTAINING ALCOHOL DO YOU HAVE ON A TYPICAL DAY: PATIENT DOES NOT DRINK

## 2023-07-19 NOTE — PROGRESS NOTES
Reviewed record in preparation for visit and have obtained necessary documentation. Identified pt with two pt identifiers(name and ). Chief Complaint   Patient presents with    Medicare AWV       There were no vitals filed for this visit. [unfilled]    [facility] Reconciliation: Completed        Coordination of Care Questionnaire:  :     1. \"Have you been to the ER, urgent care clinic since your last visit? Hospitalized since your last visit? \" no    2. \"Have you seen or consulted any other health care providers outside of the [address] [place] [address] since your last visit? \" no     3. For patients aged 43-73: Has the patient had a colonoscopy / FIT/ Cologuard? N/A      If the patient is female:    4. For patients aged 43-66: Has the patient had a mammogram within the past 2 years?  N/a

## 2023-07-19 NOTE — PROGRESS NOTES
HISTORY OF PRESENT ILLNESS   Ivette Wilson   is a 81 y.o.  female.   Hx DM-2 with microalbuminuria and neuropathyhtn hld cad s/p MI and stents FABY on cpap fatty liver and medicare wellness-here with her daughter    Dr Melonie Grossman for mild CKD  Dr Greg Hernandez -DM-2  CARD-Dr [address] and low endurance , request labs-vitamin levels  Hx b12 deficiency  Has b/l hip and knee pain that limits her walking      Last OV     C/o pain in right groin to right toes since last night  Leg feels like it is tight and going the burst per pt     No obvious swelling but feels tight per pt  No back pain  No discoloration  Able to walk ok  She called CARD office since had right groin cath in February but they did not feels thjis could be related to the catheterization procedure  Patient Active Problem List    Diagnosis Date Noted    Unstable angina ([address]) 02/15/2023    Chronic renal disease, stage III ([address]) 07/04/2022    History of breast cancer 04/20/2020    Type 2 diabetes with nephropathy ([address]) 08/10/2018    Fatty liver 02/23/2018    Iron deficiency anemia 12/01/2017    Osteopenia of left thigh 04/02/2017    Advanced care planning/counseling discussion 12/17/2015    CAD (coronary artery disease) 05/15/2012    Hiatal hernia 05/15/2012    Pure hypercholesterolemia 11/16/2009    DJD (degenerative joint disease) 11/16/2009    Essential hypertension, benign 11/16/2009    Type 2 diabetes, controlled, with neuropathy ([address]) 11/16/2009    FABY (obstructive sleep apnea) 11/16/2009     Current Outpatient Medications   Medication Sig Dispense Refill    insulin glargine, 1 unit dial, (TOUJEO SOLOSTAR) 300 UNIT/ML concentrated injection pen ADMINISTER 34 UNITS UNDER THE SKIN DAILY FOR DIABETES 1.5 mL 5    Lancets MISC Check fsbs bid -250.02      amLODIPine (NORVASC) 2.5 MG tablet Take 1 tablet by mouth daily      atorvastatin (LIPITOR) 80 MG tablet Take 1 tablet by mouth daily      azelastine (ASTELIN) 0.1 % nasal spray USE 2 SPRAYS IN [facility]

## 2023-07-22 LAB
25(OH)D3+25(OH)D2 SERPL-MCNC: 26.9 NG/ML (ref 30–100)
VIT B12 SERPL-MCNC: >2000 PG/ML (ref 232–1245)

## 2023-08-02 RX ORDER — INSULIN GLARGINE 300 U/ML
INJECTION, SOLUTION SUBCUTANEOUS
Qty: 1.5 ML | Refills: 5 | Status: SHIPPED | OUTPATIENT
Start: 2023-08-02

## 2023-08-02 NOTE — TELEPHONE ENCOUNTER
insulin glargine, 1 unit dial, (TOUJEO SOLOSTAR) 300 UNIT/ML concentrated injection pen    [facility] #[phone]

## 2023-08-03 RX ORDER — FAMOTIDINE 20 MG/1
20 TABLET, FILM COATED ORAL DAILY
Qty: 90 TABLET | Refills: 3 | Status: SHIPPED | OUTPATIENT
Start: 2023-08-03

## 2023-08-03 RX ORDER — PANTOPRAZOLE SODIUM 40 MG/1
40 TABLET, DELAYED RELEASE ORAL DAILY
Qty: 90 TABLET | Refills: 3 | Status: SHIPPED | OUTPATIENT
Start: 2023-08-03

## 2023-09-05 ENCOUNTER — OFFICE VISIT (OUTPATIENT)
Age: 83
End: 2023-09-05
Payer: MEDICARE

## 2023-09-05 VITALS
HEART RATE: 83 BPM | WEIGHT: 172.6 LBS | SYSTOLIC BLOOD PRESSURE: 146 MMHG | BODY MASS INDEX: 30.58 KG/M2 | HEIGHT: 63 IN | DIASTOLIC BLOOD PRESSURE: 70 MMHG

## 2023-09-05 DIAGNOSIS — Z79.4 TYPE 2 DIABETES MELLITUS WITH DIABETIC NEPHROPATHY, WITH LONG-TERM CURRENT USE OF INSULIN (HCC): ICD-10-CM

## 2023-09-05 DIAGNOSIS — E78.00 PURE HYPERCHOLESTEROLEMIA, UNSPECIFIED: ICD-10-CM

## 2023-09-05 DIAGNOSIS — E11.21 TYPE 2 DIABETES MELLITUS WITH DIABETIC NEPHROPATHY, WITH LONG-TERM CURRENT USE OF INSULIN (HCC): Primary | ICD-10-CM

## 2023-09-05 DIAGNOSIS — Z79.4 TYPE 2 DIABETES MELLITUS WITH DIABETIC NEPHROPATHY, WITH LONG-TERM CURRENT USE OF INSULIN (HCC): Primary | ICD-10-CM

## 2023-09-05 DIAGNOSIS — E11.21 TYPE 2 DIABETES MELLITUS WITH DIABETIC NEPHROPATHY, WITH LONG-TERM CURRENT USE OF INSULIN (HCC): ICD-10-CM

## 2023-09-05 DIAGNOSIS — I10 ESSENTIAL (PRIMARY) HYPERTENSION: ICD-10-CM

## 2023-09-05 LAB — HBA1C MFR BLD: 7.6 %

## 2023-09-05 PROCEDURE — G8427 DOCREV CUR MEDS BY ELIG CLIN: HCPCS | Performed by: INTERNAL MEDICINE

## 2023-09-05 PROCEDURE — 1090F PRES/ABSN URINE INCON ASSESS: CPT | Performed by: INTERNAL MEDICINE

## 2023-09-05 PROCEDURE — 3078F DIAST BP <80 MM HG: CPT | Performed by: INTERNAL MEDICINE

## 2023-09-05 PROCEDURE — 3077F SYST BP >= 140 MM HG: CPT | Performed by: INTERNAL MEDICINE

## 2023-09-05 PROCEDURE — G8399 PT W/DXA RESULTS DOCUMENT: HCPCS | Performed by: INTERNAL MEDICINE

## 2023-09-05 PROCEDURE — 99214 OFFICE O/P EST MOD 30 MIN: CPT | Performed by: INTERNAL MEDICINE

## 2023-09-05 PROCEDURE — 83036 HEMOGLOBIN GLYCOSYLATED A1C: CPT | Performed by: INTERNAL MEDICINE

## 2023-09-05 PROCEDURE — 3051F HG A1C>EQUAL 7.0%<8.0%: CPT | Performed by: INTERNAL MEDICINE

## 2023-09-05 PROCEDURE — G8417 CALC BMI ABV UP PARAM F/U: HCPCS | Performed by: INTERNAL MEDICINE

## 2023-09-05 PROCEDURE — 1036F TOBACCO NON-USER: CPT | Performed by: INTERNAL MEDICINE

## 2023-09-05 PROCEDURE — 1123F ACP DISCUSS/DSCN MKR DOCD: CPT | Performed by: INTERNAL MEDICINE

## 2023-09-05 RX ORDER — CARVEDILOL 12.5 MG/1
12.5 TABLET ORAL 2 TIMES DAILY WITH MEALS
Qty: 180 TABLET | Refills: 3 | Status: SHIPPED | OUTPATIENT
Start: 2023-09-05

## 2023-09-05 NOTE — PATIENT INSTRUCTIONS
1) Your Hemoglobin A1c (3 month test of blood sugar) is 7.6% which is at goal of 7.5-8% or less. 2) Keep taking toujeo 32 units but if over 200, it's fine to take 34 units and if over 250, you can take 36 units. 3) As long as your sugar is 100-180, take 5 units of humalog but if it's over 180, you can take 7 units. If ever over 250, you can take 8 units. 4) See if you can find ferrous gluconate instead of ferrous sulfate and take 1 tab daily for your anemia. 5) Increase your carvedilol to 12.5 mg twice daily for blood pressure. This will be ready for  at the pharmacy today. Take 2 of the 6.25 mg tabs twice daily until these run out and then take 1 of the 12.5 mg tabs twice daily. 6)  I will send you a message through [employer] with your lab results.

## 2023-09-05 NOTE — PROGRESS NOTES
Chief Complaint   Patient presents with    Diabetes     PCP and pharmacy confirmed      History of Present Illness: Jagdish Calderon is a 81 y.o. female here for follow up of diabetes. Weight down 1 lbs since last visit in 2/23. She has been compliant with her BP regimen but home BP readings are often in the 140s and sometimes in the 150s and has seen diastolics in the 33I. Compliant with lipitor and lofibra. Has been taking toujeo 32 units at bedtime but if over 200, will take 34 units and if higher in the 200s will take 36 units. Mostly is taking 5 units of humalog before meals and has not been taking any extra even if she has blood sugars over 180 so gave her a new plan below. Majority of her sugars when checked 3 times daily over the past 90 days are in the 100-200 range and the lowest was 97. Did have a few over 300 in July when she had a steroid injection in her shoulder. She was given ferrous sulfate by Dr. Shawnee Gracia after her Hgb was low at 10.7 on 7/21/23 and took this for 2 weeks but had more upset stomach so she stopped and I advised her to try ferrous gluconate to see if she can tolerate this better.       Current Outpatient Medications   Medication Sig    pantoprazole (PROTONIX) 40 MG tablet Take 1 tablet by mouth daily    famotidine (PEPCID) 20 MG tablet Take 1 tablet by mouth daily    insulin glargine, 1 unit dial, (TOUJEO SOLOSTAR) 300 UNIT/ML concentrated injection pen ADMINISTER 34 UNITS UNDER THE SKIN DAILY FOR DIABETES    Lancets MISC Check fsbs bid -250.02    amLODIPine (NORVASC) 2.5 MG tablet Take 1 tablet by mouth daily    atorvastatin (LIPITOR) 80 MG tablet Take 1 tablet by mouth daily    azelastine (ASTELIN) 0.1 % nasal spray USE 2 SPRAYS IN EACH NOSTRIL TWICE DAILY    carvedilol (COREG) 6.25 MG tablet Take by mouth 2 times daily (with meals)    clopidogrel (PLAVIX) 75 MG tablet Take 1 tablet by mouth daily    colestipol (COLESTID) 1 g tablet TAKE 1 TABLET BY MOUTH EVERY DAY AS DIRECTED

## 2023-09-06 LAB
ALBUMIN SERPL-MCNC: 3.4 G/DL (ref 3.5–5)
ALBUMIN/GLOB SERPL: 1 (ref 1.1–2.2)
ALP SERPL-CCNC: 67 U/L (ref 45–117)
ALT SERPL-CCNC: 23 U/L (ref 12–78)
ANION GAP SERPL CALC-SCNC: 6 MMOL/L (ref 5–15)
AST SERPL-CCNC: 14 U/L (ref 15–37)
BILIRUB SERPL-MCNC: 0.4 MG/DL (ref 0.2–1)
BUN SERPL-MCNC: 21 MG/DL (ref 6–20)
BUN/CREAT SERPL: 18 (ref 12–20)
CALCIUM SERPL-MCNC: 9.4 MG/DL (ref 8.5–10.1)
CHLORIDE SERPL-SCNC: 107 MMOL/L (ref 97–108)
CHOLEST SERPL-MCNC: 158 MG/DL
CO2 SERPL-SCNC: 24 MMOL/L (ref 21–32)
CREAT SERPL-MCNC: 1.15 MG/DL (ref 0.55–1.02)
CREAT UR-MCNC: 56.9 MG/DL
GLOBULIN SER CALC-MCNC: 3.4 G/DL (ref 2–4)
GLUCOSE SERPL-MCNC: 227 MG/DL (ref 65–100)
HDLC SERPL-MCNC: 37 MG/DL
HDLC SERPL: 4.3 (ref 0–5)
LDLC SERPL CALC-MCNC: 74.6 MG/DL (ref 0–100)
MICROALBUMIN UR-MCNC: 44.5 MG/DL
MICROALBUMIN/CREAT UR-RTO: 782 MG/G (ref 0–30)
POTASSIUM SERPL-SCNC: 4.2 MMOL/L (ref 3.5–5.1)
PROT SERPL-MCNC: 6.8 G/DL (ref 6.4–8.2)
SODIUM SERPL-SCNC: 137 MMOL/L (ref 136–145)
TRIGL SERPL-MCNC: 232 MG/DL
VLDLC SERPL CALC-MCNC: 46.4 MG/DL

## 2023-10-25 DIAGNOSIS — R52 PAIN: Primary | ICD-10-CM

## 2023-10-25 RX ORDER — PREGABALIN 50 MG/1
50 CAPSULE ORAL 3 TIMES DAILY
Qty: 90 CAPSULE | Refills: 5 | Status: SHIPPED | OUTPATIENT
Start: 2023-10-25 | End: 2024-04-22

## 2023-10-25 NOTE — TELEPHONE ENCOUNTER
PCP: Deshawn Gill MD    Last appt: 7/19/2023   Future Appointments   Date Time Provider [address]   3/25/2024 10:30 AM Susy Wilson MD [facility] [address] [facility] AMB   7/25/2024 11:00 AM Deshawn Gill MD [facility] BS AMB       Requested Prescriptions     Pending Prescriptions Disp Refills    pregabalin (LYRICA) 50 MG capsule 90 capsule 0     Sig: Take 1 capsule by mouth 3 times daily.  Max Daily Amount: 150 mg

## 2023-10-27 DIAGNOSIS — Z79.4 TYPE 2 DIABETES MELLITUS WITH DIABETIC NEUROPATHY, WITH LONG-TERM CURRENT USE OF INSULIN (HCC): Primary | ICD-10-CM

## 2023-10-27 DIAGNOSIS — E11.40 TYPE 2 DIABETES MELLITUS WITH DIABETIC NEUROPATHY, WITH LONG-TERM CURRENT USE OF INSULIN (HCC): Primary | ICD-10-CM

## 2023-10-27 NOTE — TELEPHONE ENCOUNTER
Patient states she needs a call back in reference to Prescription for Toujeo that she will run Out today & she is not able to get thru Pharmacy. Patient states she needs a call back Prior to closing. Please call Pharmacy & then Patient to update on Status.  Thank you

## 2023-10-28 RX ORDER — INSULIN GLARGINE 300 U/ML
INJECTION, SOLUTION SUBCUTANEOUS
Qty: 1.5 ML | Refills: 0 | Status: CANCELLED | OUTPATIENT
Start: 2023-10-28

## 2023-10-28 RX ORDER — INSULIN GLARGINE 300 U/ML
INJECTION, SOLUTION SUBCUTANEOUS
Qty: 1.5 ML | Refills: 5 | Status: SHIPPED | OUTPATIENT
Start: 2023-10-28

## 2023-11-09 RX ORDER — ATORVASTATIN CALCIUM 80 MG/1
80 TABLET, FILM COATED ORAL DAILY
Qty: 90 TABLET | Refills: 3 | Status: SHIPPED | OUTPATIENT
Start: 2023-11-09

## 2023-11-09 RX ORDER — LOSARTAN POTASSIUM 100 MG/1
100 TABLET ORAL DAILY
Qty: 90 TABLET | Refills: 0 | Status: SHIPPED | OUTPATIENT
Start: 2023-11-09

## 2023-11-09 NOTE — TELEPHONE ENCOUNTER
PCP: Anant Faulkner MD    Last appt: 7/19/2023   Future Appointments   Date Time Provider [address]   3/25/2024 10:30 AM Nancy Paris MD [facility] [address] [facility] AMB   7/25/2024 11:00 AM Anant Faulkner MD [facility] BS AMB       Requested Prescriptions     Pending Prescriptions Disp Refills    atorvastatin (LIPITOR) 80 MG tablet 90 tablet 3     Sig: Take 1 tablet by mouth daily    losartan (COZAAR) 100 MG tablet 90 tablet 0     Sig: Take 1 tablet by mouth daily

## 2023-11-09 NOTE — TELEPHONE ENCOUNTER
PCP: Abida Grimes MD    Last appt: 7/19/2023   Future Appointments   Date Time Provider [address]   3/25/2024 10:30 AM Felipe Deng MD [facility] [address] [facility] AMB   7/25/2024 11:00 AM Abida Grimes MD [facility] BS AMB       Requested Prescriptions     Pending Prescriptions Disp Refills    atorvastatin (LIPITOR) 80 MG tablet 90 tablet 3     Sig: Take 1 tablet by mouth daily

## 2023-12-29 RX ORDER — OSELTAMIVIR PHOSPHATE 75 MG/1
75 CAPSULE ORAL DAILY
Qty: 10 CAPSULE | Refills: 0 | Status: SHIPPED | OUTPATIENT
Start: 2023-12-29 | End: 2024-01-08

## 2024-01-17 RX ORDER — INSULIN GLARGINE 300 U/ML
INJECTION, SOLUTION SUBCUTANEOUS
Qty: 1.5 ML | Refills: 5 | Status: SHIPPED | OUTPATIENT
Start: 2024-01-17 | End: 2024-01-18 | Stop reason: SDUPTHER

## 2024-01-17 NOTE — TELEPHONE ENCOUNTER
PCP: Kevin Meyer MD    Last appt: 7/19/2023   Future Appointments   Date Time Provider Department Center   3/25/2024 10:30 AM Bryan Mcgee MD [facility] [address] [facility] AMB   7/25/2024 11:00 AM Kevin Meyer MD [facility] BS AMB       Requested Prescriptions     Pending Prescriptions Disp Refills    insulin glargine, 1 unit dial, (TOUJEO SOLOSTAR) 300 UNIT/ML concentrated injection pen 1.5 mL 5     Sig: ADMINISTER 34 UNITS UNDER THE SKIN DAILY FOR DIABETES

## 2024-01-18 RX ORDER — AZELASTINE 1 MG/ML
SPRAY, METERED NASAL
Qty: 30 ML | Refills: 11 | Status: SHIPPED | OUTPATIENT
Start: 2024-01-18

## 2024-01-18 RX ORDER — INSULIN GLARGINE 300 U/ML
INJECTION, SOLUTION SUBCUTANEOUS
Qty: 5 ADJUSTABLE DOSE PRE-FILLED PEN SYRINGE | Refills: 5 | Status: SHIPPED | OUTPATIENT
Start: 2024-01-18

## 2024-02-29 RX ORDER — LOSARTAN POTASSIUM 100 MG/1
100 TABLET ORAL DAILY
Qty: 90 TABLET | Refills: 1 | Status: SHIPPED | OUTPATIENT
Start: 2024-02-29

## 2024-02-29 RX ORDER — DAPAGLIFLOZIN 10 MG/1
10 TABLET, FILM COATED ORAL DAILY
COMMUNITY
Start: 2024-02-29

## 2024-02-29 RX ORDER — MONTELUKAST SODIUM 10 MG/1
10 TABLET ORAL DAILY
Qty: 90 TABLET | Refills: 1 | Status: SHIPPED | OUTPATIENT
Start: 2024-02-29

## 2024-02-29 NOTE — TELEPHONE ENCOUNTER
Received faxed refill request from pharmacy      PCP: Kevin Meyer MD    Last appt: 7/19/2023  Future Appointments   Date Time Provider Department Center   3/25/2024 10:30 AM Bryan Mcgee MD [facility] [address] [facility] AMB   7/25/2024 11:00 AM Kevin Meyer MD [facility] AMB       Requested Prescriptions     Pending Prescriptions Disp Refills    losartan (COZAAR) 100 MG tablet 90 tablet 1     Sig: Take 1 tablet by mouth daily

## 2024-02-29 NOTE — TELEPHONE ENCOUNTER
Received faxed refill request from pharmacy      PCP: Kevin Meyer MD    Last appt: 7/19/2023  Future Appointments   Date Time Provider Department Center   3/25/2024 10:30 AM Bryan Mcgee MD [facility] [address] [facility] AMB   7/25/2024 11:00 AM Kevin Meyer MD [facility] AMB       Requested Prescriptions     Pending Prescriptions Disp Refills    losartan (COZAAR) 100 MG tablet 90 tablet 1     Sig: Take 1 tablet by mouth daily    montelukast (SINGULAIR) 10 MG tablet 90 tablet 1     Sig: Take 1 tablet by mouth daily

## 2024-03-11 DIAGNOSIS — Z79.4 TYPE 2 DIABETES MELLITUS WITH DIABETIC NEPHROPATHY, WITH LONG-TERM CURRENT USE OF INSULIN (HCC): ICD-10-CM

## 2024-03-11 DIAGNOSIS — E78.00 PURE HYPERCHOLESTEROLEMIA, UNSPECIFIED: ICD-10-CM

## 2024-03-11 DIAGNOSIS — I10 ESSENTIAL (PRIMARY) HYPERTENSION: ICD-10-CM

## 2024-03-11 DIAGNOSIS — E11.21 TYPE 2 DIABETES MELLITUS WITH DIABETIC NEPHROPATHY, WITH LONG-TERM CURRENT USE OF INSULIN (HCC): ICD-10-CM

## 2024-03-25 ENCOUNTER — OFFICE VISIT (OUTPATIENT)
Age: 84
End: 2024-03-25
Payer: MEDICARE

## 2024-03-25 VITALS
DIASTOLIC BLOOD PRESSURE: 73 MMHG | SYSTOLIC BLOOD PRESSURE: 127 MMHG | BODY MASS INDEX: 31.01 KG/M2 | WEIGHT: 175 LBS | HEART RATE: 76 BPM | HEIGHT: 63 IN

## 2024-03-25 DIAGNOSIS — E11.21 TYPE 2 DIABETES MELLITUS WITH DIABETIC NEPHROPATHY, WITH LONG-TERM CURRENT USE OF INSULIN (HCC): Primary | ICD-10-CM

## 2024-03-25 DIAGNOSIS — Z79.4 TYPE 2 DIABETES MELLITUS WITH DIABETIC NEPHROPATHY, WITH LONG-TERM CURRENT USE OF INSULIN (HCC): Primary | ICD-10-CM

## 2024-03-25 DIAGNOSIS — E78.00 PURE HYPERCHOLESTEROLEMIA, UNSPECIFIED: ICD-10-CM

## 2024-03-25 DIAGNOSIS — I10 ESSENTIAL (PRIMARY) HYPERTENSION: ICD-10-CM

## 2024-03-25 LAB — HBA1C MFR BLD: 6.9 %

## 2024-03-25 PROCEDURE — 3074F SYST BP LT 130 MM HG: CPT | Performed by: INTERNAL MEDICINE

## 2024-03-25 PROCEDURE — 83036 HEMOGLOBIN GLYCOSYLATED A1C: CPT | Performed by: INTERNAL MEDICINE

## 2024-03-25 PROCEDURE — G8399 PT W/DXA RESULTS DOCUMENT: HCPCS | Performed by: INTERNAL MEDICINE

## 2024-03-25 PROCEDURE — G8417 CALC BMI ABV UP PARAM F/U: HCPCS | Performed by: INTERNAL MEDICINE

## 2024-03-25 PROCEDURE — 3078F DIAST BP <80 MM HG: CPT | Performed by: INTERNAL MEDICINE

## 2024-03-25 PROCEDURE — G8427 DOCREV CUR MEDS BY ELIG CLIN: HCPCS | Performed by: INTERNAL MEDICINE

## 2024-03-25 PROCEDURE — 1123F ACP DISCUSS/DSCN MKR DOCD: CPT | Performed by: INTERNAL MEDICINE

## 2024-03-25 PROCEDURE — G2211 COMPLEX E/M VISIT ADD ON: HCPCS | Performed by: INTERNAL MEDICINE

## 2024-03-25 PROCEDURE — 99214 OFFICE O/P EST MOD 30 MIN: CPT | Performed by: INTERNAL MEDICINE

## 2024-03-25 PROCEDURE — G8484 FLU IMMUNIZE NO ADMIN: HCPCS | Performed by: INTERNAL MEDICINE

## 2024-03-25 PROCEDURE — 1036F TOBACCO NON-USER: CPT | Performed by: INTERNAL MEDICINE

## 2024-03-25 PROCEDURE — 1090F PRES/ABSN URINE INCON ASSESS: CPT | Performed by: INTERNAL MEDICINE

## 2024-03-25 RX ORDER — ASPIRIN 81 MG/1
81 TABLET ORAL DAILY
COMMUNITY

## 2024-03-25 RX ORDER — INSULIN GLARGINE 300 U/ML
INJECTION, SOLUTION SUBCUTANEOUS
Qty: 4.5 ML | Refills: 11 | Status: SHIPPED | OUTPATIENT
Start: 2024-03-25

## 2024-03-25 NOTE — PROGRESS NOTES
Chief Complaint   Patient presents with    Diabetes     PCP and pharmacy confirmed     History of Present Illness: Gifty Biswas is a 83 y.o. female here for follow up of diabetes.  Weight up 3 lbs since last visit in 9/23.  She has been taking 34 units of toujeo at bedtime and has been taking 6 units of humalog before meals and this has worked well to keep most of her sugars in the 100-200 range.  She was given farxiga 10 mg daily by Dr. Ramirez last month but hasn't started this yet as she was in more pain and wanted to first restart the lyrica and has been taking 1 cap tid and the pain is better but she is feeling more sleepy during the day.  May plan on starting the samples of farxiga in the next 1-2 weeks. Advised her as below of how to adjust the toujeo if having more low sugars on the farxiga.  Compliant with BP and lipid regimen.      Current Outpatient Medications   Medication Sig    aspirin 81 MG EC tablet Take 1 tablet by mouth daily    Insulin Lispro w/ Trans Port 100 UNIT/ML SOPN Inject 5 units before meals (8 units if bs > 150)--Dose change 02/28/23--updated med list--did not send prescription to the pharmacy    losartan (COZAAR) 100 MG tablet Take 1 tablet by mouth daily    montelukast (SINGULAIR) 10 MG tablet Take 1 tablet by mouth daily    insulin glargine, 1 unit dial, (TOUJEO SOLOSTAR) 300 UNIT/ML concentrated injection pen ADMINISTER 34 UNITS UNDER THE SKIN DAILY FOR DIABETES    azelastine (ASTELIN) 0.1 % nasal spray USE 2 SPRAYS IN EACH NOSTRIL TWICE DAILY    atorvastatin (LIPITOR) 80 MG tablet Take 1 tablet by mouth daily    pregabalin (LYRICA) 50 MG capsule Take 1 capsule by mouth 3 times daily for 180 days. Max Daily Amount: 150 mg    carvedilol (COREG) 12.5 MG tablet Take 1 tablet by mouth 2 times daily (with meals) Replaces the 6.25 mg dose    pantoprazole (PROTONIX) 40 MG tablet Take 1 tablet by mouth daily    famotidine (PEPCID) 20 MG tablet Take 1 tablet by mouth daily    Lancets MISC Check

## 2024-03-25 NOTE — PATIENT INSTRUCTIONS
1) You can try either taking 1 cap of lyrica in the morning and 2 caps at night instead of 1 in the morning and 1 in the afternoon and 1 at night to see if this helps with energy during the day or just take 1 in the morning and 1 at night if this still controls your pain.    2) As long as your sugar is 100-180, take 6 units of humalog but if it's over 180, you can take 7 units.  If ever over 250, you can take 8 units.    3) Your blood pressure looks great.    4) Your Hemoglobin A1c (3 month test of blood sugar) is 6.9% which is very good.  If you end up starting the farxiga as written by Dr. Ramirez, then watch your sugars to make sure they are not going under 100.  If they are, then you should decrease your toujeo by 2 units as needed.

## 2024-04-10 RX ORDER — METFORMIN HYDROCHLORIDE 500 MG/1
TABLET, EXTENDED RELEASE ORAL
Qty: 180 TABLET | Refills: 3 | Status: SHIPPED | OUTPATIENT
Start: 2024-04-10

## 2024-06-18 RX ORDER — PANTOPRAZOLE SODIUM 40 MG/1
40 TABLET, DELAYED RELEASE ORAL DAILY
Qty: 90 TABLET | Refills: 3 | Status: SHIPPED | OUTPATIENT
Start: 2024-06-18

## 2024-06-18 RX ORDER — FAMOTIDINE 20 MG/1
20 TABLET, FILM COATED ORAL DAILY
Qty: 90 TABLET | Refills: 3 | Status: SHIPPED | OUTPATIENT
Start: 2024-06-18

## 2024-06-18 NOTE — TELEPHONE ENCOUNTER
Received medication refill from pharmacy     Medication: Famotidine 20 mg tablets     QTY: 90     Direction:  Take 1 tablet by mouth daily     LOV: 07/19/23    Next appt:07/25/24     Medication refill routed to provider

## 2024-06-18 NOTE — TELEPHONE ENCOUNTER
Received medication refill from pharmacy     Medication: Pantoprazole 40 mg tablet     QTY: 90    Direction: Take 1 tablet by mouth daily     LOV: 07/19/23  Next appt: 07/25/24    Medication refill routed to provider

## 2024-07-02 DIAGNOSIS — R52 PAIN: ICD-10-CM

## 2024-07-02 RX ORDER — PREGABALIN 50 MG/1
50 CAPSULE ORAL 3 TIMES DAILY
Qty: 270 CAPSULE | Refills: 1 | Status: SHIPPED | OUTPATIENT
Start: 2024-07-02 | End: 2024-12-29

## 2024-07-02 NOTE — TELEPHONE ENCOUNTER
Received faxed refill request from pharmacy      PCP: Kevin Meyer MD    Last appt: 7/19/2023  Future Appointments   Date Time Provider Department Center   7/25/2024 11:00 AM Kevin Meyer MD [facility] AMB   9/24/2024 11:50 AM Bryan Mcgee MD [facility] [address] [facility] AMB       Requested Prescriptions     Pending Prescriptions Disp Refills    pregabalin (LYRICA) 50 MG capsule 270 capsule 1     Sig: Take 1 capsule by mouth 3 times daily for 180 days. Max Daily Amount: 150 mg

## 2024-07-21 NOTE — PROGRESS NOTES
HISTORY OF PRESENT ILLNESS   Gifty Biswas   is a 83 y.o.  female.  Hx DM-2 with microalbuminuria and neuropathy and nephropathy , htn hld cad s/p MI and stents FABY on cpap fatty liver and medicare wellness-here with her daughter Fatmata Ramirez-nephro for mild CKD--cr 1.04 this year-farxiga started--she stopped the med -felt poorly on the med    Dr Mcgee -DM-2- A1c 6.9 earlier this year on metformin and toujeo 34 units qd-will get labs again in 2 months  Has neuropathic pain in feet-sees podiatrist  CARD-Dr Velasquez-appt next month-no cp or angina    Still able to use weedeater at her house but some shoulder pain -will fu ortho MD for injection      She awoke with a red right eye a few days ago-no pain or vision changes  Nonsmoker, no etoh  Her son lives with her in [place]    Last OV     C/o sob and low endurance , request labs-vitamin levels  Hx b12 deficiency  Has b/l hip and knee pain that limits her walking        Patient Active Problem List    Diagnosis Date Noted    Unstable angina (HCC) 02/15/2023    Chronic renal disease, stage III (HCC) 07/04/2022    History of breast cancer 04/20/2020    Type 2 diabetes with nephropathy (HCC) 08/10/2018    Fatty liver 02/23/2018    Iron deficiency anemia 12/01/2017    Osteopenia of left thigh 04/02/2017    Advanced care planning/counseling discussion 12/17/2015    CAD (coronary artery disease) 05/15/2012    Hiatal hernia 05/15/2012    Pure hypercholesterolemia 11/16/2009    DJD (degenerative joint disease) 11/16/2009    Essential hypertension, benign 11/16/2009    Type 2 diabetes, controlled, with neuropathy (HCC) 11/16/2009    FABY (obstructive sleep apnea) 11/16/2009     Current Outpatient Medications   Medication Sig Dispense Refill    pregabalin (LYRICA) 50 MG capsule Take 1 capsule by mouth 3 times daily for 180 days. Max Daily Amount: 150 mg 270 capsule 1    pantoprazole (PROTONIX) 40 MG tablet Take 1 tablet by mouth daily 90 tablet 3    famotidine (PEPCID) 20 MG

## 2024-07-25 ENCOUNTER — OFFICE VISIT (OUTPATIENT)
Age: 84
End: 2024-07-25
Payer: MEDICARE

## 2024-07-25 VITALS
BODY MASS INDEX: 29.88 KG/M2 | WEIGHT: 168.6 LBS | OXYGEN SATURATION: 98 % | HEIGHT: 63 IN | SYSTOLIC BLOOD PRESSURE: 138 MMHG | TEMPERATURE: 96.9 F | DIASTOLIC BLOOD PRESSURE: 76 MMHG | HEART RATE: 73 BPM | RESPIRATION RATE: 16 BRPM

## 2024-07-25 DIAGNOSIS — I25.10 CORONARY ARTERY DISEASE INVOLVING NATIVE CORONARY ARTERY OF NATIVE HEART WITHOUT ANGINA PECTORIS: ICD-10-CM

## 2024-07-25 DIAGNOSIS — E11.22 TYPE 2 DIABETES MELLITUS WITH STAGE 3A CHRONIC KIDNEY DISEASE, WITH LONG-TERM CURRENT USE OF INSULIN (HCC): ICD-10-CM

## 2024-07-25 DIAGNOSIS — Z12.39 BREAST SCREENING: Primary | ICD-10-CM

## 2024-07-25 DIAGNOSIS — Z00.00 MEDICARE ANNUAL WELLNESS VISIT, SUBSEQUENT: ICD-10-CM

## 2024-07-25 DIAGNOSIS — H11.31 SUBCONJUNCTIVAL HEMORRHAGE OF RIGHT EYE: ICD-10-CM

## 2024-07-25 DIAGNOSIS — N18.30 STAGE 3 CHRONIC KIDNEY DISEASE, UNSPECIFIED WHETHER STAGE 3A OR 3B CKD (HCC): ICD-10-CM

## 2024-07-25 DIAGNOSIS — Z79.4 TYPE 2 DIABETES MELLITUS WITH STAGE 3A CHRONIC KIDNEY DISEASE, WITH LONG-TERM CURRENT USE OF INSULIN (HCC): ICD-10-CM

## 2024-07-25 DIAGNOSIS — N18.31 TYPE 2 DIABETES MELLITUS WITH STAGE 3A CHRONIC KIDNEY DISEASE, WITH LONG-TERM CURRENT USE OF INSULIN (HCC): ICD-10-CM

## 2024-07-25 DIAGNOSIS — E78.5 HYPERLIPIDEMIA, UNSPECIFIED HYPERLIPIDEMIA TYPE: ICD-10-CM

## 2024-07-25 PROCEDURE — 99214 OFFICE O/P EST MOD 30 MIN: CPT | Performed by: INTERNAL MEDICINE

## 2024-07-25 RX ORDER — FUROSEMIDE 20 MG/1
20 TABLET ORAL
COMMUNITY
Start: 2017-04-18

## 2024-07-25 NOTE — PROGRESS NOTES
Anesthesia Pre Eval Note    Anesthesia ROS/Med Hx        Anesthetic Complication History:  Patient does not have a history of anesthetic complications      Pulmonary Review:    Positive for sleep apnea - no treatment    Neuro/Psych Review:    Positive for psychiatric history - Depression    Cardiovascular Review:    Positive for hyperlipidemia    GI/HEPATIC/RENAL Review:    Positive for GERD  Positive for renal disease - chronic renal insufficiency  Positive for bowel prep    End/Other Review:    Positive for obesity class II - 35.00 - 39.99  Positive for anemia  Positive for chronic pain      Relevant Problems   No relevant active problems       Physical Exam     Airway   Mallampati: II  TM Distance: >3 FB  Neck ROM: Full  Neck: Able to place in sniff position  TMJ Mobility: Good    Cardiovascular    Cardio Rhythm: Regular  Cardio Rate: Normal    Head Assessment  Head assessment: Normocephalic    General Assessment  General Assessment: Alert and oriented    Dental Exam  Dental exam normal    Pulmonary Exam    Breath sounds clear to auscultation:  Yes      Anesthesia Plan:  Anesthesia Plan    ASA Status: 2    Anesthesia Type: MAC    Induction: Intravenous  Preferred Airway Type: Mask  Maintenance: TIVA      Post-op Pain Management: Per Surgeon      Checklist  Reviewed: Lab Results, Nursing Notes, Consultations, Beta Blocker Status, Patient Summary, DNR Status, NPO Status, Problem list, Medications, Allergies, Past Med History and Pregnancy Test Results  Consent/Risks Discussed Statement:  The proposed anesthetic plan, including its risks and benefits, have been discussed with the Patient along with the risks and benefits of alternatives. Questions were encouraged and answered and the patient and/or representative understands and agrees to proceed.        I discussed with the patient (and/or patient's legal representative) the risks and benefits of the proposed anesthesia plan, MAC, which may include services  \"Have you been to the ER, urgent care clinic since your last visit?  Hospitalized since your last visit?\"    NO    “Have you seen or consulted any other health care providers outside of [facility] since your last visit?”    Endocrinologist   [facility]   Nephrology             Click Here for Release of Records Request     performed by other anesthesia providers.    Alternative anesthesia plans, if available, were reviewed with the patient (and/or patient's legal representative). Discussion has been held with the patient (and/or patient's legal representative) regarding risks of anesthesia, which include allergic reaction, anxiety, aspiration, conversion to general anesthesia, depressed breathing, intra-operative awareness, hypotension, nausea and vomiting and emergent situations that may require change in anesthesia plan.    The patient (and/or patient's legal representative) has indicated understanding, his/her questions have been answered, and he/she wishes to proceed with the planned anesthetic.    Blood Products: Not Anticipated    Comments  Plan Comments: I have reviewed the chart, interviewed and examined the patient. I have explained the risk of complications of monitored anesthesia care to the patient. MAC provides the patient with anxiety relief, amnesia, pain relief, comfort and safety during the procedure. Patient understands that MAC does not exclude awareness. Side effects from anesthetic medications may include prolonged sedation, agitation, confusion, nausea, vomiting and respiratory depression. General anesthesia might be required if the patient can't tolerate the procedure or patient has severe respiratory depression or airway obstruction. Standard precaution, prophylactic treatment and close monitoring will be taken to decrease the risk of complications. All questions are answered. Patient agrees to proceed.

## 2024-07-25 NOTE — PATIENT INSTRUCTIONS
test when you get your 's license or apply for some types of jobs.  Visual field tests  These tests are used:  To check for vision loss in any area of your range of vision.  To screen for certain eye diseases.  To look for nerve damage after a stroke, head injury, or other problem that could reduce blood flow to the brain.  Refraction and color tests  A refraction test is done to find the right prescription for glasses and contact lenses.  A color vision test is done to check for color blindness.  Color vision is often tested as part of a routine exam. You may also have this test when you apply for a job where recognizing different colors is important, such as , electronics, or the .  How are vision tests done?  Visual acuity test   You cover one eye at a time.  You read aloud from a wall chart across the room.  You read aloud from a small card that you hold in your hand.  Refraction   You look into a special device.  The device puts lenses of different strengths in front of each eye to see how strong your glasses or contact lenses need to be.  Visual field tests   Your doctor may have you look through special machines.  Or your doctor may simply have you stare straight ahead while they move a finger into and out of your field of vision.  Color vision test   You look at pieces of printed test patterns in various colors. You say what number or symbol you see.  Your doctor may have you trace the number or symbol using a pointer.  How do these tests feel?  There is very little chance of having a problem from this test. If dilating drops are used for a vision test, they may make the eyes sting and cause a medicine taste in the mouth.  Follow-up care is a key part of your treatment and safety. Be sure to make and go to all appointments, and call your doctor if you are having problems. It's also a good idea to know your test results and keep a list of the medicines you take.  Where can you learn

## 2024-08-05 ENCOUNTER — TRANSCRIBE ORDERS (OUTPATIENT)
Facility: HOSPITAL | Age: 84
End: 2024-08-05

## 2024-08-05 DIAGNOSIS — Z12.31 VISIT FOR SCREENING MAMMOGRAM: Primary | ICD-10-CM

## 2024-08-05 LAB
ALBUMIN SERPL-MCNC: 4 G/DL (ref 3.7–4.7)
ALP SERPL-CCNC: 67 IU/L (ref 44–121)
ALT SERPL-CCNC: 19 IU/L (ref 0–32)
AST SERPL-CCNC: 15 IU/L (ref 0–40)
BILIRUB SERPL-MCNC: 0.4 MG/DL (ref 0–1.2)
BUN/CREAT SERPL: 23 (ref 12–28)
CALCIUM SERPL-MCNC: 9.9 MG/DL (ref 8.7–10.3)
CHLORIDE SERPL-SCNC: 101 MMOL/L (ref 96–106)
CO2 SERPL-SCNC: 24 MMOL/L (ref 20–29)
CREAT SERPL-MCNC: 1.05 MG/DL (ref 0.57–1)
EGFRCR SERPLBLD CKD-EPI 2021: 53 ML/MIN/1.73
GLOBULIN SER CALC-MCNC: 2.8 G/DL (ref 1.5–4.5)
GLUCOSE SERPL-MCNC: 145 MG/DL (ref 70–99)
POTASSIUM SERPL-SCNC: 4.6 MMOL/L (ref 3.5–5.2)
PROT SERPL-MCNC: 6.8 G/DL (ref 6–8.5)
SODIUM SERPL-SCNC: 136 MMOL/L (ref 134–144)

## 2024-08-06 LAB
ALBUMIN/CREAT UR: 915 MG/G CREAT (ref 0–29)
CHOLEST SERPL-MCNC: 172 MG/DL (ref 100–199)
CREAT UR-MCNC: 41.8 MG/DL
HBA1C MFR BLD: 7.3 % (ref 4.8–5.6)
HDLC SERPL-MCNC: 44 MG/DL
IMP & REVIEW OF LAB RESULTS: NORMAL
LDLC SERPL CALC-MCNC: 95 MG/DL (ref 0–99)
Lab: NORMAL
MICROALBUMIN UR-MCNC: 382.5 UG/ML
REPORT: NORMAL
REPORT: NORMAL
TRIGL SERPL-MCNC: 192 MG/DL (ref 0–149)
VLDLC SERPL CALC-MCNC: 33 MG/DL (ref 5–40)

## 2024-08-07 ENCOUNTER — HOSPITAL ENCOUNTER (OUTPATIENT)
Facility: HOSPITAL | Age: 84
Discharge: HOME | End: 2024-08-10
Attending: INTERNAL MEDICINE
Payer: MEDICARE

## 2024-08-07 VITALS — WEIGHT: 167 LBS | BODY MASS INDEX: 29.59 KG/M2 | HEIGHT: 63 IN

## 2024-08-07 DIAGNOSIS — Z12.31 VISIT FOR SCREENING MAMMOGRAM: ICD-10-CM

## 2024-08-07 PROCEDURE — 77063 BREAST TOMOSYNTHESIS BI: CPT

## 2024-09-11 DIAGNOSIS — Z79.4 TYPE 2 DIABETES MELLITUS WITH DIABETIC NEPHROPATHY, WITH LONG-TERM CURRENT USE OF INSULIN (HCC): ICD-10-CM

## 2024-09-11 DIAGNOSIS — E11.21 TYPE 2 DIABETES MELLITUS WITH DIABETIC NEPHROPATHY, WITH LONG-TERM CURRENT USE OF INSULIN (HCC): ICD-10-CM

## 2024-09-11 DIAGNOSIS — I10 ESSENTIAL (PRIMARY) HYPERTENSION: ICD-10-CM

## 2024-09-11 DIAGNOSIS — E78.00 PURE HYPERCHOLESTEROLEMIA, UNSPECIFIED: ICD-10-CM

## 2024-09-13 RX ORDER — MONTELUKAST SODIUM 10 MG/1
10 TABLET ORAL DAILY
Qty: 90 TABLET | Refills: 1 | Status: SHIPPED | OUTPATIENT
Start: 2024-09-13

## 2024-09-16 ENCOUNTER — TELEPHONE (OUTPATIENT)
Age: 84
End: 2024-09-16

## 2024-09-16 RX ORDER — GLUCOSAMINE HCL/CHONDROITIN SU 500-400 MG
CAPSULE ORAL
Qty: 100 STRIP | Refills: 5 | Status: SHIPPED | OUTPATIENT
Start: 2024-09-16

## 2024-09-16 RX ORDER — LOSARTAN POTASSIUM 100 MG/1
100 TABLET ORAL DAILY
Qty: 90 TABLET | Refills: 2 | Status: SHIPPED | OUTPATIENT
Start: 2024-09-16

## 2024-09-17 RX ORDER — LOSARTAN POTASSIUM 100 MG/1
100 TABLET ORAL DAILY
Qty: 90 TABLET | Refills: 3 | Status: SHIPPED | OUTPATIENT
Start: 2024-09-17

## 2024-09-24 ENCOUNTER — OFFICE VISIT (OUTPATIENT)
Age: 84
End: 2024-09-24
Payer: MEDICARE

## 2024-09-24 VITALS
SYSTOLIC BLOOD PRESSURE: 129 MMHG | BODY MASS INDEX: 30.02 KG/M2 | DIASTOLIC BLOOD PRESSURE: 76 MMHG | WEIGHT: 169.4 LBS | HEART RATE: 79 BPM | HEIGHT: 63 IN

## 2024-09-24 DIAGNOSIS — I10 ESSENTIAL (PRIMARY) HYPERTENSION: ICD-10-CM

## 2024-09-24 DIAGNOSIS — E11.21 TYPE 2 DIABETES MELLITUS WITH DIABETIC NEPHROPATHY, WITH LONG-TERM CURRENT USE OF INSULIN (HCC): Primary | ICD-10-CM

## 2024-09-24 DIAGNOSIS — Z79.4 TYPE 2 DIABETES MELLITUS WITH DIABETIC NEPHROPATHY, WITH LONG-TERM CURRENT USE OF INSULIN (HCC): Primary | ICD-10-CM

## 2024-09-24 DIAGNOSIS — E78.00 PURE HYPERCHOLESTEROLEMIA, UNSPECIFIED: ICD-10-CM

## 2024-09-24 PROCEDURE — 1036F TOBACCO NON-USER: CPT | Performed by: INTERNAL MEDICINE

## 2024-09-24 PROCEDURE — 3051F HG A1C>EQUAL 7.0%<8.0%: CPT | Performed by: INTERNAL MEDICINE

## 2024-09-24 PROCEDURE — 1123F ACP DISCUSS/DSCN MKR DOCD: CPT | Performed by: INTERNAL MEDICINE

## 2024-09-24 PROCEDURE — 3078F DIAST BP <80 MM HG: CPT | Performed by: INTERNAL MEDICINE

## 2024-09-24 PROCEDURE — 99214 OFFICE O/P EST MOD 30 MIN: CPT | Performed by: INTERNAL MEDICINE

## 2024-09-24 PROCEDURE — G8417 CALC BMI ABV UP PARAM F/U: HCPCS | Performed by: INTERNAL MEDICINE

## 2024-09-24 PROCEDURE — G8427 DOCREV CUR MEDS BY ELIG CLIN: HCPCS | Performed by: INTERNAL MEDICINE

## 2024-09-24 PROCEDURE — G2211 COMPLEX E/M VISIT ADD ON: HCPCS | Performed by: INTERNAL MEDICINE

## 2024-09-24 PROCEDURE — 3074F SYST BP LT 130 MM HG: CPT | Performed by: INTERNAL MEDICINE

## 2024-09-24 PROCEDURE — G8399 PT W/DXA RESULTS DOCUMENT: HCPCS | Performed by: INTERNAL MEDICINE

## 2024-09-24 PROCEDURE — 1090F PRES/ABSN URINE INCON ASSESS: CPT | Performed by: INTERNAL MEDICINE

## 2024-10-18 ENCOUNTER — TELEPHONE (OUTPATIENT)
Age: 84
End: 2024-10-18

## 2024-10-18 NOTE — TELEPHONE ENCOUNTER
Attempted to reach patient. Left message on vm to return call regarding blood sugar supplies.    What brand of lancets and test strips patient uses for diabetic supplies and how many times daily check blood sugars?

## 2024-10-28 ENCOUNTER — TELEPHONE (OUTPATIENT)
Age: 84
End: 2024-10-28

## 2024-10-28 DIAGNOSIS — E11.21 TYPE 2 DIABETES MELLITUS WITH DIABETIC NEPHROPATHY, UNSPECIFIED WHETHER LONG TERM INSULIN USE (HCC): Primary | ICD-10-CM

## 2024-10-28 RX ORDER — LANCETS
EACH MISCELLANEOUS
Qty: 100 EACH | Refills: 3 | Status: SHIPPED | OUTPATIENT
Start: 2024-10-28

## 2024-10-28 NOTE — TELEPHONE ENCOUNTER
PCP: Kevin Meyer MD    Last appt: 7/25/2024   Future Appointments   Date Time Provider Department Center   3/18/2025 11:50 AM Bryan Mcgee MD [facility] [address] [facility]   7/15/2025 11:00 AM Kevin Meyer MD [facility] DEP       Requested Prescriptions     Pending Prescriptions Disp Refills    blood glucose test strips (ASCENSIA AUTODISC VI;ONE TOUCH ULTRA TEST VI) strip 100 each 3     Sig: Test 2-3 times daily    ONE TOUCH ULTRASOFT LANCETS MISC 100 each 3     Sig: Test 2-3 times daily

## 2024-10-28 NOTE — TELEPHONE ENCOUNTER
Patient returned call from 10/18 stating she uses One Touch Ultra & she checks her blood sugar 2-3 times each day

## 2024-11-21 DIAGNOSIS — E78.5 HYPERLIPIDEMIA, UNSPECIFIED HYPERLIPIDEMIA TYPE: Primary | ICD-10-CM

## 2024-11-21 RX ORDER — ATORVASTATIN CALCIUM 80 MG/1
80 TABLET, FILM COATED ORAL DAILY
Qty: 90 TABLET | Refills: 3 | Status: SHIPPED | OUTPATIENT
Start: 2024-11-21

## 2024-11-21 NOTE — TELEPHONE ENCOUNTER
Received faxed refill request from pharmacy      PCP: Kevin Meyer MD    Last appt: 7/25/2024  Future Appointments   Date Time Provider Department Center   3/18/2025 11:50 AM Bryan Mcgee MD [facility] BS [facility]   7/15/2025 11:00 AM Kevin Meyer MD [facility] DEP       Requested Prescriptions     Pending Prescriptions Disp Refills    atorvastatin (LIPITOR) 80 MG tablet 90 tablet 3     Sig: Take 1 tablet by mouth daily

## 2024-11-26 RX ORDER — FAMOTIDINE 20 MG/1
20 TABLET, FILM COATED ORAL DAILY
Qty: 90 TABLET | Refills: 3 | Status: SHIPPED | OUTPATIENT
Start: 2024-11-26

## 2024-11-26 NOTE — TELEPHONE ENCOUNTER
amotidine (PEPCID) 20 MG tablet    Refill to [facility] #[phone] [place], [place]     Pt would like today if possible.  Pt was advised of 48/72 hr turn around.

## 2024-11-26 NOTE — TELEPHONE ENCOUNTER
PCP: Kevin Meyer MD    Last appt: 7/25/2024   Future Appointments   Date Time Provider Department Center   3/18/2025 11:50 AM Bryan Mcgee MD [facility]   7/15/2025 11:00 AM Kevin Meyer MD [facility]       Requested Prescriptions     Pending Prescriptions Disp Refills    famotidine (PEPCID) 20 MG tablet 90 tablet 3     Sig: Take 1 tablet by mouth daily

## 2025-03-04 DIAGNOSIS — Z79.4 TYPE 2 DIABETES MELLITUS WITH DIABETIC NEPHROPATHY, WITH LONG-TERM CURRENT USE OF INSULIN (HCC): ICD-10-CM

## 2025-03-04 DIAGNOSIS — I10 ESSENTIAL (PRIMARY) HYPERTENSION: ICD-10-CM

## 2025-03-04 DIAGNOSIS — E78.00 PURE HYPERCHOLESTEROLEMIA, UNSPECIFIED: ICD-10-CM

## 2025-03-04 DIAGNOSIS — E11.21 TYPE 2 DIABETES MELLITUS WITH DIABETIC NEPHROPATHY, WITH LONG-TERM CURRENT USE OF INSULIN (HCC): ICD-10-CM

## 2025-03-14 LAB — HBA1C MFR BLD: 7.1 % (ref 4.8–5.6)

## 2025-03-15 LAB
ALBUMIN SERPL-MCNC: 3.8 G/DL (ref 3.7–4.7)
ALP SERPL-CCNC: 60 IU/L (ref 44–121)
ALT SERPL-CCNC: 17 IU/L (ref 0–32)
AST SERPL-CCNC: 14 IU/L (ref 0–40)
BILIRUB SERPL-MCNC: 0.4 MG/DL (ref 0–1.2)
BUN SERPL-MCNC: 24 MG/DL (ref 8–27)
BUN/CREAT SERPL: 19 (ref 12–28)
CALCIUM SERPL-MCNC: 9.4 MG/DL (ref 8.7–10.3)
CHLORIDE SERPL-SCNC: 103 MMOL/L (ref 96–106)
CHOLEST SERPL-MCNC: 144 MG/DL (ref 100–199)
CO2 SERPL-SCNC: 22 MMOL/L (ref 20–29)
CREAT SERPL-MCNC: 1.24 MG/DL (ref 0.57–1)
EGFRCR SERPLBLD CKD-EPI 2021: 43 ML/MIN/1.73
GLOBULIN SER CALC-MCNC: 2.7 G/DL (ref 1.5–4.5)
GLUCOSE SERPL-MCNC: 139 MG/DL (ref 70–99)
HDLC SERPL-MCNC: 44 MG/DL
IMP & REVIEW OF LAB RESULTS: NORMAL
LDLC SERPL CALC-MCNC: 81 MG/DL (ref 0–99)
Lab: NORMAL
POTASSIUM SERPL-SCNC: 4.9 MMOL/L (ref 3.5–5.2)
PROT SERPL-MCNC: 6.5 G/DL (ref 6–8.5)
REPORT: NORMAL
REPORT: NORMAL
SODIUM SERPL-SCNC: 139 MMOL/L (ref 134–144)
TRIGL SERPL-MCNC: 105 MG/DL (ref 0–149)
VLDLC SERPL CALC-MCNC: 19 MG/DL (ref 5–40)

## 2025-03-18 ENCOUNTER — OFFICE VISIT (OUTPATIENT)
Age: 85
End: 2025-03-18
Payer: MEDICARE

## 2025-03-18 ENCOUNTER — RESULTS FOLLOW-UP (OUTPATIENT)
Age: 85
End: 2025-03-18

## 2025-03-18 VITALS
HEIGHT: 63 IN | WEIGHT: 166.8 LBS | SYSTOLIC BLOOD PRESSURE: 121 MMHG | HEART RATE: 77 BPM | DIASTOLIC BLOOD PRESSURE: 75 MMHG | BODY MASS INDEX: 29.55 KG/M2

## 2025-03-18 DIAGNOSIS — E78.00 PURE HYPERCHOLESTEROLEMIA, UNSPECIFIED: ICD-10-CM

## 2025-03-18 DIAGNOSIS — I10 ESSENTIAL (PRIMARY) HYPERTENSION: ICD-10-CM

## 2025-03-18 DIAGNOSIS — E11.21 TYPE 2 DIABETES MELLITUS WITH DIABETIC NEPHROPATHY, WITH LONG-TERM CURRENT USE OF INSULIN (HCC): Primary | ICD-10-CM

## 2025-03-18 DIAGNOSIS — Z79.4 TYPE 2 DIABETES MELLITUS WITH DIABETIC NEPHROPATHY, WITH LONG-TERM CURRENT USE OF INSULIN (HCC): Primary | ICD-10-CM

## 2025-03-18 PROCEDURE — G8417 CALC BMI ABV UP PARAM F/U: HCPCS | Performed by: INTERNAL MEDICINE

## 2025-03-18 PROCEDURE — G2211 COMPLEX E/M VISIT ADD ON: HCPCS | Performed by: INTERNAL MEDICINE

## 2025-03-18 PROCEDURE — 1123F ACP DISCUSS/DSCN MKR DOCD: CPT | Performed by: INTERNAL MEDICINE

## 2025-03-18 PROCEDURE — 1159F MED LIST DOCD IN RCRD: CPT | Performed by: INTERNAL MEDICINE

## 2025-03-18 PROCEDURE — G8427 DOCREV CUR MEDS BY ELIG CLIN: HCPCS | Performed by: INTERNAL MEDICINE

## 2025-03-18 PROCEDURE — 1160F RVW MEDS BY RX/DR IN RCRD: CPT | Performed by: INTERNAL MEDICINE

## 2025-03-18 PROCEDURE — 3078F DIAST BP <80 MM HG: CPT | Performed by: INTERNAL MEDICINE

## 2025-03-18 PROCEDURE — 3051F HG A1C>EQUAL 7.0%<8.0%: CPT | Performed by: INTERNAL MEDICINE

## 2025-03-18 PROCEDURE — G8399 PT W/DXA RESULTS DOCUMENT: HCPCS | Performed by: INTERNAL MEDICINE

## 2025-03-18 PROCEDURE — 3074F SYST BP LT 130 MM HG: CPT | Performed by: INTERNAL MEDICINE

## 2025-03-18 PROCEDURE — 1090F PRES/ABSN URINE INCON ASSESS: CPT | Performed by: INTERNAL MEDICINE

## 2025-03-18 PROCEDURE — 99214 OFFICE O/P EST MOD 30 MIN: CPT | Performed by: INTERNAL MEDICINE

## 2025-03-18 PROCEDURE — 1036F TOBACCO NON-USER: CPT | Performed by: INTERNAL MEDICINE

## 2025-03-18 PROCEDURE — 1125F AMNT PAIN NOTED PAIN PRSNT: CPT | Performed by: INTERNAL MEDICINE

## 2025-03-18 NOTE — PATIENT INSTRUCTIONS
1) Your Hemoglobin A1c (3 month test of blood sugar) is very good at 7.1% down from 7.3% and remains at goal of 7.5-8% or less.  Keep up the good work!    2) BUN and creatinine are markers of kidney function.  Your creatinine is slightly elevated but better than in 2021.    3) ALT and AST are markers of liver function.  Your values are normal.    4) Your cholesterol and blood pressure are normal.

## 2025-03-18 NOTE — PROGRESS NOTES
lipitor 40 mg and added lofibra 54 mg and LDL 68 in 7/16.  LDL 95 in 1/22 so lipitor increased to 80 mg daily and down to 49 in 8/22.  LDL 74 and  in 9/23.  LDL 95 and  in 8/24.  LDL 81 and  in 3/25.  - cont atorvastatin 80 mg daily  - cont lofibra 54 mg daily  - check lipids prior to next visit         Patient Instructions   1) Your Hemoglobin A1c (3 month test of blood sugar) is very good at 7.1% down from 7.3% and remains at goal of 7.5-8% or less.  Keep up the good work!    2) BUN and creatinine are markers of kidney function.  Your creatinine is slightly elevated but better than in 2021.    3) ALT and AST are markers of liver function.  Your values are normal.    4) Your cholesterol and blood pressure are normal.            Return 9/23/25 at 1:50pm.        Copy sent to:  Kevin Meyer MD Rohatgi, Sameer, MD (Cardio Vascular Surgery)  Jeevan Tan MD (Gastroenterology)  Dr. Ramirez    The patient (or guardian, if applicable) and other individuals in attendance with the patient were advised that Artificial Intelligence will be utilized during this visit to record, process the conversation to generate a clinical note, and support improvement of the AI technology. The patient (or guardian, if applicable) and other individuals in attendance at the appointment consented to the use of AI, including the recording.

## 2025-03-20 RX ORDER — MONTELUKAST SODIUM 10 MG/1
10 TABLET ORAL DAILY
Qty: 90 TABLET | Refills: 1 | Status: SHIPPED | OUTPATIENT
Start: 2025-03-20

## 2025-03-28 RX ORDER — AZELASTINE 1 MG/ML
SPRAY, METERED NASAL
Qty: 30 ML | Refills: 11 | Status: SHIPPED | OUTPATIENT
Start: 2025-03-28

## 2025-03-28 NOTE — TELEPHONE ENCOUNTER
PCP: Kevni Meyer MD    Last appt: 7/25/2024   Future Appointments   Date Time Provider Department Center   7/15/2025 11:00 AM Kevin Meyer MD [facility]3 [facility]   9/23/2025  1:50 PM Bryan Mcgee MD [facility]       Requested Prescriptions     Pending Prescriptions Disp Refills    azelastine (ASTELIN) 0.1 % nasal spray 30 mL 11     Sig: USE 2 SPRAYS IN EACH NOSTRIL TWICE DAILY       Prior labs and Blood pressures:  BP Readings from Last 3 Encounters:   03/18/25 121/75   09/24/24 129/76   07/25/24 138/76     Lab Results   Component Value Date/Time     03/14/2025 08:06 AM    K 4.9 03/14/2025 08:06 AM     03/14/2025 08:06 AM    CO2 22 03/14/2025 08:06 AM    BUN 24 03/14/2025 08:06 AM    GFRAA 59 01/14/2022 10:28 AM     Lab Results   Component Value Date/Time    HAS7MAGI 6.9 03/25/2024 10:52 AM     Lab Results   Component Value Date/Time    CHOL 144 03/14/2025 08:06 AM    HDL 44 03/14/2025 08:06 AM    LDL 81 03/14/2025 08:06 AM    LDL 74.6 09/05/2023 03:04 PM    VLDL 19 03/14/2025 08:06 AM    VLDL 24 01/14/2022 10:28 AM     No results found for: \"VITD3\"    Lab Results   Component Value Date/Time    TSH 1.27 07/05/2022 11:42 AM

## 2025-03-28 NOTE — TELEPHONE ENCOUNTER
Received fax from [facility] pt requesting One Touch Verio meter message routed to provider for approval.     Last OV: 07/25/24    Next appt: 07/15/25

## 2025-04-15 RX ORDER — METFORMIN HYDROCHLORIDE 500 MG/1
TABLET, EXTENDED RELEASE ORAL
Qty: 180 TABLET | Refills: 3 | Status: SHIPPED | OUTPATIENT
Start: 2025-04-15

## 2025-04-18 RX ORDER — INSULIN GLARGINE 300 U/ML
INJECTION, SOLUTION SUBCUTANEOUS
Qty: 4.5 ML | Refills: 11 | Status: SHIPPED | OUTPATIENT
Start: 2025-04-18

## 2025-04-25 RX ORDER — CARVEDILOL 12.5 MG/1
12.5 TABLET ORAL 2 TIMES DAILY WITH MEALS
Qty: 180 TABLET | Refills: 3 | Status: SHIPPED | OUTPATIENT
Start: 2025-04-25

## 2025-04-25 RX ORDER — MONTELUKAST SODIUM 10 MG/1
10 TABLET ORAL DAILY
Qty: 90 TABLET | Refills: 1 | Status: SHIPPED | OUTPATIENT
Start: 2025-04-25

## 2025-05-12 DIAGNOSIS — E11.21 TYPE 2 DIABETES MELLITUS WITH DIABETIC NEPHROPATHY, UNSPECIFIED WHETHER LONG TERM INSULIN USE (HCC): ICD-10-CM

## 2025-05-12 NOTE — TELEPHONE ENCOUNTER
Received faxed refill request from pharmacy      PCP: Kevin Meyer MD    Last appt: 7/25/2024  Future Appointments   Date Time Provider Department Center   7/15/2025 11:00 AM Kevin Meyer MD [facility]   9/23/2025  1:50 PM [facility]       Requested Prescriptions     Pending Prescriptions Disp Refills    blood glucose test strips (ASCENSIA AUTODISC VI;ONE TOUCH ULTRA TEST VI) strip 100 each 3     Sig: Test 2-3 times daily

## 2025-05-13 ENCOUNTER — HOSPITAL ENCOUNTER (EMERGENCY)
Facility: HOSPITAL | Age: 85
Discharge: HOME | End: 2025-05-13
Attending: EMERGENCY MEDICINE
Payer: MEDICARE

## 2025-05-13 ENCOUNTER — APPOINTMENT (OUTPATIENT)
Facility: HOSPITAL | Age: 85
End: 2025-05-13
Payer: MEDICARE

## 2025-05-13 VITALS
DIASTOLIC BLOOD PRESSURE: 67 MMHG | WEIGHT: 167 LBS | RESPIRATION RATE: 18 BRPM | HEIGHT: 63 IN | SYSTOLIC BLOOD PRESSURE: 165 MMHG | TEMPERATURE: 97.7 F | BODY MASS INDEX: 29.59 KG/M2 | OXYGEN SATURATION: 95 % | HEART RATE: 76 BPM

## 2025-05-13 DIAGNOSIS — R60.0 LEG EDEMA, RIGHT: ICD-10-CM

## 2025-05-13 DIAGNOSIS — L03.115 CELLULITIS OF RIGHT LOWER EXTREMITY: ICD-10-CM

## 2025-05-13 DIAGNOSIS — M79.604 RIGHT LEG PAIN: Primary | ICD-10-CM

## 2025-05-13 LAB — ECHO BSA: 1.83 M2

## 2025-05-13 PROCEDURE — 93971 EXTREMITY STUDY: CPT

## 2025-05-13 PROCEDURE — 6370000000 HC RX 637 (ALT 250 FOR IP): Performed by: EMERGENCY MEDICINE

## 2025-05-13 PROCEDURE — 99284 EMERGENCY DEPT VISIT MOD MDM: CPT

## 2025-05-13 RX ORDER — ONDANSETRON 4 MG/1
4 TABLET, ORALLY DISINTEGRATING ORAL 3 TIMES DAILY PRN
Qty: 21 TABLET | Refills: 0 | Status: SHIPPED | OUTPATIENT
Start: 2025-05-13

## 2025-05-13 RX ORDER — DOXYCYCLINE HYCLATE 100 MG
100 TABLET ORAL
Status: COMPLETED | OUTPATIENT
Start: 2025-05-13 | End: 2025-05-13

## 2025-05-13 RX ORDER — HYDROCODONE BITARTRATE AND ACETAMINOPHEN 5; 325 MG/1; MG/1
1 TABLET ORAL EVERY 4 HOURS PRN
Qty: 18 TABLET | Refills: 0 | Status: SHIPPED | OUTPATIENT
Start: 2025-05-13 | End: 2025-05-16

## 2025-05-13 RX ORDER — ONDANSETRON 4 MG/1
4 TABLET, ORALLY DISINTEGRATING ORAL ONCE
Status: COMPLETED | OUTPATIENT
Start: 2025-05-13 | End: 2025-05-13

## 2025-05-13 RX ORDER — HYDROCODONE BITARTRATE AND ACETAMINOPHEN 5; 325 MG/1; MG/1
1 TABLET ORAL
Refills: 0 | Status: COMPLETED | OUTPATIENT
Start: 2025-05-13 | End: 2025-05-13

## 2025-05-13 RX ORDER — ONDANSETRON 4 MG/1
4 TABLET, ORALLY DISINTEGRATING ORAL EVERY 8 HOURS PRN
Status: DISCONTINUED | OUTPATIENT
Start: 2025-05-13 | End: 2025-05-13 | Stop reason: HOSPADM

## 2025-05-13 RX ORDER — DOXYCYCLINE HYCLATE 100 MG
100 TABLET ORAL 2 TIMES DAILY
Qty: 20 TABLET | Refills: 0 | Status: SHIPPED | OUTPATIENT
Start: 2025-05-13 | End: 2025-05-23

## 2025-05-13 RX ADMIN — ONDANSETRON 4 MG: 4 TABLET, ORALLY DISINTEGRATING ORAL at 19:43

## 2025-05-13 RX ADMIN — HYDROCODONE BITARTRATE AND ACETAMINOPHEN 1 TABLET: 5; 325 TABLET ORAL at 21:22

## 2025-05-13 RX ADMIN — HYDROCODONE BITARTRATE AND ACETAMINOPHEN 1 TABLET: 5; 325 TABLET ORAL at 19:43

## 2025-05-13 RX ADMIN — ONDANSETRON 4 MG: 4 TABLET, ORALLY DISINTEGRATING ORAL at 21:22

## 2025-05-13 RX ADMIN — DOXYCYCLINE HYCLATE 100 MG: 100 TABLET, COATED ORAL at 20:58

## 2025-05-13 ASSESSMENT — PAIN DESCRIPTION - ORIENTATION
ORIENTATION: RIGHT;LEFT
ORIENTATION: RIGHT;LEFT
ORIENTATION: RIGHT
ORIENTATION: RIGHT;LEFT

## 2025-05-13 ASSESSMENT — PAIN DESCRIPTION - LOCATION
LOCATION: LEG;FOOT
LOCATION: LEG
LOCATION: LEG
LOCATION: LEG;FOOT

## 2025-05-13 ASSESSMENT — PAIN DESCRIPTION - ONSET
ONSET: ON-GOING
ONSET: ON-GOING

## 2025-05-13 ASSESSMENT — PAIN DESCRIPTION - FREQUENCY
FREQUENCY: INTERMITTENT
FREQUENCY: INTERMITTENT

## 2025-05-13 ASSESSMENT — PAIN DESCRIPTION - PAIN TYPE
TYPE: ACUTE PAIN
TYPE: ACUTE PAIN

## 2025-05-13 ASSESSMENT — PAIN SCALES - GENERAL
PAINLEVEL_OUTOF10: 8
PAINLEVEL_OUTOF10: 10
PAINLEVEL_OUTOF10: 4
PAINLEVEL_OUTOF10: 8

## 2025-05-13 ASSESSMENT — PAIN - FUNCTIONAL ASSESSMENT
PAIN_FUNCTIONAL_ASSESSMENT: PREVENTS OR INTERFERES SOME ACTIVE ACTIVITIES AND ADLS
PAIN_FUNCTIONAL_ASSESSMENT: 0-10

## 2025-05-13 ASSESSMENT — PAIN DESCRIPTION - DESCRIPTORS
DESCRIPTORS: ACHING

## 2025-05-13 NOTE — ED PROVIDER NOTES
ULTRA TEST VI) STRIP    Test 2-3 times daily    CARVEDILOL (COREG) 12.5 MG TABLET    Take 1 tablet by mouth 2 times daily (with meals) Replaces the 6.25 mg dose    CLOPIDOGREL (PLAVIX) 75 MG TABLET    Take 1 tablet by mouth daily    COLESTIPOL (COLESTID) 1 G TABLET    as needed    CYANOCOBALAMIN 500 MCG TABLET    Take 1 tablet by mouth daily    FAMOTIDINE (PEPCID) 20 MG TABLET    Take 1 tablet by mouth daily    FENOFIBRATE (TRICOR) 54 MG TABLET    Take by mouth daily    INSULIN GLARGINE, 1 UNIT DIAL, (TOUJEO SOLOSTAR) 300 UNIT/ML CONCENTRATED INJECTION PEN    ADMINISTER 34 UNITS UNDER THE SKIN DAILY FOR DIABETES OR AS DIRECTED UP TO 45 UNITS PER DAY    INSULIN LISPRO W/ TRANS PORT 100 UNIT/ML SOPN    Inject 6 units before meals (7 units if 180-250 and 8 units if over 250)--Dose change 03/25/24--updated med list--did not send prescription to the pharmacy    LANCETS MISC    Check fsbs bid -250.02    LOSARTAN (COZAAR) 100 MG TABLET    Take 1 tablet by mouth daily    METFORMIN (GLUCOPHAGE-XR) 500 MG EXTENDED RELEASE TABLET    TAKE 2 TABLETS BY MOUTH WITH DINNER. REPLACES JANUMET    MONTELUKAST (SINGULAIR) 10 MG TABLET    Take 1 tablet by mouth daily    MULTIPLE VITAMIN (MULTIVITAMIN ADULT PO)    Take 1 tablet by mouth daily    NITROGLYCERIN (NITROSTAT) 0.4 MG SL TABLET    Place 1 tablet under the tongue    ONDANSETRON (ZOFRAN-ODT) 4 MG DISINTEGRATING TABLET    Take 1 tablet by mouth every 8 hours as needed    ONE TOUCH ULTRASOFT LANCETS MISC    Test 2-3 times daily    PANTOPRAZOLE (PROTONIX) 40 MG TABLET    Take 1 tablet by mouth daily    PREGABALIN (LYRICA) 50 MG CAPSULE    Take 1 capsule by mouth 3 times daily for 180 days. Max Daily Amount: 150 mg    RANOLAZINE (RANEXA) 500 MG EXTENDED RELEASE TABLET    Take 1 tablet by mouth 2 times daily       SCREENINGS               No data recorded            PHYSICAL EXAM      ED Triage Vitals   Encounter Vitals Group      BP 05/13/25 1850 (!) 175/84      Systolic BP Percentile

## 2025-05-19 DIAGNOSIS — L03.119 CELLULITIS OF LOWER EXTREMITY, UNSPECIFIED LATERALITY: Primary | ICD-10-CM

## 2025-05-19 RX ORDER — CEPHALEXIN 500 MG/1
500 CAPSULE ORAL 2 TIMES DAILY
Qty: 14 CAPSULE | Refills: 0 | Status: SHIPPED | OUTPATIENT
Start: 2025-05-19 | End: 2025-05-26

## 2025-05-20 DIAGNOSIS — R52 PAIN: ICD-10-CM

## 2025-05-20 RX ORDER — PREGABALIN 50 MG/1
50 CAPSULE ORAL 3 TIMES DAILY
Qty: 270 CAPSULE | Refills: 1 | Status: SHIPPED | OUTPATIENT
Start: 2025-05-20 | End: 2025-11-16

## 2025-05-20 NOTE — TELEPHONE ENCOUNTER
Received faxed refill request from pharmacy      PCP: Kevin Meyer MD    Last appt: 7/25/2024  Future Appointments   Date Time Provider Department Center   7/15/2025 11:00 AM Kevin Meyer MD [facility]3 [facility]   9/23/2025  1:50 PM Bryan Mcgee MD RDE [facility]       Requested Prescriptions     Pending Prescriptions Disp Refills    pregabalin (LYRICA) 50 MG capsule 270 capsule 1     Sig: Take 1 capsule by mouth 3 times daily for 180 days. Max Daily Amount: 150 mg

## 2025-06-16 ENCOUNTER — TRANSCRIBE ORDERS (OUTPATIENT)
Facility: HOSPITAL | Age: 85
End: 2025-06-16

## 2025-06-16 DIAGNOSIS — M79.671 RIGHT FOOT PAIN: ICD-10-CM

## 2025-06-16 DIAGNOSIS — M19.071 ARTHRITIS OF RIGHT ANKLE: ICD-10-CM

## 2025-06-16 DIAGNOSIS — M79.89 SWELLING OF LIMB: ICD-10-CM

## 2025-06-16 DIAGNOSIS — I87.2 PERIPHERAL VENOUS INSUFFICIENCY: ICD-10-CM

## 2025-06-16 DIAGNOSIS — M76.821 POSTERIOR TIBIAL TENDINITIS OF RIGHT LEG: Primary | ICD-10-CM

## 2025-06-16 DIAGNOSIS — L03.115 CELLULITIS OF RIGHT FOOT: ICD-10-CM

## 2025-07-01 ENCOUNTER — HOSPITAL ENCOUNTER (OUTPATIENT)
Age: 85
Discharge: HOME | End: 2025-07-04
Payer: MEDICARE

## 2025-07-01 DIAGNOSIS — L03.115 CELLULITIS OF RIGHT FOOT: ICD-10-CM

## 2025-07-01 DIAGNOSIS — M79.89 SWELLING OF LIMB: ICD-10-CM

## 2025-07-01 DIAGNOSIS — M19.071 ARTHRITIS OF RIGHT ANKLE: ICD-10-CM

## 2025-07-01 DIAGNOSIS — M79.671 RIGHT FOOT PAIN: ICD-10-CM

## 2025-07-01 DIAGNOSIS — I87.2 PERIPHERAL VENOUS INSUFFICIENCY: ICD-10-CM

## 2025-07-01 DIAGNOSIS — M76.821 POSTERIOR TIBIAL TENDINITIS OF RIGHT LEG: ICD-10-CM

## 2025-07-01 PROCEDURE — 73721 MRI JNT OF LWR EXTRE W/O DYE: CPT

## 2025-07-06 ENCOUNTER — APPOINTMENT (OUTPATIENT)
Facility: HOSPITAL | Age: 85
DRG: 951 | End: 2025-07-06
Payer: MEDICARE

## 2025-07-06 ENCOUNTER — HOSPITAL ENCOUNTER (INPATIENT)
Facility: HOSPITAL | Age: 85
LOS: 1 days | DRG: 951 | End: 2025-07-07
Attending: STUDENT IN AN ORGANIZED HEALTH CARE EDUCATION/TRAINING PROGRAM | Admitting: STUDENT IN AN ORGANIZED HEALTH CARE EDUCATION/TRAINING PROGRAM
Payer: MEDICARE

## 2025-07-06 DIAGNOSIS — I62.9 INTRACRANIAL HEMORRHAGE (HCC): Primary | ICD-10-CM

## 2025-07-06 DIAGNOSIS — J96.02 ACUTE RESPIRATORY FAILURE WITH HYPOXIA AND HYPERCAPNIA (HCC): ICD-10-CM

## 2025-07-06 DIAGNOSIS — Z51.5 PALLIATIVE CARE STATUS: ICD-10-CM

## 2025-07-06 DIAGNOSIS — J96.01 ACUTE RESPIRATORY FAILURE WITH HYPOXIA AND HYPERCAPNIA (HCC): ICD-10-CM

## 2025-07-06 PROBLEM — L82.0 INFLAMED SEBORRHEIC KERATOSIS: Status: RESOLVED | Noted: 2025-04-07 | Resolved: 2025-07-06

## 2025-07-06 PROBLEM — E87.1 HYPO-OSMOLALITY AND HYPONATREMIA: Chronic | Status: RESOLVED | Noted: 2020-07-28 | Resolved: 2025-07-06

## 2025-07-06 PROBLEM — D64.9 ABSOLUTE ANEMIA: Status: RESOLVED | Noted: 2024-02-29 | Resolved: 2025-07-06

## 2025-07-06 PROBLEM — L57.0 ACTINIC KERATOSIS: Status: RESOLVED | Noted: 2025-04-07 | Resolved: 2025-07-06

## 2025-07-06 LAB
ALBUMIN SERPL-MCNC: 2.9 G/DL (ref 3.5–5)
ALBUMIN/GLOB SERPL: 0.8 (ref 1.1–2.2)
ALP SERPL-CCNC: 64 U/L (ref 45–117)
ALT SERPL-CCNC: 24 U/L (ref 12–78)
ANION GAP SERPL CALC-SCNC: 4 MMOL/L (ref 2–12)
ARTERIAL PATENCY WRIST A: YES
AST SERPL-CCNC: 26 U/L (ref 15–37)
BASE DEFICIT BLDA-SCNC: 3.6 MMOL/L
BASOPHILS # BLD: 0.06 K/UL (ref 0–0.1)
BASOPHILS NFR BLD: 0.6 % (ref 0–1)
BDY SITE: ABNORMAL
BILIRUB SERPL-MCNC: 0.6 MG/DL (ref 0.2–1)
BUN SERPL-MCNC: 23 MG/DL (ref 6–20)
BUN/CREAT SERPL: 21 (ref 12–20)
CALCIUM SERPL-MCNC: 8.5 MG/DL (ref 8.5–10.1)
CHLORIDE SERPL-SCNC: 105 MMOL/L (ref 97–108)
CO2 SERPL-SCNC: 23 MMOL/L (ref 21–32)
COMMENT:: NORMAL
CREAT SERPL-MCNC: 1.08 MG/DL (ref 0.55–1.02)
DIFFERENTIAL METHOD BLD: ABNORMAL
EKG ATRIAL RATE: 59 BPM
EKG DIAGNOSIS: NORMAL
EKG P AXIS: 35 DEGREES
EKG P-R INTERVAL: 154 MS
EKG Q-T INTERVAL: 498 MS
EKG QRS DURATION: 94 MS
EKG QTC CALCULATION (BAZETT): 493 MS
EKG R AXIS: -18 DEGREES
EKG T AXIS: 0 DEGREES
EKG VENTRICULAR RATE: 59 BPM
EOSINOPHIL # BLD: 0.18 K/UL (ref 0–0.4)
EOSINOPHIL NFR BLD: 1.8 % (ref 0–7)
ERYTHROCYTE [DISTWIDTH] IN BLOOD BY AUTOMATED COUNT: 13.8 % (ref 11.5–14.5)
FIO2 ON VENT: 40 %
GAS FLOW.O2 SETTING OXYMISER: 14
GLOBULIN SER CALC-MCNC: 3.8 G/DL (ref 2–4)
GLUCOSE BLD STRIP.AUTO-MCNC: 258 MG/DL (ref 65–117)
GLUCOSE SERPL-MCNC: 282 MG/DL (ref 65–100)
HCO3 BLDA-SCNC: 22 MMOL/L (ref 22–26)
HCT VFR BLD AUTO: 33.1 % (ref 35–47)
HGB BLD-MCNC: 10.4 G/DL (ref 11.5–16)
IMM GRANULOCYTES # BLD AUTO: 0.06 K/UL (ref 0–0.04)
IMM GRANULOCYTES NFR BLD AUTO: 0.6 % (ref 0–0.5)
INR PPP: 1 (ref 0.9–1.1)
LYMPHOCYTES # BLD: 1.21 K/UL (ref 0.8–3.5)
LYMPHOCYTES NFR BLD: 12 % (ref 12–49)
MCH RBC QN AUTO: 28.3 PG (ref 26–34)
MCHC RBC AUTO-ENTMCNC: 31.4 G/DL (ref 30–36.5)
MCV RBC AUTO: 89.9 FL (ref 80–99)
MONOCYTES # BLD: 0.62 K/UL (ref 0–1)
MONOCYTES NFR BLD: 6.2 % (ref 5–13)
NEUTS SEG # BLD: 7.92 K/UL (ref 1.8–8)
NEUTS SEG NFR BLD: 78.8 % (ref 32–75)
NRBC # BLD: 0 K/UL (ref 0–0.01)
NRBC BLD-RTO: 0 PER 100 WBC
PCO2 BLDA: 42 MMHG (ref 35–45)
PEEP RESPIRATORY: 5
PH BLDA: 7.34 (ref 7.35–7.45)
PLATELET # BLD AUTO: 292 K/UL (ref 150–400)
PMV BLD AUTO: 11 FL (ref 8.9–12.9)
PO2 BLDA: 144 MMHG (ref 80–100)
POTASSIUM SERPL-SCNC: 4.9 MMOL/L (ref 3.5–5.1)
PROT SERPL-MCNC: 6.7 G/DL (ref 6.4–8.2)
PROTHROMBIN TIME: 11.1 SEC (ref 9.2–11.2)
RBC # BLD AUTO: 3.68 M/UL (ref 3.8–5.2)
SAO2 % BLD: 99 % (ref 92–97)
SAO2% DEVICE SAO2% SENSOR NAME: ABNORMAL
SERVICE CMNT-IMP: ABNORMAL
SODIUM SERPL-SCNC: 132 MMOL/L (ref 136–145)
SPECIMEN HOLD: NORMAL
SPECIMEN SITE: ABNORMAL
TROPONIN I SERPL HS-MCNC: 7 NG/L (ref 0–51)
VENTILATION MODE VENT: ABNORMAL
VT SETTING VENT: 350
WBC # BLD AUTO: 10.1 K/UL (ref 3.6–11)

## 2025-07-06 PROCEDURE — 6360000002 HC RX W HCPCS: Performed by: EMERGENCY MEDICINE

## 2025-07-06 PROCEDURE — 85025 COMPLETE CBC W/AUTO DIFF WBC: CPT

## 2025-07-06 PROCEDURE — 36600 WITHDRAWAL OF ARTERIAL BLOOD: CPT

## 2025-07-06 PROCEDURE — 84484 ASSAY OF TROPONIN QUANT: CPT

## 2025-07-06 PROCEDURE — 2500000003 HC RX 250 WO HCPCS: Performed by: STUDENT IN AN ORGANIZED HEALTH CARE EDUCATION/TRAINING PROGRAM

## 2025-07-06 PROCEDURE — 74177 CT ABD & PELVIS W/CONTRAST: CPT

## 2025-07-06 PROCEDURE — 6360000004 HC RX CONTRAST MEDICATION: Performed by: EMERGENCY MEDICINE

## 2025-07-06 PROCEDURE — 71045 X-RAY EXAM CHEST 1 VIEW: CPT

## 2025-07-06 PROCEDURE — 72125 CT NECK SPINE W/O DYE: CPT

## 2025-07-06 PROCEDURE — 85610 PROTHROMBIN TIME: CPT

## 2025-07-06 PROCEDURE — 82962 GLUCOSE BLOOD TEST: CPT

## 2025-07-06 PROCEDURE — 80053 COMPREHEN METABOLIC PANEL: CPT

## 2025-07-06 PROCEDURE — 2500000003 HC RX 250 WO HCPCS: Performed by: EMERGENCY MEDICINE

## 2025-07-06 PROCEDURE — 82803 BLOOD GASES ANY COMBINATION: CPT

## 2025-07-06 PROCEDURE — 6370000000 HC RX 637 (ALT 250 FOR IP): Performed by: EMERGENCY MEDICINE

## 2025-07-06 PROCEDURE — 94002 VENT MGMT INPAT INIT DAY: CPT

## 2025-07-06 PROCEDURE — 96368 THER/DIAG CONCURRENT INF: CPT

## 2025-07-06 PROCEDURE — 99285 EMERGENCY DEPT VISIT HI MDM: CPT

## 2025-07-06 PROCEDURE — 96375 TX/PRO/DX INJ NEW DRUG ADDON: CPT

## 2025-07-06 PROCEDURE — 2580000003 HC RX 258: Performed by: EMERGENCY MEDICINE

## 2025-07-06 PROCEDURE — 96376 TX/PRO/DX INJ SAME DRUG ADON: CPT

## 2025-07-06 PROCEDURE — 93005 ELECTROCARDIOGRAM TRACING: CPT | Performed by: EMERGENCY MEDICINE

## 2025-07-06 PROCEDURE — 36415 COLL VENOUS BLD VENIPUNCTURE: CPT

## 2025-07-06 PROCEDURE — 96366 THER/PROPH/DIAG IV INF ADDON: CPT

## 2025-07-06 PROCEDURE — 51702 INSERT TEMP BLADDER CATH: CPT

## 2025-07-06 PROCEDURE — 74018 RADEX ABDOMEN 1 VIEW: CPT

## 2025-07-06 PROCEDURE — 6560000002 HC HOSPICE GENERAL INPATIENT

## 2025-07-06 PROCEDURE — 96365 THER/PROPH/DIAG IV INF INIT: CPT

## 2025-07-06 PROCEDURE — 70450 CT HEAD/BRAIN W/O DYE: CPT

## 2025-07-06 RX ORDER — ONDANSETRON 2 MG/ML
4 INJECTION INTRAMUSCULAR; INTRAVENOUS EVERY 6 HOURS PRN
Status: DISCONTINUED | OUTPATIENT
Start: 2025-07-06 | End: 2025-07-07 | Stop reason: HOSPADM

## 2025-07-06 RX ORDER — MORPHINE SULFATE 20 MG/ML
10 SOLUTION ORAL
Refills: 0 | Status: DISCONTINUED | OUTPATIENT
Start: 2025-07-06 | End: 2025-07-07 | Stop reason: HOSPADM

## 2025-07-06 RX ORDER — MIDAZOLAM HYDROCHLORIDE 5 MG/5ML
2 INJECTION, SOLUTION INTRAMUSCULAR; INTRAVENOUS
Status: COMPLETED | OUTPATIENT
Start: 2025-07-06 | End: 2025-07-06

## 2025-07-06 RX ORDER — MORPHINE SULFATE 4 MG/ML
4 INJECTION, SOLUTION INTRAMUSCULAR; INTRAVENOUS
Status: DISCONTINUED | OUTPATIENT
Start: 2025-07-06 | End: 2025-07-06

## 2025-07-06 RX ORDER — HALOPERIDOL 5 MG/ML
1 INJECTION INTRAMUSCULAR
Status: DISCONTINUED | OUTPATIENT
Start: 2025-07-06 | End: 2025-07-07 | Stop reason: HOSPADM

## 2025-07-06 RX ORDER — SODIUM CHLORIDE 0.9 % (FLUSH) 0.9 %
5-40 SYRINGE (ML) INJECTION EVERY 12 HOURS SCHEDULED
Status: DISCONTINUED | OUTPATIENT
Start: 2025-07-06 | End: 2025-07-07 | Stop reason: HOSPADM

## 2025-07-06 RX ORDER — SCOPOLAMINE 1 MG/3D
1 PATCH, EXTENDED RELEASE TRANSDERMAL
Status: DISCONTINUED | OUTPATIENT
Start: 2025-07-06 | End: 2025-07-07 | Stop reason: HOSPADM

## 2025-07-06 RX ORDER — FENTANYL CITRATE-0.9 % NACL/PF 20 MCG/2ML
50 SYRINGE (ML) INTRAVENOUS EVERY 30 MIN PRN
Refills: 0 | Status: DISCONTINUED | OUTPATIENT
Start: 2025-07-06 | End: 2025-07-06

## 2025-07-06 RX ORDER — SODIUM CHLORIDE 0.9 % (FLUSH) 0.9 %
5-40 SYRINGE (ML) INJECTION PRN
Status: DISCONTINUED | OUTPATIENT
Start: 2025-07-06 | End: 2025-07-07 | Stop reason: HOSPADM

## 2025-07-06 RX ORDER — FENTANYL CITRATE-0.9 % NACL/PF 10 MCG/ML
25-200 PLASTIC BAG, INJECTION (ML) INTRAVENOUS CONTINUOUS
Refills: 0 | Status: DISCONTINUED | OUTPATIENT
Start: 2025-07-06 | End: 2025-07-06

## 2025-07-06 RX ORDER — GLYCOPYRROLATE 0.2 MG/ML
0.2 INJECTION INTRAMUSCULAR; INTRAVENOUS EVERY 4 HOURS PRN
Status: DISCONTINUED | OUTPATIENT
Start: 2025-07-06 | End: 2025-07-07 | Stop reason: HOSPADM

## 2025-07-06 RX ORDER — IOPAMIDOL 755 MG/ML
100 INJECTION, SOLUTION INTRAVASCULAR
Status: COMPLETED | OUTPATIENT
Start: 2025-07-06 | End: 2025-07-06

## 2025-07-06 RX ORDER — MANNITOL 20 G/100ML
1 INJECTION, SOLUTION INTRAVENOUS ONCE
Status: COMPLETED | OUTPATIENT
Start: 2025-07-06 | End: 2025-07-06

## 2025-07-06 RX ORDER — DIAZEPAM 10 MG/2ML
5 INJECTION, SOLUTION INTRAMUSCULAR; INTRAVENOUS EVERY 4 HOURS PRN
Status: DISCONTINUED | OUTPATIENT
Start: 2025-07-06 | End: 2025-07-07 | Stop reason: HOSPADM

## 2025-07-06 RX ORDER — SODIUM CHLORIDE 9 MG/ML
INJECTION, SOLUTION INTRAVENOUS PRN
Status: DISCONTINUED | OUTPATIENT
Start: 2025-07-06 | End: 2025-07-07 | Stop reason: HOSPADM

## 2025-07-06 RX ORDER — LABETALOL HYDROCHLORIDE 5 MG/ML
INJECTION, SOLUTION INTRAVENOUS
Status: DISCONTINUED
Start: 2025-07-06 | End: 2025-07-06

## 2025-07-06 RX ORDER — MORPHINE SULFATE 2 MG/ML
2 INJECTION, SOLUTION INTRAMUSCULAR; INTRAVENOUS EVERY 30 MIN PRN
Status: DISCONTINUED | OUTPATIENT
Start: 2025-07-06 | End: 2025-07-07 | Stop reason: HOSPADM

## 2025-07-06 RX ADMIN — ONDANSETRON 4 MG: 2 INJECTION, SOLUTION INTRAMUSCULAR; INTRAVENOUS at 15:02

## 2025-07-06 RX ADMIN — Medication 50 MCG: at 12:57

## 2025-07-06 RX ADMIN — IOPAMIDOL 100 ML: 755 INJECTION, SOLUTION INTRAVENOUS at 11:26

## 2025-07-06 RX ADMIN — MORPHINE SULFATE 2 MG: 2 INJECTION, SOLUTION INTRAMUSCULAR; INTRAVENOUS at 17:19

## 2025-07-06 RX ADMIN — MORPHINE SULFATE 2 MG: 2 INJECTION, SOLUTION INTRAMUSCULAR; INTRAVENOUS at 18:28

## 2025-07-06 RX ADMIN — SODIUM CHLORIDE, PRESERVATIVE FREE 10 ML: 5 INJECTION INTRAVENOUS at 21:02

## 2025-07-06 RX ADMIN — MANNITOL 75.8 G: 20 INJECTION, SOLUTION INTRAVENOUS at 12:48

## 2025-07-06 RX ADMIN — NICARDIPINE HYDROCHLORIDE 5 MG/HR: 25 INJECTION INTRAVENOUS at 11:26

## 2025-07-06 RX ADMIN — MORPHINE SULFATE 4 MG: 4 INJECTION INTRAVENOUS at 15:02

## 2025-07-06 RX ADMIN — MORPHINE SULFATE 2 MG: 2 INJECTION, SOLUTION INTRAMUSCULAR; INTRAVENOUS at 20:58

## 2025-07-06 RX ADMIN — Medication 50 MCG/HR: at 12:43

## 2025-07-06 RX ADMIN — MIDAZOLAM 2 MG: 1 INJECTION INTRAMUSCULAR; INTRAVENOUS at 15:05

## 2025-07-06 ASSESSMENT — PULMONARY FUNCTION TESTS: PIF_VALUE: 31

## 2025-07-06 NOTE — ED PROVIDER NOTES
1000 mcg/100mL (25 mcg/hr IntraVENous Rate/Dose Change 7/6/25 1400)     And   fentaNYL (SUBLIMAZE) bolus from bag (50 mcg IntraVENous Given 7/6/25 1257)   scopolamine (TRANSDERM-SCOP) transdermal patch 1 patch (1 patch TransDERmal Patch Applied 7/6/25 1412)   ondansetron (ZOFRAN) injection 4 mg (has no administration in time range)   morphine 20MG/ML concentrated solution 10 mg (has no administration in time range)   midazolam PF (VERSED) IntraVENous 2 mg (has no administration in time range)   morphine sulfate (PF) injection 4 mg (has no administration in time range)   iopamidol (ISOVUE-370) 76 % injection 100 mL (100 mLs IntraVENous Given 7/6/25 1126)   mannitol 20 % IVPB 75.8 g (0 g IntraVENous Stopped 7/6/25 1322)       Medical Decision Making  84-year-old female presents with altered mental status, intubated on scene by EMS with prehospital report of GCS 3 T.  Glucose normal.  No external signs of trauma. Hypertensive and intermittently bradycardic.  Will call code stroke level 1 to expedite CT.  CT head to rule out hemorrhage.  Check labs to rule out other toxic metabolic causes. Consider rapid EEG, no infectious symptoms.  Observe in ED.    Amount and/or Complexity of Data Reviewed  Independent Historian: EMS     Details: daughters  External Data Reviewed: notes.     Details: Medication history  Labs: ordered. Decision-making details documented in ED Course.  Radiology: ordered and independent interpretation performed. Decision-making details documented in ED Course.  ECG/medicine tests: ordered and independent interpretation performed. Decision-making details documented in ED Course.    Risk  Prescription drug management.  Parenteral controlled substances.  Drug therapy requiring intensive monitoring for toxicity.  Decision regarding hospitalization.  Decision not to resuscitate or to de-escalate care because of poor prognosis.          CLINICAL MANAGEMENT TOOLS:        ED Course as of 07/06/25 1502   Sun

## 2025-07-06 NOTE — H&P
Hospitalist Admission Note     Demographics    Patient Name  Gifty Biswas   Date of Birth 1940   Medical Record Number  [id number]    Age  84 y.o.   PCP Kevin Meyer MD   Admit date:  7/6/2025 10:40 AM   Date of Service 7/6/25   Room Number  ER14/14    @ [facility]     Admission Diagnosis:  Intracranial hemorrhage (HCC)      Active Problem List:  Active Hospital Problems    Diagnosis Date Noted    End of life care 07/06/2025    Acute respiratory failure with hypoxia and hypercapnia (HCC) 07/06/2025    Intracranial hemorrhage (HCC) 07/06/2025         Assessment & Plan   84 y.o. female with an updated PMHx below. See below for more information regarding HPI and ED course. 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 Routine palliative/hospice orders placed, will get hospice evaluation in the AM if patient survives. Spent time with family describing this process in great detail, being mindful that this is a sensitive moment and experience. They were understanding and in agreement with plan to admit and move her out of the ED for a little bit quieter more private space on the floor. Confirmed DNR status, which had been made hours prior to admission when patient was extubated by ED physician colleague.     *As this is documentation created with initial plan only, shortly after initially seeing patient and review of initial data, there will be changes made to this plan that may not be directly reflected in this documentation. Additional details of management can be found throughout EMR.     History of Present Illness   ED Consult Reason for Admission:   1. Intracranial hemorrhage (HCC)    2. Acute respiratory failure with hypoxia and hypercapnia (HCC)    3. Palliative care status      ED Report // Course (including perfect serve secure chat information)-->   Patient in ED 14, GIP admission.

## 2025-07-06 NOTE — ED NOTES
Dr. Cecil benavides/ tele-neuro at bedside   
ER MD at bedside w/ family   
Family returned to bedside   
Pt cleaned and placed in [employer]   
Pt transferred back to room from CT   
RN assumed care of pt, pr triage note:     Pt arrives to ED via [facility], coming in for unresponsiveness from home, patient intubated in the field. Possible LKW 0915. According to medication list from previous AVS shows that patient takes Plaavix and Aspirin     Pt transferred to CT w/ x2 Rns and RT at this time, pt arrived intubated from the field   
RN called to inform [facility] of pt status - left a voicemail at this time   
RN spoke w/ Geeta at [facility]. Coordinator informs that organ donation is rule-out due to pt previously being on the ventilator, advised RN to call back w/ GILBERTO  
RN titrated down fentanyl per verbal from provider.    Monitor changed to comfort measures per ICU MD  
RT at bedside to extubate   
TRANSFER - OUT REPORT:    Verbal report given to VASQUEZ Alegre on Gifty Biswas  being transferred to [facility] for routine progression of patient care       Report consisted of patient's Situation, Background, Assessment and   Recommendations(SBAR).     Information from the following report(s) Nurse Handoff Report, Index, ED Encounter Summary, ED SBAR, MAR, Recent Results, Med Rec Status, and Neuro Assessment was reviewed with the receiving nurse.    [place] Fall Assessment:    Presents to emergency department  because of falls (Syncope, seizure, or loss of consciousness): Yes  Age > 70: Yes  Altered Mental Status, Intoxication with alcohol or substance confusion (Disorientation, impaired judgment, poor safety awaremess, or inability to follow instructions): Yes  Impaired Mobility: Ambulates or transfers with assistive devices or assistance; Unable to ambulate or transer.: Yes  Nursing Judgement: Yes          Lines:   Peripheral IV 07/06/25 Left Forearm (Active)       Peripheral IV 07/06/25 Right Antecubital (Active)        Opportunity for questions and clarification was provided.              
Tele-Neuro paged at this time for Code S lv 1   
nothing

## 2025-07-07 VITALS
TEMPERATURE: 100.8 F | RESPIRATION RATE: 27 BRPM | WEIGHT: 167 LBS | BODY MASS INDEX: 29.59 KG/M2 | HEART RATE: 68 BPM | DIASTOLIC BLOOD PRESSURE: 66 MMHG | OXYGEN SATURATION: 91 % | SYSTOLIC BLOOD PRESSURE: 171 MMHG | HEIGHT: 63 IN

## 2025-07-07 PROCEDURE — 2700000000 HC OXYGEN THERAPY PER DAY

## 2025-07-07 PROCEDURE — 94760 N-INVAS EAR/PLS OXIMETRY 1: CPT

## 2025-07-07 PROCEDURE — 6360000002 HC RX W HCPCS: Performed by: EMERGENCY MEDICINE

## 2025-07-07 PROCEDURE — 51702 INSERT TEMP BLADDER CATH: CPT

## 2025-07-07 RX ADMIN — MORPHINE SULFATE 2 MG: 2 INJECTION, SOLUTION INTRAMUSCULAR; INTRAVENOUS at 00:59

## 2025-07-07 RX ADMIN — GLYCOPYRROLATE 0.2 MG: 0.2 INJECTION, SOLUTION INTRAMUSCULAR; INTRAVENOUS at 01:08

## 2025-07-07 NOTE — DEATH NOTES
Patient's son-in-law reported to this nurse that patient had . Pulses and heartbeat assessed by this nurse and VASQUEZ Lazcano. Patient cleaned up by JUNIOR Mohamud and son-in-law and new gown placed on patient. All lines/drains removed from patient. Covering provider called. Patient time of death declared at 0158 by HERMELINDA Mcdonough.  called.  home called and this nurse was told that  would contact us at [phone]. Time given to family to view loved one. Record of death filled out and sent to supervisors office. [facility].

## 2025-07-07 NOTE — DEATH NOTES
Pronouncement of death       Pt Name  Gifty Biswas   Admit date:  7/6/2025   Date and time of death  7/7/25 at 0158 AM EDT   Room Number  1111/01    Medical Record Number  [id number] @ [facility]   Age  84 y.o.   Date of Birth 1940   PCP Kevin Meyer MD     Admission Diagnoses: Intracranial hemorrhage (HCC)     I was called by pt's nurse Sis Qiu to pronounce the patient's death.   On 7/7/25 2:54 AM EDT I evaluated the patient.     Patient ID verified  Yes: Comment: family at bedside, bracelet   Pulses Carotid and peripheral pulses not palpable    Cardiac auscultation x 1min No heart sounds    Lungs and breath x 1min No audible lungs sounds, no visible signs of spontaneous breath    Pupils Dilated approximately 4mm and fixed. Not reactive to light. No corneal reflex   Pacemaker  Not palpable    Telemetry/rhythm strip  N/a     Audi Gamboa MD will be responsible for completing the death summary/hospital course and official death certificate.     Hospital staff will complete relevant paperwork including discharge disposition, communication with patient's family and other pertinent next steps      Sharonda Royal PA-C   7/7/2025

## 2025-07-07 NOTE — PLAN OF CARE
Problem: Respiratory - Adult  Goal: Achieves optimal ventilation and oxygenation  7/6/2025 2336 by Sis Qiu, RN  Outcome: Progressing  Flowsheets (Taken 7/6/2025 2037)  Achieves optimal ventilation and oxygenation:   Assess for changes in respiratory status   Assess for changes in mentation and behavior   Position to facilitate oxygenation and minimize respiratory effort  7/6/2025 1149 by Asha Phillips, RT  Outcome: Progressing     Problem: Safety - Adult  Goal: Free from fall injury  Outcome: Progressing  Flowsheets (Taken 7/6/2025 2037)  Free From Fall Injury: Instruct family/caregiver on patient safety     Problem: Chronic Conditions and Co-morbidities  Goal: Patient's chronic conditions and co-morbidity symptoms are monitored and maintained or improved  Outcome: Progressing  Flowsheets (Taken 7/6/2025 2037)  Care Plan - Patient's Chronic Conditions and Co-Morbidity Symptoms are Monitored and Maintained or Improved: Monitor and assess patient's chronic conditions and comorbid symptoms for stability, deterioration, or improvement     Problem: Discharge Planning  Goal: Discharge to home or other facility with appropriate resources  Outcome: Not Progressing  Flowsheets (Taken 7/6/2025 2037)  Discharge to home or other facility with appropriate resources: Identify barriers to discharge with patient and caregiver     Problem: Pain  Goal: Verbalizes/displays adequate comfort level or baseline comfort level  Outcome: Not Progressing

## 2025-07-08 ENCOUNTER — CLINICAL DOCUMENTATION (OUTPATIENT)
Age: 85
End: 2025-07-08
